# Patient Record
Sex: MALE | Race: WHITE | Employment: PART TIME | URBAN - METROPOLITAN AREA
[De-identification: names, ages, dates, MRNs, and addresses within clinical notes are randomized per-mention and may not be internally consistent; named-entity substitution may affect disease eponyms.]

---

## 2017-04-04 PROBLEM — G43.B1 INTRACTABLE OPHTHALMOPLEGIC MIGRAINE: Status: ACTIVE | Noted: 2017-04-04

## 2017-04-04 PROBLEM — Z72.0 TOBACCO ABUSE: Status: ACTIVE | Noted: 2017-04-04

## 2018-07-24 ENCOUNTER — HOSPITAL ENCOUNTER (OUTPATIENT)
Age: 68
Discharge: HOME | End: 2018-07-26
Payer: MEDICARE

## 2018-07-24 ENCOUNTER — OFFICE VISIT (OUTPATIENT)
Dept: PRIMARY CARE CLINIC | Age: 68
End: 2018-07-24
Payer: MEDICARE

## 2018-07-24 VITALS
SYSTOLIC BLOOD PRESSURE: 120 MMHG | OXYGEN SATURATION: 93 % | WEIGHT: 212 LBS | HEIGHT: 72 IN | HEART RATE: 78 BPM | TEMPERATURE: 97 F | BODY MASS INDEX: 28.71 KG/M2 | DIASTOLIC BLOOD PRESSURE: 70 MMHG

## 2018-07-24 DIAGNOSIS — E11.9 TYPE 2 DIABETES MELLITUS WITHOUT COMPLICATION, WITHOUT LONG-TERM CURRENT USE OF INSULIN (HCC): ICD-10-CM

## 2018-07-24 DIAGNOSIS — J20.9 ACUTE BRONCHITIS, UNSPECIFIED ORGANISM: Primary | ICD-10-CM

## 2018-07-24 DIAGNOSIS — E04.9 GOITER: ICD-10-CM

## 2018-07-24 DIAGNOSIS — E78.2 MIXED HYPERLIPIDEMIA: ICD-10-CM

## 2018-07-24 LAB
ALBUMIN SERPL-MCNC: 4.1 G/DL (ref 3.5–5.2)
ALP BLD-CCNC: 138 U/L (ref 40–129)
ALT SERPL-CCNC: 14 U/L (ref 0–40)
ANION GAP SERPL CALCULATED.3IONS-SCNC: 18 MMOL/L (ref 7–16)
AST SERPL-CCNC: 17 U/L (ref 0–39)
BILIRUB SERPL-MCNC: 0.9 MG/DL (ref 0–1.2)
BUN BLDV-MCNC: 12 MG/DL (ref 8–23)
CALCIUM SERPL-MCNC: 9.5 MG/DL (ref 8.6–10.2)
CHLORIDE BLD-SCNC: 99 MMOL/L (ref 98–107)
CHOLESTEROL, TOTAL: 134 MG/DL (ref 0–199)
CO2: 21 MMOL/L (ref 22–29)
CREAT SERPL-MCNC: 1.3 MG/DL (ref 0.7–1.2)
GFR AFRICAN AMERICAN: >60
GFR NON-AFRICAN AMERICAN: 55 ML/MIN/1.73
GLUCOSE BLD-MCNC: 135 MG/DL (ref 74–109)
HBA1C MFR BLD: 7.3 % (ref 4–5.6)
HDLC SERPL-MCNC: 28 MG/DL
LDL CHOLESTEROL CALCULATED: 34 MG/DL (ref 0–99)
POTASSIUM SERPL-SCNC: 4.6 MMOL/L (ref 3.5–5)
SODIUM BLD-SCNC: 138 MMOL/L (ref 132–146)
TOTAL PROTEIN: 6.7 G/DL (ref 6.4–8.3)
TRIGL SERPL-MCNC: 360 MG/DL (ref 0–149)
TSH SERPL DL<=0.05 MIU/L-ACNC: 1.93 UIU/ML (ref 0.27–4.2)
VLDLC SERPL CALC-MCNC: 72 MG/DL

## 2018-07-24 PROCEDURE — 80053 COMPREHEN METABOLIC PANEL: CPT

## 2018-07-24 PROCEDURE — 80061 LIPID PANEL: CPT

## 2018-07-24 PROCEDURE — 83036 HEMOGLOBIN GLYCOSYLATED A1C: CPT

## 2018-07-24 PROCEDURE — 84443 ASSAY THYROID STIM HORMONE: CPT

## 2018-07-24 PROCEDURE — 99214 OFFICE O/P EST MOD 30 MIN: CPT | Performed by: FAMILY MEDICINE

## 2018-07-24 RX ORDER — ATORVASTATIN CALCIUM 10 MG/1
TABLET, FILM COATED ORAL
Qty: 90 TABLET | Refills: 1 | Status: SHIPPED | OUTPATIENT
Start: 2018-07-24 | End: 2019-01-20 | Stop reason: SDUPTHER

## 2018-07-24 RX ORDER — RAMIPRIL 1.25 MG/1
CAPSULE ORAL
Qty: 90 CAPSULE | Refills: 1 | Status: SHIPPED | OUTPATIENT
Start: 2018-07-24 | End: 2019-01-20 | Stop reason: SDUPTHER

## 2018-07-24 RX ORDER — AZITHROMYCIN 250 MG/1
TABLET, FILM COATED ORAL
Qty: 1 PACKET | Refills: 0 | Status: SHIPPED | OUTPATIENT
Start: 2018-07-24 | End: 2019-01-21

## 2018-07-24 ASSESSMENT — ENCOUNTER SYMPTOMS
SHORTNESS OF BREATH: 0
BLURRED VISION: 0
CONSTIPATION: 1
SINUS PRESSURE: 1
RHINORRHEA: 1
SINUS PAIN: 1
DIARRHEA: 0
VISUAL CHANGE: 0
WHEEZING: 0
COUGH: 1

## 2018-07-24 ASSESSMENT — COPD QUESTIONNAIRES: COPD: 1

## 2018-07-24 NOTE — PROGRESS NOTES
rhythm. No murmur heard. Pulmonary/Chest: Effort normal. He has no wheezes. He has rhonchi in the right upper field and the left upper field. He has no rales. Abdominal: Soft. Bowel sounds are normal. He exhibits no mass. There is no tenderness. There is no rebound and no guarding. Musculoskeletal: Normal range of motion. He exhibits no edema. Lymphadenopathy:     He has no cervical adenopathy. Neurological: He is alert and oriented to person, place, and time. Skin: Skin is warm and dry. Psychiatric: He has a normal mood and affect. Vitals reviewed. ASSESSMENT AND PLAN:    Dimple Delatorre was seen today for diabetes, hyperlipidemia, copd, 6 month follow-up, blood work, medication refill, cough and congestion. Diagnoses and all orders for this visit:    Acute bronchitis, unspecified organism  -     azithromycin (ZITHROMAX Z-JANNETTE) 250 MG tablet; Take 2 pills on day 1, then 1 pill days 2-5. Mixed hyperlipidemia  -     atorvastatin (LIPITOR) 10 MG tablet; TAKE 1 TABLET DAILY  -     Lipid Panel; Future  -     Comprehensive Metabolic Panel; Future    Type 2 diabetes mellitus without complication, without long-term current use of insulin (HCC)  -     ramipril (ALTACE) 1.25 MG capsule; TAKE 1 CAPSULE DAILY  -     SITagliptin-metFORMIN HCl ER (JANUMET XR) 100-1000 MG TB24; TAKE 1 TABLET DAILY  -     Hemoglobin A1C; Future    Goiter  -     TSH without Reflex; Future    - call if no change in cough  - quit cig's    Return in about 6 months (around 1/24/2019), or if symptoms worsen or fail to improve.     Dwayne Ojeda, DO

## 2018-07-26 ENCOUNTER — TELEPHONE (OUTPATIENT)
Dept: PRIMARY CARE CLINIC | Age: 68
End: 2018-07-26

## 2018-11-08 LAB — DIABETIC RETINOPATHY: NEGATIVE

## 2019-01-20 DIAGNOSIS — E11.9 TYPE 2 DIABETES MELLITUS WITHOUT COMPLICATION, WITHOUT LONG-TERM CURRENT USE OF INSULIN (HCC): ICD-10-CM

## 2019-01-20 DIAGNOSIS — E78.2 MIXED HYPERLIPIDEMIA: ICD-10-CM

## 2019-01-21 ENCOUNTER — HOSPITAL ENCOUNTER (OUTPATIENT)
Age: 69
Discharge: HOME | End: 2019-01-23
Payer: MEDICARE

## 2019-01-21 ENCOUNTER — OFFICE VISIT (OUTPATIENT)
Dept: PRIMARY CARE CLINIC | Age: 69
End: 2019-01-21
Payer: MEDICARE

## 2019-01-21 VITALS
DIASTOLIC BLOOD PRESSURE: 62 MMHG | WEIGHT: 212 LBS | TEMPERATURE: 96 F | HEART RATE: 78 BPM | BODY MASS INDEX: 29.16 KG/M2 | SYSTOLIC BLOOD PRESSURE: 136 MMHG | OXYGEN SATURATION: 95 %

## 2019-01-21 DIAGNOSIS — Z12.11 COLON CANCER SCREENING: ICD-10-CM

## 2019-01-21 DIAGNOSIS — E11.65 UNCONTROLLED TYPE 2 DIABETES MELLITUS WITH HYPERGLYCEMIA (HCC): Primary | ICD-10-CM

## 2019-01-21 DIAGNOSIS — Z12.5 SCREENING PSA (PROSTATE SPECIFIC ANTIGEN): ICD-10-CM

## 2019-01-21 DIAGNOSIS — E78.2 MIXED HYPERLIPIDEMIA: ICD-10-CM

## 2019-01-21 DIAGNOSIS — E11.65 UNCONTROLLED TYPE 2 DIABETES MELLITUS WITH HYPERGLYCEMIA (HCC): ICD-10-CM

## 2019-01-21 DIAGNOSIS — J43.2 CENTRILOBULAR EMPHYSEMA (HCC): ICD-10-CM

## 2019-01-21 LAB
ALBUMIN SERPL-MCNC: 4.3 G/DL (ref 3.5–5.2)
ALP BLD-CCNC: 132 U/L (ref 40–129)
ALT SERPL-CCNC: 15 U/L (ref 0–40)
ANION GAP SERPL CALCULATED.3IONS-SCNC: 15 MMOL/L (ref 7–16)
AST SERPL-CCNC: 17 U/L (ref 0–39)
BILIRUB SERPL-MCNC: 1.6 MG/DL (ref 0–1.2)
BUN BLDV-MCNC: 10 MG/DL (ref 8–23)
CALCIUM SERPL-MCNC: 9.2 MG/DL (ref 8.6–10.2)
CHLORIDE BLD-SCNC: 96 MMOL/L (ref 98–107)
CHOLESTEROL, TOTAL: 118 MG/DL (ref 0–199)
CO2: 26 MMOL/L (ref 22–29)
CREAT SERPL-MCNC: 1.3 MG/DL (ref 0.7–1.2)
CREATININE URINE: 207 MG/DL (ref 40–278)
GFR AFRICAN AMERICAN: >60
GFR NON-AFRICAN AMERICAN: 54 ML/MIN/1.73
GLUCOSE BLD-MCNC: 169 MG/DL (ref 74–99)
HBA1C MFR BLD: 7.1 % (ref 4–5.6)
HDLC SERPL-MCNC: 33 MG/DL
LDL CHOLESTEROL CALCULATED: 45 MG/DL (ref 0–99)
MICROALBUMIN UR-MCNC: 49.1 MG/L
MICROALBUMIN/CREAT UR-RTO: 23.7 (ref 0–30)
POTASSIUM SERPL-SCNC: 4.8 MMOL/L (ref 3.5–5)
PROSTATE SPECIFIC ANTIGEN: 0.79 NG/ML (ref 0–4)
SODIUM BLD-SCNC: 137 MMOL/L (ref 132–146)
TOTAL PROTEIN: 7 G/DL (ref 6.4–8.3)
TRIGL SERPL-MCNC: 198 MG/DL (ref 0–149)
VLDLC SERPL CALC-MCNC: 40 MG/DL

## 2019-01-21 PROCEDURE — 80053 COMPREHEN METABOLIC PANEL: CPT

## 2019-01-21 PROCEDURE — 99214 OFFICE O/P EST MOD 30 MIN: CPT | Performed by: FAMILY MEDICINE

## 2019-01-21 PROCEDURE — 82570 ASSAY OF URINE CREATININE: CPT

## 2019-01-21 PROCEDURE — 82044 UR ALBUMIN SEMIQUANTITATIVE: CPT

## 2019-01-21 PROCEDURE — G0103 PSA SCREENING: HCPCS

## 2019-01-21 PROCEDURE — 83036 HEMOGLOBIN GLYCOSYLATED A1C: CPT

## 2019-01-21 PROCEDURE — 80061 LIPID PANEL: CPT

## 2019-01-21 RX ORDER — ATORVASTATIN CALCIUM 10 MG/1
TABLET, FILM COATED ORAL
Qty: 90 TABLET | Refills: 1 | Status: SHIPPED | OUTPATIENT
Start: 2019-01-21 | End: 2019-07-20 | Stop reason: SDUPTHER

## 2019-01-21 RX ORDER — RAMIPRIL 1.25 MG/1
CAPSULE ORAL
Qty: 90 CAPSULE | Refills: 1 | Status: SHIPPED | OUTPATIENT
Start: 2019-01-21 | End: 2019-07-20 | Stop reason: SDUPTHER

## 2019-01-21 RX ORDER — SITAGLIPTIN AND METFORMIN HYDROCHLORIDE 100; 1000 MG/1; MG/1
TABLET, FILM COATED, EXTENDED RELEASE ORAL
Qty: 90 TABLET | Refills: 1 | Status: SHIPPED | OUTPATIENT
Start: 2019-01-21 | End: 2019-07-20 | Stop reason: SDUPTHER

## 2019-01-21 ASSESSMENT — COPD QUESTIONNAIRES: COPD: 1

## 2019-01-21 ASSESSMENT — ENCOUNTER SYMPTOMS
BLURRED VISION: 0
VISUAL CHANGE: 0
DIARRHEA: 0
CONSTIPATION: 0
COUGH: 0
WHEEZING: 1
SHORTNESS OF BREATH: 1
BLOOD IN STOOL: 0

## 2019-01-24 ENCOUNTER — TELEPHONE (OUTPATIENT)
Dept: ADMINISTRATIVE | Age: 69
End: 2019-01-24

## 2019-01-24 ENCOUNTER — TELEPHONE (OUTPATIENT)
Dept: SURGERY | Age: 69
End: 2019-01-24

## 2019-01-30 ENCOUNTER — OFFICE VISIT (OUTPATIENT)
Dept: SURGERY | Age: 69
End: 2019-01-30

## 2019-01-30 VITALS
DIASTOLIC BLOOD PRESSURE: 66 MMHG | RESPIRATION RATE: 20 BRPM | HEIGHT: 72 IN | SYSTOLIC BLOOD PRESSURE: 136 MMHG | HEART RATE: 72 BPM | OXYGEN SATURATION: 96 % | TEMPERATURE: 97.7 F | BODY MASS INDEX: 28.71 KG/M2 | WEIGHT: 212 LBS

## 2019-01-30 DIAGNOSIS — Z12.11 ENCOUNTER FOR SCREENING COLONOSCOPY: Primary | ICD-10-CM

## 2019-01-30 PROCEDURE — 99999 PR OFFICE/OUTPT VISIT,PROCEDURE ONLY: CPT | Performed by: SURGERY

## 2019-01-31 ASSESSMENT — ENCOUNTER SYMPTOMS
WHEEZING: 0
VOMITING: 0
PHOTOPHOBIA: 0
BACK PAIN: 0
ABDOMINAL PAIN: 0
COUGH: 0
EYE REDNESS: 0
SORE THROAT: 0
NAUSEA: 0
DIARRHEA: 0
BLOOD IN STOOL: 0
CONSTIPATION: 0
SHORTNESS OF BREATH: 0

## 2019-02-22 ENCOUNTER — TELEPHONE (OUTPATIENT)
Dept: SURGERY | Age: 69
End: 2019-02-22

## 2019-02-28 RX ORDER — IBUPROFEN 200 MG
400 TABLET ORAL EVERY 6 HOURS PRN
Status: ON HOLD | COMMUNITY
End: 2021-08-26 | Stop reason: HOSPADM

## 2019-03-07 ENCOUNTER — HOSPITAL ENCOUNTER (OUTPATIENT)
Age: 69
Setting detail: OUTPATIENT SURGERY
Discharge: HOME | End: 2019-03-07
Attending: SURGERY | Admitting: SURGERY
Payer: MEDICARE

## 2019-03-07 ENCOUNTER — ANESTHESIA (OUTPATIENT)
Dept: ENDOSCOPY | Age: 69
End: 2019-03-07
Payer: MEDICARE

## 2019-03-07 ENCOUNTER — ANESTHESIA EVENT (OUTPATIENT)
Dept: ENDOSCOPY | Age: 69
End: 2019-03-07
Payer: MEDICARE

## 2019-03-07 VITALS
OXYGEN SATURATION: 94 % | BODY MASS INDEX: 27.77 KG/M2 | RESPIRATION RATE: 18 BRPM | SYSTOLIC BLOOD PRESSURE: 153 MMHG | HEIGHT: 72 IN | TEMPERATURE: 97.3 F | WEIGHT: 205 LBS | HEART RATE: 64 BPM | DIASTOLIC BLOOD PRESSURE: 80 MMHG

## 2019-03-07 VITALS — SYSTOLIC BLOOD PRESSURE: 99 MMHG | DIASTOLIC BLOOD PRESSURE: 53 MMHG | OXYGEN SATURATION: 95 %

## 2019-03-07 LAB — METER GLUCOSE: 173 MG/DL (ref 74–99)

## 2019-03-07 PROCEDURE — 2580000003 HC RX 258: Performed by: NURSE ANESTHETIST, CERTIFIED REGISTERED

## 2019-03-07 PROCEDURE — C1773 RET DEV, INSERTABLE: HCPCS | Performed by: SURGERY

## 2019-03-07 PROCEDURE — 2709999900 HC NON-CHARGEABLE SUPPLY: Performed by: SURGERY

## 2019-03-07 PROCEDURE — 7100000010 HC PHASE II RECOVERY - FIRST 15 MIN: Performed by: SURGERY

## 2019-03-07 PROCEDURE — 88305 TISSUE EXAM BY PATHOLOGIST: CPT

## 2019-03-07 PROCEDURE — 82962 GLUCOSE BLOOD TEST: CPT

## 2019-03-07 PROCEDURE — 3700000000 HC ANESTHESIA ATTENDED CARE: Performed by: SURGERY

## 2019-03-07 PROCEDURE — 45384 COLONOSCOPY W/LESION REMOVAL: CPT | Performed by: SURGERY

## 2019-03-07 PROCEDURE — 7100000011 HC PHASE II RECOVERY - ADDTL 15 MIN: Performed by: SURGERY

## 2019-03-07 PROCEDURE — 6360000002 HC RX W HCPCS: Performed by: NURSE ANESTHETIST, CERTIFIED REGISTERED

## 2019-03-07 PROCEDURE — 3609010600 HC COLONOSCOPY POLYPECTOMY SNARE/COLD BIOPSY: Performed by: SURGERY

## 2019-03-07 PROCEDURE — 45385 COLONOSCOPY W/LESION REMOVAL: CPT | Performed by: SURGERY

## 2019-03-07 PROCEDURE — 3700000001 HC ADD 15 MINUTES (ANESTHESIA): Performed by: SURGERY

## 2019-03-07 PROCEDURE — 88342 IMHCHEM/IMCYTCHM 1ST ANTB: CPT

## 2019-03-07 RX ORDER — 0.9 % SODIUM CHLORIDE 0.9 %
10 VIAL (ML) INJECTION EVERY 12 HOURS SCHEDULED
Status: DISCONTINUED | OUTPATIENT
Start: 2019-03-07 | End: 2019-03-07 | Stop reason: HOSPADM

## 2019-03-07 RX ORDER — PROPOFOL 10 MG/ML
INJECTION, EMULSION INTRAVENOUS PRN
Status: DISCONTINUED | OUTPATIENT
Start: 2019-03-07 | End: 2019-03-07 | Stop reason: SDUPTHER

## 2019-03-07 RX ORDER — FENTANYL CITRATE 50 UG/ML
INJECTION, SOLUTION INTRAMUSCULAR; INTRAVENOUS PRN
Status: DISCONTINUED | OUTPATIENT
Start: 2019-03-07 | End: 2019-03-07 | Stop reason: SDUPTHER

## 2019-03-07 RX ORDER — 0.9 % SODIUM CHLORIDE 0.9 %
10 VIAL (ML) INJECTION PRN
Status: DISCONTINUED | OUTPATIENT
Start: 2019-03-07 | End: 2019-03-07 | Stop reason: HOSPADM

## 2019-03-07 RX ORDER — SODIUM CHLORIDE 9 MG/ML
INJECTION, SOLUTION INTRAVENOUS CONTINUOUS PRN
Status: DISCONTINUED | OUTPATIENT
Start: 2019-03-07 | End: 2019-03-07 | Stop reason: SDUPTHER

## 2019-03-07 RX ORDER — SODIUM CHLORIDE 9 MG/ML
INJECTION, SOLUTION INTRAVENOUS CONTINUOUS
Status: DISCONTINUED | OUTPATIENT
Start: 2019-03-07 | End: 2019-03-07 | Stop reason: HOSPADM

## 2019-03-07 RX ADMIN — PROPOFOL 25 MG: 10 INJECTION, EMULSION INTRAVENOUS at 08:39

## 2019-03-07 RX ADMIN — PROPOFOL 25 MG: 10 INJECTION, EMULSION INTRAVENOUS at 08:46

## 2019-03-07 RX ADMIN — PROPOFOL 25 MG: 10 INJECTION, EMULSION INTRAVENOUS at 08:59

## 2019-03-07 RX ADMIN — PROPOFOL 15 MG: 10 INJECTION, EMULSION INTRAVENOUS at 08:52

## 2019-03-07 RX ADMIN — SODIUM CHLORIDE: 9 INJECTION, SOLUTION INTRAVENOUS at 08:23

## 2019-03-07 RX ADMIN — PROPOFOL 25 MG: 10 INJECTION, EMULSION INTRAVENOUS at 08:38

## 2019-03-07 RX ADMIN — PROPOFOL 25 MG: 10 INJECTION, EMULSION INTRAVENOUS at 08:41

## 2019-03-07 RX ADMIN — FENTANYL CITRATE 50 MCG: 50 INJECTION, SOLUTION INTRAMUSCULAR; INTRAVENOUS at 08:35

## 2019-03-07 RX ADMIN — FENTANYL CITRATE 50 MCG: 50 INJECTION, SOLUTION INTRAMUSCULAR; INTRAVENOUS at 08:38

## 2019-03-07 RX ADMIN — PROPOFOL 25 MG: 10 INJECTION, EMULSION INTRAVENOUS at 08:42

## 2019-03-07 RX ADMIN — PROPOFOL 50 MG: 10 INJECTION, EMULSION INTRAVENOUS at 08:37

## 2019-03-07 RX ADMIN — PROPOFOL 20 MG: 10 INJECTION, EMULSION INTRAVENOUS at 09:03

## 2019-03-07 ASSESSMENT — PAIN DESCRIPTION - LOCATION
LOCATION: ABDOMEN

## 2019-03-07 ASSESSMENT — PAIN SCALES - GENERAL
PAINLEVEL_OUTOF10: 0

## 2019-03-07 ASSESSMENT — PAIN DESCRIPTION - PROGRESSION
CLINICAL_PROGRESSION: NOT CHANGED

## 2019-03-07 ASSESSMENT — PAIN DESCRIPTION - PAIN TYPE
TYPE: ACUTE PAIN

## 2019-03-07 ASSESSMENT — LIFESTYLE VARIABLES: SMOKING_STATUS: 1

## 2019-03-12 ENCOUNTER — TELEPHONE (OUTPATIENT)
Dept: SURGERY | Age: 69
End: 2019-03-12

## 2019-03-20 ENCOUNTER — OFFICE VISIT (OUTPATIENT)
Dept: SURGERY | Age: 69
End: 2019-03-20
Payer: MEDICARE

## 2019-03-20 VITALS
HEART RATE: 70 BPM | RESPIRATION RATE: 18 BRPM | DIASTOLIC BLOOD PRESSURE: 73 MMHG | HEIGHT: 71 IN | TEMPERATURE: 98.4 F | OXYGEN SATURATION: 95 % | BODY MASS INDEX: 30.1 KG/M2 | WEIGHT: 215 LBS | SYSTOLIC BLOOD PRESSURE: 144 MMHG

## 2019-03-20 DIAGNOSIS — D12.6 ADENOMATOUS POLYP OF COLON, UNSPECIFIED PART OF COLON: Primary | ICD-10-CM

## 2019-03-20 PROCEDURE — 99212 OFFICE O/P EST SF 10 MIN: CPT | Performed by: SURGERY

## 2019-04-03 NOTE — PROGRESS NOTES
Patient presents for follow-up after recent colonoscopy with polypectomies. He did have several polyps comfortably a moderate size sessile one in the cecum. Fortunately this came back as tubular adenoma. Based on his polyp burden as well as this large polyp in the cecum, would recommend repeat colonoscopy in one year.

## 2019-04-24 ENCOUNTER — OFFICE VISIT (OUTPATIENT)
Dept: PRIMARY CARE CLINIC | Age: 69
End: 2019-04-24
Payer: MEDICARE

## 2019-04-24 VITALS
BODY MASS INDEX: 30.34 KG/M2 | TEMPERATURE: 98.2 F | OXYGEN SATURATION: 96 % | DIASTOLIC BLOOD PRESSURE: 60 MMHG | SYSTOLIC BLOOD PRESSURE: 132 MMHG | WEIGHT: 224 LBS | HEIGHT: 72 IN | HEART RATE: 88 BPM

## 2019-04-24 DIAGNOSIS — R35.1 NOCTURIA: ICD-10-CM

## 2019-04-24 DIAGNOSIS — E11.9 TYPE 2 DIABETES MELLITUS WITHOUT COMPLICATION, WITHOUT LONG-TERM CURRENT USE OF INSULIN (HCC): ICD-10-CM

## 2019-04-24 DIAGNOSIS — Z00.00 ROUTINE GENERAL MEDICAL EXAMINATION AT A HEALTH CARE FACILITY: Primary | ICD-10-CM

## 2019-04-24 LAB — HBA1C MFR BLD: 8.1 %

## 2019-04-24 PROCEDURE — 83036 HEMOGLOBIN GLYCOSYLATED A1C: CPT | Performed by: FAMILY MEDICINE

## 2019-04-24 PROCEDURE — G0438 PPPS, INITIAL VISIT: HCPCS | Performed by: FAMILY MEDICINE

## 2019-04-24 ASSESSMENT — PATIENT HEALTH QUESTIONNAIRE - PHQ9
SUM OF ALL RESPONSES TO PHQ QUESTIONS 1-9: 0
2. FEELING DOWN, DEPRESSED OR HOPELESS: 0
1. LITTLE INTEREST OR PLEASURE IN DOING THINGS: 0
SUM OF ALL RESPONSES TO PHQ9 QUESTIONS 1 & 2: 0
SUM OF ALL RESPONSES TO PHQ QUESTIONS 1-9: 0

## 2019-04-24 ASSESSMENT — ANXIETY QUESTIONNAIRES: GAD7 TOTAL SCORE: 0

## 2019-04-24 ASSESSMENT — ENCOUNTER SYMPTOMS
VISUAL CHANGE: 0
CONSTIPATION: 0
BLURRED VISION: 0
DIARRHEA: 0

## 2019-04-24 ASSESSMENT — LIFESTYLE VARIABLES: HOW OFTEN DO YOU HAVE A DRINK CONTAINING ALCOHOL: 0

## 2019-04-24 NOTE — PROGRESS NOTES
Karl Waggoner, a male of 77 y.o. came to the office 4/24/2019. Patient Active Problem List   Diagnosis    [facility] obstructive [facility] [address]    DM2 (diabetes mellitus, type 2) ([facility]    Hyperlipemia    Nephrolithiasis     Tobacco abuse    Intractable ophthalmoplegic migraine    Adenomatous polyp of colon          Diabetes   He presents for his follow-up diabetic visit. He has type 2 diabetes mellitus. His disease course has been stable. Associated symptoms include foot paresthesias (tingling but tolerable) and polyuria (at night doesn't want med). Pertinent negatives for diabetes include no blurred vision, no chest pain, no fatigue, no foot ulcerations, no polydipsia, no visual change and no weight loss. Symptoms are stable. Current diabetic treatment includes oral agent (dual therapy). He is compliant with treatment all of the time. He is following a generally unhealthy (eating more potatoe chips hs. ) diet. He participates in exercise intermittently. An ACE inhibitor/angiotensin II receptor blocker is being taken. copd: didn't notice Tudorza helping breathing. Out of for 2 months. Still smoking. Allergies   Allergen Reactions    Pcn [Penicillins]     Tape Donzell Meade Tape]        Current Outpatient Medications on File Prior to Visit   Medication Sig Dispense Refill    ibuprofen (ADVIL;MOTRIN) 200 MG tablet Take 400 mg by mouth every 6 hours as needed for Pain LD 2/28/19      ramipril (ALTACE) 1.25 MG capsule TAKE 1 CAPSULE DAILY 90 capsule 1    atorvastatin (LIPITOR) 10 MG tablet TAKE 1 TABLET DAILY 90 tablet 1    JANUMET -1000 MG TB24 TAKE 1 TABLET DAILY 90 tablet 1    aclidinium (TUDORZA PRESSAIR) 400 MCG/ACT AEPB inhaler Inhale 1 puff into the lungs 2 times daily 2 each 0    triamcinolone (KENALOG) 0.1 % cream Apply topically 2 times daily.  45 g 2    aspirin EC 81 MG EC tablet Take 81 mg by mouth daily LD 2/25/19 per surgeon       No current facility-administered medications on file prior to visit. Review of Systems   Constitutional: Negative for fatigue and weight loss. Eyes: Negative for blurred vision. Cardiovascular: Negative for chest pain, palpitations and leg swelling. Gastrointestinal: Negative for constipation and diarrhea. Endocrine: Positive for polyuria (at night doesn't want med). Negative for polydipsia. All other review of systems reviewed and are negative    OBJECTIVE:  /60   Pulse 88   Temp 98.2 °F (36.8 °C)   Ht 5' 11.5\" (1.816 m)   Wt 224 lb (101.6 kg)   SpO2 96%   BMI 30.81 kg/m²      Physical Exam   Constitutional: He is oriented to person, place, and time. He appears well-nourished. Eyes: Conjunctivae are normal. No scleral icterus. Neck: Neck supple. Carotid bruit is not present. No thyromegaly present. Cardiovascular: Normal rate and regular rhythm. No murmur heard. Pulmonary/Chest: Effort normal and breath sounds normal. He has no wheezes. He has no rales. Abdominal: Soft. Bowel sounds are normal. He exhibits no mass. There is no tenderness. There is no rebound and no guarding. Musculoskeletal: Normal range of motion. He exhibits no edema. Lymphadenopathy:     He has no cervical adenopathy. Neurological: He is alert and oriented to person, place, and time. Skin: Skin is warm and dry. Psychiatric: He has a normal mood and affect. Vitals reviewed. Results for Timbo Marion (MRN [id number]) as of 4/24/2019 09:28   Ref. Range 4/24/2019 09:13   Hemoglobin A1C Latest Units: % 8.1       ASSESSMENT AND PLAN:    Kourtney Butts was seen today for medicare awv.     Diagnoses and all orders for this visit:    Routine general medical examination at a health care facility    Type 2 diabetes mellitus without complication, without long-term current use of insulin (HCC)  -     POCT glycosylated hemoglobin (Hb A1C)    Nocturia    - quit cig use   - continue meds   - watch diet better ie less carb's    Return in about 3 months (around 7/24/2019) for Medicare Annual Wellness Visit in 1 year.     Sammie Ojeda, DO

## 2019-04-24 NOTE — PATIENT INSTRUCTIONS
Patient Education        Advance Care Planning: Care Instructions  Your Care Instructions    It can be hard to live with an illness that cannot be cured. But if your health is getting worse, you may want to make decisions about end-of-life care. Planning for the end of your life does not mean that you are giving up. It is a way to make sure that your wishes are met. Clearly stating your wishes can make it easier for your loved ones. Making plans while you are still able may also ease your mind and make your final days less stressful and more meaningful. Follow-up care is a key part of your treatment and safety. Be sure to make and go to all appointments, and call your doctor if you are having problems. It's also a good idea to know your test results and keep a list of the medicines you take. What can you do to plan for the end of life? · You can bring these issues up with your doctor. You do not need to wait until your doctor starts the conversation. You might start with \"I would not be willing to live with . Judithe Austin Judithe Austin Judithe Austin \" When you complete this sentence it helps your doctor understand your wishes. · Talk openly and honestly with your doctor. This is the best way to understand the decisions you will need to make as your health changes. Know that you can always change your mind. · Ask your doctor about commonly used life-support measures. These include tube feedings, breathing machines, and fluids given through a vein (IV). Understanding these treatments will help you decide whether you want them. · You may choose to have these life-supporting treatments for a limited time. This allows a trial period to see whether they will help you. You may also decide that you want your doctor to take only certain measures to keep you alive. It is important to spell out these conditions so that your doctor and family understand them. · Talk to your doctor about how long you are likely to live.  He or she may be able to give you an papers. Where can you learn more? Go to [email or web address] and sign in to your [employer] account. Enter P184 in the [employer] box to learn more about \"Advance Care Planning: Care Instructions. \"     If you do not have an account, please click on the \"Sign Up Now\" link. Current as of: April 18, 2018  Content Version: 11.9  © 7351-1382 [employer]. Care instructions adapted under license by [facility] ([facility]). If you have questions about a medical condition or this instruction, always ask your healthcare professional. [place] [address] any warranty or liability for your use of this information. Personalized Preventive Plan for Mahamed Beyer - 4/24/2019  Medicare offers a range of preventive health benefits. Some of the tests and screenings are paid in full while other may be subject to a deductible, co-insurance, and/or copay. Some of these benefits include a comprehensive review of your medical history including lifestyle, illnesses that may run in your family, and various assessments and screenings as appropriate. After reviewing your medical record and screening and assessments performed today your provider may have ordered immunizations, labs, imaging, and/or referrals for you. A list of these orders (if applicable) as well as your Preventive Care list are included within your After Visit Summary for your review. Other Preventive Recommendations:    · A preventive eye exam performed by an eye specialist is recommended every 1-2 years to screen for glaucoma; cataracts, macular degeneration, and other eye disorders. · A preventive dental visit is recommended every 6 months. · Try to get at least 150 minutes of exercise per week or 10,000 steps per day on a pedometer . · Order or download the FREE \"Exercise & Physical Activity: Your Everyday Guide\" from [employer].  Call [phone] or search [facility]

## 2019-04-24 NOTE — PROGRESS NOTES
Onset    Stroke Father        CareTeam (Including outside providers/suppliers regularly involved in providing care):   Patient Care Team:  Calin Campos DO as PCP - General (Family Medicine)  Calin Campos DO as PCP - [facility] Attributed Provider    Wt Readings from Last 3 Encounters:   04/24/19 224 lb (101.6 kg)   03/20/19 215 lb (97.5 kg)   03/07/19 205 lb (93 kg)     Vitals:    04/24/19 0832   BP: 132/60   Pulse: 88   Temp: 98.2 °F (36.8 °C)   SpO2: 96%   Weight: 224 lb (101.6 kg)   Height: 5' 11.5\" (1.816 m)     Body mass index is 30.81 kg/m². Based upon direct observation of the patient, evaluation of cognition reveals recent and remote memory intact.     General Appearance: alert and oriented to person, place and time, well developed and well- nourished, in no acute distress  Skin: warm and dry, no rash or erythema  Head: normocephalic and atraumatic  Eyes: pupils equal, round, and reactive to light, extraocular eye movements intact, conjunctivae normal  ENT: tympanic membrane, external ear and ear canal normal bilaterally, nose without deformity, nasal mucosa and turbinates normal without polyps  Neck: supple and non-tender without mass, no thyromegaly or thyroid nodules, no cervical lymphadenopathy  Pulmonary/Chest: clear to auscultation bilaterally- no wheezes, rales or rhonchi, normal air movement, no respiratory distress  Cardiovascular: normal rate, regular rhythm, normal S1 and S2, no murmurs, rubs, clicks, or gallops, distal pulses intact, no carotid bruits  Abdomen: soft, non-tender, non-distended, normal bowel sounds, no masses or organomegaly  Extremities: no cyanosis, clubbing or edema  Musculoskeletal: normal range of motion, no joint swelling, deformity or tenderness  Neurologic: reflexes normal and symmetric, no cranial nerve deficit, gait, coordination and speech normal    Patient's complete Health Risk Assessment and screening values have been reviewed and are found in Flowsheets. The following problems were reviewed today and where indicated follow up appointments were made and/or referrals ordered. Positive Risk Factor Screenings with Interventions:     Substance Abuse:  Social History     Tobacco History     Smoking Status  Current Every Day Smoker Smoking Frequency  1.5 packs/day for 35 years (52.5 pk yrs) Smoking Tobacco Type  Cigarettes    Smokeless Tobacco Use  Never Used          Alcohol History     Alcohol Use Status  Yes Comment  SOCIALLY          Drug Use     Drug Use Status  No          Sexual Activity     Sexually Active  Never               Audit Questionnaire: Screen for Alcohol Misuse  How often do you have a drink containing alcohol?: Never  Substance Abuse Interventions:  · Tobacco abuse:  patient is not ready to work toward tobacco cessation at this time    General Health:  General  In general, how would you say your health is?: Good  In the past 7 days, have you experienced any of the following? New or Increased Pain, New or Increased Fatigue, Loneliness, Social Isolation, Stress or Anger?: None of These  Do you get the social and emotional support that you need?: Yes  Do you have a Living Will?: (!) No  General Health Risk Interventions:  · No Living Will: recommend poa and living will    Health Habits/Nutrition:  Health Habits/Nutrition  Do you exercise for at least 20 minutes 2-3 times per week?: (!) No  Have you lost any weight without trying in the past 3 months?: No  Do you eat fewer than 2 meals per day?: No  Have you seen a dentist within the past year?: (!) No  Body mass index is 30.81 kg/m².   Health Habits/Nutrition Interventions:  · Inadequate physical activity:  patient is not ready to increase his/her physical activity level at this time  · Dental exam overdue:  patient encouraged to make appointment with his/her dentist    Hearing/Vision:  Hearing/Vision  Do you or your family notice any trouble with your hearing?: No  Do you have difficulty driving, watching TV, or doing any of your daily activities because of your eyesight?: No  Have you had an eye exam within the past year?: (!) No  Hearing/Vision Interventions:  · Vision concerns:  had eye done at home. Personalized Preventive Plan   Current Health Maintenance Status  Immunization History   Administered Date(s) Administered    Pneumococcal 13-valent Conjugate ([id number]) 2017    Pneumococcal Polysaccharide ([id number]) 2016        Health Maintenance   Topic Date Due    Hepatitis C screen  1950    Diabetic foot exam  1958    Diabetic retinal exam  1958    DTaP/Tdap/Td vaccine (1 - Tdap) 1967    Shingles Vaccine (1 of 2) 1998    Low dose CT lung screening  2003    Flu vaccine (Season Ended) 2019    A1C test (Diabetic or Prediabetic)  2020    Diabetic microalbuminuria test  2020    Lipid screen  2020    Potassium monitoring  2020    Creatinine monitoring  2020    Colon cancer screen colonoscopy  2029    Pneumococcal 65+ years Vaccine  Completed    AAA screen  Completed     Recommendations for Preventive Services Due: see orders and patient instructions/AVS.  . Recommended screening schedule for the next 5-10 years is provided to the patient in written form: see Patient Instructions/AVS.        Medicare Annual Wellness Visit  Name: Editha Schlatter Date: 2019   MRN: [id number] Sex: Male   Age: 77 y.o. Ethnicity: Non-/Non    : 1950 Race: Richard Mcgrath is here for Medicare AWV (yearly exam)    Screenings for behavioral, psychosocial and functional/safety risks, and cognitive dysfunction are all negative except as indicated below. These results, as well as other patient data from the [address] form, are documented in Flowsheets linked to this Encounter.     Allergies   Allergen Reactions    Pcn [Penicillins]     Tape Kaity Carpio Prior to Visit Medications    Medication Sig Taking? Authorizing Provider   ibuprofen (ADVIL;MOTRIN) 200 MG tablet Take 400 mg by mouth every 6 hours as needed for Pain LD 2/28/19 Yes Historical Provider, MD   ramipril (ALTACE) 1.25 MG capsule TAKE 1 CAPSULE DAILY Yes Vinod Ojeda DO   atorvastatin (LIPITOR) 10 MG tablet TAKE 1 TABLET DAILY Yes Vinod Ojeda DO   JANUMET -1000 MG TB24 TAKE 1 TABLET DAILY Yes Vinod Ojeda DO   aclidinium (TUDORZA PRESSAIR) 400 MCG/ACT AEPB inhaler Inhale 1 puff into the lungs 2 times daily Yes Vinod Ojeda DO   triamcinolone (KENALOG) 0.1 % cream Apply topically 2 times daily. Yes Balta Grandchild DO Rusty   aspirin EC 81 MG EC tablet Take 81 mg by mouth daily LD 2/25/19 per surgeon Yes Historical Provider, MD     Past Medical History:   Diagnosis Date    Colon polyps 03/07/2019    Diabetes ([address]     Hypertension     Rash     noted 2/28/19- says it is on back & shoulders     Past Surgical History:   Procedure Laterality Date    APPENDECTOMY      CHOLECYSTECTOMY      COLONOSCOPY  12 years ago    COLONOSCOPY N/A 3/7/2019    COLONOSCOPY POLYPECTOMY SNARE/COLD BIOPSY performed by Gemma Teixeira DO at [address]      LITHOTRIPSY       Family History   Problem Relation Age of Onset    Stroke Father        CareTeam (Including outside providers/suppliers regularly involved in providing care):   Patient Care Team:  Kade Gan DO as PCP - General (Family Medicine)  Kade Gan DO as PCP - [facility] Attributed Provider    Wt Readings from Last 3 Encounters:   04/24/19 224 lb (101.6 kg)   03/20/19 215 lb (97.5 kg)   03/07/19 205 lb (93 kg)     Vitals:    04/24/19 0832   BP: 132/60   Pulse: 88   Temp: 98.2 °F (36.8 °C)   SpO2: 96%   Weight: 224 lb (101.6 kg)   Height: 5' 11.5\" (1.816 m)     Body mass index is 30.81 kg/m².     Based upon direct observation of the patient, evaluation of cognition reveals recent and remote memory intact. General Appearance: alert and oriented to person, place and time, well developed and well- nourished, in no acute distress  Skin: warm and dry, no rash or erythema  Head: normocephalic and atraumatic  Eyes: pupils equal, round, and reactive to light, extraocular eye movements intact, conjunctivae normal  ENT: tympanic membrane, external ear and ear canal normal bilaterally, nose without deformity, nasal mucosa and turbinates normal without polyps  Neck: supple and non-tender without mass, no thyromegaly or thyroid nodules, no cervical lymphadenopathy  Pulmonary/Chest: clear to auscultation bilaterally- no wheezes, rales or rhonchi, normal air movement, no respiratory distress  Cardiovascular: normal rate, regular rhythm, normal S1 and S2, no murmurs, rubs, clicks, or gallops, distal pulses intact, no carotid bruits  Abdomen: soft, non-tender, non-distended, normal bowel sounds, no masses or organomegaly  Extremities: no cyanosis, clubbing or edema  Musculoskeletal: normal range of motion, no joint swelling, deformity or tenderness  Neurologic: reflexes normal and symmetric, no cranial nerve deficit, gait, coordination and speech normal    Patient's complete Health Risk Assessment and screening values have been reviewed and are found in Flowsheets. The following problems were reviewed today and where indicated follow up appointments were made and/or referrals ordered.     Positive Risk Factor Screenings with Interventions:     Substance Abuse:  Social History     Tobacco History     Smoking Status  Current Every Day Smoker Smoking Frequency  1.5 packs/day for 35 years (52.5 pk yrs) Smoking Tobacco Type  Cigarettes    Smokeless Tobacco Use  Never Used          Alcohol History     Alcohol Use Status  Yes Comment  SOCIALLY          Drug Use     Drug Use Status  No          Sexual Activity     Sexually Active  Never               Audit Questionnaire: Screen for Alcohol Misuse  How often do you have a drink containing alcohol?: Never  Substance Abuse Interventions:  · Tobacco abuse:  patient is not ready to work toward tobacco cessation at this time    General Health:  General  In general, how would you say your health is?: Good  In the past 7 days, have you experienced any of the following? New or Increased Pain, New or Increased Fatigue, Loneliness, Social Isolation, Stress or Anger?: None of These  Do you get the social and emotional support that you need?: Yes  Do you have a Living Will?: (!) No  General Health Risk Interventions:  · No Living Will: recommended    Health Habits/Nutrition:  Health Habits/Nutrition  Do you exercise for at least 20 minutes 2-3 times per week?: (!) No  Have you lost any weight without trying in the past 3 months?: No  Do you eat fewer than 2 meals per day?: No  Have you seen a dentist within the past year?: (!) No  Body mass index is 30.81 kg/m².   Health Habits/Nutrition Interventions:  · Inadequate physical activity:  patient is not ready to increase his/her physical activity level at this time  · Dental exam overdue:  patient encouraged to make appointment with his/her dentist    Hearing/Vision:  Hearing/Vision  Do you or your family notice any trouble with your hearing?: No  Do you have difficulty driving, watching TV, or doing any of your daily activities because of your eyesight?: No  Have you had an eye exam within the past year?: (!) No  Hearing/Vision Interventions:  · Vision concerns:  had home eye exam    Personalized Preventive Plan   Current Health Maintenance Status  Immunization History   Administered Date(s) Administered    Pneumococcal 13-valent Conjugate ([id number]) 01/03/2017    Pneumococcal Polysaccharide ([id number]) 01/19/2016        Health Maintenance   Topic Date Due    Hepatitis C screen  1950    Diabetic foot exam  05/01/1958    Diabetic retinal exam  05/01/1958    DTaP/Tdap/Td

## 2019-07-24 ENCOUNTER — HOSPITAL ENCOUNTER (OUTPATIENT)
Dept: GENERAL RADIOLOGY | Age: 69
Discharge: HOME | End: 2019-07-26
Payer: MEDICARE

## 2019-07-24 ENCOUNTER — HOSPITAL ENCOUNTER (OUTPATIENT)
Age: 69
Discharge: HOME | End: 2019-07-26
Payer: MEDICARE

## 2019-07-24 ENCOUNTER — OFFICE VISIT (OUTPATIENT)
Dept: PRIMARY CARE CLINIC | Age: 69
End: 2019-07-24
Payer: MEDICARE

## 2019-07-24 VITALS
TEMPERATURE: 98.2 F | HEIGHT: 71 IN | DIASTOLIC BLOOD PRESSURE: 84 MMHG | HEART RATE: 72 BPM | BODY MASS INDEX: 30.66 KG/M2 | WEIGHT: 219 LBS | OXYGEN SATURATION: 98 % | SYSTOLIC BLOOD PRESSURE: 128 MMHG

## 2019-07-24 DIAGNOSIS — E11.9 TYPE 2 DIABETES MELLITUS WITHOUT COMPLICATION, WITHOUT LONG-TERM CURRENT USE OF INSULIN (HCC): ICD-10-CM

## 2019-07-24 DIAGNOSIS — E11.9 TYPE 2 DIABETES MELLITUS WITHOUT COMPLICATION, WITHOUT LONG-TERM CURRENT USE OF INSULIN (HCC): Primary | ICD-10-CM

## 2019-07-24 DIAGNOSIS — G25.81 RLS (RESTLESS LEGS SYNDROME): ICD-10-CM

## 2019-07-24 DIAGNOSIS — R05.9 COUGH: ICD-10-CM

## 2019-07-24 LAB
ALBUMIN SERPL-MCNC: 4.3 G/DL (ref 3.5–5.2)
ALP BLD-CCNC: 137 U/L (ref 40–129)
ALT SERPL-CCNC: 15 U/L (ref 0–40)
ANION GAP SERPL CALCULATED.3IONS-SCNC: 14 MMOL/L (ref 7–16)
AST SERPL-CCNC: 16 U/L (ref 0–39)
BILIRUB SERPL-MCNC: 0.9 MG/DL (ref 0–1.2)
BUN BLDV-MCNC: 10 MG/DL (ref 8–23)
CALCIUM SERPL-MCNC: 9.5 MG/DL (ref 8.6–10.2)
CHLORIDE BLD-SCNC: 98 MMOL/L (ref 98–107)
CHOLESTEROL, TOTAL: 128 MG/DL (ref 0–199)
CO2: 26 MMOL/L (ref 22–29)
CREAT SERPL-MCNC: 1.4 MG/DL (ref 0.7–1.2)
GFR AFRICAN AMERICAN: >60
GFR NON-AFRICAN AMERICAN: 50 ML/MIN/1.73
GLUCOSE BLD-MCNC: 166 MG/DL (ref 74–99)
HBA1C MFR BLD: 7.5 %
HDLC SERPL-MCNC: 35 MG/DL
LDL CHOLESTEROL CALCULATED: 50 MG/DL (ref 0–99)
POTASSIUM SERPL-SCNC: 5.1 MMOL/L (ref 3.5–5)
SODIUM BLD-SCNC: 138 MMOL/L (ref 132–146)
TOTAL PROTEIN: 7 G/DL (ref 6.4–8.3)
TRIGL SERPL-MCNC: 214 MG/DL (ref 0–149)
VLDLC SERPL CALC-MCNC: 43 MG/DL

## 2019-07-24 PROCEDURE — 80053 COMPREHEN METABOLIC PANEL: CPT

## 2019-07-24 PROCEDURE — 71046 X-RAY EXAM CHEST 2 VIEWS: CPT

## 2019-07-24 PROCEDURE — 83036 HEMOGLOBIN GLYCOSYLATED A1C: CPT | Performed by: FAMILY MEDICINE

## 2019-07-24 PROCEDURE — 99215 OFFICE O/P EST HI 40 MIN: CPT | Performed by: FAMILY MEDICINE

## 2019-07-24 PROCEDURE — 80061 LIPID PANEL: CPT

## 2019-07-24 RX ORDER — ROPINIROLE 0.5 MG/1
0.5 TABLET, FILM COATED ORAL NIGHTLY
Qty: 30 TABLET | Refills: 3 | Status: SHIPPED
Start: 2019-07-24 | End: 2020-04-29 | Stop reason: SDUPTHER

## 2019-07-24 ASSESSMENT — COPD QUESTIONNAIRES: COPD: 1

## 2019-07-24 ASSESSMENT — ENCOUNTER SYMPTOMS
STRIDOR: 1
SORE THROAT: 0
NAUSEA: 0
SHORTNESS OF BREATH: 0
CONSTIPATION: 1
ABDOMINAL PAIN: 0
WHEEZING: 0
SPUTUM PRODUCTION: 0
COUGH: 1

## 2019-07-24 NOTE — PATIENT INSTRUCTIONS
Patient Education        Advance Directives: Care Instructions  Your Care Instructions  An advance directive is a legal way to state your wishes at the end of your life. It tells your family and your doctor what to do if you can no longer say what you want. There are two main types of advance directives. You can change them any time that your wishes change. · A living will tells your family and your doctor your wishes about life support and other treatment. · A durable power of  for health care lets you name a person to make treatment decisions for you when you can't speak for yourself. This person is called a health care agent. If you do not have an advance directive, decisions about your medical care may be made by a doctor or a  who doesn't know you. It may help to think of an advance directive as a gift to the people who care for you. If you have one, they won't have to make tough decisions by themselves. Follow-up care is a key part of your treatment and safety. Be sure to make and go to all appointments, and call your doctor if you are having problems. It's also a good idea to know your test results and keep a list of the medicines you take. How can you care for yourself at home? · Discuss your wishes with your loved ones and your doctor. This way, there are no surprises. · Many states have a unique form. Or you might use a universal form that has been approved by many states. This kind of form can sometimes be completed and stored online. Your electronic copy will then be available wherever you have a connection to the Internet. In most cases, doctors will respect your wishes even if you have a form from a different state. · You don't need a  to do an advance directive. But you may want to get legal advice. · Think about these questions when you prepare an advance directive:  ? Who do you want to make decisions about your medical care if you are not able to?  Many people choose a family member or close friend. ? Do you know enough about life support methods that might be used? If not, talk to your doctor so you understand. ? What are you most afraid of that might happen? You might be afraid of having pain, losing your independence, or being kept alive by machines. ? Where would you prefer to die? Choices include your home, a hospital, or a nursing home. ? Would you like to have information about hospice care to support you and your family? ? Do you want to donate organs when you die? ? Do you want certain Hindu practices performed before you die? If so, put your wishes in the advance directive. · Read your advance directive every year, and make changes as needed. When should you call for help? Be sure to contact your doctor if you have any questions. Where can you learn more? Go to [email or web address] org and sign in to your [employer] account. Enter R264 in the Oom[employer] box to learn more about \"Advance Directives: Care Instructions. \"     If you do not have an account, please click on the \"Sign Up Now\" link. Current as of: April 18, 2018  Content Version: 12.0  © 6695-5590 Healthwise, Incorporated. Care instructions adapted under license by [facility] ([facility]). If you have questions about a medical condition or this instruction, always ask your healthcare professional. [address] any warranty or liability for your use of this information.

## 2019-07-25 ENCOUNTER — TELEPHONE (OUTPATIENT)
Dept: PRIMARY CARE CLINIC | Age: 69
End: 2019-07-25

## 2019-07-25 DIAGNOSIS — R91.8 RIGHT LOWER LOBE LUNG MASS: Primary | ICD-10-CM

## 2019-07-25 RX ORDER — LEVOFLOXACIN 500 MG/1
500 TABLET, FILM COATED ORAL DAILY
Qty: 10 TABLET | Refills: 0 | Status: SHIPPED | OUTPATIENT
Start: 2019-07-25 | End: 2019-08-04

## 2019-07-25 NOTE — TELEPHONE ENCOUNTER
Called patient discussed labs which show cholesterol doing well. Discussed chest x-ray which shows a right lower lung mass versus infiltrate. Therefore we will get a CAT scan of his chest with contrast.  We will also start him on antibiotic.   Further treatment based on results

## 2019-08-01 ENCOUNTER — HOSPITAL ENCOUNTER (OUTPATIENT)
Dept: CT IMAGING | Age: 69
Discharge: HOME | End: 2019-08-03
Payer: MEDICARE

## 2019-08-01 ENCOUNTER — TELEPHONE (OUTPATIENT)
Dept: PRIMARY CARE CLINIC | Age: 69
End: 2019-08-01

## 2019-08-01 DIAGNOSIS — R91.8 RIGHT LOWER LOBE LUNG MASS: ICD-10-CM

## 2019-08-01 DIAGNOSIS — R91.8 HILAR MASS: Primary | ICD-10-CM

## 2019-08-01 DIAGNOSIS — E07.9 THYROID MASS: ICD-10-CM

## 2019-08-01 PROCEDURE — 6360000004 HC RX CONTRAST MEDICATION: Performed by: RADIOLOGY

## 2019-08-01 PROCEDURE — 2580000003 HC RX 258: Performed by: RADIOLOGY

## 2019-08-01 PROCEDURE — 71260 CT THORAX DX C+: CPT

## 2019-08-01 RX ORDER — SODIUM CHLORIDE 0.9 % (FLUSH) 0.9 %
10 SYRINGE (ML) INJECTION PRN
Status: DISCONTINUED | OUTPATIENT
Start: 2019-08-01 | End: 2019-08-04 | Stop reason: HOSPADM

## 2019-08-01 RX ADMIN — Medication 10 ML: at 08:38

## 2019-08-01 RX ADMIN — IOPAMIDOL 90 ML: 755 INJECTION, SOLUTION INTRAVENOUS at 08:37

## 2019-08-19 NOTE — PROGRESS NOTES
[facility] PRE-ADMISSION TESTING INSTRUCTIONS    The Preadmission Testing patient is instructed accordingly using the following criteria (check applicable):    ARRIVAL INSTRUCTIONS:  [x] Parking the day of Surgery is located in the Main Entrance lot. Upon entering the door, make an immediate right to the surgery reception desk    [] [id number] is available Monday through Friday 6 am to 6 pm    [x] Bring photo ID and insurance card    [] Bring in a copy of Living will or Durable Power of  papers. [x] Please be sure to arrange for responsible adult to provide transportation to and from the hospital    [x] Please arrange for responsible adult to be with you for the 24 hour period post procedure due to having anesthesia      GENERAL INSTRUCTIONS:    [x] Nothing by mouth after midnight, including gum, candy, mints or water    [x] You may brush your teeth, but do not swallow any water    [] Take medications as instructed with 1-2 oz of water    [x] Stop herbal supplements and vitamins 5 days prior to procedure    [x] Follow preop dosing of blood thinners per physician instructions    [] Take 1/2 dose of evening insulin, but no insulin after midnight    [x] No oral diabetic medications after midnight    [x] If diabetic and have low blood sugar or feel symptomatic, take 1-2oz apple juice only    [x] Bring inhalers day of surgery    [] Bring C-PAP/ Bi-Pap day of surgery    [] Bring urine specimen day of surgery    [x]  no lotion, powders or creams     [] Follow bowel prep as instructed per surgeon    [x] No tobacco products within 24 hours of surgery     [x] No alcohol or illegal drug use within 24 hours of surgery.     [x] Jewelry, body piercing's, eyeglasses, contact lenses and dentures are not permitted into surgery (bring cases)      [] Please do not wear any nail polish, make up or hair products on the day of surgery    [x] If not already done, you can expect a call from registration    [x] You can expect a call the business day prior to procedure to notify you if your arrival time changes    [x] If you receive a survey after surgery we would greatly appreciate your comments    [] Parent/guardian of a minor must accompany their child and remain on the premises  the entire time they are under our care     [] Pediatric patients may bring favorite toy, blanket or comfort item with them    [] A caregiver or family member must remain with the patient during their stay if they are mentally handicapped, have dementia, disoriented or unable to use a call light or would be a safety concern if left unattended    [x] Please notify surgeon if you develop any illness between now and time of surgery (cold, cough, sore throat, fever, nausea, vomiting) or any signs of infections  including skin, wounds, and dental.    [x]  The Outpatient Pharmacy is available to fill your prescription here on your day of surgery, ask your preop nurse for details    [] Other instructions  EDUCATIONAL MATERIALS PROVIDED:    [] PAT Preoperative Education Packet/Booklet     [] Medication List    [] Fluoroscopy Information Pamphlet    [] Transfusion bracelet applied with instructions    [] Joint replacement video reviewed    [] Shower with soap, lather and rinse well, and use CHG wipes provided the evening before surgery as instructed

## 2019-08-21 ENCOUNTER — OFFICE VISIT (OUTPATIENT)
Dept: PRIMARY CARE CLINIC | Age: 69
End: 2019-08-21
Payer: MEDICARE

## 2019-08-21 VITALS
HEIGHT: 72 IN | DIASTOLIC BLOOD PRESSURE: 82 MMHG | HEART RATE: 72 BPM | SYSTOLIC BLOOD PRESSURE: 128 MMHG | TEMPERATURE: 98 F | BODY MASS INDEX: 28.99 KG/M2 | WEIGHT: 214 LBS | OXYGEN SATURATION: 98 %

## 2019-08-21 DIAGNOSIS — R91.8 HILAR MASS: Primary | ICD-10-CM

## 2019-08-21 DIAGNOSIS — E04.9 GOITER: ICD-10-CM

## 2019-08-21 DIAGNOSIS — E11.9 TYPE 2 DIABETES MELLITUS WITHOUT COMPLICATION, WITHOUT LONG-TERM CURRENT USE OF INSULIN (HCC): ICD-10-CM

## 2019-08-21 DIAGNOSIS — R05.9 COUGH: ICD-10-CM

## 2019-08-21 LAB
CHP ED QC CHECK: NORMAL
GLUCOSE BLD-MCNC: 154 MG/DL

## 2019-08-21 PROCEDURE — 82962 GLUCOSE BLOOD TEST: CPT | Performed by: FAMILY MEDICINE

## 2019-08-21 PROCEDURE — 99213 OFFICE O/P EST LOW 20 MIN: CPT | Performed by: FAMILY MEDICINE

## 2019-08-21 ASSESSMENT — ENCOUNTER SYMPTOMS
COUGH: 1
SHORTNESS OF BREATH: 1
WHEEZING: 0
CHEST TIGHTNESS: 0

## 2019-08-21 NOTE — H&P
Outpatient h&p        CC:  · Abnormal CT  HPI:  · Sukhwinder Morfin is a 23-year-old male who presents today with an abnormal CT scan of the chest.  The CT was done after an abnormal chest radiograph. The patient was complaining of a cough.     The CT evidenced what appears to be an infiltrate/mass at the left lower lobe with a postobstructive process. There is been no fever chills night sweats weight loss or hemoptysis. The patient is noted to be a lifelong smoker having smoked about 70 fax 5 pack years. He continues to smoke now. Recently, he was placed on steel toe. He has presumed underlying chronic obstructive pulmonary disease.     PMH:    Past Medical History        Past Medical History:   Diagnosis Date    Colon polyps 03/07/2019    Diabetes ([address]      Hypertension      Rash       noted 2/28/19- says it is on back & shoulders         PSH:   Past Surgical History         Past Surgical History:   Procedure Laterality Date    APPENDECTOMY        CHOLECYSTECTOMY        COLONOSCOPY   12 years ago    COLONOSCOPY N/A 3/7/2019     COLONOSCOPY POLYPECTOMY SNARE/COLD BIOPSY performed by Wade Bartlett DO at [place] [id number]        LITHOTRIPSY                Review of Systems:     · Constitutional: As noted in the HPI. Denies fever or chills. · Eyes: No visual changes or diplopia. No scleral icterus. · ENT: No headaches, hearing loss or vertigo. No nasal congestion, or sore throat. · Cardiovascular: No chest pain, dyspnea on exertion, or palpitations. · Respiratory: No cough, SOB, pleuritic chest pain, or wheezing. No hemoptysis. · Gastrointestinal: No abdominal pain, nausea or emesis. No diarrhea or rectal bleeding or melena. No change in bowel habits. · Genitourinary: No dysuria, urinary frequency, or incontinence. No hematuria. · Musculoskeletal: No gait disturbance, weakness or joint complaints. · Integumentary: No rash or pruritis.  No abnormal pigmentation, hair or nail changes. · Neurological: No headache, diplopia, dizziness, tremor, change in muscle strength, numbness or tingling. No change in gait, balance, coordination, mood, affect, memory, mentation, behavior. · Psychiatric: No anxiety or depression. · Endocrine: No temperature intolerance, excessive thirst, fluid intake, urinary frequency, excessive appetite, or recent weight change. · Hematologic/Lymphatic: No abnormal bruising or bleeding, blood clots or swollen lymph nodes. No anemia, fever, chills, night sweats, or swollen glands. · Allergic/Immunologic: No seasonal or perenial allergies. No history of hives or atopic dermatitis.     Social History:  · Alcohol:  No  · Tobacco:   50 x 1.5ppd  · Employment:  no silica or asbestos exposure  · Family:  No family history of lung disease     Medications:  Current Facility-Administered Medications          Current Outpatient Medications   Medication Sig Dispense Refill    rOPINIRole (REQUIP) 0.5 MG tablet Take 1 tablet by mouth nightly 30 tablet 3    Tiotropium Bromide-Olodaterol (STIOLTO RESPIMAT) 2.5-2.5 MCG/ACT AERS 2 puffs daily 2 Inhaler 0    JANUMET -1000 MG TB24 TAKE 1 TABLET DAILY 90 tablet 0    atorvastatin (LIPITOR) 10 MG tablet TAKE 1 TABLET DAILY 90 tablet 0    ramipril (ALTACE) 1.25 MG capsule TAKE 1 CAPSULE DAILY 90 capsule 0    ibuprofen (ADVIL;MOTRIN) 200 MG tablet Take 400 mg by mouth every 6 hours as needed for Pain LD 2/28/19        triamcinolone (KENALOG) 0.1 % cream Apply topically 2 times daily.  45 g 2    aspirin EC 81 MG EC tablet Take 81 mg by mouth daily LD 2/25/19 per surgeon          No current facility-administered medications for this visit.                Vitals:  Tmax:  VITALS:  /62   Pulse 78   Resp 16   Ht 5' 11\" (1.803 m)   Wt 222 lb (100.7 kg)   SpO2 93% Comment: room air resting  BMI 30.96 kg/m²   CURRENT PULSE OXIMETRY:  SpO2: 93 %(room air resting)        EXAM:  General: No

## 2019-08-22 ENCOUNTER — ANESTHESIA (OUTPATIENT)
Dept: ENDOSCOPY | Age: 69
End: 2019-08-22
Payer: MEDICARE

## 2019-08-22 ENCOUNTER — HOSPITAL ENCOUNTER (OUTPATIENT)
Age: 69
Setting detail: OUTPATIENT SURGERY
Discharge: HOME | End: 2019-08-22
Attending: INTERNAL MEDICINE | Admitting: INTERNAL MEDICINE
Payer: MEDICARE

## 2019-08-22 ENCOUNTER — ANESTHESIA EVENT (OUTPATIENT)
Dept: ENDOSCOPY | Age: 69
End: 2019-08-22
Payer: MEDICARE

## 2019-08-22 VITALS
HEART RATE: 72 BPM | WEIGHT: 222 LBS | HEIGHT: 72 IN | SYSTOLIC BLOOD PRESSURE: 127 MMHG | BODY MASS INDEX: 30.07 KG/M2 | DIASTOLIC BLOOD PRESSURE: 61 MMHG | OXYGEN SATURATION: 94 % | RESPIRATION RATE: 16 BRPM | TEMPERATURE: 97 F

## 2019-08-22 VITALS — DIASTOLIC BLOOD PRESSURE: 62 MMHG | OXYGEN SATURATION: 96 % | SYSTOLIC BLOOD PRESSURE: 134 MMHG

## 2019-08-22 LAB — METER GLUCOSE: 150 MG/DL (ref 74–99)

## 2019-08-22 PROCEDURE — 6360000002 HC RX W HCPCS: Performed by: NURSE ANESTHETIST, CERTIFIED REGISTERED

## 2019-08-22 PROCEDURE — 2500000003 HC RX 250 WO HCPCS: Performed by: NURSE ANESTHETIST, CERTIFIED REGISTERED

## 2019-08-22 PROCEDURE — 2709999900 HC NON-CHARGEABLE SUPPLY: Performed by: INTERNAL MEDICINE

## 2019-08-22 PROCEDURE — 7100000010 HC PHASE II RECOVERY - FIRST 15 MIN: Performed by: INTERNAL MEDICINE

## 2019-08-22 PROCEDURE — 2500000003 HC RX 250 WO HCPCS: Performed by: INTERNAL MEDICINE

## 2019-08-22 PROCEDURE — 6370000000 HC RX 637 (ALT 250 FOR IP): Performed by: INTERNAL MEDICINE

## 2019-08-22 PROCEDURE — 82962 GLUCOSE BLOOD TEST: CPT

## 2019-08-22 PROCEDURE — 7100000011 HC PHASE II RECOVERY - ADDTL 15 MIN: Performed by: INTERNAL MEDICINE

## 2019-08-22 PROCEDURE — 3700000001 HC ADD 15 MINUTES (ANESTHESIA): Performed by: INTERNAL MEDICINE

## 2019-08-22 PROCEDURE — 2580000003 HC RX 258: Performed by: NURSE ANESTHETIST, CERTIFIED REGISTERED

## 2019-08-22 PROCEDURE — 3609011500 HC BRONCHOSCOPY DIAGNOSTIC OR CELL WASH ONLY: Performed by: INTERNAL MEDICINE

## 2019-08-22 PROCEDURE — 3700000000 HC ANESTHESIA ATTENDED CARE: Performed by: INTERNAL MEDICINE

## 2019-08-22 RX ORDER — LIDOCAINE HYDROCHLORIDE 20 MG/ML
SOLUTION OROPHARYNGEAL PRN
Status: DISCONTINUED | OUTPATIENT
Start: 2019-08-22 | End: 2019-08-22 | Stop reason: ALTCHOICE

## 2019-08-22 RX ORDER — LIDOCAINE HYDROCHLORIDE 20 MG/ML
INJECTION, SOLUTION EPIDURAL; INFILTRATION; INTRACAUDAL; PERINEURAL PRN
Status: DISCONTINUED | OUTPATIENT
Start: 2019-08-22 | End: 2019-08-22 | Stop reason: ALTCHOICE

## 2019-08-22 RX ORDER — FENTANYL CITRATE 50 UG/ML
INJECTION, SOLUTION INTRAMUSCULAR; INTRAVENOUS PRN
Status: DISCONTINUED | OUTPATIENT
Start: 2019-08-22 | End: 2019-08-22 | Stop reason: SDUPTHER

## 2019-08-22 RX ORDER — GLYCOPYRROLATE 1 MG/5 ML
SYRINGE (ML) INTRAVENOUS PRN
Status: DISCONTINUED | OUTPATIENT
Start: 2019-08-22 | End: 2019-08-22 | Stop reason: SDUPTHER

## 2019-08-22 RX ORDER — SODIUM CHLORIDE 9 MG/ML
INJECTION, SOLUTION INTRAVENOUS CONTINUOUS PRN
Status: DISCONTINUED | OUTPATIENT
Start: 2019-08-22 | End: 2019-08-22 | Stop reason: SDUPTHER

## 2019-08-22 RX ORDER — PROPOFOL 10 MG/ML
INJECTION, EMULSION INTRAVENOUS PRN
Status: DISCONTINUED | OUTPATIENT
Start: 2019-08-22 | End: 2019-08-22 | Stop reason: SDUPTHER

## 2019-08-22 RX ORDER — MIDAZOLAM HYDROCHLORIDE 1 MG/ML
INJECTION INTRAMUSCULAR; INTRAVENOUS PRN
Status: DISCONTINUED | OUTPATIENT
Start: 2019-08-22 | End: 2019-08-22 | Stop reason: SDUPTHER

## 2019-08-22 RX ADMIN — PROPOFOL 120 MG: 10 INJECTION, EMULSION INTRAVENOUS at 13:06

## 2019-08-22 RX ADMIN — SODIUM CHLORIDE: 9 INJECTION, SOLUTION INTRAVENOUS at 13:06

## 2019-08-22 RX ADMIN — Medication 0.2 MG: at 13:07

## 2019-08-22 RX ADMIN — FENTANYL CITRATE 50 MCG: 50 INJECTION, SOLUTION INTRAMUSCULAR; INTRAVENOUS at 13:08

## 2019-08-22 RX ADMIN — MIDAZOLAM HYDROCHLORIDE 2 MG: 1 INJECTION, SOLUTION INTRAMUSCULAR; INTRAVENOUS at 13:06

## 2019-08-22 ASSESSMENT — LIFESTYLE VARIABLES: SMOKING_STATUS: 1

## 2019-08-22 ASSESSMENT — PAIN - FUNCTIONAL ASSESSMENT: PAIN_FUNCTIONAL_ASSESSMENT: 0-10

## 2019-08-22 NOTE — OP NOTE
Bronchoscopy Procedure Note    Date of Operation: 8/22/2019    Pre-op Diagnosis: Possible left lower lobe mass    Post-op Diagnosis: Mucous plugging    Surgeon: Pasquale Coppola    Assistant: None    EBL: None    Anesthesia: [facility]     Operation: Flexible fiberoptic bronchoscopy, diagnostic / therapeutic    Complications: None    Indications and History:  The patient is a 68 y.o. male with an abnormal CT scan suggesting a left lower lobe mass. The risks, benefits, complications, treatment options and expected outcomes were discussed with the patient. The possibilities of reaction to medication, pulmonary aspiration, perforation of a viscus(Pneumothorax), bleeding, respiratory failure and failure to diagnose a condition and creating a complication requiring transfusion or operation were discussed with the patient who freely signed the consent. Description of Procedure: The patient was taken to the endoscopy suite, identified as Kim Bone and the procedure verified as Flexible Fiberoptic Bronchoscopy. A Time Out was held and the above information confirmed. After the induction of topical nasopharyngeal anesthesia, the patient was positioned supine and the bronchoscope was passed through the oropharynx . The vocal cords were visualized, and 1% lidocaine was topically placed onto the cords. The cords were normal. The scope was then passed into the trachea. Additional 1% lidocaine was used topically within the airway. Careful inspection of the tracheal lumen was accomplished. The scope was sequentially passed into all airways. Endobronchial findings: The trachea was intubated, thick issues mucus was seen throughout the tracheobronchial tree starting in the trachea. The left lower lobe was virtually occluded. All of this was aggressively suctioned. There is no overt purulence.   After that was completed, the right upper lobe, right middle lobe, superior segment right lower lobe

## 2019-08-22 NOTE — ANESTHESIA POSTPROCEDURE EVALUATION
Department of Anesthesiology  Postprocedure Note    Patient: Xiomara Farrar  MRN: [id number]  YOB: 1950  Date of evaluation: 8/22/2019  Time:  5:06 PM     Procedure Summary     Date:  08/22/19 Room / Location:  [facility] ENDO 03 / [facility] ENDOSCOPY    Anesthesia Start:   Anesthesia Stop:      Procedure:  BRONCHOSCOPY DIAGNOSTIC OR CELL [address] ONLY (N/A ) Diagnosis:  (MASS LEFT LOWER LOBE LUNG)    Surgeon:  Min Morris MD Responsible Provider:      Anesthesia Type:  MAC ASA Status:  3          Anesthesia Type: MAC    Carey Phase I: Carey Score: 10    Carey Phase II: Carey Score: 10    Last vitals: Reviewed and per EMR flowsheets.        Anesthesia Post Evaluation    Patient location during evaluation: PACU  Patient participation: complete - patient participated  Level of consciousness: awake  Pain score: 3  Airway patency: patent  Nausea & Vomiting: no nausea and no vomiting  Complications: no  Cardiovascular status: blood pressure returned to baseline  Respiratory status: acceptable  Hydration status: euvolemic

## 2019-08-26 ENCOUNTER — TELEPHONE (OUTPATIENT)
Dept: PRIMARY CARE CLINIC | Age: 69
End: 2019-08-26

## 2019-08-26 NOTE — TELEPHONE ENCOUNTER
Pt called stating that the insurance will not cover the inhaler and something different needs to be called in. He requested that  Call him to discuss this.

## 2019-08-27 NOTE — TELEPHONE ENCOUNTER
Stiolto Respimat not covered. Therefore we will change over to Combivent metered-dose inhaler 1 puffs 4 times a day.

## 2019-09-05 ENCOUNTER — OFFICE VISIT (OUTPATIENT)
Dept: ENDOCRINOLOGY | Age: 69
End: 2019-09-05
Payer: MEDICARE

## 2019-09-05 VITALS
HEART RATE: 77 BPM | BODY MASS INDEX: 31.15 KG/M2 | RESPIRATION RATE: 16 BRPM | OXYGEN SATURATION: 95 % | HEIGHT: 70 IN | WEIGHT: 217.6 LBS | SYSTOLIC BLOOD PRESSURE: 128 MMHG | DIASTOLIC BLOOD PRESSURE: 78 MMHG

## 2019-09-05 DIAGNOSIS — E04.2 MULTINODULAR GOITER: Primary | ICD-10-CM

## 2019-09-05 PROCEDURE — 99205 OFFICE O/P NEW HI 60 MIN: CPT | Performed by: INTERNAL MEDICINE

## 2019-09-05 NOTE — PROGRESS NOTES
Pcn [Penicillins] Hives    Tape Lashawn Bushy Tape]      blisters       CURRENT MEDICATIONS   Current Outpatient Medications   Medication Sig Dispense Refill    rOPINIRole (REQUIP) 0.5 MG tablet Take 1 tablet by mouth nightly 30 tablet 3    JANUMET -1000 MG TB24 TAKE 1 TABLET DAILY 90 tablet 0    atorvastatin (LIPITOR) 10 MG tablet TAKE 1 TABLET DAILY 90 tablet 0    ramipril (ALTACE) 1.25 MG capsule TAKE 1 CAPSULE DAILY 90 capsule 0    ibuprofen (ADVIL;MOTRIN) 200 MG tablet Take 400 mg by mouth every 6 hours as needed for Pain LD 8/18/2019      triamcinolone (KENALOG) 0.1 % cream Apply topically 2 times daily. 45 g 2    aspirin EC 81 MG EC tablet Take 81 mg by mouth daily Indications: for healthyi living Patient to verify with Dr. Casey Cintron for hold instuctions       No current facility-administered medications for this visit. Review of Systems  Constitutional: No fever, no chills, no diaphoresis, no generalized weakness. HEENT: No blurred vision, No sore throat, no ear pain, no hair loss  Neck: denied any neck swelling, difficulty swallowing,   Cadrdiopulomary: No CP, SOB or palpitation, No orthopnea or PND. No cough or wheezing. GI: No N/V/D, no constipation, No abdominal pain, no melena or hematochezia   : Denied any dysuria, hematuria, flank pain, discharge, or incontinence. Skin: denied any rash, ulcer, Hirsute, or hyperpigmentation. MSK: denied any joint deformity, joint pain/swelling, muscle pain, or back pain.   Neuro: no numbess, no tingling, no weakness,     OBJECTIVE    /78 (Site: Right Upper Arm, Position: Sitting, Cuff Size: Medium Adult)   Pulse 77   Resp 16   Ht 5' 10\" (1.778 m)   Wt 217 lb 9.6 oz (98.7 kg)   SpO2 95%   BMI 31.22 kg/m²   BP Readings from Last 4 Encounters:   09/05/19 128/78   08/29/19 118/70   08/22/19 127/61   08/22/19 134/62     Wt Readings from Last 6 Encounters:   09/05/19 217 lb 9.6 oz (98.7 kg)   08/29/19 221 lb (100.2 kg)   08/22/19 222 lb (100.7

## 2019-09-05 NOTE — LETTER
[address] [place] [address] Department of Endocrinology Diabetes and Metabolism   [address] [facility], [address] [facility],[address] [address]   Phone: [phone]  Fax: [phone]      Provider: Carey Jaeger MD  Primary Care Physician: Khoa Etienne DO   Referring Provider: Sandra Merchant*    Patient: Edita Malik  YOB: 1950  Date of Visit: 9/5/2019      Dear Dr. Khoa Etienne DO   I had the pleasure of seeing your patient Edita Malik today at endocrine clinic for consultation visit and I enclosed a copy of the office visit completed today. Thank you very much for asking us to participate in the care of this very pleasant patient. Please don't hesitate to call if there are any further questions or concerns. Sincerely   Carey Jaeger MD  Endocrinologist, [address] [address]   Phone: [phone]  Fax: [phone]      [address] [place] [address] Department of Endocrinology Diabetes and Metabolism   [address] [place] [address]   Phone: [phone]  Fax: [phone]    Date of Service: 9/5/2019    Primary Care Physician: Khoa Etienne DO  Referring physician: Sandra Merchant  Provider: Carey Jaeger MD            Reason for the visit:  Multinodular goiter     History of Present Illness: The history is provided by the patient. No  was used. Accuracy of the patient data is excellent. Edita Malik is a very pleasant 68 y.o. male seen in Endocrine clinic today for evaluation and management of multinodular goiter     The patient was found to have thyroid nodule on CT chest dose for evaluation for SOB and cough   CT chest 8/1/2019  There is a 3.9 x 2.5 cm mass in the right lobe of the thyroid    Edita Malik denies any new lumps, bumps in her neck, change in her voice, or shortness of breath. No family history of thyroid cancer.  No prior history of radiation to head or neck region. Patient denied any history of  swelling in the area of the thyroid gland, weight loss, change in appetite, nervousness, anxiety, irritability, tremor, sweating, heat intolerance, changes in bowel habits, muscle weakness or difficulty sleeping. Patient also denied any h/o unexplained weight gain, new fatigue, increased sensitivity to cold, dry skin, brittle fingernails, brittle hair, facial swelling/puffiness, or depressed mood. PAST MEDICAL HISTORY   Past Medical History:   Diagnosis Date    Chronic cough     uses inhaler /to have Bronch 8/22/2019    Colon polyps 03/07/2019    Diabetes ([facility] [address]     Hyperlipidemia     Hypertension     Rash     noted 2/28/19- says it is on back & shoulders    Restless leg      PAST SURGICAL HISTORY   Past Surgical History:   Procedure Laterality Date    APPENDECTOMY  2000?     ruptured    BRONCHOSCOPY N/A 8/22/2019    BRONCHOSCOPY DIAGNOSTIC OR CELL [address] ONLY performed by Marshall Godinez MD at [address] [facility]  2000?    open    COLONOSCOPY  12 years ago    COLONOSCOPY N/A 3/7/2019    COLONOSCOPY POLYPECTOMY SNARE/COLD BIOPSY performed by Arin Isaac DO at [facility]  years ago    LITHOTRIPSY  years ago     SOCIAL HISTORY   Social History     Socioeconomic History    Marital status:      Spouse name: Not on file    Number of children: Not on file    Years of education: Not on file    Highest education level: Not on file   Occupational History    Not on file   Social Needs    Financial resource strain: Not on file    Food insecurity:     Worry: Not on file     Inability: Not on file    Transportation needs:     Medical: Not on file     Non-medical: Not on file   Tobacco Use    Smoking status: Current Every Day Smoker     Packs/day: 1.50     Years: 35.00     Pack years: 52.50     Types: Cigarettes    Smokeless tobacco: Never Used Substance and Sexual Activity    Alcohol use: Yes     Comment: SOCIALLY    Drug use: No    Sexual activity: Never   Lifestyle    Physical activity:     Days per week: Not on file     Minutes per session: Not on file    Stress: Not on file   Relationships    Social connections:     Talks on phone: Not on file     Gets together: Not on file     Attends Worship service: Not on file     Active member of club or organization: Not on file     Attends meetings of clubs or organizations: Not on file     Relationship status: Not on file    Intimate partner violence:     Fear of current or ex partner: Not on file     Emotionally abused: Not on file     Physically abused: Not on file     Forced sexual activity: Not on file   Other Topics Concern    Not on file   Social History Narrative    Not on file     FAMILY HISTORY   Family History   Problem Relation Age of Onset    Stroke Father      ALLERGIES AND DRUG REACTIONS   Allergies   Allergen Reactions    Pcn [Penicillins] Hives    Tape [Adhesive Tape]      blisters       CURRENT MEDICATIONS   Current Outpatient Medications   Medication Sig Dispense Refill    rOPINIRole (REQUIP) 0.5 MG tablet Take 1 tablet by mouth nightly 30 tablet 3    JANUMET -1000 MG TB24 TAKE 1 TABLET DAILY 90 tablet 0    atorvastatin (LIPITOR) 10 MG tablet TAKE 1 TABLET DAILY 90 tablet 0    ramipril (ALTACE) 1.25 MG capsule TAKE 1 CAPSULE DAILY 90 capsule 0    ibuprofen (ADVIL;MOTRIN) 200 MG tablet Take 400 mg by mouth every 6 hours as needed for Pain LD 8/18/2019      triamcinolone (KENALOG) 0.1 % cream Apply topically 2 times daily. 45 g 2    aspirin EC 81 MG EC tablet Take 81 mg by mouth daily Indications: for healthyi living Patient to verify with Dr. Jesse Partida for hold instuctions       No current facility-administered medications for this visit. Review of Systems  Constitutional: No fever, no chills, no diaphoresis, no generalized weakness. HCT 52.7 01/19/2016 10:00 AM    MCV 92.0 01/19/2016 10:00 AM    MCH 30.2 01/19/2016 10:00 AM    MCHC 32.8 01/19/2016 10:00 AM    RDW 13.4 01/19/2016 10:00 AM     01/19/2016 10:00 AM    MPV 11.3 01/19/2016 10:00 AM      Lab Results   Component Value Date/Time     07/24/2019 12:00 PM    K 5.1 (H) 07/24/2019 12:00 PM    CO2 26 07/24/2019 12:00 PM    BUN 10 07/24/2019 12:00 PM    CREATININE 1.4 (H) 07/24/2019 12:00 PM    CALCIUM 9.5 07/24/2019 12:00 PM    LABGLOM 50 07/24/2019 12:00 PM    GFRAA >60 07/24/2019 12:00 PM      Lab Results   Component Value Date/Time    TSH 1.930 07/24/2018 11:45 AM     Lab Results   Component Value Date    LABA1C 7.5 07/24/2019    GLUCOSE 154 08/21/2019    MALBCR 23.7 01/21/2019    LABMICR 49.1 01/21/2019    LABCREA 207 01/21/2019     Lab Results   Component Value Date    CHOL 128 07/24/2019    CHOL 118 01/21/2019    TRIG 214 07/24/2019    TRIG 198 01/21/2019    HDL 35 07/24/2019    HDL 33 01/21/2019     No results found for: [address] [place] [address] [address] Records/Labs/Images Review:   I personally reviewed and summarized previous records   All labs and imaging were reviewed independently    Devencorrinamelvin 12, a 68 y.o.-old male seen in for the following issues     Multinodular goiter   · Prevalence of thyroid nodule on thyroid ultrasound is 50% and 95 % of these nodules are benign. · Will order thyroid US and proceed accordingly  · Ordered thyroid function test     VitD deficiency   · Encourage taking vitD 2000 U/day over the counter     Return in about 6 months (around 3/5/2020) for Multinodular goiter . The above issues were reviewed with the patient who understood and agreed with the plan. Thank you for allowing us to participate in the care of this patient. Please do not hesitate to contact us with any additional questions. Diagnosis Orders   1.  Multinodular goiter  TSH without Reflex    T4, Free    US Maine Thomas MD

## 2019-10-04 ENCOUNTER — HOSPITAL ENCOUNTER (OUTPATIENT)
Age: 69
Discharge: HOME | End: 2019-10-04
Payer: MEDICARE

## 2019-10-04 DIAGNOSIS — E04.2 MULTINODULAR GOITER: ICD-10-CM

## 2019-10-04 LAB
T4 FREE: 1.07 NG/DL (ref 0.93–1.7)
TSH SERPL DL<=0.05 MIU/L-ACNC: 1.3 UIU/ML (ref 0.27–4.2)

## 2019-10-04 PROCEDURE — 84443 ASSAY THYROID STIM HORMONE: CPT

## 2019-10-04 PROCEDURE — 36415 COLL VENOUS BLD VENIPUNCTURE: CPT

## 2019-10-04 PROCEDURE — 84439 ASSAY OF FREE THYROXINE: CPT

## 2019-10-06 ENCOUNTER — TELEPHONE (OUTPATIENT)
Dept: ENDOCRINOLOGY | Age: 69
End: 2019-10-06

## 2019-10-08 ENCOUNTER — HOSPITAL ENCOUNTER (OUTPATIENT)
Dept: ULTRASOUND IMAGING | Age: 69
Discharge: HOME | End: 2019-10-10
Payer: MEDICARE

## 2019-10-08 DIAGNOSIS — E04.2 MULTINODULAR GOITER: ICD-10-CM

## 2019-10-08 PROCEDURE — 76536 US EXAM OF HEAD AND NECK: CPT

## 2019-10-09 ENCOUNTER — TELEPHONE (OUTPATIENT)
Dept: ENDOCRINOLOGY | Age: 69
End: 2019-10-09

## 2019-10-09 DIAGNOSIS — E04.2 MULTINODULAR GOITER: Primary | ICD-10-CM

## 2020-03-30 RX ORDER — RAMIPRIL 1.25 MG/1
CAPSULE ORAL
Qty: 90 CAPSULE | Refills: 3 | OUTPATIENT
Start: 2020-03-30

## 2020-03-30 RX ORDER — RAMIPRIL 1.25 MG/1
1.25 CAPSULE ORAL DAILY
Qty: 90 CAPSULE | Refills: 0 | Status: SHIPPED
Start: 2020-03-30 | End: 2020-07-02 | Stop reason: SDUPTHER

## 2020-03-30 NOTE — TELEPHONE ENCOUNTER
Called patient he does not have a computer to do a virtual visit or my chart.  Spoke with the doctor he will do a refill

## 2020-04-20 RX ORDER — ATORVASTATIN CALCIUM 10 MG/1
TABLET, FILM COATED ORAL
Qty: 90 TABLET | Refills: 1 | OUTPATIENT
Start: 2020-04-20

## 2020-04-20 RX ORDER — SITAGLIPTIN AND METFORMIN HYDROCHLORIDE 100; 1000 MG/1; MG/1
TABLET, FILM COATED, EXTENDED RELEASE ORAL
Qty: 90 TABLET | Refills: 1 | OUTPATIENT
Start: 2020-04-20

## 2020-04-29 ENCOUNTER — OFFICE VISIT (OUTPATIENT)
Dept: FAMILY MEDICINE CLINIC | Age: 70
End: 2020-04-29
Payer: MEDICARE

## 2020-04-29 ENCOUNTER — HOSPITAL ENCOUNTER (OUTPATIENT)
Age: 70
Discharge: HOME | End: 2020-05-01
Payer: MEDICARE

## 2020-04-29 VITALS
SYSTOLIC BLOOD PRESSURE: 120 MMHG | WEIGHT: 215 LBS | DIASTOLIC BLOOD PRESSURE: 70 MMHG | TEMPERATURE: 96.9 F | OXYGEN SATURATION: 96 % | BODY MASS INDEX: 30.85 KG/M2 | HEART RATE: 76 BPM

## 2020-04-29 LAB
ALBUMIN SERPL-MCNC: 4.2 G/DL (ref 3.5–5.2)
ALP BLD-CCNC: 171 U/L (ref 40–129)
ALT SERPL-CCNC: 24 U/L (ref 0–40)
ANION GAP SERPL CALCULATED.3IONS-SCNC: 13 MMOL/L (ref 7–16)
AST SERPL-CCNC: 23 U/L (ref 0–39)
BILIRUB SERPL-MCNC: 1.3 MG/DL (ref 0–1.2)
BUN BLDV-MCNC: 9 MG/DL (ref 8–23)
CALCIUM SERPL-MCNC: 9.6 MG/DL (ref 8.6–10.2)
CHLORIDE BLD-SCNC: 97 MMOL/L (ref 98–107)
CHOLESTEROL, TOTAL: 129 MG/DL (ref 0–199)
CO2: 27 MMOL/L (ref 22–29)
CREAT SERPL-MCNC: 1.5 MG/DL (ref 0.7–1.2)
CREATININE URINE POCT: 300
GFR AFRICAN AMERICAN: 56
GFR NON-AFRICAN AMERICAN: 46 ML/MIN/1.73
GLUCOSE BLD-MCNC: 205 MG/DL (ref 74–99)
HBA1C MFR BLD: 7.4 % (ref 4–5.6)
HDLC SERPL-MCNC: 31 MG/DL
LDL CHOLESTEROL CALCULATED: 42 MG/DL (ref 0–99)
MICROALBUMIN/CREAT 24H UR: 150 MG/G{CREAT}
MICROALBUMIN/CREAT UR-RTO: NORMAL
POTASSIUM SERPL-SCNC: 5.2 MMOL/L (ref 3.5–5)
PROSTATE SPECIFIC ANTIGEN: 0.92 NG/ML (ref 0–4)
SODIUM BLD-SCNC: 137 MMOL/L (ref 132–146)
TOTAL PROTEIN: 7.1 G/DL (ref 6.4–8.3)
TRIGL SERPL-MCNC: 281 MG/DL (ref 0–149)
VLDLC SERPL CALC-MCNC: 56 MG/DL

## 2020-04-29 PROCEDURE — G0103 PSA SCREENING: HCPCS

## 2020-04-29 PROCEDURE — 80061 LIPID PANEL: CPT

## 2020-04-29 PROCEDURE — 82044 UR ALBUMIN SEMIQUANTITATIVE: CPT | Performed by: FAMILY MEDICINE

## 2020-04-29 PROCEDURE — 83036 HEMOGLOBIN GLYCOSYLATED A1C: CPT

## 2020-04-29 PROCEDURE — 80053 COMPREHEN METABOLIC PANEL: CPT

## 2020-04-29 PROCEDURE — 99215 OFFICE O/P EST HI 40 MIN: CPT | Performed by: FAMILY MEDICINE

## 2020-04-29 RX ORDER — ROPINIROLE 0.5 MG/1
0.5 TABLET, FILM COATED ORAL NIGHTLY
Qty: 90 TABLET | Refills: 1 | Status: SHIPPED
Start: 2020-04-29 | End: 2020-10-20

## 2020-04-29 RX ORDER — DICYCLOMINE HYDROCHLORIDE 10 MG/1
10 CAPSULE ORAL
Qty: 30 CAPSULE | Refills: 1 | Status: SHIPPED
Start: 2020-04-29 | End: 2020-09-25

## 2020-04-29 RX ORDER — ATORVASTATIN CALCIUM 10 MG/1
TABLET, FILM COATED ORAL
Qty: 90 TABLET | Refills: 1 | Status: SHIPPED
Start: 2020-04-29 | End: 2022-04-21 | Stop reason: SDUPTHER

## 2020-04-29 RX ORDER — SITAGLIPTIN AND METFORMIN HYDROCHLORIDE 100; 1000 MG/1; MG/1
TABLET, FILM COATED, EXTENDED RELEASE ORAL
Qty: 90 TABLET | Refills: 1 | Status: SHIPPED
Start: 2020-04-29 | End: 2022-04-21 | Stop reason: DRUGHIGH

## 2020-04-29 ASSESSMENT — ENCOUNTER SYMPTOMS
DIARRHEA: 0
ABDOMINAL DISTENTION: 1
SHORTNESS OF BREATH: 1
CONSTIPATION: 0
ABDOMINAL PAIN: 0
VISUAL CHANGE: 1
BLURRED VISION: 1

## 2020-04-29 ASSESSMENT — PATIENT HEALTH QUESTIONNAIRE - PHQ9
2. FEELING DOWN, DEPRESSED OR HOPELESS: 0
SUM OF ALL RESPONSES TO PHQ9 QUESTIONS 1 & 2: 0
1. LITTLE INTEREST OR PLEASURE IN DOING THINGS: 0
SUM OF ALL RESPONSES TO PHQ QUESTIONS 1-9: 0
SUM OF ALL RESPONSES TO PHQ QUESTIONS 1-9: 0

## 2020-04-29 NOTE — PROGRESS NOTES
(KENALOG) 0.1 % cream Apply topically 2 times daily. 45 g 2    aspirin EC 81 MG EC tablet Take 81 mg by mouth daily Indications: for healthyi living Patient to verify with Dr. Vel Dougherty for hold instuctions       No current facility-administered medications on file prior to visit. Review of Systems   Constitutional: Negative for unexpected weight change and weight loss. Eyes: Positive for blurred vision. Respiratory: Positive for shortness of breath. Still smoking 1.5 ppd. Cardiovascular: Negative for chest pain. Gastrointestinal: Positive for abdominal distention. Negative for abdominal pain, constipation and diarrhea. Endocrine: Negative for polydipsia and polyuria. Musculoskeletal: Negative for myalgias. other review of systems reviewed and are negative    OBJECTIVE:  /70   Pulse 76   Temp 96.9 °F (36.1 °C)   Wt 215 lb (97.5 kg)   SpO2 96%   BMI 30.85 kg/m²      Physical Exam  Vitals signs reviewed. Eyes:      General: No scleral icterus. Conjunctiva/sclera: Conjunctivae normal.   Neck:      Musculoskeletal: Neck supple. Thyroid: No thyromegaly. Vascular: No carotid bruit. Cardiovascular:      Rate and Rhythm: Normal rate and regular rhythm. Heart sounds: No murmur. Pulmonary:      Effort: Pulmonary effort is normal.      Breath sounds: Examination of the right-middle field reveals wheezing. Examination of the right-lower field reveals wheezing. Examination of the left-lower field reveals decreased breath sounds. Decreased breath sounds and wheezing present. No rales. Comments: + egophony RLL. Abdominal:      General: Bowel sounds are normal.      Palpations: Abdomen is soft. There is no mass. Tenderness: There is no abdominal tenderness. There is no guarding or rebound. Musculoskeletal: Normal range of motion. Lymphadenopathy:      Cervical: No cervical adenopathy. Skin:     General: Skin is warm and dry.       Comments: - dry

## 2020-04-30 ENCOUNTER — TELEPHONE (OUTPATIENT)
Dept: PRIMARY CARE CLINIC | Age: 70
End: 2020-04-30

## 2020-04-30 RX ORDER — ICOSAPENT ETHYL 1000 MG/1
2 CAPSULE ORAL 2 TIMES DAILY
Qty: 120 CAPSULE | Refills: 2 | Status: SHIPPED
Start: 2020-04-30 | End: 2021-08-12 | Stop reason: SDUPTHER

## 2020-04-30 NOTE — TELEPHONE ENCOUNTER
Called patient discussed labs triglycerides elevated will try to put him on the Sepra 1 g, 2 tablets twice a day. Hopefully this medicine will be covered. His hemoglobin A1c improving down to 7.4 we will try to increase his Janumet Er 100/1000 from 1 pill a day to 1-1/2 pills a day. Follow-up as scheduled.

## 2020-05-01 ENCOUNTER — TELEPHONE (OUTPATIENT)
Dept: PRIMARY CARE CLINIC | Age: 70
End: 2020-05-01

## 2020-06-29 RX ORDER — RAMIPRIL 1.25 MG/1
CAPSULE ORAL
Qty: 90 CAPSULE | Refills: 3 | OUTPATIENT
Start: 2020-06-29

## 2020-07-02 RX ORDER — RAMIPRIL 1.25 MG/1
1.25 CAPSULE ORAL DAILY
Qty: 90 CAPSULE | Refills: 1 | Status: SHIPPED
Start: 2020-07-02 | End: 2020-12-29

## 2020-08-31 ENCOUNTER — OFFICE VISIT (OUTPATIENT)
Dept: ENDOCRINOLOGY | Age: 70
End: 2020-08-31
Payer: MEDICARE

## 2020-08-31 VITALS
OXYGEN SATURATION: 94 % | BODY MASS INDEX: 31.01 KG/M2 | HEART RATE: 78 BPM | DIASTOLIC BLOOD PRESSURE: 72 MMHG | SYSTOLIC BLOOD PRESSURE: 116 MMHG | HEIGHT: 70 IN | TEMPERATURE: 97.8 F | WEIGHT: 216.6 LBS | RESPIRATION RATE: 16 BRPM

## 2020-08-31 PROCEDURE — 99214 OFFICE O/P EST MOD 30 MIN: CPT | Performed by: INTERNAL MEDICINE

## 2020-08-31 NOTE — PROGRESS NOTES
tremor, sweating, heat intolerance, changes in bowel habits, muscle weakness or difficulty sleeping. Patient also denied any h/o unexplained weight gain, new fatigue, increased sensitivity to cold, dry skin, brittle fingernails, brittle hair, facial swelling/puffiness, or depressed mood. PAST MEDICAL HISTORY   Past Medical History:   Diagnosis Date    Chronic cough     uses inhaler /to have Bronch 8/22/2019    Colon polyps 03/07/2019    Diabetes ([address]     Hyperlipidemia     Hypertension     Rash     noted 2/28/19- says it is on back & shoulders    Restless leg      PAST SURGICAL HISTORY   Past Surgical History:   Procedure Laterality Date    APPENDECTOMY  2000?     ruptured    BRONCHOSCOPY N/A 8/22/2019    BRONCHOSCOPY DIAGNOSTIC OR CELL [address] ONLY performed by Sigrid Saldana MD at [address]  2000?    open    COLONOSCOPY  12 years ago    COLONOSCOPY N/A 3/7/2019    COLONOSCOPY POLYPECTOMY SNARE/COLD BIOPSY performed by Jordy Calix DO at [facility]  years ago    LITHOTRIPSY  years ago     SOCIAL HISTORY   .[email or web address]      FAMILY HISTORY   Family History   Problem Relation Age of Onset    Stroke Father      ALLERGIES AND DRUG REACTIONS   Allergies   Allergen Reactions    Pcn [Penicillins] Hives    Tape [Adhesive Tape]      blisters       CURRENT MEDICATIONS   Current Outpatient Medications   Medication Sig Dispense Refill    ramipril (ALTACE) 1.25 MG capsule Take 1 capsule by mouth daily 90 capsule 1    Icosapent Ethyl (VASCEPA) 1 g CAPS capsule Take 2 capsules by mouth 2 times daily 120 capsule 2    atorvastatin (LIPITOR) 10 MG tablet TAKE 1 TABLET DAILY 90 tablet 1    SITagliptin-metFORMIN HCl ER (JANUMET XR) 100-1000 MG TB24 TAKE 1 TABLET DAILY 90 tablet 1    rOPINIRole (REQUIP) 0.5 MG tablet Take 1 tablet by mouth nightly 90 tablet 1    dicyclomine (BENTYL) 10 MG capsule Take 1 capsule by mouth 4 times daily (before meals and nightly) 30 capsule 1    ibuprofen (ADVIL;MOTRIN) 200 MG tablet Take 400 mg by mouth every 6 hours as needed for Pain LD 8/18/2019      triamcinolone (KENALOG) 0.1 % cream Apply topically 2 times daily. 45 g 2    aspirin EC 81 MG EC tablet Take 81 mg by mouth daily Indications: for healthyi living Patient to verify with Dr. Romana Hummingbird for hold instuctions       No current facility-administered medications for this visit. Review of Systems  Constitutional: No fever, no chills, no diaphoresis, no generalized weakness. HEENT: No blurred vision, No sore throat, no ear pain, no hair loss  Neck: denied any neck swelling, difficulty swallowing,   Cadrdiopulomary: No CP, SOB or palpitation, No orthopnea or PND. No cough or wheezing. GI: No N/V/D, no constipation, No abdominal pain, no melena or hematochezia   : Denied any dysuria, hematuria, flank pain, discharge, or incontinence. Skin: denied any rash, ulcer, Hirsute, or hyperpigmentation. MSK: denied any joint deformity, joint pain/swelling, muscle pain, or back pain. Neuro: no numbess, no tingling, no weakness,     OBJECTIVE    /72   Pulse 78   Temp 97.8 °F (36.6 °C)   Resp 16   Ht 5' 10\" (1.778 m)   Wt 216 lb 9.6 oz (98.2 kg)   SpO2 94%   BMI 31.08 kg/m²   BP Readings from Last 4 Encounters:   08/31/20 116/72   04/29/20 120/70   09/05/19 128/78   08/29/19 118/70     Wt Readings from Last 6 Encounters:   08/31/20 216 lb 9.6 oz (98.2 kg)   04/29/20 215 lb (97.5 kg)   09/05/19 217 lb 9.6 oz (98.7 kg)   08/29/19 221 lb (100.2 kg)   08/22/19 222 lb (100.7 kg)   08/21/19 214 lb (97.1 kg)       Physical examination:  General: awake alert, oriented x3, no abnormal position or movements. HEENT: normocephalic non traumatic  Neck: supple, no LN enlargement, Rt side thyroid nodule is palpable, , no thyroid tenderness, no JVD. Pulm: good air entry, +  wheezing i    CVS: S1 + S2, no murmur, no heave.  Dorsalis pedis pulse palpable   Abd: soft lax, no tenderness, no organomegaly, audible bowel sounds. Skin: warm, no lesions, no rash. No Palmar erythema  Musculoskeletal: No back tenderness, no kyphosis/scoliosis     Neuro: CN intact, sensation normal , muscle power normal. No tremors   Psych: normal mood, and affect    Review of Laboratory Data:  I personally reviewed the following labs:   Lab Results   Component Value Date/Time    WBC 9.0 01/19/2016 10:00 AM    RBC 5.73 01/19/2016 10:00 AM    HGB 17.3 (H) 01/19/2016 10:00 AM    HCT 52.7 01/19/2016 10:00 AM    MCV 92.0 01/19/2016 10:00 AM    MCH 30.2 01/19/2016 10:00 AM    MCHC 32.8 01/19/2016 10:00 AM    RDW 13.4 01/19/2016 10:00 AM     01/19/2016 10:00 AM    MPV 11.3 01/19/2016 10:00 AM      Lab Results   Component Value Date/Time     04/29/2020 10:15 AM    K 5.2 (H) 04/29/2020 10:15 AM    CO2 27 04/29/2020 10:15 AM    BUN 9 04/29/2020 10:15 AM    CREATININE 1.5 (H) 04/29/2020 10:15 AM    CALCIUM 9.6 04/29/2020 10:15 AM    LABGLOM 46 04/29/2020 10:15 AM    GFRAA 56 04/29/2020 10:15 AM      Lab Results   Component Value Date/Time    TSH 1.300 10/04/2019 09:33 AM    T4FREE 1.07 10/04/2019 09:33 AM     Lab Results   Component Value Date    LABA1C 7.4 04/29/2020    GLUCOSE 205 04/29/2020    MALBCR  04/29/2020    LABMICR 49.1 01/21/2019    LABCREA 207 01/21/2019     Lab Results   Component Value Date    CHOL 129 04/29/2020    CHOL 128 07/24/2019    TRIG 281 04/29/2020    TRIG 214 07/24/2019    HDL 31 04/29/2020    HDL 35 07/24/2019     No results found for: VITD25    Medical Records/Labs/Images Review:   I personally reviewed and summarized previous records   All labs and imaging were reviewed independently    Mise Aceves, a 65 y.o.-old male seen in for the following issues     Multinodular goiter   · Prevalence of thyroid nodule on thyroid ultrasound is 50% and 95 % of these nodules are benign.   · Will repeat thyroid US now and likely proceed with FNA to dominant Rt side thyroid nodules   · Also repeat TFT     VitD deficiency   · Continue vitD supplement     HLD  · continue Lipitor 10 mg daily     Return in about 6 months (around 2/28/2021) for MNG . The above issues were reviewed with the patient who understood and agreed with the plan. Thank you for allowing us to participate in the care of this patient. Please do not hesitate to contact us with any additional questions. Diagnosis Orders   1. Multinodular goiter  US THYROID    TSH without Reflex    T4, Free   2. Vitamin D deficiency     3. Hyperlipidemia, unspecified hyperlipidemia type       Eva Faust MD  Endocrinologist, [facility])   [address] [place] [address], [address] [place] [address],[facility] [id number]   Phone: [phone]  Fax: [phone]  -------------------------------------------------------------  An electronic signature was used to authenticate this note.  Stefany Glover MD on 8/31/2020 at 1:03 PM

## 2020-09-01 ENCOUNTER — HOSPITAL ENCOUNTER (OUTPATIENT)
Age: 70
Discharge: HOME | End: 2020-09-01
Payer: MEDICARE

## 2020-09-01 LAB
T4 FREE: 1.11 NG/DL (ref 0.93–1.7)
TSH SERPL DL<=0.05 MIU/L-ACNC: 1.09 UIU/ML (ref 0.27–4.2)

## 2020-09-01 PROCEDURE — 36415 COLL VENOUS BLD VENIPUNCTURE: CPT

## 2020-09-01 PROCEDURE — 84439 ASSAY OF FREE THYROXINE: CPT

## 2020-09-01 PROCEDURE — 84443 ASSAY THYROID STIM HORMONE: CPT

## 2020-09-02 ENCOUNTER — TELEPHONE (OUTPATIENT)
Dept: ENDOCRINOLOGY | Age: 70
End: 2020-09-02

## 2020-09-03 NOTE — TELEPHONE ENCOUNTER
Notify pt,  I have reviewed your recent results    Great!, thyroid hormones results are within an acceptable range of normal    Still waiting for thyroid US

## 2020-09-04 ENCOUNTER — HOSPITAL ENCOUNTER (OUTPATIENT)
Dept: ULTRASOUND IMAGING | Age: 70
Discharge: HOME | End: 2020-09-06
Payer: MEDICARE

## 2020-09-04 PROCEDURE — 76536 US EXAM OF HEAD AND NECK: CPT

## 2020-09-13 ENCOUNTER — TELEPHONE (OUTPATIENT)
Dept: ENDOCRINOLOGY | Age: 70
End: 2020-09-13

## 2020-09-13 NOTE — TELEPHONE ENCOUNTER
Notify pt,  I have reviewed your recent results    Thyroid US showed multiple nodules on both sides largest on Rt side     Will proceed with FNA to dominant 3 and 1.7 cm t side thyroid nodules

## 2020-09-25 ENCOUNTER — HOSPITAL ENCOUNTER (OUTPATIENT)
Dept: ULTRASOUND IMAGING | Age: 70
Discharge: HOME | End: 2020-09-27
Payer: MEDICARE

## 2020-09-25 VITALS
TEMPERATURE: 98.5 F | HEIGHT: 72 IN | WEIGHT: 210 LBS | RESPIRATION RATE: 16 BRPM | DIASTOLIC BLOOD PRESSURE: 66 MMHG | OXYGEN SATURATION: 96 % | HEART RATE: 64 BPM | SYSTOLIC BLOOD PRESSURE: 139 MMHG | BODY MASS INDEX: 28.44 KG/M2

## 2020-09-25 PROCEDURE — 88173 CYTOPATH EVAL FNA REPORT: CPT

## 2020-09-25 PROCEDURE — 10005 FNA BX W/US GDN 1ST LES: CPT

## 2020-09-25 PROCEDURE — 88305 TISSUE EXAM BY PATHOLOGIST: CPT

## 2020-09-25 RX ORDER — SODIUM CHLORIDE 0.9 % (FLUSH) 0.9 %
10 SYRINGE (ML) INJECTION PRN
Status: CANCELLED | OUTPATIENT
Start: 2020-09-25

## 2020-09-25 RX ORDER — LIDOCAINE HYDROCHLORIDE 10 MG/ML
10 INJECTION, SOLUTION INFILTRATION; PERINEURAL ONCE
Status: DISCONTINUED | OUTPATIENT
Start: 2020-09-25 | End: 2020-09-28 | Stop reason: HOSPADM

## 2020-09-25 ASSESSMENT — PAIN - FUNCTIONAL ASSESSMENT: PAIN_FUNCTIONAL_ASSESSMENT: 0-10

## 2020-09-25 NOTE — H&P
Interventional Radiology  Attending Pre-operative History and Physical    DIAGNOSIS:    Patient Active Problem List   Diagnosis    [facility]    DM2 (diabetes mellitus, type 2) ([facility]    Hyperlipemia    Nephrolithiasis     Tobacco abuse    Intractable ophthalmoplegic migraine    Adenomatous polyp of colon       CHIEF COMPLAINT: 64 yo M with a suspicious nodule in the left lobe of the thyroid. Here for ultrasound guided biopsy. Current Outpatient Medications:     ramipril (ALTACE) 1.25 MG capsule, Take 1 capsule by mouth daily, Disp: 90 capsule, Rfl: 1    Icosapent Ethyl (VASCEPA) 1 g CAPS capsule, Take 2 capsules by mouth 2 times daily, Disp: 120 capsule, Rfl: 2    atorvastatin (LIPITOR) 10 MG tablet, TAKE 1 TABLET DAILY, Disp: 90 tablet, Rfl: 1    SITagliptin-metFORMIN HCl ER (JANUMET XR) 100-1000 MG TB24, TAKE 1 TABLET DAILY, Disp: 90 tablet, Rfl: 1    rOPINIRole (REQUIP) 0.5 MG tablet, Take 1 tablet by mouth nightly, Disp: 90 tablet, Rfl: 1    ibuprofen (ADVIL;MOTRIN) 200 MG tablet, Take 400 mg by mouth every 6 hours as needed for Pain LD 8/18/2019, Disp: , Rfl:     triamcinolone (KENALOG) 0.1 % cream, Apply topically 2 times daily. , Disp: 45 g, Rfl: 2    Current Facility-Administered Medications:     lidocaine 1 % injection 10 mL, 10 mL, Intradermal, Once, Shruthi Renee MD    Allergies   Allergen Reactions    Pcn [Penicillins] Hives    Tape Hazle Stan Tape]      blisters       Past Medical History:   Diagnosis Date    Chronic cough     uses inhaler /to have Bronch 8/22/2019    Colon polyps 03/07/2019    Diabetes ([facility]     Hyperlipidemia     Hypertension     Rash     noted 2/28/19- says it is on back & shoulders    Restless leg        Past Surgical History:   Procedure Laterality Date    APPENDECTOMY  2000?     ruptured    BRONCHOSCOPY N/A 8/22/2019    BRONCHOSCOPY DIAGNOSTIC OR CELL [address] ONLY performed by Chris Rodrigez MD at [address]  CHOLECYSTECTOMY  2000?    open    COLONOSCOPY  12 years ago    COLONOSCOPY N/A 3/7/2019    COLONOSCOPY POLYPECTOMY SNARE/COLD BIOPSY performed by Dago Lovelace DO at [address] [address]  years ago    LITHOTRIPSY  years ago       Family History   Problem Relation Age of Onset    Stroke Father        Social History     Socioeconomic History    Marital status:      Spouse name: Not on file    Number of children: Not on file    Years of education: Not on file    Highest education level: Not on file   Occupational History    Not on file   Social Needs    Financial resource strain: Not on file    Food insecurity     Worry: Not on file     Inability: Not on file    Transportation needs     Medical: Not on file     Non-medical: Not on file   Tobacco Use    Smoking status: Current Every Day Smoker     Packs/day: 1.50     Years: 35.00     Pack years: 52.50     Types: Cigarettes    Smokeless tobacco: Never Used   Substance and Sexual Activity    Alcohol use: Yes     Comment: SOCIALLY    Drug use: No    Sexual activity: Never   Lifestyle    Physical activity     Days per week: Not on file     Minutes per session: Not on file    Stress: Not on file   Relationships    Social connections     Talks on phone: Not on file     Gets together: Not on file     Attends Latter day service: Not on file     Active member of club or organization: Not on file     Attends meetings of clubs or organizations: Not on file     Relationship status: Not on file    Intimate partner violence     Fear of current or ex partner: Not on file     Emotionally abused: Not on file     Physically abused: Not on file     Forced sexual activity: Not on file   Other Topics Concern    Not on file   Social History Narrative    Not on file       ROS: Non-contributory other than as noted above    PHYSICAL EXAM:      Heent: Alert and orientated.     Heart:  Rapid regular rhythm    Lungs:  demonstrate no contraindications to proceed      Abdomen:  normal      DATA:  CBC:   Lab Results   Component Value Date    WBC 9.0 01/19/2016    RBC 5.73 01/19/2016    HGB 17.3 01/19/2016    HCT 52.7 01/19/2016    MCV 92.0 01/19/2016    MCH 30.2 01/19/2016    MCHC 32.8 01/19/2016    RDW 13.4 01/19/2016     01/19/2016    MPV 11.3 01/19/2016     CBC with Differential:    Lab Results   Component Value Date    WBC 9.0 01/19/2016    RBC 5.73 01/19/2016    HGB 17.3 01/19/2016    HCT 52.7 01/19/2016     01/19/2016    MCV 92.0 01/19/2016    MCH 30.2 01/19/2016    MCHC 32.8 01/19/2016    RDW 13.4 01/19/2016    LYMPHOPCT 25 01/19/2016    MONOPCT 7 01/19/2016    BASOPCT 1 01/19/2016    MONOSABS 0.66 01/19/2016    LYMPHSABS 2.24 01/19/2016    EOSABS 0.23 01/19/2016    BASOSABS 0.08 01/19/2016     Platelets:    Lab Results   Component Value Date     01/19/2016     BUN/Creatinine:    Lab Results   Component Value Date    BUN 9 04/29/2020    CREATININE 1.5 04/29/2020       ASSESSMENT AND PLAN:  3.  64 yo M with a suspicious nodule in the left lobe of the thyroid. Here for ultrasound guided biopsy. 2.  Procedure options, risks and benefits reviewed with patient. Patient expresses understanding.     Electronically signed by Patricia Thomas MD on 9/25/2020 at 1:00 PM

## 2020-09-25 NOTE — BRIEF OP NOTE
Brief Postoperative Note    Mirna Krishna  YOB: 1950  [id number]    Pre-operative Diagnosis and Procedure: 66 yo M with a suspicious nodule in the left lobe of the thyroid. Here for ultrasound guided biopsy. Post-operative Diagnosis: Same    Anesthesia: Local    Estimated Blood Loss: < 10 cc    Surgeon: Dc Chinchilla MD    Complications: none    Specimen obtained: 3 passes    Findings: Successful ultrasound guided biopsy of a suspicious nodule in the left lobe of the thyroid.      Dc Chinchilla MD   9/25/2020 1:01 PM

## 2020-09-30 ENCOUNTER — TELEPHONE (OUTPATIENT)
Dept: ENDOCRINOLOGY | Age: 70
End: 2020-09-30

## 2020-10-01 NOTE — TELEPHONE ENCOUNTER
Notify pt,  I have reviewed your recent results    Excellent! Thyroid biopsy result was benign.  Will continue following with intermittent ultrasound and will discuss this in detail at your next OV

## 2020-12-29 RX ORDER — RAMIPRIL 1.25 MG/1
CAPSULE ORAL
Qty: 90 CAPSULE | Refills: 3 | Status: SHIPPED
Start: 2020-12-29 | End: 2021-12-27

## 2021-01-18 DIAGNOSIS — G25.81 RLS (RESTLESS LEGS SYNDROME): ICD-10-CM

## 2021-01-18 RX ORDER — ROPINIROLE 0.5 MG/1
TABLET, FILM COATED ORAL
Qty: 90 TABLET | Refills: 3 | OUTPATIENT
Start: 2021-01-18

## 2021-02-08 ENCOUNTER — OFFICE VISIT (OUTPATIENT)
Dept: PRIMARY CARE CLINIC | Age: 71
End: 2021-02-08
Payer: MEDICARE

## 2021-02-08 VITALS
HEIGHT: 71 IN | OXYGEN SATURATION: 99 % | WEIGHT: 214 LBS | TEMPERATURE: 97 F | HEART RATE: 72 BPM | SYSTOLIC BLOOD PRESSURE: 130 MMHG | DIASTOLIC BLOOD PRESSURE: 84 MMHG | BODY MASS INDEX: 29.96 KG/M2

## 2021-02-08 DIAGNOSIS — G25.81 RLS (RESTLESS LEGS SYNDROME): ICD-10-CM

## 2021-02-08 DIAGNOSIS — E78.2 MIXED HYPERLIPIDEMIA: ICD-10-CM

## 2021-02-08 DIAGNOSIS — E11.9 TYPE 2 DIABETES MELLITUS WITHOUT COMPLICATION, WITHOUT LONG-TERM CURRENT USE OF INSULIN (HCC): ICD-10-CM

## 2021-02-08 DIAGNOSIS — E11.9 TYPE 2 DIABETES MELLITUS WITHOUT COMPLICATION, WITHOUT LONG-TERM CURRENT USE OF INSULIN (HCC): Primary | ICD-10-CM

## 2021-02-08 DIAGNOSIS — J43.9 PULMONARY EMPHYSEMA, UNSPECIFIED EMPHYSEMA TYPE (HCC): ICD-10-CM

## 2021-02-08 LAB
ALBUMIN SERPL-MCNC: 4.1 G/DL (ref 3.5–5.2)
ALP BLD-CCNC: 176 U/L (ref 40–129)
ALT SERPL-CCNC: 8 U/L (ref 0–40)
ANION GAP SERPL CALCULATED.3IONS-SCNC: 10 MMOL/L (ref 7–16)
AST SERPL-CCNC: 6 U/L (ref 0–39)
BILIRUB SERPL-MCNC: 0.9 MG/DL (ref 0–1.2)
BUN BLDV-MCNC: 9 MG/DL (ref 8–23)
CALCIUM SERPL-MCNC: 9.4 MG/DL (ref 8.6–10.2)
CHLORIDE BLD-SCNC: 100 MMOL/L (ref 98–107)
CHOLESTEROL, TOTAL: 126 MG/DL (ref 0–199)
CO2: 27 MMOL/L (ref 22–29)
CREAT SERPL-MCNC: 1.3 MG/DL (ref 0.7–1.2)
GFR AFRICAN AMERICAN: >60
GFR NON-AFRICAN AMERICAN: 54 ML/MIN/1.73
GLUCOSE BLD-MCNC: 140 MG/DL (ref 74–99)
HBA1C MFR BLD: 7.2 % (ref 4–5.6)
HDLC SERPL-MCNC: 34 MG/DL
LDL CHOLESTEROL CALCULATED: 43 MG/DL (ref 0–99)
POTASSIUM SERPL-SCNC: 5.6 MMOL/L (ref 3.5–5)
SODIUM BLD-SCNC: 137 MMOL/L (ref 132–146)
TOTAL PROTEIN: 6.8 G/DL (ref 6.4–8.3)
TRIGL SERPL-MCNC: 247 MG/DL (ref 0–149)
VLDLC SERPL CALC-MCNC: 49 MG/DL

## 2021-02-08 PROCEDURE — 99214 OFFICE O/P EST MOD 30 MIN: CPT | Performed by: FAMILY MEDICINE

## 2021-02-08 RX ORDER — ROPINIROLE 1 MG/1
1 TABLET, FILM COATED ORAL NIGHTLY
Qty: 90 TABLET | Refills: 1 | Status: SHIPPED | OUTPATIENT
Start: 2021-02-08

## 2021-02-08 ASSESSMENT — ENCOUNTER SYMPTOMS
SHORTNESS OF BREATH: 1
WHEEZING: 1
CHEST TIGHTNESS: 0
SPUTUM PRODUCTION: 0
BLURRED VISION: 1
VISUAL CHANGE: 1
COUGH: 1

## 2021-02-08 ASSESSMENT — PATIENT HEALTH QUESTIONNAIRE - PHQ9
SUM OF ALL RESPONSES TO PHQ QUESTIONS 1-9: 0
SUM OF ALL RESPONSES TO PHQ QUESTIONS 1-9: 0

## 2021-02-08 ASSESSMENT — COPD QUESTIONNAIRES: COPD: 1

## 2021-02-08 NOTE — PROGRESS NOTES
Charles Amos, a male of 65 y.o. came to the office 2/8/2021. Patient Active Problem List   Diagnosis    [facility]) [facility]    DM2 (diabetes mellitus, type 2) ([facility]    Hyperlipemia    Nephrolithiasis     Tobacco abuse    Intractable ophthalmoplegic migraine    Adenomatous polyp of colon          Hyperlipidemia  This is a chronic problem. The current episode started more than 1 month ago. The problem is controlled. Associated symptoms include shortness of breath. Pertinent negatives include no chest pain, leg pain or myalgias. Diabetes  He presents for his follow-up diabetic visit. He has type 2 diabetes mellitus. Associated symptoms include blurred vision, foot paresthesias and visual change. Pertinent negatives for diabetes include no chest pain, no foot ulcerations, no polydipsia and no polyuria. Symptoms are stable. Current diabetic treatment includes oral agent (dual therapy). He is compliant with treatment all of the time. He rarely participates in exercise. Eye exam is current (has retinal appt in March. ). COPD  He complains of cough, shortness of breath and wheezing. There is no chest tightness or sputum production. This is a chronic problem. The current episode started more than 1 year ago. The problem has been unchanged. The cough is non-productive. Pertinent negatives include no chest pain or myalgias. His symptoms are aggravated by exposure to smoke (still smokes 1.5 ppd.). His symptoms are alleviated by beta-agonist and ipratropium. He reports moderate improvement on treatment. Risk factors for lung disease include smoking/tobacco exposure. RLS: better with Requip but could use higher dose.      Allergies   Allergen Reactions    Pcn [Penicillins] Hives    Tape [Adhesive Tape]      blisters       Current Outpatient Medications on File Prior to Visit   Medication Sig Dispense Refill    ramipril (ALTACE) 1.25 MG capsule TAKE 1 CAPSULE DAILY 90 capsule 3    Icosapent Ethyl (VASCEPA) 1 g CAPS capsule Take 2 capsules by mouth 2 times daily 120 capsule 2    atorvastatin (LIPITOR) 10 MG tablet TAKE 1 TABLET DAILY 90 tablet 1    SITagliptin-metFORMIN HCl ER (JANUMET XR) 100-1000 MG TB24 TAKE 1 TABLET DAILY 90 tablet 1    ibuprofen (ADVIL;MOTRIN) 200 MG tablet Take 400 mg by mouth every 6 hours as needed for Pain LD 8/18/2019       No current facility-administered medications on file prior to visit. Review of Systems   Eyes: Positive for blurred vision. Respiratory: Positive for cough, shortness of breath and wheezing. Negative for sputum production. Cardiovascular: Negative for chest pain. Endocrine: Negative for polydipsia and polyuria. Musculoskeletal: Negative for myalgias. other review of systems reviewed and are negative    OBJECTIVE:  /84   Pulse 72   Temp 97 °F (36.1 °C)   Ht 5' 11\" (1.803 m)   Wt 214 lb (97.1 kg)   SpO2 99%   BMI 29.85 kg/m²      Physical Exam  Vitals signs reviewed. Eyes:      General: No scleral icterus. Conjunctiva/sclera: Conjunctivae normal.   Neck:      Musculoskeletal: Neck supple. Thyroid: Thyromegaly (R side ) present. No thyroid mass or thyroid tenderness. Vascular: No carotid bruit. Cardiovascular:      Rate and Rhythm: Normal rate and regular rhythm. Heart sounds: No murmur. Pulmonary:      Effort: Pulmonary effort is normal.      Breath sounds: Normal breath sounds. No wheezing or rales. Abdominal:      General: Bowel sounds are normal.      Palpations: Abdomen is soft. There is no mass. Tenderness: There is no abdominal tenderness. There is no guarding or rebound. Musculoskeletal: Normal range of motion. Lymphadenopathy:      Cervical: No cervical adenopathy. Skin:     General: Skin is warm and dry. Neurological:      Mental Status: He is alert and oriented to person, place, and time.    Psychiatric:         Mood and Affect: Mood normal.         ASSESSMENT AND PLAN:    Ricci Guido was seen today for hyperlipidemia, diabetes and copd. Diagnoses and all orders for this visit:    Type 2 diabetes mellitus without complication, without long-term current use of insulin (HCC)  -     Comprehensive Metabolic Panel; Future  -     Hemoglobin A1C; Future    RLS (restless legs syndrome)  -     rOPINIRole (REQUIP) 1 MG tablet; Take 1 tablet by mouth nightly    Mixed hyperlipidemia  -     Lipid Panel; Future    Pulmonary emphysema, unspecified emphysema type (HCC)  -     albuterol-ipratropium (COMBIVENT RESPIMAT)  MCG/ACT AERS inhaler; Inhale 1 puff into the lungs every 6 hours    - quit smoking  - low carb diet. - increase Requip dose.  - get covid vax. Return in about 6 months (around 8/8/2021) for medicare pe, or for acute problem, based on lab results.     Larry Ojeda, DO

## 2021-02-10 ENCOUNTER — TELEPHONE (OUTPATIENT)
Dept: PRIMARY CARE CLINIC | Age: 71
End: 2021-02-10

## 2021-02-10 NOTE — TELEPHONE ENCOUNTER
Called patient discussed labs hemoglobin A1c improved to 7.2%. Therefore continue current regimen. His other labs were good regarding cholesterol kidneys liver and sugar. However his triglycerides are still above 200 so recommended he restart his Vascepa 1 g 2 pills twice a day. Call when he needs a refill. He agrees.

## 2021-03-02 ENCOUNTER — OFFICE VISIT (OUTPATIENT)
Dept: ENDOCRINOLOGY | Age: 71
End: 2021-03-02
Payer: MEDICARE

## 2021-03-02 VITALS
WEIGHT: 212 LBS | OXYGEN SATURATION: 97 % | SYSTOLIC BLOOD PRESSURE: 124 MMHG | BODY MASS INDEX: 29.57 KG/M2 | DIASTOLIC BLOOD PRESSURE: 76 MMHG | HEART RATE: 78 BPM | TEMPERATURE: 97.3 F

## 2021-03-02 DIAGNOSIS — E55.9 VITAMIN D DEFICIENCY: ICD-10-CM

## 2021-03-02 DIAGNOSIS — E78.5 HYPERLIPIDEMIA, UNSPECIFIED HYPERLIPIDEMIA TYPE: ICD-10-CM

## 2021-03-02 DIAGNOSIS — E04.2 MULTINODULAR GOITER: Primary | ICD-10-CM

## 2021-03-02 PROCEDURE — 99214 OFFICE O/P EST MOD 30 MIN: CPT | Performed by: INTERNAL MEDICINE

## 2021-03-02 NOTE — PROGRESS NOTES
[address] [place] [address] Department of Endocrinology Diabetes and Metabolism   [address] [facility] [address]   Phone: [phone]  Fax: [phone]    Date of Service: 3/2/2021    Primary Care Physician: Dea Tse DO  Provider: Conor Del Angel MD            Reason for the visit:  Multinodular goiter     History of Present Illness: The history is provided by the patient. No  was used. Accuracy of the patient data is excellent. Chino Gibbons is a very pleasant 65 y.o. male seen today for follow up visit     The patient was found to have thyroid nodule on CT chest dose for evaluation for SOB and cough   CT chest 8/1/2019  There is a 3.9 x 2.5 cm mass in the right lobe of the thyroid. The right lobe of thyroid measures 6.9 x 3.0 x 3.4 cm.     Thyroid US 10/2019: The right lobe of thyroid measures 6.9 x 3.0 x 3.4 cm.   There is a hypoechoic, spongiform nodule within the superior aspect of the right lobe of the thyroid with smooth borders and no calcifications measuring 3.1 x 2.7 x 2.5 cm   There is a hypoechoic, spongiform nodule within the mid right lobe of the thyroid with smooth borders and no calcifications measuring 1.9 x 1.2 x 1.9 cm   There is a hypoechoic, spongiform nodule within the inferior aspect of the right lobe of the thyroid with smooth borders and no calcifications measuring 2.8 x 1.6 x 2.9 cm   The left lobe of thyroid measures 5.3 x 1.6 x 2.0 cm --> No mass is seen in the left lobe of the thyroid    9/2020 - FNA to dominant Rt side thyroid nodules was benign     Chino Gibbons denies any compressive symptoms, change in her voice, or shortness of breath. No family history of thyroid cancer. No prior history of radiation to head or neck region.     Lab Results   Component Value Date/Time    TSH 1.090 09/01/2020 02:32 PM    T4FREE 1.11 09/01/2020 02:32 PM     Patient denied any history of  swelling in the area of the thyroid gland, weight loss, change in appetite, nervousness, anxiety, irritability, tremor, sweating, heat intolerance, changes in bowel habits, muscle weakness or difficulty sleeping. Patient also denied any h/o unexplained weight gain, new fatigue, increased sensitivity to cold, dry skin, brittle fingernails, brittle hair, facial swelling/puffiness, or depressed mood. PAST MEDICAL HISTORY   Past Medical History:   Diagnosis Date    Chronic cough     uses inhaler /to have Bronch 8/22/2019    Colon polyps 03/07/2019    Diabetes ([facility] [address]     Hyperlipidemia     Hypertension     Rash     noted 2/28/19- says it is on back & shoulders    Restless leg     Thyroid nodule 2020    R     PAST SURGICAL HISTORY   Past Surgical History:   Procedure Laterality Date    APPENDECTOMY  2000?     ruptured    BRONCHOSCOPY N/A 8/22/2019    BRONCHOSCOPY DIAGNOSTIC OR CELL [address] ONLY performed by Mitzy Corona MD at [address]  2000?    open    COLONOSCOPY  12 years ago    COLONOSCOPY N/A 3/7/2019    COLONOSCOPY POLYPECTOMY SNARE/COLD BIOPSY performed by Karma Avendaño DO at [address]  years ago    LITHOTRIPSY  years ago     SOCIAL HISTORY   .[email or web address]      FAMILY HISTORY   Family History   Problem Relation Age of Onset    Stroke Father      ALLERGIES AND DRUG REACTIONS   Allergies   Allergen Reactions    Pcn [Penicillins] Hives    Tape [Adhesive Tape]      blisters       CURRENT MEDICATIONS   Current Outpatient Medications   Medication Sig Dispense Refill    rOPINIRole (REQUIP) 1 MG tablet Take 1 tablet by mouth nightly 90 tablet 1    albuterol-ipratropium (COMBIVENT RESPIMAT)  MCG/ACT AERS inhaler Inhale 1 puff into the lungs every 6 hours 1 Inhaler 5    ramipril (ALTACE) 1.25 MG capsule TAKE 1 CAPSULE DAILY 90 capsule 3    Icosapent Ethyl (VASCEPA) 1 g CAPS capsule Take 2 capsules by mouth 2 times daily 120 capsule 2    atorvastatin (LIPITOR) 10 MG tablet TAKE 1 TABLET DAILY 90 tablet 1    SITagliptin-metFORMIN HCl ER (JANUMET XR) 100-1000 MG TB24 TAKE 1 TABLET DAILY 90 tablet 1    ibuprofen (ADVIL;MOTRIN) 200 MG tablet Take 400 mg by mouth every 6 hours as needed for Pain LD 8/18/2019       No current facility-administered medications for this visit. Review of Systems  Constitutional: No fever, no chills, no diaphoresis, no generalized weakness. HEENT: No blurred vision, No sore throat, no ear pain, no hair loss  Neck: denied any neck swelling, difficulty swallowing,   Cadrdiopulomary: No CP, SOB or palpitation, No orthopnea or PND. No cough or wheezing. GI: No N/V/D, no constipation, No abdominal pain, no melena or hematochezia   : Denied any dysuria, hematuria, flank pain, discharge, or incontinence. Skin: denied any rash, ulcer, Hirsute, or hyperpigmentation. MSK: denied any joint deformity, joint pain/swelling, muscle pain, or back pain. Neuro: no numbess, no tingling, no weakness,     OBJECTIVE    /76   Pulse 78   Temp 97.3 °F (36.3 °C)   Wt 212 lb (96.2 kg)   SpO2 97%   BMI 29.57 kg/m²   BP Readings from Last 4 Encounters:   03/02/21 124/76   02/08/21 130/84   09/25/20 139/66   08/31/20 116/72     Wt Readings from Last 6 Encounters:   03/02/21 212 lb (96.2 kg)   02/08/21 214 lb (97.1 kg)   09/25/20 210 lb (95.3 kg)   08/31/20 216 lb 9.6 oz (98.2 kg)   04/29/20 215 lb (97.5 kg)   09/05/19 217 lb 9.6 oz (98.7 kg)       Physical examination:  General: awake alert, oriented x3, no abnormal position or movements. HEENT: normocephalic non traumatic  Neck: supple, no LN enlargement, Rt side thyroid nodule is palpable, , no thyroid tenderness, no JVD. Pulm: good air entry, +  wheezing i    CVS: S1 + S2, no murmur, no heave. Dorsalis pedis pulse palpable   Abd: soft lax, no tenderness, no organomegaly, audible bowel sounds. Skin: warm, no lesions, no rash.  No Palmar erythema  Musculoskeletal: No back tenderness, no kyphosis/scoliosis     Neuro: CN intact, sensation normal , muscle power normal. No tremors   Psych: normal mood, and affect    Review of Laboratory Data:  I personally reviewed the following labs:   Lab Results   Component Value Date/Time    WBC 9.0 01/19/2016 10:00 AM    RBC 5.73 01/19/2016 10:00 AM    HGB 17.3 (H) 01/19/2016 10:00 AM    HCT 52.7 01/19/2016 10:00 AM    MCV 92.0 01/19/2016 10:00 AM    MCH 30.2 01/19/2016 10:00 AM    MCHC 32.8 01/19/2016 10:00 AM    RDW 13.4 01/19/2016 10:00 AM     01/19/2016 10:00 AM    MPV 11.3 01/19/2016 10:00 AM      Lab Results   Component Value Date/Time     02/08/2021 01:30 PM    K 5.6 (H) 02/08/2021 01:30 PM    CO2 27 02/08/2021 01:30 PM    BUN 9 02/08/2021 01:30 PM    CREATININE 1.3 (H) 02/08/2021 01:30 PM    CALCIUM 9.4 02/08/2021 01:30 PM    LABGLOM 54 02/08/2021 01:30 PM    GFRAA >60 02/08/2021 01:30 PM      Lab Results   Component Value Date/Time    TSH 1.090 09/01/2020 02:32 PM    T4FREE 1.11 09/01/2020 02:32 PM     Lab Results   Component Value Date    LABA1C 7.2 02/08/2021    GLUCOSE 140 02/08/2021    MALBCR  04/29/2020    LABMICR 49.1 01/21/2019    LABCREA 207 01/21/2019     Lab Results   Component Value Date    CHOL 126 02/08/2021    CHOL 129 04/29/2020    TRIG 247 02/08/2021    TRIG 281 04/29/2020    HDL 34 02/08/2021    HDL 31 04/29/2020     No results found for: VITD25     ASSESSMENT & RECOMMENDATIONS   Royce Kwan, a 65 y.o.-old male seen in for the following issues     Multinodular goiter   · FN to dominant 3 cm Rt side thyroid nodule was benign in 9/2020   · Repeat thyroid US in 9/2021   · TFT in 9/2020    VitD deficiency   · Continue vitD supplement     HLD  · continue Lipitor 10 mg daily     I personally reviewed external notes from PCP and other patient's care team providers, and personally interpreted labs associated with the above diagnosis.  I also ordered labs to further assess and manage the above addressed medical conditions    Return in about 6 months (around 9/2/2021) for Multinodular goiter, VitD deficiency. The above issues were reviewed with the patient who understood and agreed with the plan. Thank you for allowing us to participate in the care of this patient. Please do not hesitate to contact us with any additional questions. Diagnosis Orders   1. Multinodular goiter  TSH without Reflex    T4, Free    US THYROID   2. Vitamin D deficiency     3. Hyperlipidemia, unspecified hyperlipidemia type       Carmella Weaver MD  Endocrinologist, [facility])   [address] [place] [address], [address] [place] [address],[address] [address]   Phone: [phone]  Fax: [phone]  -------------------------------------------------------------  An electronic signature was used to authenticate this note.  Camelia Rowe MD on 3/2/2021 at 12:18 PM

## 2021-03-08 ENCOUNTER — IMMUNIZATION (OUTPATIENT)
Dept: PRIMARY CARE CLINIC | Age: 71
End: 2021-03-08
Payer: MEDICARE

## 2021-03-08 PROCEDURE — 91301 COVID-19, MODERNA VACCINE 100MCG/0.5ML DOSE: CPT | Performed by: NURSE PRACTITIONER

## 2021-03-08 PROCEDURE — 0011A COVID-19, MODERNA VACCINE 100MCG/0.5ML DOSE: CPT | Performed by: NURSE PRACTITIONER

## 2021-04-06 ENCOUNTER — IMMUNIZATION (OUTPATIENT)
Dept: PRIMARY CARE CLINIC | Age: 71
End: 2021-04-06
Payer: MEDICARE

## 2021-04-06 PROCEDURE — 0012A COVID-19, MODERNA VACCINE 100MCG/0.5ML DOSE: CPT | Performed by: NURSE PRACTITIONER

## 2021-04-06 PROCEDURE — 91301 COVID-19, MODERNA VACCINE 100MCG/0.5ML DOSE: CPT | Performed by: NURSE PRACTITIONER

## 2021-08-12 ENCOUNTER — OFFICE VISIT (OUTPATIENT)
Dept: PRIMARY CARE CLINIC | Age: 71
End: 2021-08-12
Payer: MEDICARE

## 2021-08-12 VITALS
TEMPERATURE: 96.9 F | SYSTOLIC BLOOD PRESSURE: 110 MMHG | BODY MASS INDEX: 28.28 KG/M2 | RESPIRATION RATE: 14 BRPM | DIASTOLIC BLOOD PRESSURE: 60 MMHG | HEIGHT: 71 IN | WEIGHT: 202 LBS | OXYGEN SATURATION: 96 % | HEART RATE: 67 BPM

## 2021-08-12 DIAGNOSIS — E11.9 TYPE 2 DIABETES MELLITUS WITHOUT COMPLICATION, WITHOUT LONG-TERM CURRENT USE OF INSULIN (HCC): ICD-10-CM

## 2021-08-12 DIAGNOSIS — R19.7 DIARRHEA DUE TO MALABSORPTION: ICD-10-CM

## 2021-08-12 DIAGNOSIS — K63.5 POLYP OF COLON, UNSPECIFIED PART OF COLON, UNSPECIFIED TYPE: ICD-10-CM

## 2021-08-12 DIAGNOSIS — Z00.00 ROUTINE GENERAL MEDICAL EXAMINATION AT A HEALTH CARE FACILITY: Primary | ICD-10-CM

## 2021-08-12 DIAGNOSIS — Z12.5 SCREENING PSA (PROSTATE SPECIFIC ANTIGEN): ICD-10-CM

## 2021-08-12 DIAGNOSIS — K90.9 DIARRHEA DUE TO MALABSORPTION: ICD-10-CM

## 2021-08-12 PROCEDURE — G0439 PPPS, SUBSEQ VISIT: HCPCS | Performed by: FAMILY MEDICINE

## 2021-08-12 PROCEDURE — 3051F HG A1C>EQUAL 7.0%<8.0%: CPT | Performed by: FAMILY MEDICINE

## 2021-08-12 PROCEDURE — 99213 OFFICE O/P EST LOW 20 MIN: CPT | Performed by: FAMILY MEDICINE

## 2021-08-12 RX ORDER — ICOSAPENT ETHYL 1000 MG/1
2 CAPSULE ORAL 2 TIMES DAILY
Qty: 120 CAPSULE | Refills: 3 | Status: SHIPPED | OUTPATIENT
Start: 2021-08-12

## 2021-08-12 SDOH — ECONOMIC STABILITY: FOOD INSECURITY: WITHIN THE PAST 12 MONTHS, YOU WORRIED THAT YOUR FOOD WOULD RUN OUT BEFORE YOU GOT MONEY TO BUY MORE.: NEVER TRUE

## 2021-08-12 SDOH — ECONOMIC STABILITY: FOOD INSECURITY: WITHIN THE PAST 12 MONTHS, THE FOOD YOU BOUGHT JUST DIDN'T LAST AND YOU DIDN'T HAVE MONEY TO GET MORE.: NEVER TRUE

## 2021-08-12 ASSESSMENT — PATIENT HEALTH QUESTIONNAIRE - PHQ9
SUM OF ALL RESPONSES TO PHQ QUESTIONS 1-9: 0
SUM OF ALL RESPONSES TO PHQ9 QUESTIONS 1 & 2: 0
SUM OF ALL RESPONSES TO PHQ QUESTIONS 1-9: 0
1. LITTLE INTEREST OR PLEASURE IN DOING THINGS: 0
SUM OF ALL RESPONSES TO PHQ QUESTIONS 1-9: 0
2. FEELING DOWN, DEPRESSED OR HOPELESS: 0

## 2021-08-12 ASSESSMENT — ENCOUNTER SYMPTOMS
FLATUS: 1
BLURRED VISION: 0
DIARRHEA: 1
COUGH: 0
CONSTIPATION: 1
VOMITING: 0
ABDOMINAL PAIN: 0
ABDOMINAL DISTENTION: 1
VISUAL CHANGE: 0
NAUSEA: 0
BLOATING: 1

## 2021-08-12 ASSESSMENT — LIFESTYLE VARIABLES: HOW OFTEN DO YOU HAVE A DRINK CONTAINING ALCOHOL: 0

## 2021-08-12 ASSESSMENT — SOCIAL DETERMINANTS OF HEALTH (SDOH): HOW HARD IS IT FOR YOU TO PAY FOR THE VERY BASICS LIKE FOOD, HOUSING, MEDICAL CARE, AND HEATING?: NOT HARD AT ALL

## 2021-08-12 NOTE — PATIENT INSTRUCTIONS
Personalized Preventive Plan for Iris Magallanes - 8/12/2021  Medicare offers a range of preventive health benefits. Some of the tests and screenings are paid in full while other may be subject to a deductible, co-insurance, and/or copay. Some of these benefits include a comprehensive review of your medical history including lifestyle, illnesses that may run in your family, and various assessments and screenings as appropriate. After reviewing your medical record and screening and assessments performed today your provider may have ordered immunizations, labs, imaging, and/or referrals for you. A list of these orders (if applicable) as well as your Preventive Care list are included within your After Visit Summary for your review. Other Preventive Recommendations:    · A preventive eye exam performed by an eye specialist is recommended every 1-2 years to screen for glaucoma; cataracts, macular degeneration, and other eye disorders. · A preventive dental visit is recommended every 6 months. · Try to get at least 150 minutes of exercise per week or 10,000 steps per day on a pedometer . · Order or download the FREE \"Exercise & Physical Activity: Your Everyday Guide\" from [employer]. Call 5-819.209.2070 or search [employer] online. · You need 9658-8778 mg of calcium and 6848-5965 IU of vitamin D per day. It is possible to meet your calcium requirement with diet alone, but a vitamin D supplement is usually necessary to meet this goal.  · When exposed to the sun, use a sunscreen that protects against both UVA and UVB radiation with an SPF of 30 or greater. Reapply every 2 to 3 hours or after sweating, drying off with a towel, or swimming. · Always wear a seat belt when traveling in a car. Always wear a helmet when riding a bicycle or motorcycle.

## 2021-08-12 NOTE — PROGRESS NOTES
Medicare Annual Wellness Visit  Name: Danii Aguayo Date: 2021   MRN: [id number] Sex: Male   Age: 66 y.o. Ethnicity: Non- / Non    : 1950 Race: White (non-)      Suzette Yun is here for Medicare AWV    Screenings for behavioral, psychosocial and functional/safety risks, and cognitive dysfunction are all negative except as indicated below. These results, as well as other patient data from the [address] form, are documented in Flowsheets linked to this Encounter. Allergies   Allergen Reactions    Pcn [Penicillins] Hives    Tape [Adhesive Tape]      blisters         Prior to Visit Medications    Medication Sig Taking? Authorizing Provider   Icosapent Ethyl (VASCEPA) 1 g CAPS capsule Take 2 capsules by mouth 2 times daily Yes Vinod Ojeda DO   rOPINIRole (REQUIP) 1 MG tablet Take 1 tablet by mouth nightly Yes Vinod Ojeda DO   albuterol-ipratropium (COMBIVENT RESPIMAT)  MCG/ACT AERS inhaler Inhale 1 puff into the lungs every 6 hours Yes Vinod Ojeda DO   ramipril (ALTACE) 1.25 MG capsule TAKE 1 CAPSULE DAILY Yes Lance Scott DO   atorvastatin (LIPITOR) 10 MG tablet TAKE 1 TABLET DAILY Yes Vinod Ojeda DO   SITagliptin-metFORMIN HCl ER (JANUMET XR) 100-1000 MG TB24 TAKE 1 TABLET DAILY Yes Vinod Ojeda DO   ibuprofen (ADVIL;MOTRIN) 200 MG tablet Take 400 mg by mouth every 6 hours as needed for Pain LD 2019 Yes Historical Provider, MD         Past Medical History:   Diagnosis Date    Chronic cough     uses inhaler /to have Bronch 2019    Colon polyps 2019    Diabetes ([address]     Hyperlipidemia     Hypertension     Rash     noted 19- says it is on back & shoulders    Restless leg     Thyroid nodule     R       Past Surgical History:   Procedure Laterality Date    APPENDECTOMY  ?     ruptured    BRONCHOSCOPY N/A 2019    BRONCHOSCOPY DIAGNOSTIC OR CELL [address] ONLY performed by Sabrina Blake MD at [address] [employer]  2000?    open    COLONOSCOPY  12 years ago    COLONOSCOPY N/A 3/7/2019    COLONOSCOPY POLYPECTOMY SNARE/COLD BIOPSY performed by Aleksandr Parish DO at [address]  years ago    LITHOTRIPSY  years ago         Family History   Problem Relation Age of Onset    Stroke Father        CareTeam (Including outside providers/suppliers regularly involved in providing care):   Patient Care Team:  Jannette Church DO as PCP - General (Family Medicine)  Jannette Church DO as PCP - [facility] Empaneled Provider  Sabrina Blake MD as Consulting Physician (Pulmonology)    Wt Readings from Last 3 Encounters:   08/12/21 202 lb (91.6 kg)   03/02/21 212 lb (96.2 kg)   02/08/21 214 lb (97.1 kg)     Vitals:    08/12/21 1025   BP: 110/60   Pulse: 67   Resp: 14   Temp: 96.9 °F (36.1 °C)   SpO2: 96%   Weight: 202 lb (91.6 kg)   Height: 5' 11\" (1.803 m)     Body mass index is 28.17 kg/m². Based upon direct observation of the patient, evaluation of cognition reveals recent and remote memory intact.     General Appearance: alert and oriented to person, place and time, well developed and well- nourished, in no acute distress  Skin: warm and dry, no rash or erythema  Head: normocephalic and atraumatic  Eyes: pupils equal, round, and reactive to light, extraocular eye movements intact, conjunctivae normal  ENT: tympanic membrane, external ear and ear canal normal bilaterally, nose without deformity, nasal mucosa and turbinates normal without polyps  Neck: supple and non-tender without mass, no thyromegaly or thyroid nodules, no cervical lymphadenopathy  Pulmonary/Chest: clear to auscultation bilaterally- no wheezes, rales or rhonchi, normal air movement, no respiratory distress  Cardiovascular: normal rate, regular rhythm, normal S1 and S2, no murmurs, rubs, clicks, or gallops, distal pulses intact, no carotid bruits  Abdomen: soft, non-tender, non-distended, normal bowel sounds, no masses or organomegaly  Extremities: no cyanosis, clubbing or edema  Musculoskeletal: normal range of motion, no joint swelling, deformity or tenderness  Neurologic: reflexes normal and symmetric, no cranial nerve deficit, gait, coordination and speech normal    Patient's complete Health Risk Assessment and screening values have been reviewed and are found in Flowsheets. The following problems were reviewed today and where indicated follow up appointments were made and/or referrals ordered. Positive Risk Factor Screenings with Interventions:         Substance History:  Social History     Tobacco History     Smoking Status  Current Every Day Smoker Smoking Frequency  1.5 packs/day for 35 years (52.5 pk yrs) Smoking Tobacco Type  Cigarettes    Smokeless Tobacco Use  Never Used          Alcohol History     Alcohol Use Status  Yes Comment  SOCIALLY          Drug Use     Drug Use Status  No          Sexual Activity     Sexually Active  Never               Alcohol Screening:       A score of 8 or more is associated with harmful or hazardous drinking. A score of 13 or more in women, and 15 or more in men, is likely to indicate alcohol dependence. Substance Abuse Interventions:  · Tobacco abuse:  recommend he quit smoking. General Health and ACP:  General  In general, how would you say your health is?: Good  In the past 7 days, have you experienced any of the following?  New or Increased Pain, New or Increased Fatigue, Loneliness, Social Isolation, Stress or Anger?: None of These  Do you get the social and emotional support that you need?: Yes  Do you have a Living Will?: (!) No  Advance Directives     Power of [facility] Will ACP-Advance Directive ACP-Power of     Not on File Not on File Not on File Not on File      General Health Risk Interventions:  · No Living Will: recommend [facility] Habits/Nutrition:  Health Habits/Nutrition  Do you exercise for at least 20 minutes 2-3 times per week?: (!) No  Have you lost any weight without trying in the past 3 months?: (!) Yes  Do you eat only one meal per day?: (!) Yes  Have you seen the dentist within the past year?: (!) No  Body mass index: (!) 28.17  Health Habits/Nutrition Interventions:  · Inadequate physical activity:  patient is not ready to increase his/her physical activity level at this time  · Nutritional issues:  afraid to eat due bloating and gas when he does. he gets constipated. · Dental exam overdue:  patient encouraged to make appointment with his/her dentist     Safety:  Safety  Do you have working smoke detectors?: (!) No  Have all throw rugs been removed or fastened?: Yes  Do you have non-slip mats or surfaces in all bathtubs/showers?: (!) No  Do all of your stairways have a railing or banister?: Yes  Are your doorways, halls and stairs free of clutter?: Yes  Do you always fasten your seatbelt when you are in a car?: (!) No  Safety Interventions:  · recommend smoke detectors for house and non slip mats for shower. he does wear a seatbelt.       Personalized Preventive Plan   Current Health Maintenance Status  Immunization History   Administered Date(s) Administered    COVID-19, Moderna, PF, 100mcg/0.5mL 03/08/2021, 04/06/2021    Pneumococcal Conjugate 13-valent ([id number]) 01/03/2017    Pneumococcal Polysaccharide ([id number]) 01/19/2016        Health Maintenance   Topic Date Due    Hepatitis C screen  Never done    Diabetic foot exam  Never done    DTaP/Tdap/Td vaccine (1 - Tdap) Never done    Shingles Vaccine (2 of 3) 03/09/2015    Annual Wellness Visit (AWV)  Never done    Diabetic retinal exam  11/08/2019    Low dose CT lung screening  08/01/2020    Diabetic microalbuminuria test  04/29/2021    Flu vaccine (1) 09/01/2021    A1C test (Diabetic or Prediabetic)  02/08/2022    Lipid screen  02/08/2022    Potassium monitoring  02/08/2022    Creatinine monitoring  02/08/2022    Colon cancer screen colonoscopy  03/07/2029    Pneumococcal 65+ years Vaccine  Completed    COVID-19 Vaccine  Completed    AAA screen  Completed    Hepatitis A vaccine  Aged Out    Hib vaccine  Aged Out    Meningococcal (ACWY) vaccine  Aged Out     Recommendations for BERD Due: see orders and patient instructions/AVS.  . Recommended screening schedule for the next 5-10 years is provided to the patient in written form: see Patient Kalani Gallegos was seen today for medicare awv. Diagnoses and all orders for this visit:    Routine general medical examination at a health care facility    Type 2 diabetes mellitus without complication, without long-term current use of insulin (HCC)  -     Icosapent Ethyl (VASCEPA) 1 g CAPS capsule; Take 2 capsules by mouth 2 times daily  -     Lipid Panel; Future  -     Microalbumin / Creatinine Urine Ratio; Future  -     Comprehensive Metabolic Panel; Future  -     Hemoglobin A1C; Future    Diarrhea due to malabsorption  -     Ambulatory referral to General Surgery    Screening PSA (prostate specific antigen)  -     PSA screening; Future    Polyp of colon, unspecified part of colon, unspecified type                   Michela Kitchen, a male of 66 y.o. came to the office 8/12/2021. Patient Active Problem List   Diagnosis    COPD ([facility]ronic obstructive pulmonary disease) [address] [address]    DM2 (diabetes mellitus, type 2) ([facility] [address]    Hyperlipemia    Nephrolithiasis     Tobacco abuse    Intractable ophthalmoplegic migraine    Adenomatous polyp of colon          Diabetes  He presents for his follow-up diabetic visit. He has type 2 diabetes mellitus. His disease course has been stable. Pertinent negatives for hypoglycemia include no headaches or sweats. Associated symptoms include weight loss.  Pertinent negatives for diabetes include no blurred vision, no chest pain, no foot paresthesias, no foot ulcerations, no polydipsia, no polyuria and no visual change. Symptoms are stable. Current diabetic treatment includes oral agent (dual therapy). He is compliant with treatment all of the time. His weight is decreasing steadily. His lunch blood glucose range is generally >200 mg/dl. An ACE inhibitor/angiotensin II receptor blocker is being taken. Eye exam is current. Diarrhea   This is a recurrent problem. The current episode started more than 1 month ago. The problem occurs 2 to 4 times per day (mostly in am when gets up.). The stool consistency is described as watery. Associated symptoms include bloating, increased flatus, myalgias and weight loss. Pertinent negatives include no abdominal pain, arthralgias, chills, coughing, fever, headaches, sweats or vomiting. He has tried nothing for the symptoms. Allergies   Allergen Reactions    Pcn [Penicillins] Hives    Tape [Adhesive Tape]      blisters       Current Outpatient Medications on File Prior to Visit   Medication Sig Dispense Refill    rOPINIRole (REQUIP) 1 MG tablet Take 1 tablet by mouth nightly 90 tablet 1    albuterol-ipratropium (COMBIVENT RESPIMAT)  MCG/ACT AERS inhaler Inhale 1 puff into the lungs every 6 hours 1 Inhaler 5    ramipril (ALTACE) 1.25 MG capsule TAKE 1 CAPSULE DAILY 90 capsule 3    atorvastatin (LIPITOR) 10 MG tablet TAKE 1 TABLET DAILY 90 tablet 1    SITagliptin-metFORMIN HCl ER (JANUMET XR) 100-1000 MG TB24 TAKE 1 TABLET DAILY 90 tablet 1    ibuprofen (ADVIL;MOTRIN) 200 MG tablet Take 400 mg by mouth every 6 hours as needed for Pain LD 8/18/2019       No current facility-administered medications on file prior to visit. Review of Systems   Constitutional: Positive for weight loss. Negative for chills and fever. Eyes: Negative for blurred vision. Respiratory: Negative for cough. Cardiovascular: Negative for chest pain. Gastrointestinal: Positive for abdominal distention, bloating, constipation, diarrhea and flatus. Negative for abdominal pain, nausea and vomiting. Endocrine: Negative for polydipsia and polyuria. Musculoskeletal: Positive for myalgias. Negative for arthralgias. Neurological: Negative for headaches. other review of systems reviewed and are negative    OBJECTIVE:  /60   Pulse 67   Temp 96.9 °F (36.1 °C)   Resp 14   Ht 5' 11\" (1.803 m)   Wt 202 lb (91.6 kg)   SpO2 96%   BMI 28.17 kg/m²      Physical Exam see above. ASSESSMENT AND PLAN:    Sharona Garcia was seen today for medicare awv. Diagnoses and all orders for this visit:    Routine general medical examination at a health care facility    Type 2 diabetes mellitus without complication, without long-term current use of insulin ([facility])  -     Icosapent Ethyl (VASCEPA) 1 g CAPS capsule; Take 2 capsules by mouth 2 times daily  -     Lipid Panel; Future  -     Microalbumin / Creatinine Urine Ratio; Future  -     Comprehensive Metabolic Panel; Future  -     Hemoglobin A1C; Future    Diarrhea due to malabsorption  -     Ambulatory referral to General Surgery    Screening PSA (prostate specific antigen)  -     PSA screening; Future    Polyp of colon, unspecified part of colon, unspecified type    -recommend follow up with Dr. Nadia Roberts due to his history of colon polyps. - samples Prilosec 20 mg daily for 2 wks, call if no better. - quit smoking. Return for Medicare Annual Wellness Visit in 1 year.     Kyra Ojeda, DO

## 2021-08-16 ENCOUNTER — TELEPHONE (OUTPATIENT)
Dept: SURGERY | Age: 71
End: 2021-08-16

## 2021-08-16 ENCOUNTER — OFFICE VISIT (OUTPATIENT)
Dept: SURGERY | Age: 71
End: 2021-08-16
Payer: MEDICARE

## 2021-08-16 VITALS
HEIGHT: 71 IN | WEIGHT: 206 LBS | BODY MASS INDEX: 28.84 KG/M2 | HEART RATE: 74 BPM | TEMPERATURE: 97.1 F | SYSTOLIC BLOOD PRESSURE: 141 MMHG | DIASTOLIC BLOOD PRESSURE: 73 MMHG

## 2021-08-16 DIAGNOSIS — R10.30 LOWER ABDOMINAL PAIN: ICD-10-CM

## 2021-08-16 DIAGNOSIS — R10.13 EPIGASTRIC PAIN: ICD-10-CM

## 2021-08-16 DIAGNOSIS — Z86.010 HX OF COLONIC POLYPS: Primary | ICD-10-CM

## 2021-08-16 DIAGNOSIS — R19.7 DIARRHEA, UNSPECIFIED TYPE: ICD-10-CM

## 2021-08-16 DIAGNOSIS — R14.0 BLOATING: ICD-10-CM

## 2021-08-16 PROCEDURE — 99214 OFFICE O/P EST MOD 30 MIN: CPT | Performed by: SURGERY

## 2021-08-16 NOTE — TELEPHONE ENCOUNTER
Prior Authorization Form:      DEMOGRAPHICS:                     Patient Name:  Kyra Kelley  Patient :  1950            Insurance:  Payor: Jose Peña / Plan: Pat Hall PPO / Product Type: Medicare /   Insurance ID Number:    Payor/Plan Subscr  Sex Relation Sub. Ins. ID Effective Group Num   1.  [address] 1950 Male Self [facility] 18 [id number]                                    [id number]         DIAGNOSIS & PROCEDURE:                       Procedure/Operation: egd/colon           CPT Code: 52090/60623    Diagnosis:  Bloating/hx polyps    ICD10 Code: r14.0/z86.010    Location:  [facility]    Surgeon:  Don Pozo INFORMATION:                          Date: 21    Time: 1130              Anesthesia:  General                                                       Status:  Outpatient        Special Comments:         Electronically signed by Rojelio Spence MA on 2021 at 1:33 PM

## 2021-08-16 NOTE — PATIENT INSTRUCTIONS
[facility] COLONOSCOPY PREPARATION    Instructions for Clear liquid diet  Definition  A clear liquid diet consists of clear liquids, such as water, broth and plain gelatin, that are easily digested and leave no undigested residue in your intestinal tract. Your doctor may prescribe a clear liquid diet before certain medical procedures or if you have certain digestive problems. Because a clear liquid diet can't provide you with adequate calories and nutrients, it shouldn't be continued for more than a few days. Purpose  A clear liquid diet is often used before tests, procedures or surgeries that require no food in your stomach or intestines, such as before colonoscopy. It may also be recommended as a short-term diet if you have certain digestive problems, such as nausea, vomiting or diarrhea, or after certain types of surgery. Diet details  A clear liquid diet helps maintain adequate hydration, provides some important electrolytes, such as sodium and potassium, and gives some energy at a time when a full diet isn't possible or recommended. The following foods are allowed in a clear liquid diet:    Plain water    Fruit juices without pulp, such as apple juice, white grape juice.  Strained lemonade    Clear, fat-free broth (bouillon or consomme)    Clear sodas     Plain gelatin (Not RED or PURPLE)   Honey    Ice pops without bits of fruit or fruit pulp    Tea or coffee without milk or cream   ** NOTHING RED OR PURPLE    Any foods not on the above list should be avoided. Also, for certain tests, such as colon exams, your doctor may ask you to avoid liquids or gelatin with red coloring.      A typical menu on the clear liquid diet may look like this:   Breakfast:  1 glass fruit juice  1 cup coffee or tea (without dairy products)  1 cup broth  1 bowl gelatin     Snack:  1 glass fruit juice  1 bowl gelatin     Lunch:  1 glass fruit juice  1 glass water  1 cup broth  1 bowl gelatin     Snack:  1 ice pop (without fruit pulp)  1 cup coffee or tea (without dairy products) or a soft drink     Dinner:  1 cup juice or water  1 cup broth  1 bowl gelatin  1 cup coffee or tea     Purchase this over the counter:  1. GATORADE/Clear liquid (64 ounces)   Pick these up at the pharmacy:   [address] [place] [address] 238 grams (over the counter only)    The DAY BEFORE your colonoscopy:   Drink only clear liquids. (Absolutely no solid food)    COLONOSCOPY PREP:  3 PM: Mix the entire bottle of MIRALAX into the 64 ounces of GATORADE. (Put half the bottle in each 32 ounce bottle). Shake the solution until fully dissolved. Drink an 8 ounce glass every 30 minutes until the solution is gone. **DO NOT EAT OR DRINK ANYTHING AFTER MIDNIGHT**          The DAY OF your colonoscopy: You may take any necessary medications with a sip of water. Bring along someone to take you home. REMEMBER: The preparation is very important. An adequate clean out allows for the best evaluation of your entire colon. During the prep, using baby wipes may ease some of your discomfort.     You should NOT plan on working or driving the rest of the day due to sedation given at the procedure

## 2021-08-17 RX ORDER — SODIUM CHLORIDE 9 MG/ML
INJECTION, SOLUTION INTRAVENOUS CONTINUOUS
Status: CANCELLED | OUTPATIENT
Start: 2021-08-17

## 2021-08-17 ASSESSMENT — ENCOUNTER SYMPTOMS
EYE REDNESS: 0
SORE THROAT: 0
SHORTNESS OF BREATH: 0
BLOOD IN STOOL: 0
WHEEZING: 0
CONSTIPATION: 0
NAUSEA: 0
PHOTOPHOBIA: 0
COUGH: 0
VOMITING: 0
DIARRHEA: 1
BACK PAIN: 0
ABDOMINAL PAIN: 1

## 2021-08-17 NOTE — H&P
HISTORY OF PRESENT ILLNESS:    The patient is a 66 y.o. male who presents with complaint of change in his bowel habits. He reports postprandial bloating. Reports a lot of diarrhea as well as epigastric and lower abdominal pain. He does have history of colon polyps. Last colonoscopy was in 2019. On that exam he had a moderate size sessile polyp in the cecum x3 as well as a sending colon. He was recommended to have a 1 year repeat endoscopy to reassess the area of the cecum and ascending colon. The polyp in the cecum was quite large. Past Medical History:   Diagnosis Date    Chronic cough     uses inhaler /to have Bronch 8/22/2019    Colon polyps 03/07/2019    Diabetes ([facility] [address]     Hyperlipidemia     Hypertension     Rash     noted 2/28/19- says it is on back & shoulders    Restless leg     Thyroid nodule 2020    R       Past Surgical History:   Procedure Laterality Date    APPENDECTOMY  2000? ruptured    BRONCHOSCOPY N/A 8/22/2019    BRONCHOSCOPY DIAGNOSTIC OR CELL [address] ONLY performed by Jose C Mak MD at [address]  2000?    open    COLONOSCOPY  12 years ago    COLONOSCOPY N/A 3/7/2019    COLONOSCOPY POLYPECTOMY SNARE/COLD BIOPSY performed by Enrique Chaney DO at [address]  years ago    LITHOTRIPSY  years ago       Prior to Admission medications    Medication Sig Start Date End Date Taking?  Authorizing Provider   Icosapent Ethyl (VASCEPA) 1 g CAPS capsule Take 2 capsules by mouth 2 times daily 8/12/21   [facility] Rusty, DO   rOPINIRole (REQUIP) 1 MG tablet Take 1 tablet by mouth nightly 2/8/21   [facility] Rusty, DO   albuterol-ipratropium (COMBIVENT RESPIMAT)  MCG/ACT AERS inhaler Inhale 1 puff into the lungs every 6 hours 2/8/21   [facility] Rusty, DO   ramipril (ALTACE) 1.25 MG capsule TAKE 1 CAPSULE DAILY 12/29/20   Janie Scott DO   atorvastatin (LIPITOR) 10 MG tablet TAKE 1 TABLET DAILY 4/29/20 Vinod Ojeda, DO   SITagliptin-metFORMIN HCl ER (JANUMET XR) 100-1000 MG TB24 TAKE 1 TABLET DAILY 4/29/20   Radhatami Melo DO Rusty   ibuprofen (ADVIL;MOTRIN) 200 MG tablet Take 400 mg by mouth every 6 hours as needed for Pain LD 8/18/2019    Historical Provider, MD       Scheduled Meds:  ContinuousInfusions:  PRN Meds:    Allergies   Allergen Reactions    Pcn [Penicillins] Hives    Tape [Adhesive Tape]      blisters       Social History     Socioeconomic History    Marital status:      Spouse name: Not on file    Number of children: Not on file    Years of education: Not on file    Highest education level: Not on file   Occupational History    Not on file   Tobacco Use    Smoking status: Current Every Day Smoker     Packs/day: 1.50     Years: 35.00     Pack years: 52.50     Types: Cigarettes    Smokeless tobacco: Never Used   Vaping Use    Vaping Use: Never used   Substance and Sexual Activity    Alcohol use: Yes     Comment: SOCIALLY    Drug use: No    Sexual activity: Never   Other Topics Concern    Not on file   Social History Narrative    Not on file     Social Determinants of Health     Financial Resource Strain: Low Risk     Difficulty of Paying Living Expenses: Not hard at all   Food Insecurity: No Food Insecurity    Worried About Running Out of Food in the Last Year: Never true    Carline of Food in the Last Year: Never true   Transportation Needs:     Lack of Transportation (Medical):      Lack of Transportation (Non-Medical):    Physical Activity:     Days of Exercise per Week:     Minutes of Exercise per Session:    Stress:     Feeling of Stress :    Social Connections:     Frequency of Communication with Friends and Family:     Frequency of Social Gatherings with Friends and Family:     Attends Spiritism Services:     Active Member of Clubs or Organizations:     Attends Club or Organization Meetings:     Marital Status:    Intimate Partner Violence:     intact dtr's   Psych: Euthymic mood, congruent affect      No results found. Assessment/Plan:  3 75-year-old male with change in bowel habits, diarrhea, epigastric abdominal pain, bloating, history of multiple colon polyps. Plan for EGD and colonoscopy. The risks and benefits of the procedure were explained to the patient, including risks of bleeding and perforation. The patient expressed understanding of these risks.

## 2021-08-17 NOTE — H&P (VIEW-ONLY)
HISTORY OF PRESENT ILLNESS:    The patient is a 66 y.o. male who presents with complaint of change in his bowel habits. He reports postprandial bloating. Reports a lot of diarrhea as well as epigastric and lower abdominal pain. He does have history of colon polyps. Last colonoscopy was in 2019. On that exam he had a moderate size sessile polyp in the cecum x3 as well as a sending colon. He was recommended to have a 1 year repeat endoscopy to reassess the area of the cecum and ascending colon. The polyp in the cecum was quite large. Past Medical History:   Diagnosis Date    Chronic cough     uses inhaler /to have Bronch 8/22/2019    Colon polyps 03/07/2019    Diabetes ([address]     Hyperlipidemia     Hypertension     Rash     noted 2/28/19- says it is on back & shoulders    Restless leg     Thyroid nodule 2020    R       Past Surgical History:   Procedure Laterality Date    APPENDECTOMY  2000? ruptured    BRONCHOSCOPY N/A 8/22/2019    BRONCHOSCOPY DIAGNOSTIC OR CELL [address] ONLY performed by Herminio Vail MD at [address]  2000?    open    COLONOSCOPY  12 years ago    COLONOSCOPY N/A 3/7/2019    COLONOSCOPY POLYPECTOMY SNARE/COLD BIOPSY performed by Mariajose Mendoza DO at [address] [facility]  years ago    LITHOTRIPSY  years ago       Prior to Admission medications    Medication Sig Start Date End Date Taking?  Authorizing Provider   Icosapent Ethyl (VASCEPA) 1 g CAPS capsule Take 2 capsules by mouth 2 times daily 8/12/21   Xiomy Ojeda,    rOPINIRole (REQUIP) 1 MG tablet Take 1 tablet by mouth nightly 2/8/21   Xiomy Ojeda DO   albuterol-ipratropium (COMBIVENT RESPIMAT)  MCG/ACT AERS inhaler Inhale 1 puff into the lungs every 6 hours 2/8/21   Xiomy Ojeda DO   ramipril (ALTACE) 1.25 MG capsule TAKE 1 CAPSULE DAILY 12/29/20   Christen Scott DO   atorvastatin (LIPITOR) 10 MG tablet TAKE 1 TABLET DAILY 4/29/20 Vinod Ojeda, DO   SITagliptin-metFORMIN HCl ER (JANUMET XR) 100-1000 MG TB24 TAKE 1 TABLET DAILY 4/29/20   Erick Barahona Rusty DO   ibuprofen (ADVIL;MOTRIN) 200 MG tablet Take 400 mg by mouth every 6 hours as needed for Pain LD 8/18/2019    Historical Provider, MD       Scheduled Meds:  ContinuousInfusions:  PRN Meds:    Allergies   Allergen Reactions    Pcn [Penicillins] Hives    Tape [Adhesive Tape]      blisters       Social History     Socioeconomic History    Marital status:      Spouse name: Not on file    Number of children: Not on file    Years of education: Not on file    Highest education level: Not on file   Occupational History    Not on file   Tobacco Use    Smoking status: Current Every Day Smoker     Packs/day: 1.50     Years: 35.00     Pack years: 52.50     Types: Cigarettes    Smokeless tobacco: Never Used   Vaping Use    Vaping Use: Never used   Substance and Sexual Activity    Alcohol use: Yes     Comment: SOCIALLY    Drug use: No    Sexual activity: Never   Other Topics Concern    Not on file   Social History Narrative    Not on file     Social Determinants of Health     Financial Resource Strain: Low Risk     Difficulty of Paying Living Expenses: Not hard at all   Food Insecurity: No Food Insecurity    Worried About Running Out of Food in the Last Year: Never true    Carline of Food in the Last Year: Never true   Transportation Needs:     Lack of Transportation (Medical):      Lack of Transportation (Non-Medical):    Physical Activity:     Days of Exercise per Week:     Minutes of Exercise per Session:    Stress:     Feeling of Stress :    Social Connections:     Frequency of Communication with Friends and Family:     Frequency of Social Gatherings with Friends and Family:     Attends Rastafarian Services:     Active Member of Clubs or Organizations:     Attends Club or Organization Meetings:     Marital Status:    Intimate Partner Violence:     Fear of Current or Ex-Partner:     Emotionally Abused:     Physically Abused:     Sexually Abused:        Family History   Problem Relation Age of Onset    Stroke Father        Review Of Systems:   Review of Systems   Constitutional: Negative for chills and fever. HENT: Negative for ear pain, nosebleeds, sore throat and tinnitus. Eyes: Negative for photophobia and redness. Respiratory: Negative for cough, shortness of breath and wheezing. Cardiovascular: Negative for chest pain and palpitations. Gastrointestinal: Positive for abdominal pain and diarrhea. Negative for blood in stool, constipation, nausea and vomiting. Endocrine: Negative for polydipsia. Genitourinary: Negative for dysuria, hematuria and urgency. Musculoskeletal: Negative for back pain and neck pain. Skin: Negative for rash. Neurological: Negative for dizziness, tremors and seizures. Hematological: Does not bruise/bleed easily. All other systems reviewed and are negative.        Physical Exam:  Vitals:    08/16/21 1109   BP: (!) 141/73   Site: Left Upper Arm   Pulse: 74   Temp: 97.1 °F (36.2 °C)   TempSrc: Temporal   Weight: 206 lb (93.4 kg)   Height: 5' 11\" (1.803 m)       General: Well nourished, well developed, no acute distress  Eyes:  PERRL   Conjunctiva unremarkable   ENT:  TM's intact bilaterally, no effusion   Nasal:  No mucosal edema     No nasal drainage   Oral:  mucosa moist and pink   Neck:  Supple   No palpable cervical lymphoadenopathy   Thyroid without mass or enlargement  Resp: Lungs CTAB   Equal and adequate air exchange without accessory muscle use   No rales, rhonchi or wheeze  CV: S1S2 RRR   No murmur   Intact distal pulses   No edema  GI: Abdomen Soft, non tender, non distended   Normoactive bowel sounds   No palpable hepatosplenomegaly  MS: Physiologic ROM of all extremities    Intact distal pulses   No clubbing or cyanosis   Skin Warm and dry; no rash or lesion  Neuro: Alert and oriented; normal and intact dtr's   Psych: Euthymic mood, congruent affect      No results found. Assessment/Plan:  3 77-year-old male with change in bowel habits, diarrhea, epigastric abdominal pain, bloating, history of multiple colon polyps. Plan for EGD and colonoscopy. The risks and benefits of the procedure were explained to the patient, including risks of bleeding and perforation. The patient expressed understanding of these risks.

## 2021-08-17 NOTE — PROGRESS NOTES
Consult Note    Dear Magdalena Hernandez, thank you for referring Mely Simpson for evaluation. Reason for Consult: Change in bowel habits, diarrhea    HISTORY OF PRESENT ILLNESS:    The patient is a 66 y.o. male who presents with complaint of change in his bowel habits. He reports postprandial bloating. Reports a lot of diarrhea as well as epigastric and lower abdominal pain. He does have history of colon polyps. Last colonoscopy was in 2019. On that exam he had a moderate size sessile polyp in the cecum x3 as well as a sending colon. He was recommended to have a 1 year repeat endoscopy to reassess the area of the cecum and ascending colon. The polyp in the cecum was quite large. Past Medical History:   Diagnosis Date    Chronic cough     uses inhaler /to have Bronch 8/22/2019    Colon polyps 03/07/2019    Diabetes ([address]     Hyperlipidemia     Hypertension     Rash     noted 2/28/19- says it is on back & shoulders    Restless leg     Thyroid nodule 2020    R       Past Surgical History:   Procedure Laterality Date    APPENDECTOMY  2000? ruptured    BRONCHOSCOPY N/A 8/22/2019    BRONCHOSCOPY DIAGNOSTIC OR CELL [address] ONLY performed by Dulce Aquino MD at [address]  2000?    open    COLONOSCOPY  12 years ago    COLONOSCOPY N/A 3/7/2019    COLONOSCOPY POLYPECTOMY SNARE/COLD BIOPSY performed by Suyapa Cabrera DO at [address] [facility]  years ago    LITHOTRIPSY  years ago       Prior to Admission medications    Medication Sig Start Date End Date Taking?  Authorizing Provider   Icosapent Ethyl (VASCEPA) 1 g CAPS capsule Take 2 capsules by mouth 2 times daily 8/12/21   Kelsea Ojeda DO   rOPINIRole (REQUIP) 1 MG tablet Take 1 tablet by mouth nightly 2/8/21   Kelsea Ojeda DO   albuterol-ipratropium (COMBIVENT RESPIMAT)  MCG/ACT AERS inhaler Inhale 1 puff into the lungs every 6 hours 2/8/21   Kelsea Dick Attends Jainism Services:     Active Member of Clubs or Organizations:     Attends Club or Organization Meetings:     Marital Status:    Intimate Partner Violence:     Fear of Current or Ex-Partner:     Emotionally Abused:     Physically Abused:     Sexually Abused:        Family History   Problem Relation Age of Onset    Stroke Father        Review Of Systems:   Review of Systems   Constitutional: Negative for chills and fever. HENT: Negative for ear pain, nosebleeds, sore throat and tinnitus. Eyes: Negative for photophobia and redness. Respiratory: Negative for cough, shortness of breath and wheezing. Cardiovascular: Negative for chest pain and palpitations. Gastrointestinal: Positive for abdominal pain and diarrhea. Negative for blood in stool, constipation, nausea and vomiting. Endocrine: Negative for polydipsia. Genitourinary: Negative for dysuria, hematuria and urgency. Musculoskeletal: Negative for back pain and neck pain. Skin: Negative for rash. Neurological: Negative for dizziness, tremors and seizures. Hematological: Does not bruise/bleed easily. All other systems reviewed and are negative.        Physical Exam:  Vitals:    08/16/21 1109   BP: (!) 141/73   Site: Left Upper Arm   Pulse: 74   Temp: 97.1 °F (36.2 °C)   TempSrc: Temporal   Weight: 206 lb (93.4 kg)   Height: 5' 11\" (1.803 m)       General: Well nourished, well developed, no acute distress  Eyes:  PERRL   Conjunctiva unremarkable   ENT:  TM's intact bilaterally, no effusion   Nasal:  No mucosal edema     No nasal drainage   Oral:  mucosa moist and pink   Neck:  Supple   No palpable cervical lymphoadenopathy   Thyroid without mass or enlargement  Resp: Lungs CTAB   Equal and adequate air exchange without accessory muscle use   No rales, rhonchi or wheeze  CV: S1S2 RRR   No murmur   Intact distal pulses   No edema  GI: Abdomen Soft, non tender, non distended   Normoactive bowel sounds   No palpable hepatosplenomegaly  MS: Physiologic ROM of all extremities    Intact distal pulses   No clubbing or cyanosis   Skin Warm and dry; no rash or lesion  Neuro: Alert and oriented; normal and intact dtr's   Psych: Euthymic mood, congruent affect      No results found. Assessment/Plan:  3 68-year-old male with change in bowel habits, diarrhea, epigastric abdominal pain, bloating, history of multiple colon polyps. Plan for EGD and colonoscopy. The risks and benefits of the procedure were explained to the patient, including risks of bleeding and perforation. The patient expressed understanding of these risks.         Electronically signed by Felix Rivas DO on 8/17/21 at 9:09 AM EDT

## 2021-08-24 NOTE — PROGRESS NOTES
[facility] PRE-ADMISSION TESTING INSTRUCTIONS    The Preadmission Testing patient is instructed accordingly using the following criteria (check applicable):    ARRIVAL INSTRUCTIONS:  [x] Parking the day of Surgery is located in the Main Entrance lot. Upon entering the door, make an immediate right to the surgery reception desk    [x] Bring photo ID and insurance card    [] Bring in a copy of Living will or Durable Power of  papers. [x] Please be sure to arrange for responsible adult to provide transportation to and from the hospital    [x] Please arrange for responsible adult to be with you for the 24 hour period post procedure due to having anesthesia      GENERAL INSTRUCTIONS:    [x] Nothing by mouth after midnight, including gum, candy, mints or water    [x] You may brush your teeth, but do not swallow any water    [] Take medications as instructed with 1-2 oz of water    [] Stop herbal supplements and vitamins 5 days prior to procedure    [] Follow preop dosing of blood thinners per physician instructions    [] Take 1/2 dose of evening insulin, but no insulin after midnight    [] No oral diabetic medications after midnight    [] If diabetic and have low blood sugar or feel symptomatic, take 1-2oz apple juice only    [x] Bring inhalers day of surgery    [] Bring C-PAP/ Bi-Pap day of surgery    [] Bring urine specimen day of surgery    [x] Shower or bath with soap, lather and rinse well, AM of Surgery, no lotion, powders or creams to surgical site    [x] Follow bowel prep as instructed per surgeon    [x] No tobacco products within 24 hours of surgery     [x] No alcohol or illegal drug use within 24 hours of surgery.     [x] Jewelry, body piercing's, eyeglasses, contact lenses and dentures are not permitted into surgery (bring cases)      [] Please do not wear any nail polish, make up or hair products on the day of surgery    [x] You can expect a call the business day prior to procedure to notify you if your arrival time changes    [x] If you receive a survey after surgery we would greatly appreciate your comments    [] Parent/guardian of a minor must accompany their child and remain on the premises  the entire time they are under our care     [] Pediatric patients may bring favorite toy, blanket or comfort item with them    [] A caregiver or family member must remain with the patient during their stay if they are mentally handicapped, have dementia, disoriented or unable to use a call light or would be a safety concern if left unattended    [x] Please notify surgeon if you develop any illness between now and time of surgery (cold, cough, sore throat, fever, nausea, vomiting) or any signs of infections  including skin, wounds, and dental.    [x]  The Outpatient Pharmacy is available to fill your prescription here on your day of surgery, ask your preop nurse for details    [] Other instructions    EDUCATIONAL MATERIALS PROVIDED:    [] PAT Preoperative Education Packet/Booklet     [] Medication List    [] Transfusion bracelet applied with instructions    [] Shower with soap, lather and rinse well, and use CHG wipes provided the evening before surgery as instructed    [] Incentive spirometer with instructions

## 2021-08-26 ENCOUNTER — ANESTHESIA EVENT (OUTPATIENT)
Dept: ENDOSCOPY | Age: 71
End: 2021-08-26
Payer: MEDICARE

## 2021-08-26 ENCOUNTER — ANESTHESIA (OUTPATIENT)
Dept: ENDOSCOPY | Age: 71
End: 2021-08-26
Payer: MEDICARE

## 2021-08-26 ENCOUNTER — HOSPITAL ENCOUNTER (OUTPATIENT)
Age: 71
Setting detail: OUTPATIENT SURGERY
Discharge: HOME | End: 2021-08-26
Attending: SURGERY | Admitting: SURGERY
Payer: MEDICARE

## 2021-08-26 VITALS
OXYGEN SATURATION: 96 % | RESPIRATION RATE: 30 BRPM | SYSTOLIC BLOOD PRESSURE: 150 MMHG | DIASTOLIC BLOOD PRESSURE: 65 MMHG

## 2021-08-26 VITALS
OXYGEN SATURATION: 95 % | DIASTOLIC BLOOD PRESSURE: 75 MMHG | HEIGHT: 71 IN | HEART RATE: 63 BPM | WEIGHT: 205 LBS | SYSTOLIC BLOOD PRESSURE: 153 MMHG | RESPIRATION RATE: 18 BRPM | TEMPERATURE: 97.3 F | BODY MASS INDEX: 28.7 KG/M2

## 2021-08-26 LAB — METER GLUCOSE: 144 MG/DL (ref 74–99)

## 2021-08-26 PROCEDURE — 6360000002 HC RX W HCPCS: Performed by: NURSE ANESTHETIST, CERTIFIED REGISTERED

## 2021-08-26 PROCEDURE — 88342 IMHCHEM/IMCYTCHM 1ST ANTB: CPT

## 2021-08-26 PROCEDURE — 2580000003 HC RX 258: Performed by: SURGERY

## 2021-08-26 PROCEDURE — 82962 GLUCOSE BLOOD TEST: CPT

## 2021-08-26 PROCEDURE — 7100000010 HC PHASE II RECOVERY - FIRST 15 MIN: Performed by: SURGERY

## 2021-08-26 PROCEDURE — 3700000000 HC ANESTHESIA ATTENDED CARE: Performed by: SURGERY

## 2021-08-26 PROCEDURE — 43239 EGD BIOPSY SINGLE/MULTIPLE: CPT | Performed by: SURGERY

## 2021-08-26 PROCEDURE — 88305 TISSUE EXAM BY PATHOLOGIST: CPT

## 2021-08-26 PROCEDURE — 3609012400 HC EGD TRANSORAL BIOPSY SINGLE/MULTIPLE: Performed by: SURGERY

## 2021-08-26 PROCEDURE — 7100000011 HC PHASE II RECOVERY - ADDTL 15 MIN: Performed by: SURGERY

## 2021-08-26 PROCEDURE — 2709999900 HC NON-CHARGEABLE SUPPLY: Performed by: SURGERY

## 2021-08-26 PROCEDURE — 3609010400 HC COLONOSCOPY POLYPECTOMY HOT BIOPSY: Performed by: SURGERY

## 2021-08-26 PROCEDURE — 45385 COLONOSCOPY W/LESION REMOVAL: CPT | Performed by: SURGERY

## 2021-08-26 PROCEDURE — 3700000001 HC ADD 15 MINUTES (ANESTHESIA): Performed by: SURGERY

## 2021-08-26 RX ORDER — SODIUM CHLORIDE 9 MG/ML
INJECTION, SOLUTION INTRAVENOUS CONTINUOUS
Status: DISCONTINUED | OUTPATIENT
Start: 2021-08-26 | End: 2021-08-26 | Stop reason: HOSPADM

## 2021-08-26 RX ORDER — PROPOFOL 10 MG/ML
INJECTION, EMULSION INTRAVENOUS PRN
Status: DISCONTINUED | OUTPATIENT
Start: 2021-08-26 | End: 2021-08-26 | Stop reason: SDUPTHER

## 2021-08-26 RX ORDER — OMEPRAZOLE 40 MG/1
40 CAPSULE, DELAYED RELEASE ORAL 2 TIMES DAILY
Qty: 30 CAPSULE | Refills: 2 | Status: SHIPPED | OUTPATIENT
Start: 2021-08-26

## 2021-08-26 RX ADMIN — PROPOFOL 30 MG: 10 INJECTION, EMULSION INTRAVENOUS at 10:48

## 2021-08-26 RX ADMIN — PROPOFOL 40 MG: 10 INJECTION, EMULSION INTRAVENOUS at 11:05

## 2021-08-26 RX ADMIN — PROPOFOL 30 MG: 10 INJECTION, EMULSION INTRAVENOUS at 10:52

## 2021-08-26 RX ADMIN — PROPOFOL 50 MG: 10 INJECTION, EMULSION INTRAVENOUS at 10:44

## 2021-08-26 RX ADMIN — SODIUM CHLORIDE: 9 INJECTION, SOLUTION INTRAVENOUS at 10:01

## 2021-08-26 RX ADMIN — PROPOFOL 150 MG: 10 INJECTION, EMULSION INTRAVENOUS at 10:36

## 2021-08-26 RX ADMIN — PROPOFOL 60 MG: 10 INJECTION, EMULSION INTRAVENOUS at 11:13

## 2021-08-26 RX ADMIN — PROPOFOL 40 MG: 10 INJECTION, EMULSION INTRAVENOUS at 10:57

## 2021-08-26 ASSESSMENT — PAIN DESCRIPTION - PROGRESSION
CLINICAL_PROGRESSION: NOT CHANGED

## 2021-08-26 ASSESSMENT — PAIN DESCRIPTION - LOCATION
LOCATION: ABDOMEN;THROAT
LOCATION: ABDOMEN
LOCATION: ABDOMEN;THROAT

## 2021-08-26 ASSESSMENT — PAIN - FUNCTIONAL ASSESSMENT: PAIN_FUNCTIONAL_ASSESSMENT: 0-10

## 2021-08-26 ASSESSMENT — PAIN SCALES - GENERAL
PAINLEVEL_OUTOF10: 0

## 2021-08-26 ASSESSMENT — PAIN DESCRIPTION - PAIN TYPE
TYPE: ACUTE PAIN

## 2021-08-26 ASSESSMENT — COPD QUESTIONNAIRES: CAT_SEVERITY: MODERATE

## 2021-08-26 ASSESSMENT — LIFESTYLE VARIABLES: SMOKING_STATUS: 1

## 2021-08-26 NOTE — INTERVAL H&P NOTE
Update History & Physical    The patient's History and Physical was reviewed with the patient and I examined the patient. There was no change. The surgical site was confirmed by the patient and me. Plan: The risks, benefits, expected outcome, and alternative to the recommended procedure have been discussed with the patient. Patient understands and wants to proceed with the procedure.      Electronically signed by Antonina Belle DO on 8/26/2021 at 10:28 AM

## 2021-08-26 NOTE — PROGRESS NOTES
Pt received in pacu 2 from endo report received assessment and vs obtained pt awake wife at bedside and updated passing flatus will monitor  1150 taking clear liquids vss   1210 discharge instructions given to pt and his wife at this time and verbalized understanding of instructions pamphlets given waiting to speak with dr Fidel Hebert here and spoke with pt and his wife and updated on procedure and follow up at this time

## 2021-08-26 NOTE — ANESTHESIA POSTPROCEDURE EVALUATION
Department of Anesthesiology  Postprocedure Note    Patient: Sana Segovia  MRN: [id number]  YOB: 1950  Date of evaluation: 8/26/2021  Time:  7:36 PM     Procedure Summary     Date: 08/26/21 Room / Location: [place] [address] / [facility]    Anesthesia Start: 1036 Anesthesia Stop: 1130    Procedures:       EGD BIOPSY (N/A )      COLONOSCOPY POLYPECTOMY HOT BIOPSY (N/A ) Diagnosis: (ABDOMINAL BLOATING HISTORY OF POLYPS)    Surgeons: Marie Vu DO Responsible Provider: Cuca Gross MD    Anesthesia Type: MAC ASA Status: 3          Anesthesia Type: MAC    Carey Phase I: Carey Score: 10    Carey Phase II: Carey Score: 10    Last vitals: Reviewed and per EMR flowsheets.        Anesthesia Post Evaluation    Patient location during evaluation: PACU  Patient participation: complete - patient participated  Level of consciousness: awake and alert  Airway patency: patent  Nausea & Vomiting: no vomiting and no nausea  Complications: no  Cardiovascular status: hemodynamically stable  Respiratory status: acceptable  Hydration status: stable

## 2021-08-26 NOTE — OP NOTE
Operative Note      Patient: Afshin Walker  YOB: 1950  MRN: [id number]    Date of Procedure: 8/26/2021    Pre-Op Diagnosis: ABDOMINAL BLOATING HISTORY OF POLYPS    Post-Op Diagnosis: Same, erosive gastritis, submucosal gastric lesion, multiple colon polyps       Procedure(s):  EGD BIOPSY  COLONOSCOPY with snare polypectomy times 9    Surgeon(s):  Damaso Phan DO    Assistant:   * No surgical staff found *    Anesthesia: Monitor Anesthesia Care    Estimated Blood Loss (mL): Minimal    Complications: None    Specimens:   ID Type Source Tests Collected by Time Destination   A : antral bx Tissue Stomach SURGICAL PATHOLOGY [facility],  8/26/2021 1039    B : bx lesser curve Tissue Stomach SURGICAL PATHOLOGY [facility],  8/26/2021 1044    C : bx submucosal lesion Tissue Stomach SURGICAL PATHOLOGY [facility],  8/26/2021 1046    D : cold snare polyp 20 cm Tissue Colon SURGICAL PATHOLOGY [facility],  8/26/2021 1100    E : cold snare proximal transverse polyp x3 Tissue Colon SURGICAL PATHOLOGY [facility],  8/26/2021 1102    F : hot snare cecal polyp Tissue Colon SURGICAL PATHOLOGY [facility],  8/26/2021 1112    G : hot snare hepatic flexure polyps Tissue Colon SURGICAL PATHOLOGY [facility],  8/26/2021 1119    H : hot snare polyps x2 @ 70 cm Tissue Colon SURGICAL PATHOLOGY [facility],  8/26/2021 1124    I : hot snare polyp 50 cm Tissue Colon SURGICAL PATHOLOGY [facility],  8/26/2021 1126        Implants:  * No implants in log *      Drains: * No LDAs found *    Findings: Severe diffuse erosive gastritis. Abnormal appearance of lesions on the lesser curve. Biopsies taken to rule out ulcer versus neoplasm. Submucosal gastric lesion along the mid body of the stomach, likely lipoma. Biopsies obtained. Multiple colon polyps. 9 of which are removed using snare polypectomy technique.   Large sessile polyp in the cecum as well as multiple sessile polyps in the ascending colon are not removed today. Recommend likely right colectomy for treatment of these. Detailed Description of Procedure:   Procedures form endoscopy sooner conversation prep with anesthesia staff. Esophogastric was placed in the oropharynx advanced in the esophageal difficulty. Esophageal mucosa was grossly UltraSling. Within the stomach there is severe erosive gastritis diffusely. On the lesser curve there are 3-4 abnormal appearing lesions that resemble somewhat ulcers versus neoplastic lesions. Biopsies were taken with the forceps in this area. Also in the mid body of the stomach there is a submucosal lesion. Multiple biopsies were obtained. Ultimately this appears to be a lipoma. There is severe duodenitis first part. Second part is grossly normal.  Additional antral biopsies were obtained. Scope was removed. Digital rectal exam is performed. There are no palpable masses. Colonoscope passed transanally into the rectum Vancil into the cecum was reached intubated. There are multiple polyps identified. None of these are removed using snare polypectomy technique. I was able to clear the colon up to the hepatic flexure area. Of note, the polyp labeled at 20 cm was actually at 40 cm. The ascending colon contained numerous polyps as did the cecum with a broad-based sessile polyp which was unable to be completely removed endoscopically. Recommend right colectomy for this.     Electronically signed by Enrique Chaney DO on 8/26/2021 at 11:30 AM

## 2021-08-26 NOTE — ANESTHESIA PRE PROCEDURE
Department of Anesthesiology  Preprocedure Note       Name:  Chapis Guido   Age:  66 y.o.  :  1950                                          MRN:  [id number]         Date:  2021      Surgeon: Jimi Quan):  Ortiz Cornejo DO    Procedure: EGD ESOPHAGOGASTRODUODENOSCOPY (N/A )  COLONOSCOPY DIAGNOSTIC (N/A )    Medications prior to admission:   Prior to Admission medications    Medication Sig Start Date End Date Taking? Authorizing Provider   Icosapent Ethyl (VASCEPA) 1 g CAPS capsule Take 2 capsules by mouth 2 times daily 21   Betina Nexesskavya Economus, DO   rOPINIRole (REQUIP) 1 MG tablet Take 1 tablet by mouth nightly 21   Betina Nexesskavya Singhus,    albuterol-ipratropium (COMBIVENT RESPIMAT)  MCG/ACT AERS inhaler Inhale 1 puff into the lungs every 6 hours 21   Betina Nexesskavya Singhus, DO   ramipril (ALTACE) 1.25 MG capsule TAKE 1 CAPSULE DAILY 20   Herminio Scott DO   atorvastatin (LIPITOR) 10 MG tablet TAKE 1 TABLET DAILY 20   Vinod Ojeda,    SITagliptin-metFORMIN HCl ER (JANUMET XR) 100-1000 MG TB24 TAKE 1 TABLET DAILY 20   Vinod Ojeda DO   ibuprofen (ADVIL;MOTRIN) 200 MG tablet Take 400 mg by mouth every 6 hours as needed for Pain     Historical Provider, MD       Current medications:    No current facility-administered medications for this visit. No current outpatient medications on file. Facility-Administered Medications Ordered in Other Visits   Medication Dose Route Frequency Provider Last Rate Last Admin    0.9 % sodium chloride infusion   IntraVENous Continuous Ortiz Cornejo DO           Allergies:     Allergies   Allergen Reactions    Pcn [Penicillins] Hives    Tape Lindia Cluck Tape]      blisters       Problem List:    Patient Active Problem List   Diagnosis Code    [facility]) [facility] J44.9    DM2 (diabetes mellitus, type 2) ([facility] E11.9    Hyperlipemia E78.5    Nephrolithiasis  N20.0  Tobacco abuse Z72.0    Intractable ophthalmoplegic migraine G43. B1    Adenomatous polyp of colon D12.6       Past Medical History:        Diagnosis Date    Chronic cough     uses inhaler /to have Bronch 8/22/2019    Colon polyps 03/07/2019    Diabetes ([address]     Hyperlipidemia     Hypertension     Rash     noted 2/28/19- says it is on back & shoulders    Restless leg     Thyroid nodule 2020    R       Past Surgical History:        Procedure Laterality Date    APPENDECTOMY  2000? ruptured    BRONCHOSCOPY N/A 8/22/2019    BRONCHOSCOPY DIAGNOSTIC OR CELL [address] ONLY performed by Galilea Polanco MD at [address]  2000?    open    COLONOSCOPY  12 years ago    COLONOSCOPY N/A 3/7/2019    COLONOSCOPY POLYPECTOMY SNARE/COLD BIOPSY performed by Elidia Buenrostro DO [facility]  years ago    LITHOTRIPSY  years ago       Social History:    Social History     Tobacco Use    Smoking status: Current Every Day Smoker     Packs/day: 1.50     Years: 35.00     Pack years: 52.50     Types: Cigarettes    Smokeless tobacco: Never Used   Substance Use Topics    Alcohol use: Yes     Comment: SOCIALLY                                Ready to quit: Not Answered  Counseling given: Not Answered      Vital Signs (Current): There were no vitals filed for this visit.                                            BP Readings from Last 3 Encounters:   08/26/21 (!) 148/69   08/16/21 (!) 141/73   08/12/21 110/60       NPO Status:                                                                                 BMI:   Wt Readings from Last 3 Encounters:   08/26/21 205 lb (93 kg)   08/16/21 206 lb (93.4 kg)   08/12/21 202 lb (91.6 kg)     There is no height or weight on file to calculate BMI.    CBC:   Lab Results   Component Value Date    WBC 9.0 01/19/2016    RBC 5.73 01/19/2016    HGB 17.3 01/19/2016    HCT 52.7 01/19/2016    MCV 92.0 01/19/2016    RDW 13.4 01/19/2016    PLT 216 01/19/2016       CMP:   Lab Results   Component Value Date     08/12/2021    K 4.8 08/12/2021    CL 97 08/12/2021    CO2 27 08/12/2021    BUN 9 08/12/2021    CREATININE 1.2 08/12/2021    GFRAA >60 08/12/2021    LABGLOM 59 08/12/2021    GLUCOSE 156 08/12/2021    PROT 6.6 08/12/2021    CALCIUM 9.2 08/12/2021    BILITOT 1.1 08/12/2021    ALKPHOS 181 08/12/2021    AST 17 08/12/2021    ALT 11 08/12/2021       POC Tests: No results for input(s): POCGLU, POCNA, POCK, POCCL, POCBUN, POCHEMO, POCHCT in the last 72 hours. Coags: No results found for: PROTIME, INR, APTT    HCG (If Applicable): No results found for: PREGTESTUR, PREGSERUM, HCG, HCGQUANT     ABGs: No results found for: PHART, PO2ART, DWA1KQI, URM0POR, BEART, [id number]     Type & Screen (If Applicable):  No results found for: LABABO, [address]    Anesthesia Evaluation  Patient summary reviewed  Airway: Mallampati: III  TM distance: >3 FB   Neck ROM: full  Mouth opening: > = 3 FB Dental: normal exam         Pulmonary: breath sounds clear to auscultation  (+) COPD: moderate and severe,  decreased breath sounds,  current smoker          Patient smoked on day of surgery. Cardiovascular:    (+) hypertension: moderate, hyperlipidemia      ECG reviewed  Rhythm: regular  Rate: normal                    Neuro/Psych:   (+) neuromuscular disease:, headaches:,             GI/Hepatic/Renal:   (+) bowel prep,           Endo/Other:    (+) DiabetesType II DM, , .                 Abdominal:             Vascular: negative vascular ROS. Other Findings:               Anesthesia Plan      MAC     ASA 3       Induction: intravenous. MIPS: Prophylactic antiemetics administered. Anesthetic plan and risks discussed with patient and spouse. Plan discussed with CRNA.                   Nitesh Chaudhari MD   8/26/2021

## 2021-09-02 ENCOUNTER — OFFICE VISIT (OUTPATIENT)
Dept: ENDOCRINOLOGY | Age: 71
End: 2021-09-02
Payer: MEDICARE

## 2021-09-02 VITALS
HEIGHT: 71 IN | HEART RATE: 84 BPM | OXYGEN SATURATION: 98 % | DIASTOLIC BLOOD PRESSURE: 72 MMHG | BODY MASS INDEX: 28.42 KG/M2 | SYSTOLIC BLOOD PRESSURE: 122 MMHG | RESPIRATION RATE: 18 BRPM | WEIGHT: 203 LBS

## 2021-09-02 DIAGNOSIS — Z79.4 TYPE 2 DIABETES MELLITUS WITH DIABETIC POLYNEUROPATHY, WITH LONG-TERM CURRENT USE OF INSULIN (HCC): ICD-10-CM

## 2021-09-02 DIAGNOSIS — E04.2 MULTINODULAR GOITER: Primary | ICD-10-CM

## 2021-09-02 DIAGNOSIS — E11.42 TYPE 2 DIABETES MELLITUS WITH DIABETIC POLYNEUROPATHY, WITH LONG-TERM CURRENT USE OF INSULIN (HCC): ICD-10-CM

## 2021-09-02 DIAGNOSIS — E55.9 VITAMIN D DEFICIENCY: ICD-10-CM

## 2021-09-02 PROCEDURE — 3051F HG A1C>EQUAL 7.0%<8.0%: CPT | Performed by: INTERNAL MEDICINE

## 2021-09-02 PROCEDURE — 99214 OFFICE O/P EST MOD 30 MIN: CPT | Performed by: INTERNAL MEDICINE

## 2021-09-02 NOTE — PROGRESS NOTES
[address] [place] [address] Department of Endocrinology Diabetes and Metabolism   [address] [facility] [address]   Phone: [phone]  Fax: [phone]    Date of Service: 9/2/2021  Primary Care Physician: Corey Richmond DO  Provider: Manda Schneider MD            Reason for the visit:  Multinodular goiter     History of Present Illness: The history is provided by the patient. No  was used. Accuracy of the patient data is excellent. Paz Petit is a very pleasant 66 y.o. male seen today for follow up visit     The patient was found to have thyroid nodule on CT chest dose for evaluation for SOB and cough   CT chest 8/1/2019  There is a 3.9 x 2.5 cm mass in the right lobe of the thyroid. The right lobe of thyroid measures 6.9 x 3.0 x 3.4 cm.     Thyroid US 10/2019: The right lobe of thyroid measures 6 x 3.1 x 3.8 cm --> 2 masses are seen in the right lobe of thyroid one in the upper pole measuring 3 x 2.5 x 2 0.1 cm/s the mid pole measuring 1.6 x 1.7 x 1.7 cm  The left lobe of thyroid measures 3.9 x 1.7 x 1.4 cm. 2 masses are seen in the left lobe of thyroid one measuring 0.3 x 0.3 x 0.4 cm a second measuring 0.4 x 0.3 x 0.3 cm  The isthmus measures 0.8 cm There is a 0.9 x 0.4 x 0.6 cm nodule seen in the isthmus    US 9/2020: The right lobe of thyroid measures 6.9 x 3.0 x 3.4 cm --> There is a hypoechoic, spongiform nodule within the superior aspect of the right lobe of the thyroid with smooth borders and no calcifications measuring 3.1 x 2.7 x 2.5 cm. There is a hypoechoic, spongiform nodule within the mid right lobe of the thyroid with smooth borders and no calcifications measuring 1.9 x 1.2 x 1.9 cm.  There is a hypoechoic, spongiform nodule within the inferior aspect of the right lobe of the thyroid with smooth borders and no calcifications measuring 2.8 x 1.6 x 2.9 cm   The left lobe of thyroid measures 5.3 x 1.6 x 2.0 cm --> No mass is seen in the left lobe of the thyroid    9/2020 - FNA to dominant Rt side thyroid nodules was benign     Afshin Walker denies any compressive symptoms, change in her voice, or shortness of breath. No family history of thyroid cancer. No prior history of radiation to head or neck region. Lab Results   Component Value Date/Time    TSH 1.090 09/01/2020 02:32 PM    T4FREE 1.11 09/01/2020 02:32 PM     Patient denied any history of  swelling in the area of the thyroid gland, weight loss, change in appetite, nervousness, anxiety, irritability, tremor, sweating, heat intolerance, changes in bowel habits, muscle weakness or difficulty sleeping. Patient also denied any h/o unexplained weight gain, new fatigue, increased sensitivity to cold, dry skin, brittle fingernails, brittle hair, facial swelling/puffiness, or depressed mood. PAST MEDICAL HISTORY   Past Medical History:   Diagnosis Date    Chronic cough     uses inhaler /to have Bronch 8/22/2019    Colon polyps 03/07/2019    Diabetes [facility]     Hyperlipidemia     Hypertension     Rash     noted 2/28/19- says it is on back & shoulders    Restless leg     Thyroid nodule 2020    R     PAST SURGICAL HISTORY   Past Surgical History:   Procedure Laterality Date    APPENDECTOMY  2000?     ruptured    BRONCHOSCOPY N/A 8/22/2019    BRONCHOSCOPY DIAGNOSTIC OR CELL [address] [facility]?    [facility]    COLONOSCOPY  12 years ago    COLONOSCOPY N/A 3/7/2019    COLONOSCOPY POLYPECTOMY SNARE/COLD BIOPSY performed by Damaso Phan DO at [address] COLONOSCOPY N/A 8/26/2021    COLONOSCOPY POLYPECTOMY HOT BIOPSY performed by Damaso Phan DO at [address]  years ago    LITHOTRIPSY  years ago    UPPER GASTROINTESTINAL ENDOSCOPY N/A 8/26/2021    EGD BIOPSY performed by Damaso Phan DO at [facility]   .[place]      FAMILY HISTORY   Family History   Problem Relation Age of Onset    Stroke Father      ALLERGIES AND DRUG REACTIONS   Allergies   Allergen Reactions    Pcn [Penicillins] Hives    Tape [Adhesive Tape]      blisters       CURRENT MEDICATIONS   Current Outpatient Medications   Medication Sig Dispense Refill    omeprazole (PRILOSEC) 40 MG delayed release capsule Take 1 capsule by mouth 2 times daily 30 capsule 2    Icosapent Ethyl (VASCEPA) 1 g CAPS capsule Take 2 capsules by mouth 2 times daily 120 capsule 3    rOPINIRole (REQUIP) 1 MG tablet Take 1 tablet by mouth nightly 90 tablet 1    albuterol-ipratropium (COMBIVENT RESPIMAT)  MCG/ACT AERS inhaler Inhale 1 puff into the lungs every 6 hours 1 Inhaler 5    ramipril (ALTACE) 1.25 MG capsule TAKE 1 CAPSULE DAILY 90 capsule 3    atorvastatin (LIPITOR) 10 MG tablet TAKE 1 TABLET DAILY 90 tablet 1    SITagliptin-metFORMIN HCl ER (JANUMET XR) 100-1000 MG TB24 TAKE 1 TABLET DAILY 90 tablet 1     No current facility-administered medications for this visit. Review of Systems  Constitutional: No fever, no chills, no diaphoresis, no generalized weakness. HEENT: No blurred vision, No sore throat, no ear pain, no hair loss  Neck: denied any neck swelling, difficulty swallowing,   Cadrdiopulomary: No CP, SOB or palpitation, No orthopnea or PND. No cough or wheezing. GI: No N/V/D, no constipation, No abdominal pain, no melena or hematochezia   : Denied any dysuria, hematuria, flank pain, discharge, or incontinence. Skin: denied any rash, ulcer, Hirsute, or hyperpigmentation. MSK: denied any joint deformity, joint pain/swelling, muscle pain, or back pain.   Neuro: no numbess, no tingling, no weakness,     OBJECTIVE    /72   Pulse 84   Resp 18   Ht 5' 11\" (1.803 m)   Wt 203 lb (92.1 kg)   SpO2 98%   BMI 28.31 kg/m²   BP Readings from Last 4 Encounters:   09/02/21 122/72   08/26/21 (!) 153/75   08/26/21 (!) 150/65   08/16/21 (!) 141/73     Wt Readings from Last 6 Encounters:   09/02/21 203 lb (92.1 kg)   08/26/21 205 lb (93 kg)   08/16/21 206 lb (93.4 kg)   08/12/21 202 lb (91.6 kg)   03/02/21 212 lb (96.2 kg)   02/08/21 214 lb (97.1 kg)       Physical examination:  General: awake alert, oriented x3, no abnormal position or movements. HEENT: normocephalic non traumatic  Neck: supple, no LN enlargement, Rt side thyroid nodule is palpable, , no thyroid tenderness, no JVD. Pulm: good air entry, +  wheezing i    CVS: S1 + S2, no murmur, no heave. Dorsalis pedis pulse palpable   Abd: soft lax, no tenderness, no organomegaly, audible bowel sounds. Skin: warm, no lesions, no rash.  No Palmar erythema  Musculoskeletal: No back tenderness, no kyphosis/scoliosis     Neuro: CN intact, sensation normal , muscle power normal. No tremors   Psych: normal mood, and affect    Review of Laboratory Data:  I personally reviewed the following labs:   Lab Results   Component Value Date/Time    WBC 9.0 01/19/2016 10:00 AM    RBC 5.73 01/19/2016 10:00 AM    HGB 17.3 (H) 01/19/2016 10:00 AM    HCT 52.7 01/19/2016 10:00 AM    MCV 92.0 01/19/2016 10:00 AM    MCH 30.2 01/19/2016 10:00 AM    MCHC 32.8 01/19/2016 10:00 AM    RDW 13.4 01/19/2016 10:00 AM     01/19/2016 10:00 AM    MPV 11.3 01/19/2016 10:00 AM      Lab Results   Component Value Date/Time     08/12/2021 12:00 PM    K 4.8 08/12/2021 12:00 PM    CO2 27 08/12/2021 12:00 PM    BUN 9 08/12/2021 12:00 PM    CREATININE 1.2 08/12/2021 12:00 PM    CALCIUM 9.2 08/12/2021 12:00 PM    LABGLOM 59 08/12/2021 12:00 PM    GFRAA >60 08/12/2021 12:00 PM      Lab Results   Component Value Date/Time    TSH 1.090 09/01/2020 02:32 PM    T4FREE 1.11 09/01/2020 02:32 PM     Lab Results   Component Value Date    LABA1C 7.4 08/12/2021    GLUCOSE 156 08/12/2021    MALBCR 26.7 08/12/2021    LABMICR 75.8 08/12/2021    LABCREA 284 08/12/2021     Lab Results   Component Value Date    CHOL 145 08/12/2021    CHOL 126 02/08/2021    TRIG 247 08/12/2021    TRIG 247 02/08/2021    HDL 32 08/12/2021    HDL 34 02/08/2021     No results found for: Alyce Purvis, a 66 y.o.-old male seen in for the following issues     Multinodular goiter   · Pt has multiple nodules in the Rt lobe. FNA to dominant 3 cm Rt side thyroid nodule was benign in 9/2020   · Repeat thyroid US in few weeks     VitD deficiency   · Continue vitD supplement     HLD  · continue Lipitor 10 mg daily     I personally reviewed external notes from PCP and other patient's care team providers, and personally interpreted labs associated with the above diagnosis. I also ordered labs to further assess and manage the above addressed medical conditions    Return in about 1 year (around 9/2/2022) for Multinodular goiter, VitD deficiency. The above issues were reviewed with the patient who understood and agreed with the plan. Thank you for allowing us to participate in the care of this patient. Please do not hesitate to contact us with any additional questions. Diagnosis Orders   1. Multinodular goiter  TSH without Reflex    T4, Free    US THYROID   2. Type 2 diabetes mellitus with diabetic polyneuropathy, with long-term current use of insulin ([facility]     3. Vitamin D deficiency  Vitamin D 25 Hydroxy    Basic Metabolic Panel     Juleen Dubin MD  Endocrinologist, [facility])   [address] [facility], [address] [facility],[address] [address]   Phone: [phone]  Fax: [phone]  -------------------------------------------------------------  An electronic signature was used to authenticate this note.  Gianluca Castro MD on 9/2/2021 at 11:12 AM

## 2021-09-06 ENCOUNTER — TELEPHONE (OUTPATIENT)
Dept: ENDOCRINOLOGY | Age: 71
End: 2021-09-06

## 2021-09-06 NOTE — TELEPHONE ENCOUNTER
Notify pt,  I have reviewed your recent results    All result are WNL except vitD which was low. Take Vitamin D 50.000 units one tab once a week for 8 weeks. At the end of 8 weeks, start taking Vitamin D3 2000 units daily over the counter.  Prescription for weekly vitD sent to the pharmacy

## 2021-09-13 ENCOUNTER — OFFICE VISIT (OUTPATIENT)
Dept: SURGERY | Age: 71
End: 2021-09-13
Payer: MEDICARE

## 2021-09-13 VITALS
DIASTOLIC BLOOD PRESSURE: 66 MMHG | WEIGHT: 202.4 LBS | BODY MASS INDEX: 28.34 KG/M2 | HEART RATE: 70 BPM | TEMPERATURE: 97.2 F | HEIGHT: 71 IN | SYSTOLIC BLOOD PRESSURE: 131 MMHG

## 2021-09-13 DIAGNOSIS — D12.6 ADENOMATOUS POLYP OF COLON, UNSPECIFIED PART OF COLON: Primary | ICD-10-CM

## 2021-09-13 PROCEDURE — 99214 OFFICE O/P EST MOD 30 MIN: CPT | Performed by: SURGERY

## 2021-09-13 NOTE — LETTER
[facility] General Surgery  [address] [place] [address], [address] [facility]  [place] [address]  Phone: [phone]  Fax: [phone] [facility],           September 13, 2021     Dr Savannah Sawyer      Patient: Karla Tellez   MR Number: [id number]   YOB: 1950   Date of Visit: 9/13/2021       Dear Aleah Ohara:    I am requesting a consultation of my patient Se Chaves, to you for evaluation of Medical Clearance. Patient is in need of Right Colectomy. ____Cleared    ____not cleared    I appreciate your assistance in his care and look forward to your findings and recommendations.     Sincerely,                           Sherif Chapin, DO

## 2021-09-15 ENCOUNTER — TELEPHONE (OUTPATIENT)
Dept: PRIMARY CARE CLINIC | Age: 71
End: 2021-09-15

## 2021-09-15 NOTE — TELEPHONE ENCOUNTER
Nurse called and said patient is going to be scheduling for a colectomy with Dr Amy Chinchilla he will just need a letter of clearance from you. Told her to call and have him make appt. Just an FYI.

## 2021-09-17 RX ORDER — SODIUM CHLORIDE 0.9 % (FLUSH) 0.9 %
5-40 SYRINGE (ML) INJECTION PRN
Status: CANCELLED | OUTPATIENT
Start: 2021-09-17

## 2021-09-17 RX ORDER — SODIUM CHLORIDE 0.9 % (FLUSH) 0.9 %
5-40 SYRINGE (ML) INJECTION EVERY 12 HOURS SCHEDULED
Status: CANCELLED | OUTPATIENT
Start: 2021-09-17

## 2021-09-17 RX ORDER — SODIUM CHLORIDE 9 MG/ML
25 INJECTION, SOLUTION INTRAVENOUS PRN
Status: CANCELLED | OUTPATIENT
Start: 2021-09-17

## 2021-09-17 ASSESSMENT — ENCOUNTER SYMPTOMS
BLOOD IN STOOL: 0
BACK PAIN: 0
WHEEZING: 0
EYE REDNESS: 0
CONSTIPATION: 0
SORE THROAT: 0
SHORTNESS OF BREATH: 0
DIARRHEA: 0
ABDOMINAL PAIN: 0
PHOTOPHOBIA: 0
COUGH: 0
NAUSEA: 0
VOMITING: 0

## 2021-09-17 NOTE — H&P
History and Physical    Patient's Name/Date of Birth: China Doll / 1950 (38 y.o.)    Date: September 17, 2021     Chief Complaint:   Chief Complaint   Patient presents with    Results     egd biopsy results        HPI: Patient is 57-year-old male who presents for follow-up to recent colonoscopy. He has no complaints. We did review his findings today which included erosive gastritis, multiple colon polyps including a large sessile polyp of the cecum as well as multiple sessile polyps in the ascending colon which are unable to be endoscopically removed. Past Medical History:   Diagnosis Date    Chronic cough     uses inhaler /to have Bronch 8/22/2019    Colon polyps 03/07/2019    Diabetes ([address]     Hyperlipidemia     Hypertension     Rash     noted 2/28/19- says it is on back & shoulders    Restless leg     Thyroid nodule 2020    R       Past Surgical History:   Procedure Laterality Date    APPENDECTOMY  2000? ruptured    BRONCHOSCOPY N/A 8/22/2019    BRONCHOSCOPY DIAGNOSTIC OR CELL [address] ONLY performed by Holly Finnegan MD at [address] [employer]  2000?    open    COLONOSCOPY  12 years ago    COLONOSCOPY N/A 3/7/2019    COLONOSCOPY POLYPECTOMY SNARE/COLD BIOPSY performed by Chato Johnson DO at [address] [facility] COLONOSCOPY N/A 8/26/2021    COLONOSCOPY POLYPECTOMY HOT BIOPSY performed by Chato Johnson DO at [facility]  years ago    LITHOTRIPSY  years ago    UPPER GASTROINTESTINAL ENDOSCOPY N/A 8/26/2021    EGD BIOPSY performed by Chato Johnson DO at [address]       Prior to Admission medications    Medication Sig Start Date End Date Taking? Authorizing Provider   vitamin D (CHOLECALCIFEROL) 39279 UNIT CAPS Take 1 capsule weekly, for 8 weeks only.  No refills 9/6/21   Elgin Paulino MD   omeprazole (PRILOSEC) 40 MG delayed release capsule Take 1 capsule by mouth 2 times daily 8/26/21   Chato Johnson DO   Icosapent Ethyl (VASCEPA) 1 g CAPS capsule Take 2 capsules by mouth 2 times daily 8/12/21   Sydnee Ojeda, DO   rOPINIRole (REQUIP) 1 MG tablet Take 1 tablet by mouth nightly 2/8/21   Sydnee Ojeda, DO   albuterol-ipratropium (COMBIVENT RESPIMAT)  MCG/ACT AERS inhaler Inhale 1 puff into the lungs every 6 hours 2/8/21   Sydnee Ojeda, DO   ramipril (ALTACE) 1.25 MG capsule TAKE 1 CAPSULE DAILY 12/29/20   Yan Scott, DO   atorvastatin (LIPITOR) 10 MG tablet TAKE 1 TABLET DAILY 4/29/20   Sydnee Ojeda, DO   SITagliptin-metFORMIN HCl ER (JANUMET XR) 100-1000 MG TB24 TAKE 1 TABLET DAILY 4/29/20   Sydnee Ojeda, DO       Allergies   Allergen Reactions    Pcn [Penicillins] Hives    Tape [Adhesive Tape]      blisters       Family History   Problem Relation Age of Onset    Stroke Father           Social History     Socioeconomic History    Marital status:      Spouse name: Not on file    Number of children: Not on file    Years of education: Not on file    Highest education level: Not on file   Occupational History    Not on file   Tobacco Use    Smoking status: Current Every Day Smoker     Packs/day: 1.50     Years: 35.00     Pack years: 52.50     Types: Cigarettes    Smokeless tobacco: Never Used   Vaping Use    Vaping Use: Never used   Substance and Sexual Activity    Alcohol use: Yes     Comment: SOCIALLY    Drug use: No    Sexual activity: Never   Other Topics Concern    Not on file   Social History Narrative    Not on file     Social Determinants of Health     Financial Resource Strain: Low Risk     Difficulty of Paying Living Expenses: Not hard at all   Food Insecurity: No Food Insecurity    Worried About Running Out of Food in the Last Year: Never true    Carline of Food in the Last Year: Never true   Transportation Needs:     Lack of Transportation (Medical):      Lack of Transportation (Non-Medical):    Physical Activity:     Days of Exercise per Week:     Minutes of Exercise per Session:    Stress:     Feeling of Stress :    Social Connections:     Frequency of Communication with Friends and Family:     Frequency of Social Gatherings with Friends and Family:     Attends Islam Services:     Active Member of Clubs or Organizations:     Attends Club or Organization Meetings:     Marital Status:    Intimate Partner Violence:     Fear of Current or Ex-Partner:     Emotionally Abused:     Physically Abused:     Sexually Abused:        Review of Systems:   Review of Systems   Constitutional: Negative for chills and fever. HENT: Negative for ear pain, nosebleeds, sore throat and tinnitus. Eyes: Negative for photophobia and redness. Respiratory: Negative for cough, shortness of breath and wheezing. Cardiovascular: Negative for chest pain and palpitations. Gastrointestinal: Negative for abdominal pain, blood in stool, constipation, diarrhea, nausea and vomiting. Endocrine: Negative for polydipsia. Genitourinary: Negative for dysuria, hematuria and urgency. Musculoskeletal: Negative for back pain and neck pain. Skin: Negative for rash. Neurological: Negative for dizziness, tremors and seizures. Hematological: Does not bruise/bleed easily. All other systems reviewed and are negative.        Physical Exam:  Vitals:    09/13/21 1015   BP: 131/66   Site: Right Upper Arm   Pulse: 70   Temp: 97.2 °F (36.2 °C)   TempSrc: Temporal   Weight: 202 lb 6.4 oz (91.8 kg)   Height: 5' 11\" (1.803 m)       General: Well nourished, well developed, no acute distress  Eyes:  PERRL   Conjunctiva unremarkable   ENT:  TM's intact bilaterally, no effusion   Nasal:  Nomucosal edema     No nasal drainage   Oral:  mucosa moist and pink   Neck:  Supple   No palpable cervical lymphoadenopathy   Thyroid without mass or enlargement  Resp: Lungs CTAB   Equal and adequate air exchange without accessory muscle use   No rales, rhonchi or wheeze  CV: S1S2 RRR   No murmur   Intact distal pulses   No edema  GI: Abdomen Soft, nontender, non distended   Normoactive bowel sounds   No palpable hepatosplenomegaly  MS: Physiologic ROM of all extremities    Intact distal pulses   No clubbing or cyanosis   Skin Warmand dry; no rash or lesion  Neuro: Alert and oriented; normal and intact dtr's   Psych: Euthymic mood, congruent affect      No results found. Assessment/Plan:  3 75-year-old male with large sessile adenomas of the ascending colon and cecum. I have recommended right colectomy for this. We will request medical clearance and once cleared, will schedule the procedure    The risks of the procedure were explained to the patient including, but not limited to, infection, bleeding, anastomotic leak, injury to the ureter.

## 2021-09-17 NOTE — PROGRESS NOTES
History and Physical    Patient's Name/Date of Birth: Chapis Guido / 1950 (28 y.o.)    Date: September 17, 2021     Chief Complaint:   Chief Complaint   Patient presents with    Results     egd biopsy results        HPI: Patient is 75-year-old male who presents for follow-up to recent colonoscopy. He has no complaints. We did review his findings today which included erosive gastritis, multiple colon polyps including a large sessile polyp of the cecum as well as multiple sessile polyps in the ascending colon which are unable to be endoscopically removed. Past Medical History:   Diagnosis Date    Chronic cough     uses inhaler /to have Bronch 8/22/2019    Colon polyps 03/07/2019    Diabetes ([address]     Hyperlipidemia     Hypertension     Rash     noted 2/28/19- says it is on back & shoulders    Restless leg     Thyroid nodule 2020    R       Past Surgical History:   Procedure Laterality Date    APPENDECTOMY  2000? ruptured    BRONCHOSCOPY N/A 8/22/2019    BRONCHOSCOPY DIAGNOSTIC OR CELL [address] ONLY performed by Felipe Ferreira MD at [address]  2000?    open    COLONOSCOPY  12 years ago    COLONOSCOPY N/A 3/7/2019    COLONOSCOPY POLYPECTOMY SNARE/COLD BIOPSY performed by Ortiz Cornejo DO at [address] COLONOSCOPY N/A 8/26/2021    COLONOSCOPY POLYPECTOMY HOT BIOPSY performed by Ortiz Cornejo DO at [address]  years ago    LITHOTRIPSY  years ago    UPPER GASTROINTESTINAL ENDOSCOPY N/A 8/26/2021    EGD BIOPSY performed by Ortiz Cornejo DO at [address]       Prior to Admission medications    Medication Sig Start Date End Date Taking? Authorizing Provider   vitamin D (CHOLECALCIFEROL) 91754 UNIT CAPS Take 1 capsule weekly, for 8 weeks only.  No refills 9/6/21   Kari Sanchez MD   omeprazole (PRILOSEC) 40 MG delayed release capsule Take 1 capsule by mouth 2 times daily 8/26/21   Ortiz Cornejo DO   Icosapent Ethyl (VASCEPA) 1 g CAPS capsule Take 2 capsules by mouth 2 times daily 8/12/21   Nicolás Singhus, DO   rOPINIRole (REQUIP) 1 MG tablet Take 1 tablet by mouth nightly 2/8/21   Nicolás Ojeda, DO   albuterol-ipratropium (COMBIVENT RESPIMAT)  MCG/ACT AERS inhaler Inhale 1 puff into the lungs every 6 hours 2/8/21   Nicolás Ojeda, DO   ramipril (ALTACE) 1.25 MG capsule TAKE 1 CAPSULE DAILY 12/29/20   Ari Scott,    atorvastatin (LIPITOR) 10 MG tablet TAKE 1 TABLET DAILY 4/29/20   Nicolás Ojeda, DO   SITagliptin-metFORMIN HCl ER (JANUMET XR) 100-1000 MG TB24 TAKE 1 TABLET DAILY 4/29/20   Nicolás Singhus, DO       Allergies   Allergen Reactions    Pcn [Penicillins] Hives    Tape [Adhesive Tape]      blisters       Family History   Problem Relation Age of Onset    Stroke Father           Social History     Socioeconomic History    Marital status:      Spouse name: Not on file    Number of children: Not on file    Years of education: Not on file    Highest education level: Not on file   Occupational History    Not on file   Tobacco Use    Smoking status: Current Every Day Smoker     Packs/day: 1.50     Years: 35.00     Pack years: 52.50     Types: Cigarettes    Smokeless tobacco: Never Used   Vaping Use    Vaping Use: Never used   Substance and Sexual Activity    Alcohol use: Yes     Comment: SOCIALLY    Drug use: No    Sexual activity: Never   Other Topics Concern    Not on file   Social History Narrative    Not on file     Social Determinants of Health     Financial Resource Strain: Low Risk     Difficulty of Paying Living Expenses: Not hard at all   Food Insecurity: No Food Insecurity    Worried About Running Out of Food in the Last Year: Never true    Carline of Food in the Last Year: Never true   Transportation Needs:     Lack of Transportation (Medical):      Lack of Transportation (Non-Medical):    Physical Activity:     Days of Exercise per Week:     Minutes of Exercise per Session:    Stress:     Feeling of Stress :    Social Connections:     Frequency of Communication with Friends and Family:     Frequency of Social Gatherings with Friends and Family:     Attends Yazidi Services:     Active Member of Clubs or Organizations:     Attends Club or Organization Meetings:     Marital Status:    Intimate Partner Violence:     Fear of Current or Ex-Partner:     Emotionally Abused:     Physically Abused:     Sexually Abused:        Review of Systems:   Review of Systems   Constitutional: Negative for chills and fever. HENT: Negative for ear pain, nosebleeds, sore throat and tinnitus. Eyes: Negative for photophobia and redness. Respiratory: Negative for cough, shortness of breath and wheezing. Cardiovascular: Negative for chest pain and palpitations. Gastrointestinal: Negative for abdominal pain, blood in stool, constipation, diarrhea, nausea and vomiting. Endocrine: Negative for polydipsia. Genitourinary: Negative for dysuria, hematuria and urgency. Musculoskeletal: Negative for back pain and neck pain. Skin: Negative for rash. Neurological: Negative for dizziness, tremors and seizures. Hematological: Does not bruise/bleed easily. All other systems reviewed and are negative.        Physical Exam:  Vitals:    09/13/21 1015   BP: 131/66   Site: Right Upper Arm   Pulse: 70   Temp: 97.2 °F (36.2 °C)   TempSrc: Temporal   Weight: 202 lb 6.4 oz (91.8 kg)   Height: 5' 11\" (1.803 m)       General: Well nourished, well developed, no acute distress  Eyes:  PERRL   Conjunctiva unremarkable   ENT:  TM's intact bilaterally, no effusion   Nasal:  Nomucosal edema     No nasal drainage   Oral:  mucosa moist and pink   Neck:  Supple   No palpable cervical lymphoadenopathy   Thyroid without mass or enlargement  Resp: Lungs CTAB   Equal and adequate air exchange without accessory muscle use   No rales, rhonchi or wheeze  CV: S1S2 RRR   No murmur   Intact distal pulses   No edema  GI: Abdomen Soft, nontender, non distended   Normoactive bowel sounds   No palpable hepatosplenomegaly  MS: Physiologic ROM of all extremities    Intact distal pulses   No clubbing or cyanosis   Skin Warmand dry; no rash or lesion  Neuro: Alert and oriented; normal and intact dtr's   Psych: Euthymic mood, congruent affect      No results found. Assessment/Plan:  3 77-year-old male with large sessile adenomas of the ascending colon and cecum. I have recommended right colectomy for this. We will request medical clearance and once cleared, will schedule the procedure    The risks of the procedure were explained to the patient including, but not limited to, infection, bleeding, anastomotic leak, injury to the ureter.         Electronically signed by Stephanie Humphries DO on 9/17/21 at 3:03 PM EDT

## 2021-09-22 ENCOUNTER — HOSPITAL ENCOUNTER (OUTPATIENT)
Dept: ULTRASOUND IMAGING | Age: 71
Discharge: HOME | End: 2021-09-24
Payer: MEDICARE

## 2021-09-22 DIAGNOSIS — E04.2 MULTINODULAR GOITER: ICD-10-CM

## 2021-09-22 PROCEDURE — 76536 US EXAM OF HEAD AND NECK: CPT

## 2021-09-23 ENCOUNTER — TELEPHONE (OUTPATIENT)
Dept: SURGERY | Age: 71
End: 2021-09-23

## 2021-09-23 ENCOUNTER — OFFICE VISIT (OUTPATIENT)
Dept: PRIMARY CARE CLINIC | Age: 71
End: 2021-09-23
Payer: MEDICARE

## 2021-09-23 VITALS
OXYGEN SATURATION: 93 % | SYSTOLIC BLOOD PRESSURE: 110 MMHG | WEIGHT: 202 LBS | HEART RATE: 73 BPM | DIASTOLIC BLOOD PRESSURE: 70 MMHG | TEMPERATURE: 97.1 F | BODY MASS INDEX: 28.17 KG/M2

## 2021-09-23 DIAGNOSIS — J43.2 CENTRILOBULAR EMPHYSEMA (HCC): ICD-10-CM

## 2021-09-23 DIAGNOSIS — Z01.818 PRE-OP TESTING: Primary | ICD-10-CM

## 2021-09-23 PROCEDURE — 93000 ELECTROCARDIOGRAM COMPLETE: CPT | Performed by: FAMILY MEDICINE

## 2021-09-23 PROCEDURE — 99213 OFFICE O/P EST LOW 20 MIN: CPT | Performed by: FAMILY MEDICINE

## 2021-09-23 RX ORDER — ALBUTEROL SULFATE 90 UG/1
2 AEROSOL, METERED RESPIRATORY (INHALATION) EVERY 6 HOURS PRN
Qty: 18 G | Refills: 1 | Status: SHIPPED | OUTPATIENT
Start: 2021-09-23

## 2021-09-23 RX ORDER — FLUTICASONE FUROATE, UMECLIDINIUM BROMIDE AND VILANTEROL TRIFENATATE 100; 62.5; 25 UG/1; UG/1; UG/1
1 POWDER RESPIRATORY (INHALATION) DAILY
Qty: 2 EACH | Refills: 0 | COMMUNITY
Start: 2021-09-23 | End: 2021-10-15 | Stop reason: SDUPTHER

## 2021-09-23 ASSESSMENT — ENCOUNTER SYMPTOMS
SHORTNESS OF BREATH: 0
WHEEZING: 1
DIARRHEA: 0
CONSTIPATION: 0

## 2021-09-23 NOTE — TELEPHONE ENCOUNTER
Prior Authorization Form:      DEMOGRAPHICS:                     Patient Name:  Liane Mora  Patient :  1950            Insurance:  Payor: [employer] / Plan: Janie German PPO / Product Type: Medicare /   Insurance ID Number:    Payor/Plan Subscr  Sex Relation Sub. Ins. ID Effective Group Num   1.  [address] 1950 Male Self [id number]                                    [id number]         DIAGNOSIS & PROCEDURE:                       Procedure/Operation: robot R colectomy           CPT Code: 08987    Diagnosis:  Adenomatous polyp of colon    ICD10 Code: d12.6    Location:  [facility]    Surgeon:  Yojana Srivastava INFORMATION:                          Date: 10/19/21    Time: 830              Anesthesia:  General                                                       Status:  Outpatient        Special Comments:         Electronically signed by Nani Espino MA on 2021 at 2:32 PM

## 2021-09-23 NOTE — PROGRESS NOTES
Karla Tellez, a male of 66 y.o. came to the office 9/23/2021. Patient Active Problem List   Diagnosis    COPD [facility]chronic obstructive [facility] disease) [facility]    Type 2 diabetes mellitus with diabetic polyneuropathy, with long-term current use of insulin ([facility]    Hyperlipemia    Nephrolithiasis     Tobacco abuse    Intractable ophthalmoplegic migraine    Adenomatous polyp of colon          HPI  Colon polyps: has mult sessile polyps in asc colon and large one that was unable to be removed from cecum. No abd pain. Gen: [employer] did home visit and recommend rescue inhaler. Copd: having some wheezing. Allergies   Allergen Reactions    Pcn [Penicillins] Hives    Tape [Adhesive Tape]      blisters       Current Outpatient Medications on File Prior to Visit   Medication Sig Dispense Refill    vitamin D (CHOLECALCIFEROL) 23742 UNIT CAPS Take 1 capsule weekly, for 8 weeks only. No refills 8 capsule 0    omeprazole (PRILOSEC) 40 MG delayed release capsule Take 1 capsule by mouth 2 times daily 30 capsule 2    Icosapent Ethyl (VASCEPA) 1 g CAPS capsule Take 2 capsules by mouth 2 times daily 120 capsule 3    rOPINIRole (REQUIP) 1 MG tablet Take 1 tablet by mouth nightly 90 tablet 1    albuterol-ipratropium (COMBIVENT RESPIMAT)  MCG/ACT AERS inhaler Inhale 1 puff into the lungs every 6 hours 1 Inhaler 5    ramipril (ALTACE) 1.25 MG capsule TAKE 1 CAPSULE DAILY 90 capsule 3    atorvastatin (LIPITOR) 10 MG tablet TAKE 1 TABLET DAILY 90 tablet 1    SITagliptin-metFORMIN HCl ER (JANUMET XR) 100-1000 MG TB24 TAKE 1 TABLET DAILY 90 tablet 1     No current facility-administered medications on file prior to visit. Review of Systems   Constitutional: Negative for fatigue and unexpected weight change. Respiratory: Positive for wheezing. Negative for shortness of breath. Gastrointestinal: Negative for constipation and diarrhea.    other review of systems reviewed and are negative    OBJECTIVE:  /70   Pulse 73   Temp 97.1 °F (36.2 °C)   Wt 202 lb (91.6 kg)   SpO2 93%   BMI 28.17 kg/m²      Physical Exam  Constitutional:       General: He is not in acute distress. Eyes:      General: No scleral icterus. Conjunctiva/sclera: Conjunctivae normal.   Neck:      Thyroid: No thyromegaly. Cardiovascular:      Rate and Rhythm: Normal rate and regular rhythm. Heart sounds: No murmur heard. Pulmonary:      Effort: Pulmonary effort is normal.      Breath sounds: Wheezing present. Musculoskeletal:      Cervical back: Neck supple. Lymphadenopathy:      Cervical: No cervical adenopathy. Skin:     General: Skin is warm and dry. Neurological:      Mental Status: He is alert and oriented to person, place, and time.     ekg: nsr, nl     ASSESSMENT AND PLAN:    Artemio Baldwin was seen today for pre-op exam.    Diagnoses and all orders for this visit:    Pre-op testing  -     EKG 12 Lead    Centrilobular emphysema ([address]  -     fluticasone-umeclidin-vilant (Prentis Ing) 100-62.5-25 MCG/INH AEPB; Inhale 1 puff into the lungs daily  -     albuterol sulfate  (90 Base) MCG/ACT inhaler; Inhale 2 puffs into the lungs every 6 hours as needed for Wheezing or Shortness of Breath    - hold Combivent and see if breathing improves with Trelegy. - ok for surgery     Return if symptoms worsen or fail to improve, for as scheduled.     Doroteo Ojeda,

## 2021-09-26 ENCOUNTER — TELEPHONE (OUTPATIENT)
Dept: ENDOCRINOLOGY | Age: 71
End: 2021-09-26

## 2021-09-26 DIAGNOSIS — E04.2 MULTINODULAR GOITER: Primary | ICD-10-CM

## 2021-10-12 ENCOUNTER — HOSPITAL ENCOUNTER (OUTPATIENT)
Dept: PREADMISSION TESTING | Age: 71
Discharge: HOME | End: 2021-10-12
Payer: MEDICARE

## 2021-10-12 VITALS
DIASTOLIC BLOOD PRESSURE: 56 MMHG | HEART RATE: 68 BPM | OXYGEN SATURATION: 94 % | HEIGHT: 71 IN | RESPIRATION RATE: 20 BRPM | WEIGHT: 202 LBS | TEMPERATURE: 97.4 F | BODY MASS INDEX: 28.28 KG/M2 | SYSTOLIC BLOOD PRESSURE: 114 MMHG

## 2021-10-12 LAB
ABO/RH: NORMAL
ALBUMIN SERPL-MCNC: 3.9 G/DL (ref 3.5–5.2)
ALP BLD-CCNC: 152 U/L (ref 40–129)
ALT SERPL-CCNC: 11 U/L (ref 0–40)
ANION GAP SERPL CALCULATED.3IONS-SCNC: 9 MMOL/L (ref 7–16)
ANTIBODY SCREEN: NORMAL
AST SERPL-CCNC: 18 U/L (ref 0–39)
BASOPHILS ABSOLUTE: 0.07 E9/L (ref 0–0.2)
BASOPHILS RELATIVE PERCENT: 0.7 % (ref 0–2)
BILIRUB SERPL-MCNC: 1.3 MG/DL (ref 0–1.2)
BUN BLDV-MCNC: 12 MG/DL (ref 6–23)
CALCIUM SERPL-MCNC: 8.8 MG/DL (ref 8.6–10.2)
CEA: 3.3 NG/ML (ref 0–5.2)
CHLORIDE BLD-SCNC: 99 MMOL/L (ref 98–107)
CO2: 29 MMOL/L (ref 22–29)
CREAT SERPL-MCNC: 1.5 MG/DL (ref 0.7–1.2)
EOSINOPHILS ABSOLUTE: 0.15 E9/L (ref 0.05–0.5)
EOSINOPHILS RELATIVE PERCENT: 1.5 % (ref 0–6)
GFR AFRICAN AMERICAN: 55
GFR NON-AFRICAN AMERICAN: 46 ML/MIN/1.73
GLUCOSE BLD-MCNC: 123 MG/DL (ref 74–99)
HCT VFR BLD CALC: 50.4 % (ref 37–54)
HEMOGLOBIN: 16.2 G/DL (ref 12.5–16.5)
IMMATURE GRANULOCYTES #: 0.05 E9/L
IMMATURE GRANULOCYTES %: 0.5 % (ref 0–5)
LYMPHOCYTES ABSOLUTE: 1.59 E9/L (ref 1.5–4)
LYMPHOCYTES RELATIVE PERCENT: 16.4 % (ref 20–42)
MCH RBC QN AUTO: 30.2 PG (ref 26–35)
MCHC RBC AUTO-ENTMCNC: 32.1 % (ref 32–34.5)
MCV RBC AUTO: 93.9 FL (ref 80–99.9)
MONOCYTES ABSOLUTE: 0.56 E9/L (ref 0.1–0.95)
MONOCYTES RELATIVE PERCENT: 5.8 % (ref 2–12)
NEUTROPHILS ABSOLUTE: 7.28 E9/L (ref 1.8–7.3)
NEUTROPHILS RELATIVE PERCENT: 75.1 % (ref 43–80)
PDW BLD-RTO: 12.8 FL (ref 11.5–15)
PLATELET # BLD: 198 E9/L (ref 130–450)
PMV BLD AUTO: 11.6 FL (ref 7–12)
POTASSIUM REFLEX MAGNESIUM: 5.3 MMOL/L (ref 3.5–5)
RBC # BLD: 5.37 E12/L (ref 3.8–5.8)
SODIUM BLD-SCNC: 137 MMOL/L (ref 132–146)
TOTAL PROTEIN: 6.4 G/DL (ref 6.4–8.3)
WBC # BLD: 9.7 E9/L (ref 4.5–11.5)

## 2021-10-12 PROCEDURE — 36415 COLL VENOUS BLD VENIPUNCTURE: CPT

## 2021-10-12 PROCEDURE — 86900 BLOOD TYPING SEROLOGIC ABO: CPT

## 2021-10-12 PROCEDURE — 80053 COMPREHEN METABOLIC PANEL: CPT

## 2021-10-12 PROCEDURE — 86901 BLOOD TYPING SEROLOGIC RH(D): CPT

## 2021-10-12 PROCEDURE — 86850 RBC ANTIBODY SCREEN: CPT

## 2021-10-12 PROCEDURE — 82378 CARCINOEMBRYONIC ANTIGEN: CPT

## 2021-10-12 PROCEDURE — 85025 COMPLETE CBC W/AUTO DIFF WBC: CPT

## 2021-10-12 PROCEDURE — 87081 CULTURE SCREEN ONLY: CPT

## 2021-10-12 NOTE — PROGRESS NOTES
[facility] PRE-ADMISSION TESTING INSTRUCTIONS    The Preadmission Testing patient is instructed accordingly using the following criteria (check applicable):    ARRIVAL INSTRUCTIONS:  [x] Parking the day of Surgery is located in the Main Entrance lot. Upon entering the door, make an immediate right to the surgery reception desk    [x] Bring photo ID and insurance card    [] Bring in a copy of Living will or Durable Power of  papers. [x] Please be sure to arrange for responsible adult to provide transportation to and from the hospital    [] Please arrange for responsible adult to be with you for the 24 hour period post procedure due to having anesthesia      GENERAL INSTRUCTIONS:    [x] Nothing by mouth after midnight, including gum, candy, mints or water    [x] You may brush your teeth, but do not swallow any water    [x] Take medications as instructed with 1-2 oz of water    [x] Stop herbal supplements and vitamins 5 days prior to procedure    [] Follow preop dosing of blood thinners per physician instructions    [] Take 1/2 dose of evening insulin, but no insulin after midnight    [] No oral diabetic medications after midnight    [] If diabetic and have low blood sugar or feel symptomatic, take 1-2oz apple juice only    [] Bring inhalers day of surgery    [] Bring C-PAP/ Bi-Pap day of surgery    [] Bring urine specimen day of surgery    [] Shower or bath with soap, lather and rinse well, AM of Surgery, no lotion, powders or creams to surgical site    [] Follow bowel prep as instructed per surgeon    [x] No tobacco products within 24 hours of surgery     [x] No alcohol or illegal drug use within 24 hours of surgery.     [x] Jewelry, body piercing's, eyeglasses, contact lenses and dentures are not permitted into surgery (bring cases)      [] Please do not wear any nail polish, make up or hair products on the day of surgery    [x] You can expect a call the business day prior to procedure to notify you if your arrival time changes    [x] If you receive a survey after surgery we would greatly appreciate your comments    [] Parent/guardian of a minor must accompany their child and remain on the premises  the entire time they are under our care     [] Pediatric patients may bring favorite toy, blanket or comfort item with them    [] A caregiver or family member must remain with the patient during their stay if they are mentally handicapped, have dementia, disoriented or unable to use a call light or would be a safety concern if left unattended    [x] Please notify surgeon if you develop any illness between now and time of surgery (cold, cough, sore throat, fever, nausea, vomiting) or any signs of infections  including skin, wounds, and dental.    [x]  The Outpatient Pharmacy is available to fill your prescription here on your day of surgery, ask your preop nurse for details    [] Other instructions    EDUCATIONAL MATERIALS PROVIDED:    [x] PAT Preoperative Education Packet/Booklet     [x] Medication List    [x] Transfusion bracelet applied with instructions    [x] Shower with soap, lather and rinse well, and use CHG wipes provided the evening before surgery as instructed    [x] Incentive spirometer with instructions        Have you been tested for COVID  No           Have you been told you were positive for COVID No  Have you had any known exposure to someone that is positive for COVID Yes  Do you have a cough                   Yes   smoker           Do you have shortness of breath Yes       smoker          Do you have a sore throat            No                Are you having chills                    No                Are you having muscle aches. Yes legs                    Please come to the hospital wearing a mask and have your significant other wear a mask as well.   Both of you should check your temperature before leaving to come here,  if it is 100 or higher please call [phone]

## 2021-10-12 NOTE — PROGRESS NOTES
Patient wheezing in [facility]. I updated Dr Yoli Ware regarding the wheezing, and his recent visit with Dr Nelson John, and the inhaler changes. She wanted patient to re-check and follow up with Dr Nelson John prior to surgery. Patient aware and instructed. I faxed labs from [facility] to Dr Roberto Traylor, and updated him regarding the wheezing.

## 2021-10-14 LAB — MRSA CULTURE ONLY: NORMAL

## 2021-10-15 ENCOUNTER — OFFICE VISIT (OUTPATIENT)
Dept: PRIMARY CARE CLINIC | Age: 71
End: 2021-10-15
Payer: MEDICARE

## 2021-10-15 ENCOUNTER — HOSPITAL ENCOUNTER (OUTPATIENT)
Dept: GENERAL RADIOLOGY | Age: 71
Discharge: HOME | End: 2021-10-17
Payer: MEDICARE

## 2021-10-15 ENCOUNTER — HOSPITAL ENCOUNTER (OUTPATIENT)
Age: 71
Discharge: HOME | End: 2021-10-15
Payer: MEDICARE

## 2021-10-15 VITALS
HEART RATE: 70 BPM | DIASTOLIC BLOOD PRESSURE: 62 MMHG | BODY MASS INDEX: 27.89 KG/M2 | SYSTOLIC BLOOD PRESSURE: 120 MMHG | WEIGHT: 200 LBS | TEMPERATURE: 96.8 F | OXYGEN SATURATION: 92 %

## 2021-10-15 DIAGNOSIS — J43.2 CENTRILOBULAR EMPHYSEMA (HCC): ICD-10-CM

## 2021-10-15 DIAGNOSIS — J43.2 CENTRILOBULAR EMPHYSEMA (HCC): Primary | ICD-10-CM

## 2021-10-15 PROCEDURE — 71046 X-RAY EXAM CHEST 2 VIEWS: CPT

## 2021-10-15 PROCEDURE — 99213 OFFICE O/P EST LOW 20 MIN: CPT | Performed by: FAMILY MEDICINE

## 2021-10-15 RX ORDER — FLUTICASONE FUROATE, UMECLIDINIUM BROMIDE AND VILANTEROL TRIFENATATE 100; 62.5; 25 UG/1; UG/1; UG/1
1 POWDER RESPIRATORY (INHALATION) DAILY
Qty: 1 EACH | Refills: 3 | Status: SHIPPED
Start: 2021-10-15 | End: 2022-08-22 | Stop reason: SDUPTHER

## 2021-10-15 ASSESSMENT — ENCOUNTER SYMPTOMS
WHEEZING: 1
SHORTNESS OF BREATH: 1
COUGH: 1

## 2021-10-15 NOTE — PROGRESS NOTES
Brandie Patterson, a male of 66 y.o. came to the office 10/15/2021. Patient Active Problem List   Diagnosis    [facility] obstructive [facility] disease) [facility]    Type 2 diabetes mellitus with diabetic polyneuropathy, with long-term current use of insulin ([facility]    Hyperlipemia    Nephrolithiasis     Tobacco abuse    Intractable ophthalmoplegic migraine    Adenomatous polyp of colon          Wheezing   This is a chronic problem. The current episode started more than 1 month ago. The problem has been gradually improving. Associated symptoms include coughing (dry ) and shortness of breath (no change with new med). Pertinent negatives include no chills or fever. The symptoms are aggravated by smoke. He has tried steroid inhaler (Trelegy) for the symptoms. The treatment provided moderate relief. His past medical history is significant for COPD. Allergies   Allergen Reactions    Pcn [Penicillins] Hives    Tape [Adhesive Tape]      blisters       Current Outpatient Medications on File Prior to Visit   Medication Sig Dispense Refill    albuterol sulfate  (90 Base) MCG/ACT inhaler Inhale 2 puffs into the lungs every 6 hours as needed for Wheezing or Shortness of Breath 18 g 1    vitamin D (CHOLECALCIFEROL) 49512 UNIT CAPS Take 1 capsule weekly, for 8 weeks only. No refills 8 capsule 0    omeprazole (PRILOSEC) 40 MG delayed release capsule Take 1 capsule by mouth 2 times daily 30 capsule 2    Icosapent Ethyl (VASCEPA) 1 g CAPS capsule Take 2 capsules by mouth 2 times daily 120 capsule 3    rOPINIRole (REQUIP) 1 MG tablet Take 1 tablet by mouth nightly 90 tablet 1    ramipril (ALTACE) 1.25 MG capsule TAKE 1 CAPSULE DAILY 90 capsule 3    atorvastatin (LIPITOR) 10 MG tablet TAKE 1 TABLET DAILY 90 tablet 1    SITagliptin-metFORMIN HCl ER (JANUMET XR) 100-1000 MG TB24 TAKE 1 TABLET DAILY 90 tablet 1     No current facility-administered medications on file prior to visit.        Review of Systems Constitutional: Negative for chills and fever. Respiratory: Positive for cough (dry ), shortness of breath (no change with new med) and wheezing. other review of systems reviewed and are negative    OBJECTIVE:  /62   Pulse 70   Temp 96.8 °F (36 °C)   Wt 200 lb (90.7 kg)   SpO2 92%   BMI 27.89 kg/m²      Physical Exam  Constitutional:       General: He is not in acute distress. Eyes:      General: No scleral icterus. Conjunctiva/sclera: Conjunctivae normal.   Neck:      Thyroid: No thyromegaly. Cardiovascular:      Rate and Rhythm: Normal rate and regular rhythm. Heart sounds: No murmur heard. Pulmonary:      Effort: Pulmonary effort is normal.      Breath sounds: Examination of the right-lower field reveals rhonchi. Examination of the left-lower field reveals rhonchi. Rhonchi present. Comments: Mild expiratory rhonchi in bases. Musculoskeletal:      Cervical back: Neck supple. Lymphadenopathy:      Cervical: No cervical adenopathy. Skin:     General: Skin is warm and dry. Neurological:      Mental Status: He is alert and oriented to person, place, and time. ASSESSMENT AND PLAN:    Jennifer Shah was seen today for wheezing. Diagnoses and all orders for this visit:    Centrilobular emphysema ([facility]  -     fluticasone-umeclidin-vilant ([facility]) 100-62.5-25 MCG/INH AEPB; Inhale 1 puff into the lungs daily  -     XR CHEST (2 VW); Future    - continue Trelegy daily  - ok for gi surgery 10/19 if cxr ok. - quit cig's    Return if symptoms worsen or fail to improve, for based on results.     Alfredo Ojeda, DO

## 2021-10-19 ENCOUNTER — ANESTHESIA EVENT (OUTPATIENT)
Dept: OPERATING ROOM | Age: 71
DRG: 331 | End: 2021-10-19
Payer: MEDICARE

## 2021-10-19 ENCOUNTER — ANESTHESIA (OUTPATIENT)
Dept: OPERATING ROOM | Age: 71
DRG: 331 | End: 2021-10-19
Payer: MEDICARE

## 2021-10-19 ENCOUNTER — HOSPITAL ENCOUNTER (INPATIENT)
Age: 71
LOS: 4 days | Discharge: HOME | DRG: 331 | End: 2021-10-23
Attending: SURGERY | Admitting: SURGERY
Payer: MEDICARE

## 2021-10-19 VITALS — SYSTOLIC BLOOD PRESSURE: 152 MMHG | OXYGEN SATURATION: 99 % | DIASTOLIC BLOOD PRESSURE: 72 MMHG

## 2021-10-19 DIAGNOSIS — D12.6 ADENOMA OF COLON: Primary | ICD-10-CM

## 2021-10-19 LAB
METER GLUCOSE: 116 MG/DL (ref 74–99)
METER GLUCOSE: 201 MG/DL (ref 74–99)

## 2021-10-19 PROCEDURE — 2060000000 HC ICU INTERMEDIATE R&B

## 2021-10-19 PROCEDURE — 44207 L COLECTOMY/COLOPROCTOSTOMY: CPT | Performed by: SURGERY

## 2021-10-19 PROCEDURE — 2580000003 HC RX 258: Performed by: SURGERY

## 2021-10-19 PROCEDURE — 6360000002 HC RX W HCPCS: Performed by: SURGERY

## 2021-10-19 PROCEDURE — S2900 ROBOTIC SURGICAL SYSTEM: HCPCS | Performed by: SURGERY

## 2021-10-19 PROCEDURE — 3700000000 HC ANESTHESIA ATTENDED CARE: Performed by: SURGERY

## 2021-10-19 PROCEDURE — 94640 AIRWAY INHALATION TREATMENT: CPT

## 2021-10-19 PROCEDURE — 7100000001 HC PACU RECOVERY - ADDTL 15 MIN: Performed by: SURGERY

## 2021-10-19 PROCEDURE — 2580000003 HC RX 258

## 2021-10-19 PROCEDURE — 99223 1ST HOSP IP/OBS HIGH 75: CPT | Performed by: SURGERY

## 2021-10-19 PROCEDURE — 2500000003 HC RX 250 WO HCPCS

## 2021-10-19 PROCEDURE — 2720000010 HC SURG SUPPLY STERILE: Performed by: SURGERY

## 2021-10-19 PROCEDURE — 6360000002 HC RX W HCPCS

## 2021-10-19 PROCEDURE — 88307 TISSUE EXAM BY PATHOLOGIST: CPT

## 2021-10-19 PROCEDURE — 6370000000 HC RX 637 (ALT 250 FOR IP): Performed by: SURGERY

## 2021-10-19 PROCEDURE — 3600000009 HC SURGERY ROBOT BASE: Performed by: SURGERY

## 2021-10-19 PROCEDURE — 2500000003 HC RX 250 WO HCPCS: Performed by: SURGERY

## 2021-10-19 PROCEDURE — 6360000002 HC RX W HCPCS: Performed by: ANESTHESIOLOGY

## 2021-10-19 PROCEDURE — 3600000019 HC SURGERY ROBOT ADDTL 15MIN: Performed by: SURGERY

## 2021-10-19 PROCEDURE — 0DTF0ZZ RESECTION OF RIGHT LARGE INTESTINE, OPEN APPROACH: ICD-10-PCS | Performed by: SURGERY

## 2021-10-19 PROCEDURE — 82962 GLUCOSE BLOOD TEST: CPT

## 2021-10-19 PROCEDURE — 3700000001 HC ADD 15 MINUTES (ANESTHESIA): Performed by: SURGERY

## 2021-10-19 PROCEDURE — 2709999900 HC NON-CHARGEABLE SUPPLY: Performed by: SURGERY

## 2021-10-19 PROCEDURE — 8E0W4CZ ROBOTIC ASSISTED PROCEDURE OF TRUNK REGION, PERCUTANEOUS ENDOSCOPIC APPROACH: ICD-10-PCS | Performed by: SURGERY

## 2021-10-19 PROCEDURE — 7100000000 HC PACU RECOVERY - FIRST 15 MIN: Performed by: SURGERY

## 2021-10-19 RX ORDER — ONDANSETRON 2 MG/ML
4 INJECTION INTRAMUSCULAR; INTRAVENOUS EVERY 6 HOURS PRN
Status: DISCONTINUED | OUTPATIENT
Start: 2021-10-19 | End: 2021-10-23 | Stop reason: HOSPADM

## 2021-10-19 RX ORDER — SODIUM CHLORIDE 9 MG/ML
INJECTION, SOLUTION INTRAVENOUS CONTINUOUS
Status: DISCONTINUED | OUTPATIENT
Start: 2021-10-19 | End: 2021-10-21

## 2021-10-19 RX ORDER — RAMIPRIL 1.25 MG/1
1.25 CAPSULE ORAL DAILY
Status: DISCONTINUED | OUTPATIENT
Start: 2021-10-19 | End: 2021-10-23 | Stop reason: HOSPADM

## 2021-10-19 RX ORDER — ONDANSETRON 4 MG/1
4 TABLET, ORALLY DISINTEGRATING ORAL EVERY 8 HOURS PRN
Status: DISCONTINUED | OUTPATIENT
Start: 2021-10-19 | End: 2021-10-23 | Stop reason: HOSPADM

## 2021-10-19 RX ORDER — SODIUM CHLORIDE 0.9 % (FLUSH) 0.9 %
5-40 SYRINGE (ML) INJECTION EVERY 12 HOURS SCHEDULED
Status: DISCONTINUED | OUTPATIENT
Start: 2021-10-19 | End: 2021-10-19 | Stop reason: HOSPADM

## 2021-10-19 RX ORDER — DEXAMETHASONE SODIUM PHOSPHATE 4 MG/ML
INJECTION, SOLUTION INTRA-ARTICULAR; INTRALESIONAL; INTRAMUSCULAR; INTRAVENOUS; SOFT TISSUE PRN
Status: DISCONTINUED | OUTPATIENT
Start: 2021-10-19 | End: 2021-10-19 | Stop reason: SDUPTHER

## 2021-10-19 RX ORDER — METFORMIN HYDROCHLORIDE 500 MG/1
1000 TABLET, EXTENDED RELEASE ORAL
Status: DISCONTINUED | OUTPATIENT
Start: 2021-10-19 | End: 2021-10-23 | Stop reason: HOSPADM

## 2021-10-19 RX ORDER — OXYCODONE HYDROCHLORIDE 5 MG/1
5 TABLET ORAL EVERY 4 HOURS PRN
Status: DISCONTINUED | OUTPATIENT
Start: 2021-10-19 | End: 2021-10-23 | Stop reason: HOSPADM

## 2021-10-19 RX ORDER — ARFORMOTEROL TARTRATE 15 UG/2ML
15 SOLUTION RESPIRATORY (INHALATION) 2 TIMES DAILY
Status: DISCONTINUED | OUTPATIENT
Start: 2021-10-19 | End: 2021-10-23 | Stop reason: HOSPADM

## 2021-10-19 RX ORDER — SODIUM CHLORIDE 0.9 % (FLUSH) 0.9 %
5-40 SYRINGE (ML) INJECTION EVERY 12 HOURS SCHEDULED
Status: DISCONTINUED | OUTPATIENT
Start: 2021-10-19 | End: 2021-10-23 | Stop reason: HOSPADM

## 2021-10-19 RX ORDER — BUDESONIDE 0.25 MG/2ML
250 INHALANT ORAL 2 TIMES DAILY
Status: DISCONTINUED | OUTPATIENT
Start: 2021-10-19 | End: 2021-10-23 | Stop reason: HOSPADM

## 2021-10-19 RX ORDER — SODIUM CHLORIDE 9 MG/ML
25 INJECTION, SOLUTION INTRAVENOUS PRN
Status: DISCONTINUED | OUTPATIENT
Start: 2021-10-19 | End: 2021-10-19 | Stop reason: HOSPADM

## 2021-10-19 RX ORDER — MORPHINE SULFATE 2 MG/ML
2 INJECTION, SOLUTION INTRAMUSCULAR; INTRAVENOUS
Status: DISCONTINUED | OUTPATIENT
Start: 2021-10-19 | End: 2021-10-22

## 2021-10-19 RX ORDER — ALBUTEROL SULFATE 90 UG/1
2 AEROSOL, METERED RESPIRATORY (INHALATION) EVERY 6 HOURS PRN
Status: DISCONTINUED | OUTPATIENT
Start: 2021-10-19 | End: 2021-10-19 | Stop reason: CLARIF

## 2021-10-19 RX ORDER — ROCURONIUM BROMIDE 10 MG/ML
INJECTION, SOLUTION INTRAVENOUS PRN
Status: DISCONTINUED | OUTPATIENT
Start: 2021-10-19 | End: 2021-10-19 | Stop reason: SDUPTHER

## 2021-10-19 RX ORDER — SODIUM CHLORIDE 9 MG/ML
INJECTION, SOLUTION INTRAVENOUS CONTINUOUS PRN
Status: DISCONTINUED | OUTPATIENT
Start: 2021-10-19 | End: 2021-10-19 | Stop reason: SDUPTHER

## 2021-10-19 RX ORDER — BUPIVACAINE HYDROCHLORIDE 5 MG/ML
INJECTION, SOLUTION EPIDURAL; INTRACAUDAL PRN
Status: DISCONTINUED | OUTPATIENT
Start: 2021-10-19 | End: 2021-10-19 | Stop reason: ALTCHOICE

## 2021-10-19 RX ORDER — ONDANSETRON 2 MG/ML
4 INJECTION INTRAMUSCULAR; INTRAVENOUS
Status: DISCONTINUED | OUTPATIENT
Start: 2021-10-19 | End: 2021-10-19 | Stop reason: HOSPADM

## 2021-10-19 RX ORDER — SODIUM CHLORIDE 0.9 % (FLUSH) 0.9 %
5-40 SYRINGE (ML) INJECTION PRN
Status: DISCONTINUED | OUTPATIENT
Start: 2021-10-19 | End: 2021-10-23 | Stop reason: HOSPADM

## 2021-10-19 RX ORDER — ALBUTEROL SULFATE 2.5 MG/3ML
2.5 SOLUTION RESPIRATORY (INHALATION) EVERY 6 HOURS PRN
Status: DISCONTINUED | OUTPATIENT
Start: 2021-10-19 | End: 2021-10-23 | Stop reason: HOSPADM

## 2021-10-19 RX ORDER — PROPOFOL 10 MG/ML
INJECTION, EMULSION INTRAVENOUS PRN
Status: DISCONTINUED | OUTPATIENT
Start: 2021-10-19 | End: 2021-10-19 | Stop reason: SDUPTHER

## 2021-10-19 RX ORDER — MORPHINE SULFATE 2 MG/ML
4 INJECTION, SOLUTION INTRAMUSCULAR; INTRAVENOUS
Status: DISCONTINUED | OUTPATIENT
Start: 2021-10-19 | End: 2021-10-22

## 2021-10-19 RX ORDER — SODIUM CHLORIDE 9 MG/ML
25 INJECTION, SOLUTION INTRAVENOUS PRN
Status: DISCONTINUED | OUTPATIENT
Start: 2021-10-19 | End: 2021-10-23 | Stop reason: HOSPADM

## 2021-10-19 RX ORDER — SODIUM CHLORIDE 0.9 % (FLUSH) 0.9 %
5-40 SYRINGE (ML) INJECTION PRN
Status: DISCONTINUED | OUTPATIENT
Start: 2021-10-19 | End: 2021-10-19 | Stop reason: HOSPADM

## 2021-10-19 RX ORDER — ALOGLIPTIN 12.5 MG/1
12.5 TABLET, FILM COATED ORAL
Status: DISCONTINUED | OUTPATIENT
Start: 2021-10-19 | End: 2021-10-23 | Stop reason: HOSPADM

## 2021-10-19 RX ORDER — LIDOCAINE HYDROCHLORIDE 20 MG/ML
INJECTION, SOLUTION EPIDURAL; INFILTRATION; INTRACAUDAL; PERINEURAL PRN
Status: DISCONTINUED | OUTPATIENT
Start: 2021-10-19 | End: 2021-10-19 | Stop reason: SDUPTHER

## 2021-10-19 RX ORDER — ROPINIROLE 1 MG/1
1 TABLET, FILM COATED ORAL NIGHTLY
Status: DISCONTINUED | OUTPATIENT
Start: 2021-10-19 | End: 2021-10-23 | Stop reason: HOSPADM

## 2021-10-19 RX ORDER — ONDANSETRON 2 MG/ML
INJECTION INTRAMUSCULAR; INTRAVENOUS PRN
Status: DISCONTINUED | OUTPATIENT
Start: 2021-10-19 | End: 2021-10-19 | Stop reason: SDUPTHER

## 2021-10-19 RX ORDER — KETOROLAC TROMETHAMINE 30 MG/ML
INJECTION, SOLUTION INTRAMUSCULAR; INTRAVENOUS PRN
Status: DISCONTINUED | OUTPATIENT
Start: 2021-10-19 | End: 2021-10-19 | Stop reason: SDUPTHER

## 2021-10-19 RX ORDER — MIDAZOLAM HYDROCHLORIDE 1 MG/ML
INJECTION INTRAMUSCULAR; INTRAVENOUS PRN
Status: DISCONTINUED | OUTPATIENT
Start: 2021-10-19 | End: 2021-10-19 | Stop reason: SDUPTHER

## 2021-10-19 RX ORDER — FENTANYL CITRATE 50 UG/ML
INJECTION, SOLUTION INTRAMUSCULAR; INTRAVENOUS PRN
Status: DISCONTINUED | OUTPATIENT
Start: 2021-10-19 | End: 2021-10-19 | Stop reason: SDUPTHER

## 2021-10-19 RX ORDER — MEPERIDINE HYDROCHLORIDE 25 MG/ML
12.5 INJECTION INTRAMUSCULAR; INTRAVENOUS; SUBCUTANEOUS EVERY 5 MIN PRN
Status: DISCONTINUED | OUTPATIENT
Start: 2021-10-19 | End: 2021-10-19 | Stop reason: HOSPADM

## 2021-10-19 RX ORDER — PANTOPRAZOLE SODIUM 40 MG/1
40 TABLET, DELAYED RELEASE ORAL
Status: DISCONTINUED | OUTPATIENT
Start: 2021-10-19 | End: 2021-10-23 | Stop reason: HOSPADM

## 2021-10-19 RX ORDER — OXYCODONE HYDROCHLORIDE 5 MG/1
10 TABLET ORAL EVERY 4 HOURS PRN
Status: DISCONTINUED | OUTPATIENT
Start: 2021-10-19 | End: 2021-10-23 | Stop reason: HOSPADM

## 2021-10-19 RX ADMIN — ARFORMOTEROL TARTRATE 15 MCG: 15 SOLUTION RESPIRATORY (INHALATION) at 21:29

## 2021-10-19 RX ADMIN — ROCURONIUM BROMIDE 50 MG: 10 INJECTION, SOLUTION INTRAVENOUS at 09:15

## 2021-10-19 RX ADMIN — ENOXAPARIN SODIUM 40 MG: 40 INJECTION SUBCUTANEOUS at 15:05

## 2021-10-19 RX ADMIN — LIDOCAINE HYDROCHLORIDE 100 MG: 20 INJECTION, SOLUTION EPIDURAL; INFILTRATION; INTRACAUDAL; PERINEURAL at 09:14

## 2021-10-19 RX ADMIN — SUGAMMADEX 200 MG: 100 INJECTION, SOLUTION INTRAVENOUS at 11:37

## 2021-10-19 RX ADMIN — SODIUM CHLORIDE: 9 INJECTION, SOLUTION INTRAVENOUS at 09:50

## 2021-10-19 RX ADMIN — HYDROMORPHONE HYDROCHLORIDE 0.5 MG: 1 INJECTION, SOLUTION INTRAMUSCULAR; INTRAVENOUS; SUBCUTANEOUS at 12:06

## 2021-10-19 RX ADMIN — KETOROLAC TROMETHAMINE 30 MG: 30 INJECTION, SOLUTION INTRAMUSCULAR; INTRAVENOUS at 11:16

## 2021-10-19 RX ADMIN — FENTANYL CITRATE 50 MCG: 50 INJECTION, SOLUTION INTRAMUSCULAR; INTRAVENOUS at 10:43

## 2021-10-19 RX ADMIN — CEFOXITIN SODIUM 2000 MG: 2 POWDER, FOR SOLUTION INTRAVENOUS at 16:33

## 2021-10-19 RX ADMIN — PROPOFOL 200 MG: 10 INJECTION, EMULSION INTRAVENOUS at 09:14

## 2021-10-19 RX ADMIN — FENTANYL CITRATE 50 MCG: 50 INJECTION, SOLUTION INTRAMUSCULAR; INTRAVENOUS at 11:43

## 2021-10-19 RX ADMIN — PANTOPRAZOLE SODIUM 40 MG: 40 TABLET, DELAYED RELEASE ORAL at 15:05

## 2021-10-19 RX ADMIN — MIDAZOLAM 2 MG: 1 INJECTION INTRAMUSCULAR; INTRAVENOUS at 09:09

## 2021-10-19 RX ADMIN — FENTANYL CITRATE 50 MCG: 50 INJECTION, SOLUTION INTRAMUSCULAR; INTRAVENOUS at 09:39

## 2021-10-19 RX ADMIN — ROCURONIUM BROMIDE 20 MG: 10 INJECTION, SOLUTION INTRAVENOUS at 10:55

## 2021-10-19 RX ADMIN — DEXAMETHASONE SODIUM PHOSPHATE 8 MG: 4 INJECTION, SOLUTION INTRAMUSCULAR; INTRAVENOUS at 09:14

## 2021-10-19 RX ADMIN — BUDESONIDE 250 MCG: 0.25 SUSPENSION RESPIRATORY (INHALATION) at 21:29

## 2021-10-19 RX ADMIN — IPRATROPIUM BROMIDE 0.5 MG: 0.5 SOLUTION RESPIRATORY (INHALATION) at 21:30

## 2021-10-19 RX ADMIN — FENTANYL CITRATE 150 MCG: 50 INJECTION, SOLUTION INTRAMUSCULAR; INTRAVENOUS at 09:14

## 2021-10-19 RX ADMIN — RAMIPRIL 1.25 MG: 1.25 CAPSULE ORAL at 16:32

## 2021-10-19 RX ADMIN — OXYCODONE 10 MG: 5 TABLET ORAL at 15:10

## 2021-10-19 RX ADMIN — ONDANSETRON 4 MG: 2 INJECTION INTRAMUSCULAR; INTRAVENOUS at 09:14

## 2021-10-19 RX ADMIN — HYDROMORPHONE HYDROCHLORIDE 0.5 MG: 1 INJECTION, SOLUTION INTRAMUSCULAR; INTRAVENOUS; SUBCUTANEOUS at 12:11

## 2021-10-19 RX ADMIN — HYDROMORPHONE HYDROCHLORIDE 0.5 MG: 1 INJECTION, SOLUTION INTRAMUSCULAR; INTRAVENOUS; SUBCUTANEOUS at 12:01

## 2021-10-19 RX ADMIN — HYDROMORPHONE HYDROCHLORIDE 0.5 MG: 1 INJECTION, SOLUTION INTRAMUSCULAR; INTRAVENOUS; SUBCUTANEOUS at 12:18

## 2021-10-19 RX ADMIN — ROCURONIUM BROMIDE 10 MG: 10 INJECTION, SOLUTION INTRAVENOUS at 09:41

## 2021-10-19 RX ADMIN — FENTANYL CITRATE 50 MCG: 50 INJECTION, SOLUTION INTRAMUSCULAR; INTRAVENOUS at 11:52

## 2021-10-19 RX ADMIN — SODIUM CHLORIDE: 9 INJECTION, SOLUTION INTRAVENOUS at 09:14

## 2021-10-19 RX ADMIN — CEFOXITIN SODIUM 2000 MG: 2 POWDER, FOR SOLUTION INTRAVENOUS at 09:14

## 2021-10-19 ASSESSMENT — PULMONARY FUNCTION TESTS
PIF_VALUE: 20
PIF_VALUE: 25
PIF_VALUE: 20
PIF_VALUE: 25
PIF_VALUE: 17
PIF_VALUE: 20
PIF_VALUE: 25
PIF_VALUE: 25
PIF_VALUE: 16
PIF_VALUE: 20
PIF_VALUE: 22
PIF_VALUE: 18
PIF_VALUE: 25
PIF_VALUE: 20
PIF_VALUE: 17
PIF_VALUE: 25
PIF_VALUE: 20
PIF_VALUE: 19
PIF_VALUE: 1
PIF_VALUE: 25
PIF_VALUE: 23
PIF_VALUE: 25
PIF_VALUE: 20
PIF_VALUE: 25
PIF_VALUE: 25
PIF_VALUE: 20
PIF_VALUE: 22
PIF_VALUE: 25
PIF_VALUE: 22
PIF_VALUE: 22
PIF_VALUE: 24
PIF_VALUE: 23
PIF_VALUE: 25
PIF_VALUE: 22
PIF_VALUE: 20
PIF_VALUE: 23
PIF_VALUE: 15
PIF_VALUE: 25
PIF_VALUE: 23
PIF_VALUE: 26
PIF_VALUE: 25
PIF_VALUE: 22
PIF_VALUE: 26
PIF_VALUE: 20
PIF_VALUE: 20
PIF_VALUE: 2
PIF_VALUE: 20
PIF_VALUE: 20
PIF_VALUE: 22
PIF_VALUE: 25
PIF_VALUE: 25
PIF_VALUE: 22
PIF_VALUE: 25
PIF_VALUE: 22
PIF_VALUE: 22
PIF_VALUE: 20
PIF_VALUE: 20
PIF_VALUE: 25
PIF_VALUE: 22
PIF_VALUE: 25
PIF_VALUE: 15
PIF_VALUE: 15
PIF_VALUE: 1
PIF_VALUE: 22
PIF_VALUE: 23
PIF_VALUE: 25
PIF_VALUE: 25
PIF_VALUE: 23
PIF_VALUE: 22
PIF_VALUE: 20
PIF_VALUE: 25
PIF_VALUE: 25
PIF_VALUE: 3
PIF_VALUE: 20
PIF_VALUE: 19
PIF_VALUE: 25
PIF_VALUE: 23
PIF_VALUE: 25
PIF_VALUE: 20
PIF_VALUE: 22
PIF_VALUE: 25
PIF_VALUE: 25
PIF_VALUE: 20
PIF_VALUE: 20
PIF_VALUE: 21
PIF_VALUE: 25
PIF_VALUE: 3
PIF_VALUE: 25
PIF_VALUE: 20
PIF_VALUE: 25
PIF_VALUE: 20
PIF_VALUE: 25
PIF_VALUE: 23
PIF_VALUE: 25
PIF_VALUE: 25
PIF_VALUE: 3
PIF_VALUE: 24
PIF_VALUE: 20
PIF_VALUE: 25
PIF_VALUE: 10
PIF_VALUE: 20
PIF_VALUE: 25
PIF_VALUE: 1
PIF_VALUE: 20
PIF_VALUE: 25
PIF_VALUE: 25
PIF_VALUE: 27
PIF_VALUE: 24
PIF_VALUE: 22
PIF_VALUE: 1
PIF_VALUE: 25
PIF_VALUE: 3
PIF_VALUE: 25
PIF_VALUE: 24
PIF_VALUE: 20
PIF_VALUE: 25
PIF_VALUE: 25
PIF_VALUE: 23
PIF_VALUE: 25
PIF_VALUE: 19
PIF_VALUE: 25
PIF_VALUE: 25
PIF_VALUE: 24
PIF_VALUE: 25
PIF_VALUE: 20
PIF_VALUE: 20
PIF_VALUE: 24
PIF_VALUE: 25
PIF_VALUE: 20
PIF_VALUE: 19
PIF_VALUE: 23

## 2021-10-19 ASSESSMENT — PAIN DESCRIPTION - PROGRESSION: CLINICAL_PROGRESSION: GRADUALLY IMPROVING

## 2021-10-19 ASSESSMENT — PAIN SCALES - GENERAL
PAINLEVEL_OUTOF10: 7
PAINLEVEL_OUTOF10: 8
PAINLEVEL_OUTOF10: 7
PAINLEVEL_OUTOF10: 8

## 2021-10-19 ASSESSMENT — PAIN DESCRIPTION - LOCATION
LOCATION: ABDOMEN
LOCATION: ABDOMEN

## 2021-10-19 ASSESSMENT — PAIN DESCRIPTION - PAIN TYPE
TYPE: SURGICAL PAIN

## 2021-10-19 ASSESSMENT — COPD QUESTIONNAIRES: CAT_SEVERITY: MODERATE

## 2021-10-19 ASSESSMENT — LIFESTYLE VARIABLES: SMOKING_STATUS: 1

## 2021-10-19 NOTE — H&P
HPI: Patient is 20-year-old male who presents for follow-up to recent colonoscopy. He has no complaints. We did review his findings today which included erosive gastritis, multiple colon polyps including a large sessile polyp of the cecum as well as multiple sessile polyps in the ascending colon which are unable to be endoscopically removed.     Past Medical History        Past Medical History:   Diagnosis Date    Chronic cough       uses inhaler /to have Bronch 8/22/2019    Colon polyps 03/07/2019    Diabetes ([address]      Hyperlipidemia      Hypertension      Rash       noted 2/28/19- says it is on back & shoulders    Restless leg      Thyroid nodule 2020     R            Past Surgical History         Past Surgical History:   Procedure Laterality Date    APPENDECTOMY   2000?     ruptured    BRONCHOSCOPY N/A 8/22/2019     BRONCHOSCOPY DIAGNOSTIC OR CELL [address] ONLY performed by Lexy Springer MD at [address]   2000?     open    COLONOSCOPY   12 years ago    COLONOSCOPY N/A 3/7/2019     COLONOSCOPY POLYPECTOMY SNARE/COLD BIOPSY performed by Sivakumar Strauss DO at [address] COLONOSCOPY N/A 8/26/2021     COLONOSCOPY POLYPECTOMY HOT BIOPSY performed by Sivakumar Strauss DO at [address]   years ago    LITHOTRIPSY   years ago    UPPER GASTROINTESTINAL ENDOSCOPY N/A 8/26/2021     EGD BIOPSY performed by Sivakumar Strauss DO at [address] Medications           Prior to Admission medications    Medication Sig Start Date End Date Taking? Authorizing Provider   vitamin D (CHOLECALCIFEROL) 67186 UNIT CAPS Take 1 capsule weekly, for 8 weeks only.  No refills 9/6/21     Jorge Curtis MD   omeprazole (PRILOSEC) 40 MG delayed release capsule Take 1 capsule by mouth 2 times daily 8/26/21     Sivakumar Strauss DO   Icosapent Ethyl (VASCEPA) 1 g CAPS capsule Take 2 capsules by mouth 2 times daily 8/12/21     Linh Ojeda DO rOPINIRole (REQUIP) 1 MG tablet Take 1 tablet by mouth nightly 2/8/21     Vinod Ojeda DO   albuterol-ipratropium (COMBIVENT RESPIMAT)  MCG/ACT AERS inhaler Inhale 1 puff into the lungs every 6 hours 2/8/21     Vinod Ojeda DO   ramipril (ALTACE) 1.25 MG capsule TAKE 1 CAPSULE DAILY 12/29/20     Shahrzad Scott DO   atorvastatin (LIPITOR) 10 MG tablet TAKE 1 TABLET DAILY 4/29/20     Vinod Ojeda DO   SITagliptin-metFORMIN HCl ER (JANUMET XR) 100-1000 MG TB24 TAKE 1 TABLET DAILY 4/29/20     Breanne Cricket Ojeda DO                  Allergies   Allergen Reactions    Pcn [Penicillins] Hives    Tape Clemetine Ada Tape]         blisters         Family History         Family History   Problem Relation Age of Onset    Stroke Father                 Social History               Socioeconomic History    Marital status:        Spouse name: Not on file    Number of children: Not on file    Years of education: Not on file    Highest education level: Not on file   Occupational History    Not on file   Tobacco Use    Smoking status: Current Every Day Smoker       Packs/day: 1.50       Years: 35.00       Pack years: 52.50       Types: Cigarettes    Smokeless tobacco: Never Used   Vaping Use    Vaping Use: Never used   Substance and Sexual Activity    Alcohol use: Yes       Comment: SOCIALLY    Drug use: No    Sexual activity: Never   Other Topics Concern    Not on file   Social History Narrative    Not on file      Social Determinants of Health          Financial Resource Strain: Low Risk     Difficulty of Paying Living Expenses: Not hard at all   Food Insecurity: No Food Insecurity    Worried About Running Out of Food in the Last Year: Never true    [address] in the Last Year: Never true   Transportation Needs:     Lack of Transportation (Medical):      Lack of Transportation (Non-Medical):    Physical Activity:     Days of Exercise per Week:     Minutes of Exercise per Session:    Stress:     Feeling of Stress :    Social Connections:     Frequency of Communication with Friends and Family:     Frequency of Social Gatherings with Friends and Family:     Attends Taoist Services:     Active Member of Clubs or Organizations:     Attends Club or Organization Meetings:     Marital Status:    Intimate Partner Violence:     Fear of Current or Ex-Partner:     Emotionally Abused:     Physically Abused:     Sexually Abused:             Review of Systems:   Review of Systems   Constitutional: Negative for chills and fever. HENT: Negative for ear pain, nosebleeds, sore throat and tinnitus. Eyes: Negative for photophobia and redness. Respiratory: Negative for cough, shortness of breath and wheezing. Cardiovascular: Negative for chest pain and palpitations. Gastrointestinal: Negative for abdominal pain, blood in stool, constipation, diarrhea, nausea and vomiting. Endocrine: Negative for polydipsia. Genitourinary: Negative for dysuria, hematuria and urgency. Musculoskeletal: Negative for back pain and neck pain. Skin: Negative for rash. Neurological: Negative for dizziness, tremors and seizures. Hematological: Does not bruise/bleed easily.    All other systems reviewed and are negative.        Physical Exam:  Vitals       Vitals:     09/13/21 1015   BP: 131/66   Site: Right Upper Arm   Pulse: 70   Temp: 97.2 °F (36.2 °C)   TempSrc: Temporal   Weight: 202 lb 6.4 oz (91.8 kg)   Height: 5' 11\" (1.803 m)            General: Well nourished, well developed, no acute distress  Eyes:   PERRL              Conjunctiva unremarkable   ENT:    TM's intact bilaterally, no effusion              Nasal:  Nomucosal edema                           No nasal drainage              Oral:    mucosa moist and pink   Neck:   Supple              No palpable cervical lymphoadenopathy              Thyroid without mass or enlargement  Resp:   Lungs CTAB              Equal and adequate air exchange without accessory muscle use              No rales, rhonchi or wheeze  CV:      S1S2 RRR              No murmur              Intact distal pulses              No edema  GI:       Abdomen Soft, nontender, non distended              Normoactive bowel sounds              No palpable hepatosplenomegaly  MS:      Physiologic ROM of all extremities               Intact distal pulses              No clubbing or cyanosis   Skin     Warmand dry; no rash or lesion  Neuro: Alert and oriented; normal and intact dtr's   Psych: Euthymic mood, congruent affect       No results found.            Assessment/Plan:  3 69-year-old male with large sessile adenomas of the ascending colon and cecum. I have recommended right colectomy for this.   We will request medical clearance and once cleared, will schedule the procedure     The risks of the procedure were explained to the patient including, but not limited to, infection, bleeding, anastomotic leak, injury to the ureter.

## 2021-10-19 NOTE — ANESTHESIA PRE PROCEDURE
(MEFOXIN) 2000 mg in dextrose 5% 50 mL (mini-bag)  2,000 mg IntraVENous On Call to [facility],         sodium chloride flush 0.9 % injection 5-40 mL  5-40 mL IntraVENous 2 times per day Elle Koo DO        sodium chloride flush 0.9 % injection 5-40 mL  5-40 mL IntraVENous PRN Elle Koo DO           Allergies: Allergies   Allergen Reactions    Pcn [Penicillins] Hives    Tape Dietra Vickey Tape]      blisters       Problem List:    Patient Active Problem List   Diagnosis Code    COPD (chronic obstructive pulmonary disease) ([facility] J44.9    Type 2 diabetes mellitus with diabetic polyneuropathy, with long-term current use of insulin ([facility] E11.42, Z79.4    Hyperlipemia E78.5    Nephrolithiasis  N20.0    Tobacco abuse Z72.0    Intractable ophthalmoplegic migraine G43. B1    Adenomatous polyp of colon D12.6       Past Medical History:        Diagnosis Date    Agent orange exposure     Chronic cough     uses inhaler /to have Bronch 8/22/2019    Colon polyps 03/07/2019, 9-2021    fOR or 10-19-21    COPD (chronic obstructive pulmonary disease) ([facility]     Diabetes ([facility]     Hyperlipidemia     Hypertension     Rash     noted 2/28/19- says it is on back & shoulders    Restless leg     Thyroid nodule 2020    R       Past Surgical History:        Procedure Laterality Date    APPENDECTOMY  2000?     ruptured    BRONCHOSCOPY N/A 8/22/2019    BRONCHOSCOPY DIAGNOSTIC OR CELL [address] ONLY [facility]  [facility]?    open    COLONOSCOPY  12 years ago    COLONOSCOPY N/A 3/7/2019    COLONOSCOPY POLYPECTOMY SNARE/COLD BIOPSY performed by Elle Koo DO at [address] [facility] COLONOSCOPY N/A 8/26/2021    COLONOSCOPY POLYPECTOMY HOT BIOPSY performed by Elle Koo DO at [address]  years ago    LITHOTRIPSY  years ago    UPPER GASTROINTESTINAL ENDOSCOPY N/A 8/26/2021    EGD BIOPSY performed by Elle Koo DO at [address]  TM distance: >3 FB   Neck ROM: full  Mouth opening: > = 3 FB Dental: normal exam         Pulmonary: breath sounds clear to auscultation  (+) COPD: moderate and severe,  current smoker    (-) no decreased breath sounds          Patient smoked on day of surgery. Cardiovascular:    (+) hypertension: moderate, hyperlipidemia      ECG reviewed  Rhythm: regular  Rate: normal                    Neuro/Psych:   (+) neuromuscular disease:, headaches:,             GI/Hepatic/Renal:        (-) bowel prep       Endo/Other:    (+) DiabetesType II DM, , .                 Abdominal:             Vascular: negative vascular ROS. Other Findings:             Anesthesia Plan      general     ASA 3       Induction: intravenous. MIPS: Prophylactic antiemetics administered. Anesthetic plan and risks discussed with patient and spouse. Plan discussed with CRNA.                 Parminder Webb MD   10/19/2021

## 2021-10-19 NOTE — OP NOTE
Operative Note      Patient: Cecil Wylie  YOB: 1950  MRN: [id number]    Date of Procedure: 10/19/2021    Pre-Op Diagnosis: Colonic adenoma    Post-Op Diagnosis: Same       Procedure(s):  LAPAROSCOPIC ROBOTIC XI ASSISTED RIGHT COLECTOMY    Surgeon(s):  Beth Norman DO    Assistant:   Arun Nuñez MD    Anesthesia: General    Estimated Blood Loss (mL): Minimal    Complications: None    Specimens:   ID Type Source Tests Collected by Time Destination   A : RIGHT COLON Tissue Tissue SURGICAL PATHOLOGY Beth Norman DO 10/19/2021 1055        Implants:  * No implants in log *      Drains:   Urethral Catheter Non-latex 16 fr (Active)       Findings: Intra-abdominal adhesions from previous open cholecystectomy    Detailed Description of Procedure:   Dr. Manuela Marroquin was present throughout the procedure and participated in all parts of the procedure including dissection, retraction, exposure, creation of anastomosis. Procedure was performed in the operating room under general anesthesia. Patient is prepped draped standard sterile fashion. Began with a stab incision the patient's left upper quadrant Chaidez's point. Varies is then passed into the abdominal cavity which is then insufflated to a pressure of 50 mmHg with CO2 gas. Robotic port site incisions were then marked on the patient's left lateral abdomen. Skin incision is made in the patient's left upper quadrant and an 8 mm robotic port with laparoscope in situ was then passed into the abdominal cavity. 3 additional 8 mm ports were placed under direct laparoscopic visualization. Robot is then docked instruments are introduced. There is moderate amount of right upper quadrant adhesions between the omentum, colon, anterior abdominal wall, and liver. These were taken down using the vessel sealer. There also adhesions between the ascending colon lateral abdominal wall which were also divided with the vessel sealer.   Next the cecum and terminal ileum were then elevated. The ileocolic colic artery and vein are individually dissected near the base of the mesentery. These were then ligated each individually using the vessel sealer device. Additional mesenteric division was performed with the vessel sealer as well as blunt dissection. Was able to create a window posterior to the cecum to the lateral abdominal wall. Additional a sending colon mesentery is divided bluntly as well as with the aid of the vessel sealer. Next the gastrocolic ligament is incised with the vessel sealer. Dissection is carried down laterally dividing remaining adhesions between the colon and liver, eventually taking down the hepatic flexure attachments. Continued dissection was performed bluntly in order to visualize the duodenum which was bluntly dissected away from the colon. Additional mesenteric attachments of the proximal transverse colon were also divided using the vessel sealer. Once there was excellent mobility of the entire specimen, a supraumbilical approximately 5 cm incision was created sharply with a 15 blade. Just prior to this, the instruments were removed and the robot was undocked and withdrawn. Dissection was completed with cautery through the linea into the peritoneal cavity. Wound protector was then placed. The specimen was then brought out through the wound protector. The terminal ileum was divided with a CATRACHITO 75 stapler. Additional mesenteric attachments of the terminal ileum were divided between Lulu clamps and 3-0 Vicryl ties. We chose our site of transection of the mid transverse colon. This was also divided with CATRACHITO 75 stapler. Once again any remaining mesenteric attachments were divided between Ashanti Wenceslao clamps and tied with 3-0 Vicryl ties. Specimen was then handed off. Hemostasis was noted to be good. We then created a stapled side-to-side, functional end-to-end anastomosis with 2 firings of the CATRACHITO 75 stapler.   The staple line was oversewn with a layer of interrupted 3-0 silk Lembert sutures. Anastomosis was then placed back to peritoneal cavity. Wound protector was removed. All ports were removed. The fascial defect at the supraumbilical incision was closed with a running #1 PDS suture. Wound was irrigated. Skin was closed with 4 Monocryl. Port site incisions were also closed with 4-0 Monocryl. Marcaine 0.5% was injected for postop pain control. All incisions were then covered with skin glue. At the end the procedure all sponge, needle, instrument counts were correct.     Electronically signed by Mathew Castaneda DO on 10/19/2021 at 11:52 AM

## 2021-10-20 LAB
ANION GAP SERPL CALCULATED.3IONS-SCNC: 12 MMOL/L (ref 7–16)
BASOPHILS ABSOLUTE: 0.01 E9/L (ref 0–0.2)
BASOPHILS RELATIVE PERCENT: 0.1 % (ref 0–2)
BUN BLDV-MCNC: 18 MG/DL (ref 6–23)
CALCIUM SERPL-MCNC: 8.1 MG/DL (ref 8.6–10.2)
CHLORIDE BLD-SCNC: 105 MMOL/L (ref 98–107)
CO2: 22 MMOL/L (ref 22–29)
CREAT SERPL-MCNC: 1.4 MG/DL (ref 0.7–1.2)
EOSINOPHILS ABSOLUTE: 0 E9/L (ref 0.05–0.5)
EOSINOPHILS RELATIVE PERCENT: 0 % (ref 0–6)
GFR AFRICAN AMERICAN: >60
GFR NON-AFRICAN AMERICAN: 50 ML/MIN/1.73
GLUCOSE BLD-MCNC: 134 MG/DL (ref 74–99)
HCT VFR BLD CALC: 45.2 % (ref 37–54)
HEMOGLOBIN: 14.6 G/DL (ref 12.5–16.5)
IMMATURE GRANULOCYTES #: 0.07 E9/L
IMMATURE GRANULOCYTES %: 0.4 % (ref 0–5)
LYMPHOCYTES ABSOLUTE: 1.05 E9/L (ref 1.5–4)
LYMPHOCYTES RELATIVE PERCENT: 6 % (ref 20–42)
MCH RBC QN AUTO: 30.4 PG (ref 26–35)
MCHC RBC AUTO-ENTMCNC: 32.3 % (ref 32–34.5)
MCV RBC AUTO: 94 FL (ref 80–99.9)
METER GLUCOSE: 127 MG/DL (ref 74–99)
METER GLUCOSE: 134 MG/DL (ref 74–99)
METER GLUCOSE: 152 MG/DL (ref 74–99)
METER GLUCOSE: 152 MG/DL (ref 74–99)
MONOCYTES ABSOLUTE: 1.4 E9/L (ref 0.1–0.95)
MONOCYTES RELATIVE PERCENT: 8.1 % (ref 2–12)
NEUTROPHILS ABSOLUTE: 14.85 E9/L (ref 1.8–7.3)
NEUTROPHILS RELATIVE PERCENT: 85.4 % (ref 43–80)
PDW BLD-RTO: 12.7 FL (ref 11.5–15)
PLATELET # BLD: 180 E9/L (ref 130–450)
PMV BLD AUTO: 12 FL (ref 7–12)
POTASSIUM SERPL-SCNC: 5.1 MMOL/L (ref 3.5–5)
RBC # BLD: 4.81 E12/L (ref 3.8–5.8)
SODIUM BLD-SCNC: 139 MMOL/L (ref 132–146)
WBC # BLD: 17.4 E9/L (ref 4.5–11.5)

## 2021-10-20 PROCEDURE — 82962 GLUCOSE BLOOD TEST: CPT

## 2021-10-20 PROCEDURE — 85025 COMPLETE CBC W/AUTO DIFF WBC: CPT

## 2021-10-20 PROCEDURE — 6370000000 HC RX 637 (ALT 250 FOR IP): Performed by: SURGERY

## 2021-10-20 PROCEDURE — 80048 BASIC METABOLIC PNL TOTAL CA: CPT

## 2021-10-20 PROCEDURE — 2580000003 HC RX 258: Performed by: SURGERY

## 2021-10-20 PROCEDURE — 6360000002 HC RX W HCPCS: Performed by: SURGERY

## 2021-10-20 PROCEDURE — 51701 INSERT BLADDER CATHETER: CPT

## 2021-10-20 PROCEDURE — 36415 COLL VENOUS BLD VENIPUNCTURE: CPT

## 2021-10-20 PROCEDURE — 2700000000 HC OXYGEN THERAPY PER DAY

## 2021-10-20 PROCEDURE — 51798 US URINE CAPACITY MEASURE: CPT

## 2021-10-20 PROCEDURE — 2060000000 HC ICU INTERMEDIATE R&B

## 2021-10-20 RX ORDER — NICOTINE POLACRILEX 4 MG
15 LOZENGE BUCCAL PRN
Status: DISCONTINUED | OUTPATIENT
Start: 2021-10-20 | End: 2021-10-23 | Stop reason: HOSPADM

## 2021-10-20 RX ORDER — IPRATROPIUM BROMIDE AND ALBUTEROL SULFATE 2.5; .5 MG/3ML; MG/3ML
1 SOLUTION RESPIRATORY (INHALATION) EVERY 4 HOURS PRN
Status: DISCONTINUED | OUTPATIENT
Start: 2021-10-20 | End: 2021-10-23 | Stop reason: HOSPADM

## 2021-10-20 RX ORDER — ACETAMINOPHEN 325 MG/1
650 TABLET ORAL EVERY 6 HOURS PRN
Status: DISCONTINUED | OUTPATIENT
Start: 2021-10-20 | End: 2021-10-23 | Stop reason: HOSPADM

## 2021-10-20 RX ORDER — DEXTROSE MONOHYDRATE 50 MG/ML
100 INJECTION, SOLUTION INTRAVENOUS PRN
Status: DISCONTINUED | OUTPATIENT
Start: 2021-10-20 | End: 2021-10-23 | Stop reason: HOSPADM

## 2021-10-20 RX ORDER — ACETAMINOPHEN 325 MG/1
650 TABLET ORAL EVERY 4 HOURS PRN
Status: DISCONTINUED | OUTPATIENT
Start: 2021-10-20 | End: 2021-10-20

## 2021-10-20 RX ORDER — DEXTROSE MONOHYDRATE 25 G/50ML
12.5 INJECTION, SOLUTION INTRAVENOUS PRN
Status: DISCONTINUED | OUTPATIENT
Start: 2021-10-20 | End: 2021-10-23 | Stop reason: HOSPADM

## 2021-10-20 RX ADMIN — ENOXAPARIN SODIUM 40 MG: 40 INJECTION SUBCUTANEOUS at 15:00

## 2021-10-20 RX ADMIN — RAMIPRIL 1.25 MG: 1.25 CAPSULE ORAL at 08:58

## 2021-10-20 RX ADMIN — OXYCODONE 10 MG: 5 TABLET ORAL at 10:45

## 2021-10-20 RX ADMIN — PANTOPRAZOLE SODIUM 40 MG: 40 TABLET, DELAYED RELEASE ORAL at 16:00

## 2021-10-20 RX ADMIN — SODIUM CHLORIDE, PRESERVATIVE FREE 10 ML: 5 INJECTION INTRAVENOUS at 08:56

## 2021-10-20 RX ADMIN — ACETAMINOPHEN 650 MG: 325 TABLET ORAL at 04:30

## 2021-10-20 RX ADMIN — PANTOPRAZOLE SODIUM 40 MG: 40 TABLET, DELAYED RELEASE ORAL at 08:56

## 2021-10-20 RX ADMIN — ROPINIROLE HYDROCHLORIDE 1 MG: 1 TABLET, FILM COATED ORAL at 22:09

## 2021-10-20 RX ADMIN — CEFOXITIN SODIUM 2000 MG: 2 POWDER, FOR SOLUTION INTRAVENOUS at 03:23

## 2021-10-20 RX ADMIN — CEFOXITIN SODIUM 2000 MG: 2 POWDER, FOR SOLUTION INTRAVENOUS at 10:45

## 2021-10-20 RX ADMIN — OXYCODONE 5 MG: 5 TABLET ORAL at 06:30

## 2021-10-20 ASSESSMENT — PAIN SCALES - GENERAL
PAINLEVEL_OUTOF10: 8
PAINLEVEL_OUTOF10: 0
PAINLEVEL_OUTOF10: 8
PAINLEVEL_OUTOF10: 8
PAINLEVEL_OUTOF10: 2

## 2021-10-20 NOTE — PROGRESS NOTES
GENERAL SURGERY  DAILY PROGRESS NOTE  10/20/2021    Subjective:  Pain unchanged this morning, no N/V. Denies flatus and BM. Increased secretions this morning and some coughing. Patient had a small 100.2 temperature overnight. Camargo clear. Objective:  BP (!) 118/58   Pulse 88   Temp 100.2 °F (37.9 °C) (Oral)   Resp 22   Ht 5' 11\" (1.803 m)   Wt 202 lb (91.6 kg)   SpO2 92%   BMI 28.17 kg/m²     GENERAL:  No acute distress. Alert and interactive. Oriented x3. LUNGS:  No cough. Nonlabored breathing on RA  CARDIOVASC:  Normal rate, no cyanosis. ABDOMEN:  Soft, non distended, appropriately tender. No guarding / rigidity / rebound. EXTREMITIES: Moves all extremities. No swelling or edema. Assessment/Plan:  66 y.o. male s/p robo assist right colectomy for  large sessile adenomas of the ascending colon and cecum.      - Encourage ICS and ambulation  - NPO  - pain and nausea control as needed  -abdominal binder    Plan to be discussed with Dr. Basilio Horvath     Electronically signed by Jemma Lopes MD on 10/20/2021 at 5:02 AM  Increase activity. Advance to full liquids.

## 2021-10-20 NOTE — PROGRESS NOTES
General surgery residents at bedside and nurse informed them regarding K+ 5.1 , temperature of 100.0 (Tylenol given), and order to pull catheter. Nurse was told that if creatinine Improved order will be entered to pull catheter. Breathing treatments ordered due to moist cough and mild SOB. Pt remains A&Ox4 and denies severe pain when not coughing, outside of temperature vitals are stable.  Awaiting further recommendations from gen surg team.

## 2021-10-20 NOTE — PLAN OF CARE
Problem: Falls - Risk of:  Goal: Will remain free from falls  Description: Will remain free from falls  Outcome: Met This Shift  Goal: Absence of physical injury  Description: Absence of physical injury  Outcome: Met This Shift     Problem: Nausea/Vomiting:  Goal: Absence of nausea/vomiting  Description: Absence of nausea/vomiting  Outcome: Met This Shift     Problem: Nausea/Vomiting:  Goal: Able to drink  Description: Able to drink  Outcome: Ongoing  Goal: Able to eat  Description: Able to eat  Outcome: Ongoing

## 2021-10-20 NOTE — PROGRESS NOTES
P Quality Flow/Interdisciplinary Rounds Progress Note        Quality Flow Rounds held on October 20, 2021    Disciplines Attending:  Bedside Nurse, ,  and Nursing Unit Leadership    Meron Pugh was admitted on 10/19/2021  6:24 AM    Anticipated Discharge Date:       Disposition:    Garcia Score:  Garcia Scale Score: 20    Readmission Risk              Risk of Unplanned Readmission:  12           Discussed patient goal for the day, patient clinical progression, and barriers to discharge.   The following Goal(s) of the Day/Commitment(s) have been identified:  Discharge [address] CHARLES Sauer  October 20, 2021

## 2021-10-20 NOTE — CARE COORDINATION
Met with patient about diagnosis and discharge plan of care. POD#1 lap right colectomy. Currently on clear liquid diet. Lives with spouse in 2nd floor apt. Independent prior to admit. PCP is Dr Carolyn Marquis.  Plan is home with no needs-o

## 2021-10-20 NOTE — ANESTHESIA POSTPROCEDURE EVALUATION
Department of Anesthesiology  Postprocedure Note    Patient: Aida Ramirez  MRN: [id number]  YOB: 1950  Date of evaluation: 10/19/2021  Time:  9:48 PM     Procedure Summary     Date: 10/19/21 Room / Location: [facility] OR 10 / [facility]    Anesthesia Start: 9087 Anesthesia Stop: 1152    Procedure: LAPAROSCOPIC ROBOTIC XI ASSISTED RIGHT COLECTOMY (N/A Abdomen) Diagnosis: (LARGE FECAL ADENOMA)    Surgeons: Darin Dupont DO Responsible Provider: Christy Dumont MD    Anesthesia Type: general ASA Status: 3          Anesthesia Type: general    Carey Phase I: Carey Score: 9    Carey Phase II:      Last vitals: Reviewed and per EMR flowsheets.        Anesthesia Post Evaluation    Patient location during evaluation: PACU  Patient participation: complete - patient participated  Level of consciousness: awake and alert  Airway patency: patent  Nausea & Vomiting: no vomiting and no nausea  Complications: no  Cardiovascular status: hemodynamically stable  Respiratory status: acceptable  Hydration status: stable

## 2021-10-21 LAB
ANION GAP SERPL CALCULATED.3IONS-SCNC: 10 MMOL/L (ref 7–16)
BUN BLDV-MCNC: 15 MG/DL (ref 6–23)
CALCIUM SERPL-MCNC: 8.6 MG/DL (ref 8.6–10.2)
CHLORIDE BLD-SCNC: 104 MMOL/L (ref 98–107)
CO2: 22 MMOL/L (ref 22–29)
CREAT SERPL-MCNC: 1.2 MG/DL (ref 0.7–1.2)
GFR AFRICAN AMERICAN: >60
GFR NON-AFRICAN AMERICAN: 59 ML/MIN/1.73
GLUCOSE BLD-MCNC: 135 MG/DL (ref 74–99)
HCT VFR BLD CALC: 44.3 % (ref 37–54)
HEMOGLOBIN: 14.4 G/DL (ref 12.5–16.5)
MCH RBC QN AUTO: 30.6 PG (ref 26–35)
MCHC RBC AUTO-ENTMCNC: 32.5 % (ref 32–34.5)
MCV RBC AUTO: 94.1 FL (ref 80–99.9)
METER GLUCOSE: 124 MG/DL (ref 74–99)
METER GLUCOSE: 167 MG/DL (ref 74–99)
METER GLUCOSE: 180 MG/DL (ref 74–99)
METER GLUCOSE: 192 MG/DL (ref 74–99)
PDW BLD-RTO: 12.8 FL (ref 11.5–15)
PLATELET # BLD: 170 E9/L (ref 130–450)
PMV BLD AUTO: 12.1 FL (ref 7–12)
POTASSIUM REFLEX MAGNESIUM: 4.6 MMOL/L (ref 3.5–5)
RBC # BLD: 4.71 E12/L (ref 3.8–5.8)
SODIUM BLD-SCNC: 136 MMOL/L (ref 132–146)
WBC # BLD: 17.1 E9/L (ref 4.5–11.5)

## 2021-10-21 PROCEDURE — 6360000002 HC RX W HCPCS: Performed by: SURGERY

## 2021-10-21 PROCEDURE — 2700000000 HC OXYGEN THERAPY PER DAY

## 2021-10-21 PROCEDURE — 2580000003 HC RX 258: Performed by: STUDENT IN AN ORGANIZED HEALTH CARE EDUCATION/TRAINING PROGRAM

## 2021-10-21 PROCEDURE — 51702 INSERT TEMP BLADDER CATH: CPT

## 2021-10-21 PROCEDURE — 94640 AIRWAY INHALATION TREATMENT: CPT

## 2021-10-21 PROCEDURE — 82962 GLUCOSE BLOOD TEST: CPT

## 2021-10-21 PROCEDURE — 2060000000 HC ICU INTERMEDIATE R&B

## 2021-10-21 PROCEDURE — 6370000000 HC RX 637 (ALT 250 FOR IP): Performed by: STUDENT IN AN ORGANIZED HEALTH CARE EDUCATION/TRAINING PROGRAM

## 2021-10-21 PROCEDURE — 80048 BASIC METABOLIC PNL TOTAL CA: CPT

## 2021-10-21 PROCEDURE — 85027 COMPLETE CBC AUTOMATED: CPT

## 2021-10-21 PROCEDURE — 6370000000 HC RX 637 (ALT 250 FOR IP): Performed by: SURGERY

## 2021-10-21 PROCEDURE — 36415 COLL VENOUS BLD VENIPUNCTURE: CPT

## 2021-10-21 RX ORDER — DEXTROSE AND SODIUM CHLORIDE 5; .45 G/100ML; G/100ML
INJECTION, SOLUTION INTRAVENOUS CONTINUOUS
Status: DISCONTINUED | OUTPATIENT
Start: 2021-10-21 | End: 2021-10-22

## 2021-10-21 RX ORDER — TAMSULOSIN HYDROCHLORIDE 0.4 MG/1
0.4 CAPSULE ORAL DAILY
Status: DISCONTINUED | OUTPATIENT
Start: 2021-10-21 | End: 2021-10-23 | Stop reason: HOSPADM

## 2021-10-21 RX ADMIN — INSULIN LISPRO 1 UNITS: 100 INJECTION, SOLUTION INTRAVENOUS; SUBCUTANEOUS at 22:47

## 2021-10-21 RX ADMIN — BUDESONIDE 250 MCG: 0.25 SUSPENSION RESPIRATORY (INHALATION) at 20:39

## 2021-10-21 RX ADMIN — ROPINIROLE HYDROCHLORIDE 1 MG: 1 TABLET, FILM COATED ORAL at 23:20

## 2021-10-21 RX ADMIN — IPRATROPIUM BROMIDE 0.5 MG: 0.5 SOLUTION RESPIRATORY (INHALATION) at 09:29

## 2021-10-21 RX ADMIN — ARFORMOTEROL TARTRATE 15 MCG: 15 SOLUTION RESPIRATORY (INHALATION) at 09:29

## 2021-10-21 RX ADMIN — ACETAMINOPHEN 650 MG: 325 TABLET ORAL at 22:47

## 2021-10-21 RX ADMIN — PANTOPRAZOLE SODIUM 40 MG: 40 TABLET, DELAYED RELEASE ORAL at 15:52

## 2021-10-21 RX ADMIN — ARFORMOTEROL TARTRATE 15 MCG: 15 SOLUTION RESPIRATORY (INHALATION) at 20:39

## 2021-10-21 RX ADMIN — BUDESONIDE 250 MCG: 0.25 SUSPENSION RESPIRATORY (INHALATION) at 09:29

## 2021-10-21 RX ADMIN — IPRATROPIUM BROMIDE 0.5 MG: 0.5 SOLUTION RESPIRATORY (INHALATION) at 20:39

## 2021-10-21 RX ADMIN — DEXTROSE AND SODIUM CHLORIDE: 5; 450 INJECTION, SOLUTION INTRAVENOUS at 09:19

## 2021-10-21 RX ADMIN — OXYCODONE 10 MG: 5 TABLET ORAL at 10:47

## 2021-10-21 RX ADMIN — TAMSULOSIN HYDROCHLORIDE 0.4 MG: 0.4 CAPSULE ORAL at 09:15

## 2021-10-21 RX ADMIN — RAMIPRIL 1.25 MG: 1.25 CAPSULE ORAL at 09:16

## 2021-10-21 RX ADMIN — PANTOPRAZOLE SODIUM 40 MG: 40 TABLET, DELAYED RELEASE ORAL at 09:15

## 2021-10-21 RX ADMIN — ENOXAPARIN SODIUM 40 MG: 40 INJECTION SUBCUTANEOUS at 15:52

## 2021-10-21 RX ADMIN — INSULIN LISPRO 2 UNITS: 100 INJECTION, SOLUTION INTRAVENOUS; SUBCUTANEOUS at 12:59

## 2021-10-21 RX ADMIN — INSULIN LISPRO 2 UNITS: 100 INJECTION, SOLUTION INTRAVENOUS; SUBCUTANEOUS at 16:36

## 2021-10-21 ASSESSMENT — PAIN - FUNCTIONAL ASSESSMENT: PAIN_FUNCTIONAL_ASSESSMENT: ACTIVITIES ARE NOT PREVENTED

## 2021-10-21 ASSESSMENT — PAIN SCALES - GENERAL
PAINLEVEL_OUTOF10: 2
PAINLEVEL_OUTOF10: 7

## 2021-10-21 ASSESSMENT — PAIN DESCRIPTION - LOCATION: LOCATION: ABDOMEN

## 2021-10-21 ASSESSMENT — PAIN DESCRIPTION - DESCRIPTORS: DESCRIPTORS: THROBBING

## 2021-10-21 ASSESSMENT — PAIN DESCRIPTION - PAIN TYPE: TYPE: SURGICAL PAIN

## 2021-10-21 ASSESSMENT — PAIN DESCRIPTION - ONSET: ONSET: ON-GOING

## 2021-10-21 ASSESSMENT — PAIN DESCRIPTION - PROGRESSION: CLINICAL_PROGRESSION: NOT CHANGED

## 2021-10-21 ASSESSMENT — PAIN DESCRIPTION - FREQUENCY: FREQUENCY: CONTINUOUS

## 2021-10-21 ASSESSMENT — PAIN DESCRIPTION - ORIENTATION: ORIENTATION: MID

## 2021-10-21 NOTE — CARE COORDINATION
Updated plan of care. POD#2. Camargo reinserted. Full liquid diet, encourage ambulation.  Plan remains home with no needs-mjo

## 2021-10-21 NOTE — PROGRESS NOTES
Updated gen surg resident that pt denies BM but states some flatus. No new diet order at this time. States to leave alvares in place at this time.

## 2021-10-21 NOTE — PLAN OF CARE
Problem: Falls - Risk of:  Goal: Will remain free from falls  Description: Will remain free from falls  Outcome: Met This Shift  Goal: Absence of physical injury  Description: Absence of physical injury  Outcome: Met This Shift     Problem: Nausea/Vomiting:  Goal: Able to drink  Description: Able to drink  Outcome: Met This Shift  Goal: Able to eat  Description: Able to eat  Outcome: Met This Shift     Problem: Pain:  Goal: Control of acute pain  Description: Control of acute pain  Outcome: Met This Shift

## 2021-10-21 NOTE — PROGRESS NOTES
Patient seen and examined. He has not been drinking much since this morning. He is not using his incentive spirometer. He states that he feels that he needs to cough, but he cannot due to abdominal pain from his incision. Patient instructed on importance of using incentive spirometer. Will order PEP/Flutter. Continue IV fluids. CXR not indicated at this time.

## 2021-10-21 NOTE — PROGRESS NOTES
Bladder scan revealed approximately 364 cc of urine in bladder. Nurse notified Dr. Alberto Govea via perfect serve for straight cath or alvares insertion order. Awaiting response.

## 2021-10-21 NOTE — PROGRESS NOTES
GENERAL SURGERY  DAILY PROGRESS NOTE  10/21/2021    Subjective:  Patient experienced urinary retention overnight. Alvares catheter was reinserted. Denies abdominal pain, flatus and BM overnight. Tolerating liquids without N/V. Objective:  /67   Pulse 86   Temp 97.4 °F (36.3 °C) (Oral)   Resp 20   Ht 5' 11\" (1.803 m)   Wt 202 lb (91.6 kg)   SpO2 95%   BMI 28.17 kg/m²     GENERAL:  No acute distress. Alert and interactive. Oriented x3. LUNGS:  No cough. Nonlabored breathing on 1 L O2 nasal cannula. CARDIOVASC:  Normal rate, no cyanosis. ABDOMEN:  Soft, mildly distended, tympanic, appropriately tender. No guarding / rigidity / rebound. Dressings CDIT. EXTREMITIES: Moves all extremities. No swelling or edema. Assessment/Plan:  66 y.o. male s/p robo assist right colectomy for  large sessile adenomas of the ascending colon and cecum.      - Encourage ICS and ambulation  - pain and nausea control as needed  - abdominal binder  - await return of bowel function   - full liquid diet     Patient findings and plan discussed with Dr. Roberto Traylor    Electronically signed by Navjot Morfin MD on 10/21/2021 at 4:29 AM   Electronically signed by Nico Wright MD on 10/21/2021 at 5:36 AM     BM today. Still somewhat distended.   Incr activity  Keep alvares until am tomorrow, then DC  Cont full liq

## 2021-10-21 NOTE — PROGRESS NOTES
Patient instructed and educated on the importance of using his incentive spirometer, patient achieved 200, goal set for 500 tolerated poorly.

## 2021-10-22 LAB
ANION GAP SERPL CALCULATED.3IONS-SCNC: 12 MMOL/L (ref 7–16)
BUN BLDV-MCNC: 22 MG/DL (ref 6–23)
CALCIUM SERPL-MCNC: 9 MG/DL (ref 8.6–10.2)
CHLORIDE BLD-SCNC: 101 MMOL/L (ref 98–107)
CO2: 23 MMOL/L (ref 22–29)
CREAT SERPL-MCNC: 1.1 MG/DL (ref 0.7–1.2)
GFR AFRICAN AMERICAN: >60
GFR NON-AFRICAN AMERICAN: >60 ML/MIN/1.73
GLUCOSE BLD-MCNC: 163 MG/DL (ref 74–99)
HCT VFR BLD CALC: 46.1 % (ref 37–54)
HEMOGLOBIN: 14.9 G/DL (ref 12.5–16.5)
MCH RBC QN AUTO: 30 PG (ref 26–35)
MCHC RBC AUTO-ENTMCNC: 32.3 % (ref 32–34.5)
MCV RBC AUTO: 92.9 FL (ref 80–99.9)
METER GLUCOSE: 128 MG/DL (ref 74–99)
METER GLUCOSE: 131 MG/DL (ref 74–99)
METER GLUCOSE: 148 MG/DL (ref 74–99)
METER GLUCOSE: 158 MG/DL (ref 74–99)
PDW BLD-RTO: 12.8 FL (ref 11.5–15)
PLATELET # BLD: 196 E9/L (ref 130–450)
PMV BLD AUTO: 12.4 FL (ref 7–12)
POTASSIUM REFLEX MAGNESIUM: 4.5 MMOL/L (ref 3.5–5)
RBC # BLD: 4.96 E12/L (ref 3.8–5.8)
SODIUM BLD-SCNC: 136 MMOL/L (ref 132–146)
WBC # BLD: 14.2 E9/L (ref 4.5–11.5)

## 2021-10-22 PROCEDURE — 6370000000 HC RX 637 (ALT 250 FOR IP): Performed by: STUDENT IN AN ORGANIZED HEALTH CARE EDUCATION/TRAINING PROGRAM

## 2021-10-22 PROCEDURE — 2700000000 HC OXYGEN THERAPY PER DAY

## 2021-10-22 PROCEDURE — 2580000003 HC RX 258: Performed by: SURGERY

## 2021-10-22 PROCEDURE — 6360000002 HC RX W HCPCS: Performed by: SURGERY

## 2021-10-22 PROCEDURE — 80048 BASIC METABOLIC PNL TOTAL CA: CPT

## 2021-10-22 PROCEDURE — 2060000000 HC ICU INTERMEDIATE R&B

## 2021-10-22 PROCEDURE — 85027 COMPLETE CBC AUTOMATED: CPT

## 2021-10-22 PROCEDURE — 36415 COLL VENOUS BLD VENIPUNCTURE: CPT

## 2021-10-22 PROCEDURE — 82962 GLUCOSE BLOOD TEST: CPT

## 2021-10-22 PROCEDURE — 94640 AIRWAY INHALATION TREATMENT: CPT

## 2021-10-22 PROCEDURE — 6370000000 HC RX 637 (ALT 250 FOR IP): Performed by: SURGERY

## 2021-10-22 RX ADMIN — IPRATROPIUM BROMIDE 0.5 MG: 0.5 SOLUTION RESPIRATORY (INHALATION) at 21:04

## 2021-10-22 RX ADMIN — ACETAMINOPHEN 650 MG: 325 TABLET ORAL at 13:29

## 2021-10-22 RX ADMIN — ENOXAPARIN SODIUM 40 MG: 40 INJECTION SUBCUTANEOUS at 15:50

## 2021-10-22 RX ADMIN — ROPINIROLE HYDROCHLORIDE 1 MG: 1 TABLET, FILM COATED ORAL at 20:28

## 2021-10-22 RX ADMIN — BUDESONIDE 250 MCG: 0.25 SUSPENSION RESPIRATORY (INHALATION) at 21:04

## 2021-10-22 RX ADMIN — INSULIN LISPRO 2 UNITS: 100 INJECTION, SOLUTION INTRAVENOUS; SUBCUTANEOUS at 12:08

## 2021-10-22 RX ADMIN — SODIUM CHLORIDE, PRESERVATIVE FREE 10 ML: 5 INJECTION INTRAVENOUS at 20:28

## 2021-10-22 RX ADMIN — ARFORMOTEROL TARTRATE 15 MCG: 15 SOLUTION RESPIRATORY (INHALATION) at 21:04

## 2021-10-22 RX ADMIN — ARFORMOTEROL TARTRATE 15 MCG: 15 SOLUTION RESPIRATORY (INHALATION) at 10:48

## 2021-10-22 RX ADMIN — RAMIPRIL 1.25 MG: 1.25 CAPSULE ORAL at 09:46

## 2021-10-22 RX ADMIN — IPRATROPIUM BROMIDE 0.5 MG: 0.5 SOLUTION RESPIRATORY (INHALATION) at 10:48

## 2021-10-22 RX ADMIN — TAMSULOSIN HYDROCHLORIDE 0.4 MG: 0.4 CAPSULE ORAL at 09:46

## 2021-10-22 RX ADMIN — PANTOPRAZOLE SODIUM 40 MG: 40 TABLET, DELAYED RELEASE ORAL at 15:50

## 2021-10-22 RX ADMIN — INSULIN LISPRO 2 UNITS: 100 INJECTION, SOLUTION INTRAVENOUS; SUBCUTANEOUS at 07:39

## 2021-10-22 RX ADMIN — PANTOPRAZOLE SODIUM 40 MG: 40 TABLET, DELAYED RELEASE ORAL at 07:37

## 2021-10-22 RX ADMIN — BUDESONIDE 250 MCG: 0.25 SUSPENSION RESPIRATORY (INHALATION) at 10:48

## 2021-10-22 ASSESSMENT — PAIN DESCRIPTION - PAIN TYPE: TYPE: ACUTE PAIN;SURGICAL PAIN

## 2021-10-22 ASSESSMENT — PAIN SCALES - GENERAL
PAINLEVEL_OUTOF10: 0
PAINLEVEL_OUTOF10: 4
PAINLEVEL_OUTOF10: 4
PAINLEVEL_OUTOF10: 0

## 2021-10-22 ASSESSMENT — PAIN DESCRIPTION - DESCRIPTORS: DESCRIPTORS: ACHING

## 2021-10-22 ASSESSMENT — PAIN DESCRIPTION - LOCATION: LOCATION: ABDOMEN

## 2021-10-22 NOTE — PROGRESS NOTES
Spoke to Dr. Lee Ann Garcias regarding patient's emesis after lunch, will continue with current diet at this time.

## 2021-10-22 NOTE — PLAN OF CARE
Problem: Falls - Risk of:  Goal: Will remain free from falls  Description: Will remain free from falls  Outcome: Met This Shift  Goal: Absence of physical injury  Description: Absence of physical injury  Outcome: Met This Shift     Problem: Nausea/Vomiting:  Goal: Able to drink  Description: Able to drink  Outcome: Met This Shift  Goal: Able to eat  Description: Able to eat  Outcome: Met This Shift

## 2021-10-22 NOTE — CARE COORDINATION
Social Work:    Per RN Cassandra, pulse ox indicates a need for portable & home 02. Social work met with Mr. Asher Barrera and discussed 02 need as well as offered HHC. Mr. Asher Barrera is agreeable to both and has no agency preference. Social work called a tentative referral in to [facility] at [facility] (accepted) for today vs possible weekend discharge.     Electronically signed by MARSIOL Jaffe on 10/22/2021 at 1:11 PM

## 2021-10-22 NOTE — PROGRESS NOTES
GENERAL SURGERY  DAILY PROGRESS NOTE  10/22/2021    Subjective:  Patient denies pain, nausea and vomiting. Had a BM overnight. SMI 500ml  Objective:  /73   Pulse 88   Temp 97.7 °F (36.5 °C) (Oral)   Resp 16   Ht 5' 11\" (1.803 m)   Wt 202 lb (91.6 kg)   SpO2 97%   BMI 28.17 kg/m²     GENERAL:  No acute distress. Alert and interactive. Oriented x3. LUNGS:  No cough. Nonlabored breathing on 2 L O2 nasal cannula. CARDIOVASC:  Normal rate, no cyanosis. ABDOMEN:  Soft, mildly distended, tympanic, appropriately tender. No guarding / rigidity / rebound. Dressings CDIT. Urine dark but clear. EXTREMITIES: Moves all extremities. No swelling or edema.      Assessment/Plan:  66 y.o. male s/p robo assist right colectomy for  large sessile adenomas of the ascending colon and cecum 10/19/21.    - Encourage ICS and ambulation  - pain and nausea control as needed  - abdominal binder  - full liquid diet as tolerated  - remove alvares    Patient findings and plan to be discussed with Dr. Kvng Mcclellan    Electronically signed by Daria Alvarez MD on 10/22/2021 at 4:57 AM   Electronically signed by Susannah Nugetn MD on 10/22/2021 at 6:01 AM

## 2021-10-22 NOTE — PROGRESS NOTES
Patient sitting at rest with Oxygen 2L n/c 93%. During ambulation on room air pulse ox dropped to 91%, recovered at 92% on RA.

## 2021-10-22 NOTE — HOME CARE
[facility] received referral. Will follow after discharge. Spoke with patient wife and verified demographics. Fany Grider LPN, [facility].

## 2021-10-23 VITALS
SYSTOLIC BLOOD PRESSURE: 134 MMHG | HEIGHT: 71 IN | BODY MASS INDEX: 28.28 KG/M2 | TEMPERATURE: 97.8 F | HEART RATE: 90 BPM | DIASTOLIC BLOOD PRESSURE: 60 MMHG | OXYGEN SATURATION: 94 % | RESPIRATION RATE: 16 BRPM | WEIGHT: 202 LBS

## 2021-10-23 LAB
ANION GAP SERPL CALCULATED.3IONS-SCNC: 11 MMOL/L (ref 7–16)
BUN BLDV-MCNC: 26 MG/DL (ref 6–23)
CALCIUM SERPL-MCNC: 8.7 MG/DL (ref 8.6–10.2)
CHLORIDE BLD-SCNC: 100 MMOL/L (ref 98–107)
CO2: 24 MMOL/L (ref 22–29)
CREAT SERPL-MCNC: 1.1 MG/DL (ref 0.7–1.2)
GFR AFRICAN AMERICAN: >60
GFR NON-AFRICAN AMERICAN: >60 ML/MIN/1.73
GLUCOSE BLD-MCNC: 143 MG/DL (ref 74–99)
HCT VFR BLD CALC: 41.2 % (ref 37–54)
HEMOGLOBIN: 13.6 G/DL (ref 12.5–16.5)
MCH RBC QN AUTO: 30.4 PG (ref 26–35)
MCHC RBC AUTO-ENTMCNC: 33 % (ref 32–34.5)
MCV RBC AUTO: 92 FL (ref 80–99.9)
METER GLUCOSE: 126 MG/DL (ref 74–99)
METER GLUCOSE: 142 MG/DL (ref 74–99)
PDW BLD-RTO: 12.6 FL (ref 11.5–15)
PLATELET # BLD: 204 E9/L (ref 130–450)
PMV BLD AUTO: 12 FL (ref 7–12)
POTASSIUM REFLEX MAGNESIUM: 4.5 MMOL/L (ref 3.5–5)
RBC # BLD: 4.48 E12/L (ref 3.8–5.8)
SODIUM BLD-SCNC: 135 MMOL/L (ref 132–146)
WBC # BLD: 7 E9/L (ref 4.5–11.5)

## 2021-10-23 PROCEDURE — 6370000000 HC RX 637 (ALT 250 FOR IP): Performed by: SURGERY

## 2021-10-23 PROCEDURE — 36415 COLL VENOUS BLD VENIPUNCTURE: CPT

## 2021-10-23 PROCEDURE — 94667 MNPJ CHEST WALL 1ST: CPT

## 2021-10-23 PROCEDURE — 6360000002 HC RX W HCPCS: Performed by: SURGERY

## 2021-10-23 PROCEDURE — 80048 BASIC METABOLIC PNL TOTAL CA: CPT

## 2021-10-23 PROCEDURE — 2700000000 HC OXYGEN THERAPY PER DAY

## 2021-10-23 PROCEDURE — 85027 COMPLETE CBC AUTOMATED: CPT

## 2021-10-23 PROCEDURE — 82962 GLUCOSE BLOOD TEST: CPT

## 2021-10-23 PROCEDURE — 94640 AIRWAY INHALATION TREATMENT: CPT

## 2021-10-23 PROCEDURE — 2580000003 HC RX 258: Performed by: SURGERY

## 2021-10-23 PROCEDURE — 6370000000 HC RX 637 (ALT 250 FOR IP): Performed by: STUDENT IN AN ORGANIZED HEALTH CARE EDUCATION/TRAINING PROGRAM

## 2021-10-23 RX ORDER — OXYCODONE HYDROCHLORIDE 5 MG/1
5 TABLET ORAL EVERY 4 HOURS PRN
Qty: 20 TABLET | Refills: 0 | Status: SHIPPED | OUTPATIENT
Start: 2021-10-23 | End: 2021-10-26

## 2021-10-23 RX ADMIN — ARFORMOTEROL TARTRATE 15 MCG: 15 SOLUTION RESPIRATORY (INHALATION) at 08:14

## 2021-10-23 RX ADMIN — IPRATROPIUM BROMIDE 0.5 MG: 0.5 SOLUTION RESPIRATORY (INHALATION) at 08:14

## 2021-10-23 RX ADMIN — RAMIPRIL 1.25 MG: 1.25 CAPSULE ORAL at 08:42

## 2021-10-23 RX ADMIN — TAMSULOSIN HYDROCHLORIDE 0.4 MG: 0.4 CAPSULE ORAL at 08:42

## 2021-10-23 RX ADMIN — PANTOPRAZOLE SODIUM 40 MG: 40 TABLET, DELAYED RELEASE ORAL at 06:41

## 2021-10-23 RX ADMIN — SODIUM CHLORIDE, PRESERVATIVE FREE 10 ML: 5 INJECTION INTRAVENOUS at 08:43

## 2021-10-23 RX ADMIN — BUDESONIDE 250 MCG: 0.25 SUSPENSION RESPIRATORY (INHALATION) at 08:14

## 2021-10-23 ASSESSMENT — PAIN SCALES - GENERAL: PAINLEVEL_OUTOF10: 0

## 2021-10-23 NOTE — PLAN OF CARE
Problem: Falls - Risk of:  Goal: Will remain free from falls  Description: Will remain free from falls  10/22/2021 2333 by Palak Scales RN  Outcome: Ongoing  10/22/2021 1943 by Perla Palacios RN  Outcome: Met This Shift  Goal: Absence of physical injury  Description: Absence of physical injury  10/22/2021 2333 by Palak Scales RN  Outcome: Ongoing  10/22/2021 1943 by Perla Palacios RN  Outcome: Met This Shift     Problem: Nausea/Vomiting:  Goal: Able to drink  Description: Able to drink  10/22/2021 2333 by Palak Scales RN  Outcome: Ongoing  10/22/2021 1943 by Perla Palacios RN  Outcome: Met This Shift  Goal: Able to eat  Description: Able to eat  10/22/2021 2333 by Palak Scales RN  Outcome: Ongoing  10/22/2021 1943 by Perla Palacios RN  Outcome: Met This Shift  Goal: Ability to achieve adequate nutritional intake will improve  Description: Ability to achieve adequate nutritional intake will improve  Outcome: Ongoing     Problem: Pain:  Goal: Pain level will decrease  Description: Pain level will decrease  Outcome: Ongoing  Goal: Control of acute pain  Description: Control of acute pain  Outcome: Ongoing  Goal: Control of chronic pain  Description: Control of chronic pain  Outcome: Ongoing

## 2021-10-23 NOTE — PROGRESS NOTES
Tolerating diet. Continues to move bowels. Was to go home. Abdomen soft nontender. Incision intact. Okay for discharge today.

## 2021-11-03 ENCOUNTER — OFFICE VISIT (OUTPATIENT)
Dept: SURGERY | Age: 71
End: 2021-11-03

## 2021-11-03 VITALS
BODY MASS INDEX: 26.92 KG/M2 | TEMPERATURE: 97.3 F | HEART RATE: 101 BPM | SYSTOLIC BLOOD PRESSURE: 166 MMHG | WEIGHT: 193 LBS | DIASTOLIC BLOOD PRESSURE: 66 MMHG

## 2021-11-03 DIAGNOSIS — Z09 POSTOPERATIVE EXAMINATION: Primary | ICD-10-CM

## 2021-11-03 PROCEDURE — 99024 POSTOP FOLLOW-UP VISIT: CPT | Performed by: SURGERY

## 2021-11-05 NOTE — PROGRESS NOTES
Patient presents for postoperative visit. He has no complaints. Tolerating diet. He is having several bowel movements a day. On exam incisions are intact healing appropriately his abdomen soft nontender. I provided reassurance that his bowels will continue to firm up over time. Continue to increase activity as tolerated. Recheck him in 3 weeks. Pathology is reviewed and fortunately all benign adenomas.

## 2021-11-24 ENCOUNTER — OFFICE VISIT (OUTPATIENT)
Dept: SURGERY | Age: 71
End: 2021-11-24

## 2021-11-24 VITALS
WEIGHT: 185.6 LBS | TEMPERATURE: 97.4 F | BODY MASS INDEX: 25.89 KG/M2 | DIASTOLIC BLOOD PRESSURE: 70 MMHG | SYSTOLIC BLOOD PRESSURE: 130 MMHG | HEART RATE: 97 BPM

## 2021-11-24 DIAGNOSIS — Z09 POSTOPERATIVE EXAMINATION: Primary | ICD-10-CM

## 2021-11-24 PROCEDURE — 99024 POSTOP FOLLOW-UP VISIT: CPT | Performed by: SURGERY

## 2021-11-24 NOTE — DISCHARGE SUMMARY
Physician Discharge Summary     Patient ID:  Yonatan Nicholson  [id number]  64 y.o.  1950    Admit date: 10/19/2021    Discharge date and time: 10/23/2021 12:58 PM     Admitting Physician: Demetrio Patel DO     Admission Diagnoses: Adenoma of colon [D12.6]    Discharge Diagnoses: Active Problems:    Adenoma of colon  Resolved Problems:    * No resolved hospital problems. *      Admission Condition: fair    Discharged Condition: stable    Indication for Admission: R hemicolectomy for sessile adenoma    Hospital Course/Procedures/Operation/treatments:   Patient presented 10/19 following colonoscopy showing large sessile adenomas for which R colectomy was recommended. He tolerated operation without issues, postoperatively he experienced some urinary retention for which alvares was reinserted. He was encouraged to use ICS and had appropriate pain control and return of bowel function. He was stablefor dc home 10/23            Consults:   None    Significant Diagnostic Studies:   XR CHEST (2 VW)    Result Date: 10/15/2021  EXAMINATION: TWO XRAY VIEWS OF THE CHEST 10/15/2021 11:03 am COMPARISON: 24 July 2019 HISTORY: ORDERING SYSTEM PROVIDED HISTORY: Centrilobular emphysema ([address] TECHNOLOGIST PROVIDED HISTORY: Reason for exam:->other FINDINGS: Opacity in the lateral view is related to mild residual right middle lobe atelectasis. Heart and pulmonary vascularity are normal.  Neither costophrenic angle is blunted. Diminished right middle lobe atelectasis. No new abnormal findings. Discharge Exam:  GENERAL:  No acute distress. Alert and interactive. Oriented x3. LUNGS:  No cough. Nonlabored breathing on 2 L O2 nasal cannula. CARDIOVASC:  Normal rate, no cyanosis. ABDOMEN:  Soft, mildly distended, tympanic, appropriately tender. No guarding / rigidity / rebound. Dressings CDIT. Urine dark but clear. EXTREMITIES: Moves all extremities.  No swelling or edema.         Disposition: home    In process/preliminary results:  Outstanding Order Results     No orders found from 9/20/2021 to 10/20/2021. Patient Instructions:   Discharge Medication List as of 10/23/2021 12:32 PM           Details   oxyCODONE (ROXICODONE) 5 MG immediate release tablet Take 1 tablet by mouth every 4 hours as needed for Pain for up to 3 days. , Disp-20 tablet, R-0Print              Details   fluticasone-umeclidin-vilant (TRELEGY ELLIPTA) 100-62.5-25 MCG/INH AEPB Inhale 1 puff into the lungs daily, Disp-1 each, R-3Normal      albuterol sulfate  (90 Base) MCG/ACT inhaler Inhale 2 puffs into the lungs every 6 hours as needed for Wheezing or Shortness of Breath, Disp-18 g, R-1Normal      vitamin D (CHOLECALCIFEROL) 07877 UNIT CAPS Take 1 capsule weekly, for 8 weeks only.  No refills, Disp-8 capsule, R-0Normal      omeprazole (PRILOSEC) 40 MG delayed release capsule Take 1 capsule by mouth 2 times daily, Disp-30 capsule, R-2Normal      Icosapent Ethyl (VASCEPA) 1 g CAPS capsule Take 2 capsules by mouth 2 times daily, Disp-120 capsule, R-3Normal      rOPINIRole (REQUIP) 1 MG tablet Take 1 tablet by mouth nightly, Disp-90 tablet, R-1Normal      ramipril (ALTACE) 1.25 MG capsule TAKE 1 CAPSULE DAILY, Disp-90 capsule, R-3Normal      atorvastatin (LIPITOR) 10 MG tablet TAKE 1 TABLET DAILY, Disp-90 tablet, R-1Normal      SITagliptin-metFORMIN HCl ER (JANUMET XR) 100-1000 MG TB24 TAKE 1 TABLET DAILY, Disp-90 tablet, R-1Normal             Follow up:   Scott Gonzalez DO  [address]  [phone]    In 2 weeks         Signed:  Patrisha Meigs, MD  11/23/2021  8:57 PM

## 2021-11-24 NOTE — PROGRESS NOTES
Patient presents for postop visit. He feels he is getting better. He is still having loose bowels. Explained again this is because we remove the right colon. He expresses understanding. On exam incisions well-healed. Still has residual hematoma in the right lateral abdomen which is mildly tender. Measures approximately 7 cm in diameter. He may return to work on Sunday. Recheck in 3 weeks.

## 2021-11-24 NOTE — LETTER
[facility]  [address] [place] [address], [address] [facility]  [address] [address]  Phone: [phone]  Fax: [phone] [facility],         November 24, 2021     Patient: Tessy Patterson   YOB: 1950   Date of Visit: 11/24/2021       To Whom It May Concern: It is my medical opinion that Parish Root may return to work on 11/28/21. If you have any questions or concerns, please don't hesitate to call.     Sincerely,        Chaparro Faustin DO

## 2021-12-15 ENCOUNTER — OFFICE VISIT (OUTPATIENT)
Dept: SURGERY | Age: 71
End: 2021-12-15

## 2021-12-15 VITALS
SYSTOLIC BLOOD PRESSURE: 124 MMHG | HEIGHT: 71 IN | DIASTOLIC BLOOD PRESSURE: 64 MMHG | WEIGHT: 182 LBS | TEMPERATURE: 97.4 F | HEART RATE: 84 BPM | RESPIRATION RATE: 20 BRPM | BODY MASS INDEX: 25.48 KG/M2 | OXYGEN SATURATION: 97 %

## 2021-12-15 DIAGNOSIS — Z09 POSTOPERATIVE EXAMINATION: Primary | ICD-10-CM

## 2021-12-15 PROCEDURE — 99024 POSTOP FOLLOW-UP VISIT: CPT | Performed by: SURGERY

## 2021-12-15 NOTE — PROGRESS NOTES
Patient presents for postoperative visit. He is doing well. Bowels are becoming more solid. He is tolerating diet. On exam abdomen is soft nondistended. The hematoma has decreased approximate 50% in size. Is now approximately 6 cm in maximal diameter. It is mildly tender. Overall he is doing very well. He should return for a colonoscopy in October 2022 due to the large amount of polyps.

## 2021-12-23 ENCOUNTER — OFFICE VISIT (OUTPATIENT)
Dept: PRIMARY CARE CLINIC | Age: 71
End: 2021-12-23
Payer: MEDICARE

## 2021-12-23 VITALS
HEIGHT: 71 IN | HEART RATE: 77 BPM | OXYGEN SATURATION: 95 % | DIASTOLIC BLOOD PRESSURE: 64 MMHG | WEIGHT: 181 LBS | TEMPERATURE: 97.6 F | BODY MASS INDEX: 25.34 KG/M2 | SYSTOLIC BLOOD PRESSURE: 122 MMHG

## 2021-12-23 DIAGNOSIS — G25.0 BENIGN ESSENTIAL TREMOR: ICD-10-CM

## 2021-12-23 DIAGNOSIS — E78.2 MIXED HYPERLIPIDEMIA: ICD-10-CM

## 2021-12-23 DIAGNOSIS — E11.9 TYPE 2 DIABETES MELLITUS WITHOUT COMPLICATION, WITHOUT LONG-TERM CURRENT USE OF INSULIN (HCC): Primary | ICD-10-CM

## 2021-12-23 DIAGNOSIS — J43.2 CENTRILOBULAR EMPHYSEMA (HCC): ICD-10-CM

## 2021-12-23 LAB — HBA1C MFR BLD: 6.6 %

## 2021-12-23 PROCEDURE — 83036 HEMOGLOBIN GLYCOSYLATED A1C: CPT | Performed by: FAMILY MEDICINE

## 2021-12-23 PROCEDURE — 99214 OFFICE O/P EST MOD 30 MIN: CPT | Performed by: FAMILY MEDICINE

## 2021-12-23 ASSESSMENT — ENCOUNTER SYMPTOMS
BLURRED VISION: 0
DIFFICULTY BREATHING: 0
VISUAL CHANGE: 0
SHORTNESS OF BREATH: 1
CHEST TIGHTNESS: 0
COUGH: 0

## 2021-12-23 ASSESSMENT — COPD QUESTIONNAIRES: COPD: 1

## 2021-12-23 NOTE — PROGRESS NOTES
Kerri Saab, a male of 66 y.o. came to the office 12/23/2021. Patient Active Problem List   Diagnosis    [facility]    Type 2 diabetes mellitus with diabetic polyneuropathy, with long-term current use of insulin ([facility]    Hyperlipemia    Nephrolithiasis     Tobacco abuse    Intractable ophthalmoplegic migraine    Adenomatous polyp of colon    Adenoma of colon          Diabetes  He presents for his follow-up diabetic visit. He has type 2 diabetes mellitus. His disease course has been stable. Associated symptoms include fatigue (since abd surgery) and foot paresthesias (when walking). Pertinent negatives for diabetes include no blurred vision, no chest pain, no foot ulcerations, no polydipsia, no polyuria and no visual change. Symptoms are stable. Current diabetic treatment includes oral agent (dual therapy). He is compliant with treatment all of the time. An ACE inhibitor/angiotensin II receptor blocker is being taken. COPD  He complains of shortness of breath (no change). There is no chest tightness, cough or difficulty breathing. This is a chronic problem. The current episode started more than 1 year ago. Pertinent negatives include no chest pain or myalgias. His symptoms are aggravated by exposure to smoke. His symptoms are alleviated by steroid inhaler, leukotriene antagonist and beta-agonist. He reports moderate improvement on treatment. Hyperlipidemia  This is a chronic problem. The current episode started more than 1 year ago. The problem is controlled. Associated symptoms include shortness of breath (no change). Pertinent negatives include no chest pain, leg pain or myalgias. Current antihyperlipidemic treatment includes statins. tremor R hand: for past month or so since abd surgery.       Allergies   Allergen Reactions    Pcn [Penicillins] Hives    Tape [Adhesive Tape]      blisters       Current Outpatient Medications on File Prior to Visit Medication Sig Dispense Refill    fluticasone-umeclidin-vilant (TRELEGY ELLIPTA) 100-62.5-25 MCG/INH AEPB Inhale 1 puff into the lungs daily 1 each 3    albuterol sulfate  (90 Base) MCG/ACT inhaler Inhale 2 puffs into the lungs every 6 hours as needed for Wheezing or Shortness of Breath 18 g 1    vitamin D (CHOLECALCIFEROL) 28412 UNIT CAPS Take 1 capsule weekly, for 8 weeks only. No refills 8 capsule 0    omeprazole (PRILOSEC) 40 MG delayed release capsule Take 1 capsule by mouth 2 times daily 30 capsule 2    Icosapent Ethyl (VASCEPA) 1 g CAPS capsule Take 2 capsules by mouth 2 times daily 120 capsule 3    rOPINIRole (REQUIP) 1 MG tablet Take 1 tablet by mouth nightly 90 tablet 1    ramipril (ALTACE) 1.25 MG capsule TAKE 1 CAPSULE DAILY 90 capsule 3    atorvastatin (LIPITOR) 10 MG tablet TAKE 1 TABLET DAILY 90 tablet 1    SITagliptin-metFORMIN HCl ER (JANUMET XR) 100-1000 MG TB24 TAKE 1 TABLET DAILY 90 tablet 1     No current facility-administered medications on file prior to visit. Review of Systems   Constitutional: Positive for fatigue (since abd surgery). Eyes: Negative for blurred vision. Respiratory: Positive for shortness of breath (no change). Negative for cough. Cardiovascular: Negative for chest pain. Endocrine: Negative for polydipsia and polyuria. Musculoskeletal: Negative for myalgias. other review of systems reviewed and are negative    OBJECTIVE:  /64   Pulse 77   Temp 97.6 °F (36.4 °C)   Ht 5' 11\" (1.803 m)   Wt 181 lb (82.1 kg)   SpO2 95%   BMI 25.24 kg/m²      Physical Exam  Vitals reviewed. Eyes:      General: No scleral icterus. Conjunctiva/sclera: Conjunctivae normal.   Neck:      Thyroid: No thyromegaly. Vascular: No carotid bruit. Cardiovascular:      Rate and Rhythm: Normal rate and regular rhythm. Heart sounds: No murmur heard. Pulmonary:      Effort: Pulmonary effort is normal.      Breath sounds: Normal breath sounds. No wheezing or rales. Abdominal:      General: Bowel sounds are normal.      Palpations: Abdomen is soft. There is mass (R uq hematoma. ). Tenderness: There is no abdominal tenderness. There is no guarding or rebound. Musculoskeletal:         General: Normal range of motion. Cervical back: Neck supple. Lymphadenopathy:      Cervical: No cervical adenopathy. Skin:     General: Skin is warm and dry. Neurological:      Mental Status: He is alert and oriented to person, place, and time. Motor: No tremor. Psychiatric:         Mood and Affect: Mood normal.       Results for Ivonne King (MRN [id number]) as of 12/23/2021 11:09   Ref. Range 12/23/2021 10:58   Hemoglobin A1C Latest Units: % 6.6     ASSESSMENT AND PLAN:    [address] was seen today for diabetes, copd and hyperlipidemia. Diagnoses and all orders for this visit:    Type 2 diabetes mellitus without complication, without long-term current use of insulin (HCC)  -     POCT glycosylated hemoglobin (Hb A1C)    Mixed hyperlipidemia    Centrilobular emphysema (HCC)    Benign essential tremor    - continue current meds and diet  - quit cig's   -monitor tremor if worsens call    Return in about 4 months (around 4/23/2022) for or for acute problem.     Charli Ojeda, DO

## 2021-12-24 DIAGNOSIS — E11.9 TYPE 2 DIABETES MELLITUS WITHOUT COMPLICATION, WITHOUT LONG-TERM CURRENT USE OF INSULIN (HCC): ICD-10-CM

## 2021-12-27 RX ORDER — RAMIPRIL 1.25 MG/1
CAPSULE ORAL
Qty: 90 CAPSULE | Refills: 0 | Status: SHIPPED
Start: 2021-12-27 | End: 2022-03-24

## 2022-03-24 DIAGNOSIS — E11.9 TYPE 2 DIABETES MELLITUS WITHOUT COMPLICATION, WITHOUT LONG-TERM CURRENT USE OF INSULIN (HCC): ICD-10-CM

## 2022-03-24 RX ORDER — RAMIPRIL 1.25 MG/1
CAPSULE ORAL
Qty: 90 CAPSULE | Refills: 1 | Status: SHIPPED
Start: 2022-03-24 | End: 2022-09-20

## 2022-03-29 LAB — DIABETIC RETINOPATHY: NEGATIVE

## 2022-04-21 ENCOUNTER — OFFICE VISIT (OUTPATIENT)
Dept: PRIMARY CARE CLINIC | Age: 72
End: 2022-04-21
Payer: MEDICARE

## 2022-04-21 VITALS
HEIGHT: 71 IN | BODY MASS INDEX: 26.32 KG/M2 | SYSTOLIC BLOOD PRESSURE: 92 MMHG | OXYGEN SATURATION: 97 % | DIASTOLIC BLOOD PRESSURE: 48 MMHG | HEART RATE: 78 BPM | TEMPERATURE: 96.9 F | WEIGHT: 188 LBS

## 2022-04-21 DIAGNOSIS — E11.9 TYPE 2 DIABETES MELLITUS WITHOUT COMPLICATION, WITHOUT LONG-TERM CURRENT USE OF INSULIN (HCC): Primary | ICD-10-CM

## 2022-04-21 DIAGNOSIS — E78.2 MIXED HYPERLIPIDEMIA: ICD-10-CM

## 2022-04-21 DIAGNOSIS — K90.9 DIARRHEA DUE TO MALABSORPTION: ICD-10-CM

## 2022-04-21 DIAGNOSIS — R19.7 DIARRHEA DUE TO MALABSORPTION: ICD-10-CM

## 2022-04-21 DIAGNOSIS — J43.2 CENTRILOBULAR EMPHYSEMA (HCC): ICD-10-CM

## 2022-04-21 DIAGNOSIS — Z79.4 TYPE 2 DIABETES MELLITUS WITH DIABETIC POLYNEUROPATHY, WITH LONG-TERM CURRENT USE OF INSULIN (HCC): ICD-10-CM

## 2022-04-21 DIAGNOSIS — E11.42 TYPE 2 DIABETES MELLITUS WITH DIABETIC POLYNEUROPATHY, WITH LONG-TERM CURRENT USE OF INSULIN (HCC): ICD-10-CM

## 2022-04-21 LAB — HBA1C MFR BLD: 5.9 %

## 2022-04-21 PROCEDURE — 83036 HEMOGLOBIN GLYCOSYLATED A1C: CPT | Performed by: FAMILY MEDICINE

## 2022-04-21 PROCEDURE — 3044F HG A1C LEVEL LT 7.0%: CPT | Performed by: FAMILY MEDICINE

## 2022-04-21 PROCEDURE — 99214 OFFICE O/P EST MOD 30 MIN: CPT | Performed by: FAMILY MEDICINE

## 2022-04-21 RX ORDER — ATORVASTATIN CALCIUM 10 MG/1
TABLET, FILM COATED ORAL
Qty: 90 TABLET | Refills: 1 | Status: SHIPPED | OUTPATIENT
Start: 2022-04-21

## 2022-04-21 RX ORDER — SITAGLIPTIN AND METFORMIN HYDROCHLORIDE 100; 1000 MG/1; MG/1
TABLET, FILM COATED, EXTENDED RELEASE ORAL
Qty: 90 TABLET | Refills: 1 | Status: CANCELLED | OUTPATIENT
Start: 2022-04-21

## 2022-04-21 RX ORDER — SITAGLIPTIN AND METFORMIN HYDROCHLORIDE 50; 500 MG/1; MG/1
1 TABLET, FILM COATED, EXTENDED RELEASE ORAL DAILY
Qty: 90 TABLET | Refills: 1 | Status: SHIPPED | OUTPATIENT
Start: 2022-04-21

## 2022-04-21 ASSESSMENT — ENCOUNTER SYMPTOMS
COUGH: 1
CONSTIPATION: 0
BLURRED VISION: 0
VISUAL CHANGE: 0
BLOOD IN STOOL: 0
DIARRHEA: 1

## 2022-04-21 NOTE — PROGRESS NOTES
Diann Salmeron, a male of 66 y.o. came to the office 4/21/2022. Patient Active Problem List   Diagnosis    [facility]    Type 2 diabetes mellitus with diabetic polyneuropathy, with long-term current use of insulin ([facility]    Hyperlipemia    Nephrolithiasis     Tobacco abuse    Intractable ophthalmoplegic migraine    Adenomatous polyp of colon    Adenoma of colon          Diabetes  He presents for his follow-up diabetic visit. He has type 2 diabetes mellitus. His disease course has been improving. Hypoglycemia symptoms include dizziness. Pertinent negatives for diabetes include no blurred vision, no chest pain, no fatigue, no foot paresthesias, no foot ulcerations, no polydipsia, no polyuria, no visual change and no weight loss. He rarely participates in exercise. An ACE inhibitor/angiotensin II receptor blocker is being taken. Eye exam is current. Hyperlipidemia  This is a chronic problem. The current episode started more than 1 year ago. The problem is controlled. Pertinent negatives include no chest pain or myalgias. Current antihyperlipidemic treatment includes statins. Diarrhea   This is a new problem. Episode onset: since abd surgery  The problem occurs 2 to 4 times per day. The stool consistency is described as watery. Associated symptoms include coughing. Pertinent negatives include no myalgias or weight loss. Nothing aggravates the symptoms. He has tried nothing for the symptoms. His past medical history is significant for bowel resection. Emphysema: still smoking a ppd. Braulio Setters well on Trelegy.      Allergies   Allergen Reactions    Pcn [Penicillins] Hives    Tape [Adhesive Tape]      blisters       Current Outpatient Medications on File Prior to Visit   Medication Sig Dispense Refill    ramipril (ALTACE) 1.25 MG capsule TAKE 1 CAPSULE DAILY 90 capsule 1    fluticasone-umeclidin-vilant (TRELEGY ELLIPTA) 100-62.5-25 MCG/INH AEPB Inhale 1 puff into the lungs daily 1 each 3    albuterol sulfate  (90 Base) MCG/ACT inhaler Inhale 2 puffs into the lungs every 6 hours as needed for Wheezing or Shortness of Breath 18 g 1    omeprazole (PRILOSEC) 40 MG delayed release capsule Take 1 capsule by mouth 2 times daily 30 capsule 2    rOPINIRole (REQUIP) 1 MG tablet Take 1 tablet by mouth nightly 90 tablet 1    Icosapent Ethyl (VASCEPA) 1 g CAPS capsule Take 2 capsules by mouth 2 times daily (Patient not taking: Reported on 4/21/2022) 120 capsule 3     No current facility-administered medications on file prior to visit. Review of Systems   Constitutional: Negative for fatigue and weight loss. Eyes: Negative for blurred vision. Respiratory: Positive for cough. Cardiovascular: Negative for chest pain, palpitations and leg swelling. Gastrointestinal: Positive for diarrhea. Negative for blood in stool and constipation. Passing a lot of gas since abd surgery. Endocrine: Negative for polydipsia and polyuria. Musculoskeletal: Negative for myalgias. Neurological: Positive for dizziness. Negative for light-headedness. other review of systems reviewed and are negative    OBJECTIVE:  BP (!) 92/48   Pulse 78   Temp 96.9 °F (36.1 °C) (Temporal)   Ht 5' 11\" (1.803 m)   Wt 188 lb (85.3 kg)   SpO2 97%   BMI 26.22 kg/m²      Physical Exam  Vitals reviewed. Eyes:      General: No scleral icterus. Conjunctiva/sclera: Conjunctivae normal.   Neck:      Thyroid: No thyromegaly. Vascular: No carotid bruit. Cardiovascular:      Rate and Rhythm: Normal rate and regular rhythm. Heart sounds: No murmur heard. Pulmonary:      Effort: Pulmonary effort is normal.      Breath sounds: Examination of the left-upper field reveals decreased breath sounds. Decreased breath sounds and rhonchi present. No wheezing or rales. Abdominal:      General: Bowel sounds are normal.      Palpations: Abdomen is soft. There is no mass. Tenderness: There is no abdominal tenderness. There is no guarding or rebound. Musculoskeletal:         General: Normal range of motion. Cervical back: Neck supple. Lymphadenopathy:      Cervical: No cervical adenopathy. Skin:     General: Skin is warm and dry. Neurological:      Mental Status: He is alert and oriented to person, place, and time. Psychiatric:         Mood and Affect: Mood normal.       Results for Rishabh Leija (MRN [id number]) as of 4/21/2022 11:45   Ref. Range 4/21/2022 11:31   Hemoglobin A1C Latest Units: % 5.9     ASSESSMENT AND PLAN:    Paulino Betancur was seen today for diabetes. Diagnoses and all orders for this visit:    Type 2 diabetes mellitus without complication, without long-term current use of insulin ([facility])  -     POCT glycosylated hemoglobin (Hb A1C)    Mixed hyperlipidemia  -     atorvastatin (LIPITOR) 10 MG tablet; TAKE 1 TABLET DAILY    Diarrhea due to malabsorption    Centrilobular emphysema (HCC)    Type 2 diabetes mellitus with diabetic polyneuropathy, with long-term current use of insulin ([facility])  -     SITagliptin-metFORMIN (JANUMET XR)  MG TB24 per extended release tablet; Take 1 tablet by mouth daily    Other orders  -     Discontinue: vitamin D (CHOLECALCIFEROL) 62413 UNIT CAPS; One weekly  -     vitamin D (CHOLECALCIFEROL) 07118 UNIT CAPS; One weekly    - decrease Janumet XR due to much improved DM control and his diarrhea which is due to recent partial colectomy surgery. - otc Metamucil powder daily. - quit cigs  - continue Trelegy  - push fluids    Return in about 4 months (around 8/21/2022) for medicare pe and ov.     Josh Ojeda DO

## 2022-07-21 ENCOUNTER — OFFICE VISIT (OUTPATIENT)
Dept: PRIMARY CARE CLINIC | Age: 72
End: 2022-07-21
Payer: MEDICARE

## 2022-07-21 VITALS
HEART RATE: 68 BPM | WEIGHT: 186 LBS | OXYGEN SATURATION: 98 % | BODY MASS INDEX: 25.94 KG/M2 | TEMPERATURE: 97.2 F | SYSTOLIC BLOOD PRESSURE: 122 MMHG | DIASTOLIC BLOOD PRESSURE: 80 MMHG

## 2022-07-21 DIAGNOSIS — M79.10 MYALGIA: ICD-10-CM

## 2022-07-21 DIAGNOSIS — M79.605 PAIN IN BOTH LOWER EXTREMITIES: Primary | ICD-10-CM

## 2022-07-21 DIAGNOSIS — M79.604 PAIN IN BOTH LOWER EXTREMITIES: Primary | ICD-10-CM

## 2022-07-21 PROCEDURE — 1123F ACP DISCUSS/DSCN MKR DOCD: CPT | Performed by: FAMILY MEDICINE

## 2022-07-21 PROCEDURE — 96372 THER/PROPH/DIAG INJ SC/IM: CPT | Performed by: FAMILY MEDICINE

## 2022-07-21 PROCEDURE — 99213 OFFICE O/P EST LOW 20 MIN: CPT | Performed by: FAMILY MEDICINE

## 2022-07-21 RX ORDER — KETOROLAC TROMETHAMINE 30 MG/ML
30 INJECTION, SOLUTION INTRAMUSCULAR; INTRAVENOUS ONCE
Status: COMPLETED | OUTPATIENT
Start: 2022-07-21 | End: 2022-07-21

## 2022-07-21 RX ORDER — KETOROLAC TROMETHAMINE 30 MG/ML
30 INJECTION, SOLUTION INTRAMUSCULAR; INTRAVENOUS ONCE
Qty: 1 ML | Refills: 0
Start: 2022-07-21 | End: 2022-07-21

## 2022-07-21 RX ADMIN — KETOROLAC TROMETHAMINE 30 MG: 30 INJECTION, SOLUTION INTRAMUSCULAR; INTRAVENOUS at 14:11

## 2022-07-21 ASSESSMENT — PATIENT HEALTH QUESTIONNAIRE - PHQ9
SUM OF ALL RESPONSES TO PHQ QUESTIONS 1-9: 0
SUM OF ALL RESPONSES TO PHQ QUESTIONS 1-9: 0
1. LITTLE INTEREST OR PLEASURE IN DOING THINGS: 0
SUM OF ALL RESPONSES TO PHQ9 QUESTIONS 1 & 2: 0
SUM OF ALL RESPONSES TO PHQ QUESTIONS 1-9: 0
SUM OF ALL RESPONSES TO PHQ QUESTIONS 1-9: 0
2. FEELING DOWN, DEPRESSED OR HOPELESS: 0

## 2022-07-21 ASSESSMENT — ENCOUNTER SYMPTOMS: SHORTNESS OF BREATH: 1

## 2022-07-21 NOTE — PROGRESS NOTES
Kendrick Madrid, a male of 74 y.o. came to the office 7/21/2022. Patient Active Problem List   Diagnosis    COPD (chronic obstructive pulmonary disease) ([facility] [address]    Type 2 diabetes mellitus with diabetic polyneuropathy, with long-term current use of insulin ([facility] [address]    Hyperlipemia    Nephrolithiasis     Tobacco abuse    Intractable ophthalmoplegic migraine    Adenomatous polyp of colon          Leg Pain   There was no injury mechanism (but symptoms since Nov '21). The pain is present in the left leg and right leg. The quality of the pain is described as aching and burning. The pain has been Constant since onset. Associated symptoms include muscle weakness. Exacerbated by: walking. He has tried NSAIDs for the symptoms. The treatment provided mild relief. Allergies   Allergen Reactions    Pcn [Penicillins] Hives    Tape Mary Carmen Gurwinder Tape]      blisters       Current Outpatient Medications on File Prior to Visit   Medication Sig Dispense Refill    atorvastatin (LIPITOR) 10 MG tablet TAKE 1 TABLET DAILY 90 tablet 1    SITagliptin-metFORMIN (JANUMET XR)  MG TB24 per extended release tablet Take 1 tablet by mouth daily 90 tablet 1    vitamin D (CHOLECALCIFEROL) 71942 UNIT CAPS One weekly 12 capsule 1    ramipril (ALTACE) 1.25 MG capsule TAKE 1 CAPSULE DAILY 90 capsule 1    fluticasone-umeclidin-vilant (TRELEGY ELLIPTA) 100-62.5-25 MCG/INH AEPB Inhale 1 puff into the lungs daily 1 each 3    albuterol sulfate  (90 Base) MCG/ACT inhaler Inhale 2 puffs into the lungs every 6 hours as needed for Wheezing or Shortness of Breath 18 g 1    omeprazole (PRILOSEC) 40 MG delayed release capsule Take 1 capsule by mouth 2 times daily 30 capsule 2    Icosapent Ethyl (VASCEPA) 1 g CAPS capsule Take 2 capsules by mouth 2 times daily 120 capsule 3    rOPINIRole (REQUIP) 1 MG tablet Take 1 tablet by mouth nightly 90 tablet 1     No current facility-administered medications on file prior to visit.        Review of Systems Respiratory:  Positive for shortness of breath. Cardiovascular:  Negative for chest pain, palpitations and leg swelling. Musculoskeletal:  Positive for myalgias. Negative for arthralgias. Neurological:  Positive for weakness (legs). other review of systems reviewed and are negative    OBJECTIVE:  /80   Pulse 68   Temp 97.2 °F (36.2 °C)   Wt 186 lb (84.4 kg)   SpO2 98%   BMI 25.94 kg/m²      Physical Exam  Constitutional:       General: He is not in acute distress. Eyes:      General: No scleral icterus. Conjunctiva/sclera: Conjunctivae normal.   Neck:      Thyroid: No thyromegaly. Cardiovascular:      Rate and Rhythm: Normal rate and regular rhythm. Pulses:           Femoral pulses are 3+ on the right side and 3+ on the left side. Dorsalis pedis pulses are 2+ on the right side and 2+ on the left side. Heart sounds: No murmur heard. Pulmonary:      Effort: Pulmonary effort is normal.      Breath sounds: Rhonchi present. Musculoskeletal:      Cervical back: Neck supple. Right lower leg: No edema. Left lower leg: No edema. Lymphadenopathy:      Cervical: No cervical adenopathy. Skin:     General: Skin is warm and dry. Neurological:      Mental Status: He is alert and oriented to person, place, and time. Psychiatric:         Mood and Affect: Mood normal.       ASSESSMENT AND PLAN:    Arlyn Sims was seen today for leg pain. Diagnoses and all orders for this visit:    Pain in both lower extremities  -     ketorolac (TORADOL) 30 MG/ML injection; Inject 1 mL into the muscle once for 1 dose    Myalgia  -     ketorolac (TORADOL) 30 MG/ML injection; Inject 1 mL into the muscle once for 1 dose  - stop Lipitor as possible cause of his leg pain  - quit smoking   - continue motrin prn. Return if symptoms worsen or fail to improve, for as scheduled for medicare pe.     Ronny Ojeda, DO

## 2022-08-04 ENCOUNTER — TELEPHONE (OUTPATIENT)
Dept: PRIMARY CARE CLINIC | Age: 72
End: 2022-08-04

## 2022-08-04 NOTE — TELEPHONE ENCOUNTER
Pt wanted to update you on his condition still in a lot of pain very slight improvement.  Please advise

## 2022-08-05 ENCOUNTER — HOSPITAL ENCOUNTER (OUTPATIENT)
Age: 72
Discharge: HOME | End: 2022-08-07
Payer: MEDICARE

## 2022-08-05 ENCOUNTER — HOSPITAL ENCOUNTER (OUTPATIENT)
Dept: GENERAL RADIOLOGY | Age: 72
Discharge: HOME | End: 2022-08-07
Payer: MEDICARE

## 2022-08-05 DIAGNOSIS — M54.40 LOW BACK PAIN WITH SCIATICA, SCIATICA LATERALITY UNSPECIFIED, UNSPECIFIED BACK PAIN LATERALITY, UNSPECIFIED CHRONICITY: Primary | ICD-10-CM

## 2022-08-05 DIAGNOSIS — M54.40 LOW BACK PAIN WITH SCIATICA, SCIATICA LATERALITY UNSPECIFIED, UNSPECIFIED BACK PAIN LATERALITY, UNSPECIFIED CHRONICITY: ICD-10-CM

## 2022-08-05 PROCEDURE — 72100 X-RAY EXAM L-S SPINE 2/3 VWS: CPT

## 2022-08-05 NOTE — TELEPHONE ENCOUNTER
Spoke with patient's wife last evening. Apparently stopping the Lipitor is not helped the leg pain that much. I wonder if this is coming out of his back. Therefore I want him to go get a lumbar sacral x-ray. Follow-up based on what x-ray shows.

## 2022-08-08 DIAGNOSIS — M51.36 DEGENERATIVE DISC DISEASE, LUMBAR: Primary | ICD-10-CM

## 2022-08-08 RX ORDER — MELOXICAM 7.5 MG/1
7.5 TABLET ORAL DAILY PRN
Qty: 30 TABLET | Refills: 2 | Status: SHIPPED
Start: 2022-08-08 | End: 2022-08-22 | Stop reason: SDUPTHER

## 2022-08-08 NOTE — PROGRESS NOTES
Called patient discussed x-ray lumbar spine which shows significant disease there. He is having significant problems walking. We will start him on Mobic as well as get him into physical therapy. If no improvement with this would consider MRI and epidural possible injections and/or surgical consult. Patient took old Percocet which helped but only for a couple hours. I directed him to stay away from this.

## 2022-08-22 ENCOUNTER — OFFICE VISIT (OUTPATIENT)
Dept: PRIMARY CARE CLINIC | Age: 72
End: 2022-08-22
Payer: MEDICARE

## 2022-08-22 VITALS
HEART RATE: 72 BPM | OXYGEN SATURATION: 98 % | WEIGHT: 186 LBS | HEIGHT: 71 IN | TEMPERATURE: 97.2 F | BODY MASS INDEX: 26.04 KG/M2

## 2022-08-22 DIAGNOSIS — Z79.4 TYPE 2 DIABETES MELLITUS WITH DIABETIC POLYNEUROPATHY, WITH LONG-TERM CURRENT USE OF INSULIN (HCC): ICD-10-CM

## 2022-08-22 DIAGNOSIS — Z00.00 MEDICARE ANNUAL WELLNESS VISIT, SUBSEQUENT: Primary | ICD-10-CM

## 2022-08-22 DIAGNOSIS — Z12.5 SCREENING PSA (PROSTATE SPECIFIC ANTIGEN): ICD-10-CM

## 2022-08-22 DIAGNOSIS — E11.42 TYPE 2 DIABETES MELLITUS WITH DIABETIC POLYNEUROPATHY, WITH LONG-TERM CURRENT USE OF INSULIN (HCC): ICD-10-CM

## 2022-08-22 DIAGNOSIS — M51.36 DEGENERATIVE DISC DISEASE, LUMBAR: ICD-10-CM

## 2022-08-22 DIAGNOSIS — J43.2 CENTRILOBULAR EMPHYSEMA (HCC): ICD-10-CM

## 2022-08-22 DIAGNOSIS — R27.0 ATAXIA: ICD-10-CM

## 2022-08-22 LAB
ALBUMIN SERPL-MCNC: 4 G/DL (ref 3.5–5.2)
ALP BLD-CCNC: 187 U/L (ref 40–129)
ALT SERPL-CCNC: 15 U/L (ref 0–40)
ANION GAP SERPL CALCULATED.3IONS-SCNC: 14 MMOL/L (ref 7–16)
AST SERPL-CCNC: 19 U/L (ref 0–39)
BILIRUB SERPL-MCNC: 1 MG/DL (ref 0–1.2)
BUN BLDV-MCNC: 12 MG/DL (ref 6–23)
CALCIUM SERPL-MCNC: 9.2 MG/DL (ref 8.6–10.2)
CHLORIDE BLD-SCNC: 100 MMOL/L (ref 98–107)
CHOLESTEROL, TOTAL: 159 MG/DL (ref 0–199)
CO2: 24 MMOL/L (ref 22–29)
CREAT SERPL-MCNC: 1.3 MG/DL (ref 0.7–1.2)
CREATININE URINE: 163 MG/DL (ref 40–278)
GFR AFRICAN AMERICAN: >60
GFR NON-AFRICAN AMERICAN: 54 ML/MIN/1.73
GLUCOSE BLD-MCNC: 158 MG/DL (ref 74–99)
HBA1C MFR BLD: 6.1 % (ref 4–5.6)
HCT VFR BLD CALC: 49.3 % (ref 37–54)
HDLC SERPL-MCNC: 36 MG/DL
HEMOGLOBIN: 16 G/DL (ref 12.5–16.5)
LDL CHOLESTEROL CALCULATED: 89 MG/DL (ref 0–99)
MCH RBC QN AUTO: 30.4 PG (ref 26–35)
MCHC RBC AUTO-ENTMCNC: 32.5 % (ref 32–34.5)
MCV RBC AUTO: 93.5 FL (ref 80–99.9)
MICROALBUMIN UR-MCNC: 15.6 MG/L
MICROALBUMIN/CREAT UR-RTO: 9.6 (ref 0–30)
PDW BLD-RTO: 13.1 FL (ref 11.5–15)
PLATELET # BLD: 199 E9/L (ref 130–450)
PMV BLD AUTO: 11.8 FL (ref 7–12)
POTASSIUM SERPL-SCNC: 4.8 MMOL/L (ref 3.5–5)
PROSTATE SPECIFIC ANTIGEN: 1.4 NG/ML (ref 0–4)
RBC # BLD: 5.27 E12/L (ref 3.8–5.8)
SODIUM BLD-SCNC: 138 MMOL/L (ref 132–146)
TOTAL PROTEIN: 6.7 G/DL (ref 6.4–8.3)
TRIGL SERPL-MCNC: 171 MG/DL (ref 0–149)
VLDLC SERPL CALC-MCNC: 34 MG/DL
WBC # BLD: 8.5 E9/L (ref 4.5–11.5)

## 2022-08-22 PROCEDURE — G0439 PPPS, SUBSEQ VISIT: HCPCS | Performed by: FAMILY MEDICINE

## 2022-08-22 PROCEDURE — 99212 OFFICE O/P EST SF 10 MIN: CPT | Performed by: FAMILY MEDICINE

## 2022-08-22 PROCEDURE — 1123F ACP DISCUSS/DSCN MKR DOCD: CPT | Performed by: FAMILY MEDICINE

## 2022-08-22 PROCEDURE — 3044F HG A1C LEVEL LT 7.0%: CPT | Performed by: FAMILY MEDICINE

## 2022-08-22 RX ORDER — MELOXICAM 15 MG/1
15 TABLET ORAL DAILY PRN
Qty: 30 TABLET | Refills: 1 | Status: SHIPPED | OUTPATIENT
Start: 2022-08-22

## 2022-08-22 RX ORDER — FLUTICASONE FUROATE, UMECLIDINIUM BROMIDE AND VILANTEROL TRIFENATATE 100; 62.5; 25 UG/1; UG/1; UG/1
1 POWDER RESPIRATORY (INHALATION) DAILY
Qty: 1 EACH | Refills: 3 | Status: SHIPPED | OUTPATIENT
Start: 2022-08-22

## 2022-08-22 SDOH — ECONOMIC STABILITY: FOOD INSECURITY: WITHIN THE PAST 12 MONTHS, THE FOOD YOU BOUGHT JUST DIDN'T LAST AND YOU DIDN'T HAVE MONEY TO GET MORE.: NEVER TRUE

## 2022-08-22 SDOH — ECONOMIC STABILITY: FOOD INSECURITY: WITHIN THE PAST 12 MONTHS, YOU WORRIED THAT YOUR FOOD WOULD RUN OUT BEFORE YOU GOT MONEY TO BUY MORE.: NEVER TRUE

## 2022-08-22 ASSESSMENT — SOCIAL DETERMINANTS OF HEALTH (SDOH): HOW HARD IS IT FOR YOU TO PAY FOR THE VERY BASICS LIKE FOOD, HOUSING, MEDICAL CARE, AND HEATING?: NOT HARD AT ALL

## 2022-08-22 ASSESSMENT — LIFESTYLE VARIABLES
HOW MANY STANDARD DRINKS CONTAINING ALCOHOL DO YOU HAVE ON A TYPICAL DAY: PATIENT DOES NOT DRINK
HOW OFTEN DO YOU HAVE A DRINK CONTAINING ALCOHOL: NEVER

## 2022-08-22 ASSESSMENT — PATIENT HEALTH QUESTIONNAIRE - PHQ9
1. LITTLE INTEREST OR PLEASURE IN DOING THINGS: 0
2. FEELING DOWN, DEPRESSED OR HOPELESS: 0
SUM OF ALL RESPONSES TO PHQ QUESTIONS 1-9: 0
SUM OF ALL RESPONSES TO PHQ9 QUESTIONS 1 & 2: 0
SUM OF ALL RESPONSES TO PHQ QUESTIONS 1-9: 0

## 2022-08-22 ASSESSMENT — ENCOUNTER SYMPTOMS: VISUAL CHANGE: 0

## 2022-08-22 NOTE — PROGRESS NOTES
Medicare Annual Wellness Visit    Darrell Bourgeois is here for Medicare AWV and Blood Work ([facility] orders)    Assessment & Plan   Medicare annual wellness visit, subsequent  Centrilobular emphysema ([facility] [address]  -     fluticasone-umeclidin-vilant (Renny Nip) 100-62.5-25 MCG/INH AEPB; Inhale 1 puff into the lungs daily, Disp-1 each, R-3Normal  -     CBC; Future  Degenerative disc disease, lumbar  -     MRI LUMBAR SPINE WO CONTRAST; Future  -     meloxicam (MOBIC) 15 MG tablet; Take 1 tablet by mouth daily as needed for Pain, Disp-30 tablet, R-1Normal  Type 2 diabetes mellitus with diabetic polyneuropathy, with long-term current use of insulin ([facility])  -     Lipid Panel; Future  -     Comprehensive Metabolic Panel; Future  -     Hemoglobin A1C; Future  -     CBC; Future  -     Microalbumin / Creatinine Urine Ratio; Future  Screening PSA (prostate specific antigen)  -     PSA Screening; Future  Ataxia  -     MRI LUMBAR SPINE WO CONTRAST; Future    Recommendations for Preventive Services Due: see orders and patient instructions/AVS.  Recommended screening schedule for the next 5-10 years is provided to the patient in written form: see Patient Instructions/AVS.     Return for Medicare Annual Wellness Visit in 1 year, based on results. Subjective   The following acute and/or chronic problems were also addressed today:    Darrell Bourgeois, a male of 74 y.o. came to the office 8/22/2022. Patient Active Problem List   Diagnosis    COPD (chronic obstructive pulmonary disease) ([facility] [address]    Type 2 diabetes mellitus with diabetic polyneuropathy, with long-term current use of insulin ([facility] [address]    Hyperlipemia    Nephrolithiasis     Tobacco abuse    Intractable ophthalmoplegic migraine    Adenomatous polyp of colon          Diabetes  He presents for his follow-up diabetic visit. He has type 2 diabetes mellitus. His disease course has been stable. Associated symptoms include foot paresthesias.  Pertinent negatives for diabetes include no chest pain, no foot ulcerations, no polydipsia, no polyuria, no visual change and no weight loss. Symptoms are stable. Current diabetic treatment includes oral agent (dual therapy). His weight is stable. He is following a generally healthy diet. He rarely participates in exercise. Low back pain: still having with assoc leg pain when walks down back of his legs and front to his feet. Feels unsteady on feet. Pain occurs as soon as he stands. Had radiofrequency done in past.       Allergies   Allergen Reactions    Pcn [Penicillins] Hives    Tape Mary Carmen Gurwinder Tape]      blisters       Current Outpatient Medications on File Prior to Visit   Medication Sig Dispense Refill    ketorolac (TORADOL) 30 MG/ML injection Inject 1 mL into the muscle once for 1 dose 1 mL 0    atorvastatin (LIPITOR) 10 MG tablet TAKE 1 TABLET DAILY 90 tablet 1    SITagliptin-metFORMIN (JANUMET XR)  MG TB24 per extended release tablet Take 1 tablet by mouth daily 90 tablet 1    vitamin D (CHOLECALCIFEROL) 95925 UNIT CAPS One weekly 12 capsule 1    ramipril (ALTACE) 1.25 MG capsule TAKE 1 CAPSULE DAILY 90 capsule 1    albuterol sulfate  (90 Base) MCG/ACT inhaler Inhale 2 puffs into the lungs every 6 hours as needed for Wheezing or Shortness of Breath 18 g 1    omeprazole (PRILOSEC) 40 MG delayed release capsule Take 1 capsule by mouth 2 times daily 30 capsule 2    Icosapent Ethyl (VASCEPA) 1 g CAPS capsule Take 2 capsules by mouth 2 times daily 120 capsule 3    rOPINIRole (REQUIP) 1 MG tablet Take 1 tablet by mouth nightly 90 tablet 1     No current facility-administered medications on file prior to visit. Review of Systems   Constitutional:  Negative for weight loss. Cardiovascular:  Negative for chest pain. Endocrine: Negative for polydipsia and polyuria.  other review of systems reviewed and are negative    OBJECTIVE:  Pulse 72   Temp 97.2 °F (36.2 °C)   Ht 5' 11\" (1.803 m)   Wt 186 lb (84.4 kg)   SpO2 98% BMI 25.94 kg/m²      Physical Exam see below. ASSESSMENT AND PLAN:    Dimitrios Atkinson was seen today for medicare awv and blood work. Diagnoses and all orders for this visit:    Medicare annual wellness visit, subsequent    Centrilobular emphysema ([address]  -     fluticasone-umeclidin-vilant (Giacomo Minus) 100-62.5-25 MCG/INH AEPB; Inhale 1 puff into the lungs daily  -     CBC; Future    Degenerative disc disease, lumbar  -     MRI LUMBAR SPINE WO CONTRAST; Future  -     meloxicam (MOBIC) 15 MG tablet; Take 1 tablet by mouth daily as needed for Pain    Type 2 diabetes mellitus with diabetic polyneuropathy, with long-term current use of insulin ([facility])  -     Lipid Panel; Future  -     Comprehensive Metabolic Panel; Future  -     Hemoglobin A1C; Future  -     CBC; Future  -     Microalbumin / Creatinine Urine Ratio; Future    Screening PSA (prostate specific antigen)  -     PSA Screening; Future    Ataxia  -     MRI LUMBAR SPINE WO CONTRAST; Future      Return for Medicare Annual Wellness Visit in 1 year, based on results. Yusuf Ojeda,         Patient's complete Health Risk Assessment and screening values have been reviewed and are found in Flowsheets. The following problems were reviewed today and where indicated follow up appointments were made and/or referrals ordered.     Positive Risk Factor Screenings with Interventions:    Fall Risk:  Do you feel unsteady or are you worried about falling? : (!) yes  2 or more falls in past year?: (!) yes (no injury)  Fall with injury in past year?: (!) yes (no injury)   Fall Risk Interventions:    Physical therapy referral ordered for strength and balance training      Tobacco Use:  Tobacco Use: High Risk    Smoking Tobacco Use: Every Day    Smokeless Tobacco Use: Never     E-cigarette/Vaping       Questions Responses    E-cigarette/Vaping Use Never User    Start Date     Passive Exposure     Quit Date     Counseling Given     Comments           Substance Use - Tobacco Interventions:  patient is not ready to work toward tobacco cessation at this time         [address] and ACP:  General  In general, how would you say your health is?: Fair  In the past 7 days, have you experienced any of the following: New or Increased Pain, New or Increased Fatigue, Loneliness, Social Isolation, Stress or Anger?: No  Do you get the social and emotional support that you need?: Yes  Do you have a Living Will?: (!) No    Advance Directives       Power of [facility] Will ACP-Advance Directive ACP-Power of     Not on File Not on File Not on File Not on [facility] Interventions:  Pain issues: physical therapy referral ordered    Health Habits/Nutrition:  Physical Activity: Sufficiently Active    Days of Exercise per Week: 5 days    Minutes of Exercise per Session: 60 min     Have you lost any weight without trying in the past 3 months?: No  Body mass index: (!) 25.94  Have you seen the dentist within the past year?: (!) No  Health Habits/Nutrition Interventions:  Dental exam overdue:  patient encouraged to make appointment with his/her dentist     Safety:  Do you have working smoke detectors?: (!) No  Do you have any tripping hazards - loose or unsecured carpets or rugs?: No  Do you have any tripping hazards - clutter in doorways, halls, or stairs?: No  Do you have either shower bars, grab bars, non-slip mats or non-slip surfaces in your shower or bathtub?: (!) No  Do all of your stairways have a railing or banister?: Yes  Do you always fasten your seatbelt when you are in a car?: Yes  Safety Interventions:  Non slip mats for bathtub. Get smoke detector. Objective   Vitals:    08/22/22 1021   Pulse: 72   Temp: 97.2 °F (36.2 °C)   SpO2: 98%   Weight: 186 lb (84.4 kg)   Height: 5' 11\" (1.803 m)      Body mass index is 25.94 kg/m².       General Appearance: alert and oriented to person, place and time, well developed and well- nourished, in no acute distress  Skin: warm and dry, no rash or erythema  Head: normocephalic and atraumatic  Eyes: pupils equal, round, and reactive to light, extraocular eye movements intact, conjunctivae normal  ENT: tympanic membrane, external ear and ear canal normal bilaterally, nose without deformity, nasal mucosa and turbinates normal without polyps  Neck: supple and non-tender without mass, no thyromegaly or thyroid nodules, no cervical lymphadenopathy  Pulmonary/Chest: clear to auscultation bilaterally- no wheezes, rales or rhonchi, normal air movement, no respiratory distress  Cardiovascular: normal rate, regular rhythm, normal S1 and S2, no murmurs, rubs, clicks, or gallops, distal pulses intact, no carotid bruits  Abdomen: soft, non-tender, non-distended, normal bowel sounds, no masses or organomegaly  Extremities: no cyanosis, clubbing or edema  Musculoskeletal: normal range of motion, no joint swelling, deformity or tenderness  Neurologic: reflexes normal and symmetric, no cranial nerve deficit, gait, coordination and speech normal       Allergies   Allergen Reactions    Pcn [Penicillins] Hives    Tape [Adhesive Tape]      blisters     Prior to Visit Medications    Medication Sig Taking?  Authorizing Provider   fluticasone-umeclidin-vilant (TRELEGY ELLIPTA) 100-62.5-25 MCG/INH AEPB Inhale 1 puff into the lungs daily Yes Vinod Ojeda DO   meloxicam (MOBIC) 15 MG tablet Take 1 tablet by mouth daily as needed for Pain Yes Vinod Ojeda DO   ketorolac (TORADOL) 30 MG/ML injection Inject 1 mL into the muscle once for 1 dose  Vinod Ojeda DO   atorvastatin (LIPITOR) 10 MG tablet TAKE 1 TABLET DAILY  Vinod Ojeda DO   SITagliptin-metFORMIN (JANUMET XR)  MG TB24 per extended release tablet Take 1 tablet by mouth daily  Vinod Ojeda DO   vitamin D (CHOLECALCIFEROL) 76202 UNIT CAPS One weekly  Vinod Ojeda DO   ramipril (ALTACE) 1.25 MG capsule TAKE 1 CAPSULE DAILY Vinod Ojeda DO   albuterol sulfate  (90 Base) MCG/ACT inhaler Inhale 2 puffs into the lungs every 6 hours as needed for Wheezing or Shortness of Breath  Vinod Ojeda DO   omeprazole (PRILOSEC) 40 MG delayed release capsule Take 1 capsule by mouth 2 times daily  Mathew Castaneda DO   Icosapent Ethyl (VASCEPA) 1 g CAPS capsule Take 2 capsules by mouth 2 times daily  Vinod Ojeda DO   rOPINIRole (REQUIP) 1 MG tablet Take 1 tablet by mouth nightly  DO Caitlyn Vargas (Including outside providers/suppliers regularly involved in providing care):   Patient Care Team:  Cookie Sawyer DO as PCP - General (Family Medicine)  Cookie Sawyer DO as PCP - [facility] Empaneled Provider  Mirela Rivas MD as Consulting Physician (Pulmonology)     Reviewed and updated this visit:  Tobacco  Allergies  Meds  Problems  Med Hx  Surg Hx  Soc Hx  Fam Hx        - refuses ct lung screen presently

## 2022-08-22 NOTE — PATIENT INSTRUCTIONS
Personalized Preventive Plan for Cecil Wylie - 8/22/2022  Medicare offers a range of preventive health benefits. Some of the tests and screenings are paid in full while other may be subject to a deductible, co-insurance, and/or copay. Some of these benefits include a comprehensive review of your medical history including lifestyle, illnesses that may run in your family, and various assessments and screenings as appropriate. After reviewing your medical record and screening and assessments performed today your provider may have ordered immunizations, labs, imaging, and/or referrals for you. A list of these orders (if applicable) as well as your Preventive Care list are included within your After Visit Summary for your review. Other Preventive Recommendations:    A preventive eye exam performed by an eye specialist is recommended every 1-2 years to screen for glaucoma; cataracts, macular degeneration, and other eye disorders. A preventive dental visit is recommended every 6 months. Try to get at least 150 minutes of exercise per week or 10,000 steps per day on a pedometer . Order or download the FREE \"Exercise & Physical Activity: Your Everyday Guide\" from [employer]. Call 5-880.859.7171 or search [employer] online. You need 5197-0604 mg of calcium and 1056-6989 IU of vitamin D per day. It is possible to meet your calcium requirement with diet alone, but a vitamin D supplement is usually necessary to meet this goal.  When exposed to the sun, use a sunscreen that protects against both UVA and UVB radiation with an SPF of 30 or greater. Reapply every 2 to 3 hours or after sweating, drying off with a towel, or swimming. Always wear a seat belt when traveling in a car. Always wear a helmet when riding a bicycle or motorcycle.

## 2022-09-13 ENCOUNTER — OFFICE VISIT (OUTPATIENT)
Dept: ENDOCRINOLOGY | Age: 72
End: 2022-09-13
Payer: MEDICARE

## 2022-09-13 VITALS
HEIGHT: 71 IN | HEART RATE: 73 BPM | OXYGEN SATURATION: 95 % | SYSTOLIC BLOOD PRESSURE: 133 MMHG | BODY MASS INDEX: 26.18 KG/M2 | DIASTOLIC BLOOD PRESSURE: 73 MMHG | RESPIRATION RATE: 18 BRPM | WEIGHT: 187 LBS

## 2022-09-13 DIAGNOSIS — E04.2 MULTINODULAR GOITER: Primary | ICD-10-CM

## 2022-09-13 DIAGNOSIS — E11.42 TYPE 2 DIABETES MELLITUS WITH DIABETIC POLYNEUROPATHY, WITH LONG-TERM CURRENT USE OF INSULIN (HCC): ICD-10-CM

## 2022-09-13 DIAGNOSIS — E55.9 VITAMIN D DEFICIENCY: ICD-10-CM

## 2022-09-13 DIAGNOSIS — E78.5 HYPERLIPIDEMIA, UNSPECIFIED HYPERLIPIDEMIA TYPE: ICD-10-CM

## 2022-09-13 DIAGNOSIS — Z79.4 TYPE 2 DIABETES MELLITUS WITH DIABETIC POLYNEUROPATHY, WITH LONG-TERM CURRENT USE OF INSULIN (HCC): ICD-10-CM

## 2022-09-13 PROCEDURE — 1123F ACP DISCUSS/DSCN MKR DOCD: CPT | Performed by: INTERNAL MEDICINE

## 2022-09-13 PROCEDURE — 3044F HG A1C LEVEL LT 7.0%: CPT | Performed by: INTERNAL MEDICINE

## 2022-09-13 PROCEDURE — 99214 OFFICE O/P EST MOD 30 MIN: CPT | Performed by: INTERNAL MEDICINE

## 2022-09-13 NOTE — PROGRESS NOTES
[address] [place] [address] Department of Endocrinology Diabetes and Metabolism   [address] [facility] [address]   Phone: [phone]  Fax: [phone]    Date of Service: 9/13/2022  Primary Care Physician: Erica Alvarenga DO  Provider: Anthony Dupont MD            Reason for the visit:  Multinodular goiter     History of Present Illness: The history is provided by the patient. No  was used. Accuracy of the patient data is excellent. Bety Ventura is a very pleasant 74 y.o. male seen today for follow up visit     The patient was found to have thyroid nodule on CT chest dose for evaluation for SOB and cough   CT chest 8/1/2019  There is a 3.9 x 2.5 cm mass in the right lobe of the thyroid. The right lobe of thyroid measures 6.9 x 3.0 x 3.4 cm. Thyroid US 10/2019: The right lobe of thyroid measures 6 x 3.1 x 3.8 cm --> 2 masses are seen in the right lobe of thyroid one in the upper pole measuring 3 x 2.5 x 2 0.1 cm/s the mid pole measuring 1.6 x 1.7 x 1.7 cm  The left lobe of thyroid measures 3.9 x 1.7 x 1.4 cm. 2 masses are seen in the left lobe of thyroid one measuring 0.3 x 0.3 x 0.4 cm a second measuring 0.4 x 0.3 x 0.3 cm  The isthmus measures 0.8 cm There is a 0.9 x 0.4 x 0.6 cm nodule seen in the isthmus    US 9/2020: The right lobe of thyroid measures 6.9 x 3.0 x 3.4 cm --> There is a hypoechoic, spongiform nodule within the superior aspect of the right lobe of the thyroid with smooth borders and no calcifications measuring 3.1 x 2.7 x 2.5 cm. There is a hypoechoic, spongiform nodule within the mid right lobe of the thyroid with smooth borders and no calcifications measuring 1.9 x 1.2 x 1.9 cm.  There is a hypoechoic, spongiform nodule within the inferior aspect of the right lobe of the thyroid with smooth borders and no calcifications measuring 2.8 x 1.6 x 2.9 cm   The left lobe of thyroid measures 5.3 x 1.6 x 2.0 cm --> No mass is seen in the left lobe of the thyroid    9/2020 - FNA to dominant suspicious Rt side thyroid nodules was benign     9/2021 - thyroid US , Rt side nodule now measuring 2.5 cm and 2.4 cm  2.4 cm nodule has calcification but was biopsies in 2019 and was benign     Due for US now       Kadi Ibarra denies any compressive symptoms, change in her voice, or shortness of breath. No family history of thyroid cancer. No prior history of radiation to head or neck region. Lab Results   Component Value Date/Time    TSH 1.340 09/02/2021 11:25 AM    T4FREE 1.04 09/02/2021 11:25 AM     Patient denied any history of  swelling in the area of the thyroid gland, weight loss, change in appetite, nervousness, anxiety, irritability, tremor, sweating, heat intolerance, changes in bowel habits, muscle weakness or difficulty sleeping. Patient also denied any h/o unexplained weight gain, new fatigue, increased sensitivity to cold, dry skin, brittle fingernails, brittle hair, facial swelling/puffiness, or depressed mood. PAST MEDICAL HISTORY   Past Medical History:   Diagnosis Date    Agent orange exposure     Chronic cough     uses inhaler /to have Bronch 8/22/2019    Colon polyps 03/07/2019, 9-2021    fOR or 10-19-21    COPD (chronic obstructive pulmonary disease) ([facility]     Diabetes ([facility]     Hyperlipidemia     Hypertension     Rash     noted 2/28/19- says it is on back & shoulders    Restless leg     Spinal stenosis at L4-L5 level 2009    [facility]    Thyroid nodule 2020    R     PAST SURGICAL HISTORY   Past Surgical History:   Procedure Laterality Date    APPENDECTOMY  2000?     ruptured    BRONCHOSCOPY N/A 8/22/2019    BRONCHOSCOPY DIAGNOSTIC OR CELL [address] ONLY performed by Love Dobson MD at [facility]  2000?    open    COLECTOMY N/A 10/19/2021    LAPAROSCOPIC ROBOTIC XI ASSISTED RIGHT COLECTOMY performed by Jeremy Quinonez DO at [address] [place] [address]  12 years ago    COLONOSCOPY N/A 3/7/2019    COLONOSCOPY POLYPECTOMY SNARE/COLD BIOPSY [facility]el [facility] N/A 8/26/2021    COLONOSCOPY POLYPECTOMY HOT BIOPSY performed by Demetrio Patel DO at [address] [facility]  years ago    LITHOTRIPSY  years ago    UPPER GASTROINTESTINAL ENDOSCOPY N/A 8/26/2021    EGD BIOPSY performed by Demetrio Patel DO at [facility] ENDOSCOPY     SOCIAL HISTORY   .[email or web address]      FAMILY HISTORY   Family History   Problem Relation Age of Onset    Stroke Father      ALLERGIES AND DRUG REACTIONS   Allergies   Allergen Reactions    Pcn [Penicillins] Hives    Tape Jeaneen Eastern Tape]      blisters       CURRENT MEDICATIONS   Current Outpatient Medications   Medication Sig Dispense Refill    fluticasone-umeclidin-vilant (TRELEGY ELLIPTA) 100-62.5-25 MCG/INH AEPB Inhale 1 puff into the lungs daily 1 each 3    meloxicam (MOBIC) 15 MG tablet Take 1 tablet by mouth daily as needed for Pain 30 tablet 1    atorvastatin (LIPITOR) 10 MG tablet TAKE 1 TABLET DAILY 90 tablet 1    SITagliptin-metFORMIN (JANUMET XR)  MG TB24 per extended release tablet Take 1 tablet by mouth daily 90 tablet 1    vitamin D (CHOLECALCIFEROL) 33386 UNIT CAPS One weekly 12 capsule 1    ramipril (ALTACE) 1.25 MG capsule TAKE 1 CAPSULE DAILY 90 capsule 1    albuterol sulfate  (90 Base) MCG/ACT inhaler Inhale 2 puffs into the lungs every 6 hours as needed for Wheezing or Shortness of Breath 18 g 1    omeprazole (PRILOSEC) 40 MG delayed release capsule Take 1 capsule by mouth 2 times daily 30 capsule 2    Icosapent Ethyl (VASCEPA) 1 g CAPS capsule Take 2 capsules by mouth 2 times daily 120 capsule 3    rOPINIRole (REQUIP) 1 MG tablet Take 1 tablet by mouth nightly 90 tablet 1    ketorolac (TORADOL) 30 MG/ML injection Inject 1 mL into the muscle once for 1 dose 1 mL 0     No current facility-administered medications for this visit.        Review of Systems  Constitutional: No fever, no chills, no diaphoresis, no generalized weakness. HEENT: No blurred vision, No sore throat, no ear pain, no hair loss  Neck: denied any neck swelling, difficulty swallowing,   Cadrdiopulomary: No CP, SOB or palpitation, No orthopnea or PND. No cough or wheezing. GI: No N/V/D, no constipation, No abdominal pain, no melena or hematochezia   : Denied any dysuria, hematuria, flank pain, discharge, or incontinence. Skin: denied any rash, ulcer, Hirsute, or hyperpigmentation. MSK: denied any joint deformity, joint pain/swelling, muscle pain, or back pain. Neuro: no numbess, no tingling, no weakness,     OBJECTIVE    /73   Pulse 73   Resp 18   Ht 5' 11\" (1.803 m)   Wt 187 lb (84.8 kg)   SpO2 95%   BMI 26.08 kg/m²   BP Readings from Last 4 Encounters:   09/13/22 133/73   07/21/22 122/80   04/21/22 (!) 92/48   12/23/21 122/64     Wt Readings from Last 6 Encounters:   09/13/22 187 lb (84.8 kg)   08/22/22 186 lb (84.4 kg)   07/21/22 186 lb (84.4 kg)   04/21/22 188 lb (85.3 kg)   12/23/21 181 lb (82.1 kg)   12/15/21 182 lb (82.6 kg)       Physical examination:  General: awake alert, oriented x3, no abnormal position or movements. HEENT: normocephalic non traumatic  Neck: supple, no LN enlargement, Rt side thyroid nodule is palpable, , no thyroid tenderness, no JVD. Pulm: good air entry, +  wheezing i    CVS: S1 + S2, no murmur, no heave. Dorsalis pedis pulse palpable   Abd: soft lax, no tenderness, no organomegaly, audible bowel sounds. Skin: warm, no lesions, no rash.  No Palmar erythema  Musculoskeletal: No back tenderness, no kyphosis/scoliosis     Neuro: CN intact, sensation normal , muscle power normal. No tremors   Psych: normal mood, and affect    Review of Laboratory Data:  I personally reviewed the following labs:   Lab Results   Component Value Date/Time    WBC 8.5 08/22/2022 10:52 AM    RBC 5.27 08/22/2022 10:52 AM    HGB 16.0 08/22/2022 10:52 AM    HCT 49.3 08/22/2022 10:52 AM    MCV 93.5 08/22/2022 10:52 AM    MCH 30.4 08/22/2022 10:52 AM    MCHC 32.5 08/22/2022 10:52 AM    RDW 13.1 08/22/2022 10:52 AM     08/22/2022 10:52 AM    MPV 11.8 08/22/2022 10:52 AM      Lab Results   Component Value Date/Time     08/22/2022 10:52 AM    K 4.8 08/22/2022 10:52 AM    K 4.5 10/23/2021 04:15 AM    CO2 24 08/22/2022 10:52 AM    BUN 12 08/22/2022 10:52 AM    CREATININE 1.3 (H) 08/22/2022 10:52 AM    CALCIUM 9.2 08/22/2022 10:52 AM    LABGLOM 54 08/22/2022 10:52 AM    GFRAA >60 08/22/2022 10:52 AM      Lab Results   Component Value Date/Time    TSH 1.340 09/02/2021 11:25 AM    T4FREE 1.04 09/02/2021 11:25 AM     Lab Results   Component Value Date/Time    LABA1C 6.1 08/22/2022 10:52 AM    GLUCOSE 158 08/22/2022 10:52 AM    MALBCR 9.6 08/22/2022 11:01 AM    LABMICR 15.6 08/22/2022 11:01 AM    LABCREA 163 08/22/2022 11:01 AM     Lab Results   Component Value Date/Time    CHOL 159 08/22/2022 10:52 AM    CHOL 145 08/12/2021 12:00 PM    TRIG 171 08/22/2022 10:52 AM    TRIG 247 08/12/2021 12:00 PM    HDL 36 08/22/2022 10:52 AM    HDL 32 08/12/2021 12:00 PM     Lab Results   Component Value Date/Time    VITD25 20 09/02/2021 11:25 AM        ASSESSMENT & RECOMMENDATIONS   Neyda Jones, a 74 y.o.-old male seen in for the following issues     Multinodular goiter   Pt has multiple nodules in the Rt lobe. FNA to dominant 3 cm Rt side thyroid nodule was benign in 9/2020   Repeat thyroid US in few weeks and consider FNA to Rt side nodule continue to grow and showing suspicious features on US     VitD deficiency   Continue vitD supplement     HLD  continue Lipitor 10 mg daily     I personally reviewed external notes from PCP and other patient's care team providers, and personally interpreted labs associated with the above diagnosis. I also ordered labs to further assess and manage the above addressed medical conditions    Return in about 1 year (around 9/13/2023) for Multinodular goiter, VitD deficiency.     The above issues were reviewed with the patient who understood and agreed with the plan. Thank you for allowing us to participate in the care of this patient. Please do not hesitate to contact us with any additional questions. Diagnosis Orders   1. Multinodular goiter  US THYROID      2. Type 2 diabetes mellitus with diabetic polyneuropathy, with long-term current use of insulin ([facility]        3. Vitamin D deficiency        4. Hyperlipidemia, unspecified hyperlipidemia type          Montez Mccullough MD  Endocrinologist, [facility])   [address] [place] [address], [address] [place] [address],[address] [id number]   Phone: [phone]  Fax: [phone]  -------------------------------------------------------------  An electronic signature was used to authenticate this note.  Lavonne Nageotte, MD on 9/13/2022 at 8:37 AM

## 2022-09-20 DIAGNOSIS — E11.9 TYPE 2 DIABETES MELLITUS WITHOUT COMPLICATION, WITHOUT LONG-TERM CURRENT USE OF INSULIN (HCC): ICD-10-CM

## 2022-09-20 RX ORDER — RAMIPRIL 1.25 MG/1
CAPSULE ORAL
Qty: 90 CAPSULE | Refills: 1 | Status: SHIPPED | OUTPATIENT
Start: 2022-09-20

## 2022-10-04 ENCOUNTER — HOSPITAL ENCOUNTER (OUTPATIENT)
Dept: MRI IMAGING | Age: 72
Discharge: HOME | End: 2022-10-06
Payer: MEDICARE

## 2022-10-04 ENCOUNTER — HOSPITAL ENCOUNTER (OUTPATIENT)
Dept: ULTRASOUND IMAGING | Age: 72
Discharge: HOME | End: 2022-10-06
Payer: MEDICARE

## 2022-10-04 DIAGNOSIS — R27.0 ATAXIA: ICD-10-CM

## 2022-10-04 DIAGNOSIS — M51.36 DEGENERATIVE DISC DISEASE, LUMBAR: ICD-10-CM

## 2022-10-04 DIAGNOSIS — E04.2 MULTINODULAR GOITER: ICD-10-CM

## 2022-10-04 PROCEDURE — 72148 MRI LUMBAR SPINE W/O DYE: CPT

## 2022-10-04 PROCEDURE — 76536 US EXAM OF HEAD AND NECK: CPT

## 2022-10-06 ENCOUNTER — TELEPHONE (OUTPATIENT)
Dept: PRIMARY CARE CLINIC | Age: 72
End: 2022-10-06

## 2022-10-06 DIAGNOSIS — M48.061 SPINAL STENOSIS AT L4-L5 LEVEL: Primary | ICD-10-CM

## 2022-10-06 NOTE — TELEPHONE ENCOUNTER
Patient discussed MRI of lumbar sacral spine which shows severe spinal stenosis between L3-4. Also moderate spinal stenosis between L4-5. Therefore we will set him up with pain management for epidurals most likely.

## 2022-10-09 ENCOUNTER — TELEPHONE (OUTPATIENT)
Dept: ENDOCRINOLOGY | Age: 72
End: 2022-10-09

## 2022-10-10 NOTE — TELEPHONE ENCOUNTER
Endocrine staff,   Please notify pt that I have reviewed your recent results. Great!  Thyroid US showed that thyroid nodules remained stable

## 2022-10-25 ENCOUNTER — OFFICE VISIT (OUTPATIENT)
Dept: PAIN MANAGEMENT | Age: 72
End: 2022-10-25
Payer: MEDICARE

## 2022-10-25 VITALS
HEIGHT: 71 IN | RESPIRATION RATE: 16 BRPM | TEMPERATURE: 97.1 F | SYSTOLIC BLOOD PRESSURE: 122 MMHG | DIASTOLIC BLOOD PRESSURE: 80 MMHG | HEART RATE: 74 BPM | WEIGHT: 187 LBS | BODY MASS INDEX: 26.18 KG/M2 | OXYGEN SATURATION: 91 %

## 2022-10-25 DIAGNOSIS — E11.40 TYPE 2 DIABETES MELLITUS WITH DIABETIC NEUROPATHY, WITHOUT LONG-TERM CURRENT USE OF INSULIN (HCC): ICD-10-CM

## 2022-10-25 DIAGNOSIS — M47.816 LUMBAR FACET ARTHROPATHY: ICD-10-CM

## 2022-10-25 DIAGNOSIS — M21.371 FOOT DROP, RIGHT: ICD-10-CM

## 2022-10-25 DIAGNOSIS — M48.061 SPINAL STENOSIS OF LUMBAR REGION WITHOUT NEUROGENIC CLAUDICATION: ICD-10-CM

## 2022-10-25 DIAGNOSIS — M51.9 LUMBAR DISC DISORDER: ICD-10-CM

## 2022-10-25 DIAGNOSIS — G89.4 CHRONIC PAIN SYNDROME: Primary | ICD-10-CM

## 2022-10-25 DIAGNOSIS — M47.816 LUMBAR SPONDYLOSIS: ICD-10-CM

## 2022-10-25 PROCEDURE — 1123F ACP DISCUSS/DSCN MKR DOCD: CPT | Performed by: PAIN MEDICINE

## 2022-10-25 PROCEDURE — 3044F HG A1C LEVEL LT 7.0%: CPT | Performed by: PAIN MEDICINE

## 2022-10-25 PROCEDURE — 99204 OFFICE O/P NEW MOD 45 MIN: CPT | Performed by: PAIN MEDICINE

## 2022-10-25 RX ORDER — SODIUM CHLORIDE 9 MG/ML
INJECTION, SOLUTION INTRAVENOUS PRN
Status: CANCELLED | OUTPATIENT
Start: 2022-10-25

## 2022-10-25 RX ORDER — SODIUM CHLORIDE 0.9 % (FLUSH) 0.9 %
5-40 SYRINGE (ML) INJECTION EVERY 12 HOURS SCHEDULED
Status: CANCELLED | OUTPATIENT
Start: 2022-10-25

## 2022-10-25 RX ORDER — IBUPROFEN 200 MG
200 TABLET ORAL PRN
COMMUNITY

## 2022-10-25 RX ORDER — GABAPENTIN 100 MG/1
100 CAPSULE ORAL 2 TIMES DAILY
Qty: 120 CAPSULE | Refills: 0 | Status: SHIPPED | OUTPATIENT
Start: 2022-10-25 | End: 2022-11-24

## 2022-10-25 RX ORDER — SODIUM CHLORIDE 0.9 % (FLUSH) 0.9 %
5-40 SYRINGE (ML) INJECTION PRN
Status: CANCELLED | OUTPATIENT
Start: 2022-10-25

## 2022-10-25 NOTE — PROGRESS NOTES
Do you currently have any of the following:    Fever: No  Headache:  No  Cough: No  Shortness of breath: No  Exposed to anyone with these symptoms: Lainey Isabel presents to the [facility] on 10/25/2022. Omar Hickey is complaining of pain in his lower back and legs. . The pain is constant. The pain is described as aching, throbbing, shooting, sharp, and burning. Pain is rated on his best day at a 7, on his worst day at a 10, and on average at a 8 on the VAS scale. He took his last dose of  meloxicam  . Omar Hickey does not have issues with constipation. Any procedures since your last visit: No,       He is  on NSAIDS and  is not on anticoagulation medications to include none and is managed by NA. Pacemaker or defibrillator: No Physician managing device is NA. Medication Contract and Consent for Opioid Use Documents Filed        No documents found                       /80   Pulse 74   Temp 97.1 °F (36.2 °C) (Infrared)   Resp 16   Ht 5' 11\" (1.803 m)   Wt 187 lb (84.8 kg)   SpO2 91%   BMI 26.08 kg/m²      No LMP for male patient.

## 2022-10-25 NOTE — PROGRESS NOTES
[facility]        [address] [facility], [address] [facility]      [phone]          Consult Note      Patient:  MARGIE Au 1950    Date of Service:  10/25/22    Requesting Physician:  Justin Young    Reason for Consult:      Patient presents with complaints of low back pain that started 3 month and has been progressively getting worse. Pain is described as sharp/intermittent. Pain is aggravated by standing/walking. Pain is relieved by sitting down. HISTORY OF PRESENT ILLNESS:      Pain does radiate to both lower extremities both lower extremities down to the feet. He  has numbness, tingling of the both lower extremities and does not have bladder or bowel dysfunction. He has not been on anticoagulation medications to include none. The patient  has not been on herbal supplements. The patient is diabetic. Imaging:   Lumbar spine MRI :  1. No fracture or bony destructive lesion. 2. Severe central canal stenosis at L3-4. Moderate stenosis at L4-5.   3.  Multilevel neural foraminal stenoses, worst (severe) at the bilateral   L3-4 and L4-5 levels, as well as the right L5-S1 level. 4. Fluid along the interspinous bursa at L1-2 and L3-4. Clinical correlation   for [place] disease is recommended. Previous treatments: Physical Therapy and medications. .      Opioid Agreement:  Renewal date:N/A    Past Medical History:   Diagnosis Date    Agent orange exposure     Chronic cough     uses inhaler /to have Bronch 2019    Colon polyps 2019,     fOR or 10-19-21    COPD (chronic obstructive pulmonary disease) ([facility]     Diabetes ([facility]     Hyperlipidemia     Hypertension     Rash     noted 19- says it is on back & shoulders    Restless leg     Spinal stenosis at L4-L5 level     [facility]    Thyroid nodule 2020    R     Past Surgical History:   Procedure Laterality Date    APPENDECTOMY  2000?     ruptured BRONCHOSCOPY N/A 8/22/2019    BRONCHOSCOPY DIAGNOSTIC OR CELL [address] ONLY performed by Rosette Lui MD at [address]  2000?    open    COLECTOMY N/A 10/19/2021    LAPAROSCOPIC ROBOTIC XI ASSISTED RIGHT COLECTOMY performed by Austyn Streeter, DO at [address] [facility]  12 years ago    COLONOSCOPY N/A 3/7/2019    COLONOSCOPY POLYPECTOMY SNARE/COLD BIOPSY performed by Austyn Streeter, DO at [address] N/A 8/26/2021    COLONOSCOPY POLYPECTOMY HOT BIOPSY performed by Austyn Streeter, DO at [address]  years ago    LITHOTRIPSY  years ago    UPPER GASTROINTESTINAL ENDOSCOPY N/A 8/26/2021    EGD BIOPSY performed by Austyn Streeter, DO at [address]     Prior to Admission medications    Medication Sig Start Date End Date Taking?  Authorizing Provider   ibuprofen (ADVIL;MOTRIN) 200 MG tablet Take 200 mg by mouth as needed for Pain   Yes Historical Provider, MD   ramipril (ALTACE) 1.25 MG capsule TAKE 1 CAPSULE DAILY 9/20/22  Yes Vinod Ojeda, DO   fluticasone-umeclidin-vilant (TRELEGY ELLIPTA) 100-62.5-25 MCG/INH AEPB Inhale 1 puff into the lungs daily 8/22/22  Yes Vinod Ojeda DO   meloxicam (MOBIC) 15 MG tablet Take 1 tablet by mouth daily as needed for Pain 8/22/22  Yes Vinod Ojeda DO   atorvastatin (LIPITOR) 10 MG tablet TAKE 1 TABLET DAILY 4/21/22  Yes Vinod Ojeda DO   SITagliptin-metFORMIN (JANUMET XR)  MG TB24 per extended release tablet Take 1 tablet by mouth daily 4/21/22  Yes Vinod Ojeda DO   vitamin D (CHOLECALCIFEROL) [address]886 UNIT CAPS One weekly 4/21/22  Yes Vinod Ojeda DO   albuterol sulfate  (90 Base) MCG/ACT inhaler Inhale 2 puffs into the lungs every 6 hours as needed for Wheezing or Shortness of Breath 9/23/21  Yes Vinod Ojeda DO   omeprazole (PRILOSEC) 40 MG delayed release capsule Take 1 capsule by mouth 2 times daily 8/26/21  Yes Keisha Rubio Glenn Nolen,    Icosapent Ethyl (VASCEPA) 1 g CAPS capsule Take 2 capsules by mouth 2 times daily 8/12/21  Yes Vinod Ojeda DO   rOPINIRole (REQUIP) 1 MG tablet Take 1 tablet by mouth nightly 2/8/21  Yes Vinod Ojeda DO   ketorolac (TORADOL) 30 MG/ML injection Inject 1 mL into the muscle once for 1 dose 7/21/22 7/21/22  Vinod Ojeda DO     Allergies   Allergen Reactions    Pcn [Penicillins] Hives    Tape Alline Gu Tape]      blisters     Social History     Socioeconomic History    Marital status:      Spouse name: Not on file    Number of children: Not on file    Years of education: Not on file    Highest education level: Not on file   Occupational History    Not on file   Tobacco Use    Smoking status: Every Day     Packs/day: 1.50     Years: 35.00     Pack years: 52.50     Types: Cigarettes    Smokeless tobacco: Never   Vaping Use    Vaping Use: Never used   Substance and Sexual Activity    Alcohol use: Not Currently    Drug use: No    Sexual activity: Never   Other Topics Concern    Not on file   Social History Narrative    Not on file     Social Determinants of Health     Financial Resource Strain: Low Risk     Difficulty of Paying Living Expenses: Not hard at all   Food Insecurity: No Food Insecurity    Worried About Running Out of Food in the Last Year: Never true    Ran Out of Food in the Last Year: Never true   Transportation Needs: Not on file   Physical Activity: Sufficiently Active    Days of Exercise per Week: 5 days    Minutes of Exercise per Session: 60 min   Stress: Not on file   Social Connections: Not on file   Intimate Partner Violence: Not on file   Housing Stability: Not on file     Family History   Problem Relation Age of Onset    Stroke Father      REVIEW OF SYSTEMS:     Patient specifically denies fever/chills, chest pain, shortness of breath, new bowel or bladder complaints. All other review of systems was negative.     PHYSICAL EXAMINATION:      /80 Pulse 74   Temp 97.1 °F (36.2 °C) (Infrared)   Resp 16   Ht 5' 11\" (1.803 m)   Wt 187 lb (84.8 kg)   SpO2 91%   BMI 26.08 kg/m²     General:      General appearance:   elderly, pleasant, and well-hydrated. , in moderate discomfort and A & O x3  Build:Normal Weight    HEENT:    Head:normocephalic and atraumatic  Sclera: icterus absent,     Lungs:    Breathing:Breathing Pattern: regular, no distress    Abdomen:    Shape:non-distended and normal  Tenderness:none    Lumbar spine:    Spine inspection:normal   CVA tenderness:No   Palpation:tenderness paravertebral muscles, facet loading, left, right, positive, and tenderness. Range of motion:abnormal mildly Lateral bending, flexion, extension rotation bilateral and is  painful. Musculoskeletal:    Trigger points in Paraveteral:absent bilaterally  SI joint tenderness:negative right, negative left              FREDY test:not done right, not done             left  Piriformis tenderness:negative right, negative left  Trochanteric bursa tenderness:negative right, negative left  SLR:negative right, negative left, sitting     Extremities:    Tremors:None bilaterally upper and lower  Range of motion:pain with internal rotation of hips negative. Intact:Yes  Edema:Normal    Neurological:    Sensory:diminished to light touch bilateral legs. Motor:                         Right Quadriceps4/5          Left Quadriceps5/5           Right Gastrocnemius4-/5    Left Gastrocnemius5/5  Right Ant Tibialis2/5  Left Ant Tibialis5/5  Reflexes:    Right Quadriceps reflex0  Left Quadriceps reflex2+  Right Achilles reflex0  Left Achilles reflex0  [address]    Dermatology:    Skin:no unusual rashes and no skin lesions    Impression:  Low back pain with radiation to bilateral lower extremities. Lumbar spine MRI 2022 spinal stenosis worst at L3-4 and moderate at L4-5. Plan:  Reviewed lumbar spine MRI including actual images and discussed with the patient.   Discussed pathology of spinal stenosis. Discussed lumbar epidural including R/B/A, patient agrees. Will start patient on Gabapentin 200 mg to be titrated to 400 mg daily. Will refer patient for right orthotic (right foot drop). OARRS report reviewed 10/2022. Patient encouraged to stay active, currently in physical therapy. Treatment plan discussed with the patient including medications and procedure side effects. We discussed with the patient that combining opioids, benzodiazepines, alcohol, illicit drugs or sleep aids increases the risk of respiratory depression including death. We discussed that these medications may cause drowsiness, sedation or dizziness and have counseled the patient not to drive or operate machinery. We have discussed that these medications will be prescribed only by one provider. We have discussed with the patient about age related risk factors and have thoroughly discussed the importance of taking these medications as prescribed. The patient verbalizes understanding. ccrefsimoneing rigo Morfin M.D.

## 2022-11-07 ENCOUNTER — TELEPHONE (OUTPATIENT)
Dept: PAIN MANAGEMENT | Age: 72
End: 2022-11-07

## 2022-11-07 PROBLEM — M54.16 LUMBAR RADICULOPATHY: Status: ACTIVE | Noted: 2022-11-07

## 2022-11-07 NOTE — TELEPHONE ENCOUNTER
Call to Zuly Mesa that procedure was approved for 11/14/2022 and that [facility] should call him a few days before for the pre op call and after 3:00 PM the business day before with the arrival time. Instructed Kin to hold ibuprofen for 24 hours, naprosyn and mobic for 4 days and any aspirin containing products or fish oil for 7 days. Instructed to call office back if any questions. Uche Barney verbalized understanding.     Electronically signed by Wing Hines RN on 11/7/2022 at 2:50 PM

## 2022-11-14 ENCOUNTER — HOSPITAL ENCOUNTER (OUTPATIENT)
Age: 72
Setting detail: OUTPATIENT SURGERY
Discharge: HOME | End: 2022-11-14
Attending: PAIN MEDICINE | Admitting: PAIN MEDICINE
Payer: MEDICARE

## 2022-11-14 ENCOUNTER — HOSPITAL ENCOUNTER (OUTPATIENT)
Dept: OPERATING ROOM | Age: 72
Setting detail: OUTPATIENT SURGERY
Discharge: HOME | End: 2022-11-14
Attending: PAIN MEDICINE
Payer: MEDICARE

## 2022-11-14 VITALS
DIASTOLIC BLOOD PRESSURE: 72 MMHG | HEIGHT: 71 IN | RESPIRATION RATE: 16 BRPM | OXYGEN SATURATION: 97 % | HEART RATE: 64 BPM | BODY MASS INDEX: 26.18 KG/M2 | WEIGHT: 187 LBS | SYSTOLIC BLOOD PRESSURE: 147 MMHG | TEMPERATURE: 97 F

## 2022-11-14 DIAGNOSIS — M54.16 LUMBAR RADICULOPATHY: ICD-10-CM

## 2022-11-14 PROCEDURE — 6360000002 HC RX W HCPCS: Performed by: PAIN MEDICINE

## 2022-11-14 PROCEDURE — 3600000005 HC SURGERY LEVEL 5 BASE: Performed by: PAIN MEDICINE

## 2022-11-14 PROCEDURE — 7100000010 HC PHASE II RECOVERY - FIRST 15 MIN: Performed by: PAIN MEDICINE

## 2022-11-14 PROCEDURE — 62323 NJX INTERLAMINAR LMBR/SAC: CPT | Performed by: PAIN MEDICINE

## 2022-11-14 PROCEDURE — 6360000004 HC RX CONTRAST MEDICATION: Performed by: PAIN MEDICINE

## 2022-11-14 PROCEDURE — 2709999900 HC NON-CHARGEABLE SUPPLY: Performed by: PAIN MEDICINE

## 2022-11-14 PROCEDURE — 3209999900 FLUORO FOR SURGICAL PROCEDURES

## 2022-11-14 PROCEDURE — 2500000003 HC RX 250 WO HCPCS: Performed by: PAIN MEDICINE

## 2022-11-14 PROCEDURE — 7100000011 HC PHASE II RECOVERY - ADDTL 15 MIN: Performed by: PAIN MEDICINE

## 2022-11-14 RX ORDER — SODIUM CHLORIDE 0.9 % (FLUSH) 0.9 %
5-40 SYRINGE (ML) INJECTION EVERY 12 HOURS SCHEDULED
Status: DISCONTINUED | OUTPATIENT
Start: 2022-11-14 | End: 2022-11-14 | Stop reason: HOSPADM

## 2022-11-14 RX ORDER — LIDOCAINE HYDROCHLORIDE 5 MG/ML
INJECTION, SOLUTION INFILTRATION; INTRAVENOUS PRN
Status: DISCONTINUED | OUTPATIENT
Start: 2022-11-14 | End: 2022-11-14 | Stop reason: ALTCHOICE

## 2022-11-14 RX ORDER — SODIUM CHLORIDE 9 MG/ML
INJECTION, SOLUTION INTRAVENOUS PRN
Status: DISCONTINUED | OUTPATIENT
Start: 2022-11-14 | End: 2022-11-14 | Stop reason: HOSPADM

## 2022-11-14 RX ORDER — SODIUM CHLORIDE 0.9 % (FLUSH) 0.9 %
5-40 SYRINGE (ML) INJECTION PRN
Status: DISCONTINUED | OUTPATIENT
Start: 2022-11-14 | End: 2022-11-14 | Stop reason: HOSPADM

## 2022-11-14 ASSESSMENT — PAIN - FUNCTIONAL ASSESSMENT: PAIN_FUNCTIONAL_ASSESSMENT: 0-10

## 2022-11-14 ASSESSMENT — PAIN SCALES - GENERAL
PAINLEVEL_OUTOF10: 0
PAINLEVEL_OUTOF10: 0

## 2022-11-14 NOTE — OP NOTE
2022    Patient: Dean Dawson  :  1950  Age:  74 y.o. Sex:  male     PRE-OPERATIVE DIAGNOSIS: Lumbar disc displacement, lumbar radiculopathy. POST-OPERATIVE DIAGNOSIS: Same. PROCEDURE: Fluoroscopic guided therapeutic lumbar epidural steroid injection at the L4-5 level (# 1). SURGEON: ELISA Sanon M.D.. ANESTHESIA: Local    ESTIMATED BLOOD LOSS: None.  ______________________________________________________________________    BRIEF HISTORY:  Dean Dawson comes in today for first lumbar epidural injection at L4-5 level. The potential complications of this procedure were discussed with him again today. He has elected to undergo the aforementioned procedure. Sulaiman complete History & Physical examination were reviewed in depth, a copy of which is in the chart. DESCRIPTION OF PROCEDURE:    After confirming written and informed consent, a time-out was performed and Sulaiman name and date of birth, the procedure to be performed as well as the plan for the location of the needle insertion were confirmed. The patient was brought into the procedure room and placed in the prone position on the fluoroscopy table. A pillow was placed under the patient's lower abdomen/upper pelvis to increase lumbar interlaminar space. Standard monitors were placed, and vital signs were observed throughout the procedure. The area of the lumbar spine was prepped with chloraprep and draped in a sterile manner. The L4-5 interspace was identified and marked under AP fluoroscopy. The skin and subcutaneous tissues at the above level were anesthestized with 0.5% lidocaine. With intermittent fluoroscopy, an # 18 gauge 3 1/2 tuohy epidural needle was inserted and directed toward the interlaminar space. The needle was slowly advanced using loss of resistance technique and 5 cc glass syringe  until the tip of the epidural needle has passed through the ligamentum flavum and entered the epidural space.  AP and lateral fluoroscopic imaging is performed to verify that the epidural needle is properly placed. Negative aspiration of blood and CSF was confirmed. 0.5 ml of omnipaque 240 was used for confirmation of even epidural spread under both live and AP fluoroscopy. After negative aspiration, a solution of 0.5 % Lidocaine 1 ml and 60 mg DepoMedrol was easily injected. The needle was gently removed intact . The patient back was cleaned and a Band-Aid was placed over the needle insertion point. Disposition the patient tolerated the procedure well and there were no complications . Vital signs remained stable throughout the procedure. The patient was escorted to the recovery area where they remained until discharge and written discharge instructions for the procedure were given. Plan: Uche Barney will return to our pain management center as scheduled.      Bri England MD

## 2022-11-14 NOTE — H&P
[address] [facility], [address] [place] [address] Jatinder  [phone]    Procedure History & Physical      Eloisa Dawson     HPI:    Patient  is here for low back and leg pain for LESI L4-5  Labs/imaging studies reviewed   All question and concerns addressed including R/B/A associated with the procedure    Past Medical History:   Diagnosis Date    Agent orange exposure     Chronic cough     uses inhaler /to have Bronch 8/22/2019    Colon polyps 03/07/2019, 9-2021    fOR or 10-19-21    COPD (chronic obstructive pulmonary disease) ([facility] [facility]     Diabetes ([facility] [facility]     Hyperlipidemia     Hypertension     Rash     noted 2/28/19- says it is on back & shoulders    Restless leg     Spinal stenosis at L4-L5 level 2009    [facility]    Thyroid nodule 2020    R       Past Surgical History:   Procedure Laterality Date    APPENDECTOMY  2000? ruptured    BRONCHOSCOPY N/A 8/22/2019    BRONCHOSCOPY DIAGNOSTIC OR CELL [address] ONLY performed by Priscila De La Cruz MD at [facility]  2000?    open    COLECTOMY N/A 10/19/2021    LAPAROSCOPIC ROBOTIC XI ASSISTED RIGHT COLECTOMY performed by Sammye Kanner, DO at [address],[facility] [address]  12 years ago    COLONOSCOPY N/A 3/7/2019    COLONOSCOPY POLYPECTOMY SNARE/COLD BIOPSY performed by Sammye Kanner, DO at [address] N/A 8/26/2021    COLONOSCOPY POLYPECTOMY HOT BIOPSY performed by Sammye Kanner, DO at [address] [facility]  years ago    LITHOTRIPSY  years ago    UPPER GASTROINTESTINAL ENDOSCOPY N/A 8/26/2021    EGD BIOPSY performed by Sammye Kanner, DO at [facility]       Prior to Admission medications    Medication Sig Start Date End Date Taking? Authorizing Provider   ibuprofen (ADVIL;MOTRIN) 200 MG tablet Take 200 mg by mouth as needed for Pain    Historical Provider, MD   gabapentin (NEURONTIN) 100 MG capsule Take 1 capsule by mouth 2 times daily for 30 days.  Take 2 capsules QHS for three days then 2 capsules BID as tolerated 10/25/22 11/24/22  Azra Hughes MD   ramipril (ALTACE) 1.25 MG capsule TAKE 1 CAPSULE DAILY 9/20/22   [facility]   fluticasone-umeclidin-vilant (TRELEGY ELLIPTA) 100-62.5-25 MCG/INH AEPB Inhale 1 puff into the lungs daily 8/22/22   [facility] Peralta Samantha, DO   meloxicam (MOBIC) 15 MG tablet Take 1 tablet by mouth daily as needed for Pain 8/22/22   [facility], DO   SITagliptin-metFORMIN (JANUMET XR)  MG TB24 per extended release tablet Take 1 tablet by mouth daily 4/21/22   [facility] Samantha, DO   vitamin D (CHOLECALCIFEROL) [address]9217 UNIT CAPS One weekly 4/21/22   [facility] Samantha, DO   albuterol sulfate  (90 Base) MCG/ACT inhaler Inhale 2 puffs into the lungs every 6 hours as needed for Wheezing or Shortness of Breath 9/23/21   [facility] Sam[facility], DO   rOPINIRole (REQUIP) 1 MG tablet Take 1 tablet by mouth nightly 2/8/21   [facility] Samantha, DO       Allergies   Allergen Reactions    Pcn [Penicillins] Hives    Tape Elizabeth Sickle Tape]      blisters       Social History     Socioeconomic History    Marital status:      Spouse name: Not on file    Number of children: Not on file    Years of education: Not on file    Highest education level: Not on file   Occupational History    Not on file   Tobacco Use    Smoking status: Every Day     Packs/day: 1.50     Years: 35.00     Pack years: 52.50     Types: Cigarettes    Smokeless tobacco: Never   Vaping Use    Vaping Use: Never used   Substance and Sexual Activity    Alcohol use: Not Currently    Drug use: No    Sexual activity: Never   Other Topics Concern    Not on file   Social History Narrative    Not on file     Social Determinants of Health     Financial Resource Strain: Low Risk     Difficulty of Paying Living Expenses: Not hard at all   Food Insecurity: No Food Insecurity    Worried About [address] in the Last Year: Never true    [address] [facility] [address] in the Last Year: Never true   Transportation Needs: Not on file   Physical Activity: Sufficiently Active    Days of Exercise per Week: 5 days    Minutes of Exercise per Session: 60 min   Stress: Not on file   Social Connections: Not on file   Intimate Partner Violence: Not on file   Housing Stability: Not on file       Family History   Problem Relation Age of Onset    Stroke Father          REVIEW OF SYSTEMS:    CONSTITUTIONAL:  negative for  fevers, chills, sweats and fatigue    RESPIRATORY:  negative for  dry cough, cough with sputum, dyspnea, wheezing and chest pain    CARDIOVASCULAR:  negative for chest pain, dyspnea, palpitations, syncope    GASTROINTESTINAL:  negative for nausea, vomiting, change in bowel habits, diarrhea, constipation and abdominal pain    MUSCULOSKELETAL: negative for muscle weakness    SKIN: negative for itching or rashes. BEHAVIOR/PSYCH:  negative for poor appetite, increased appetite, decreased sleep and poor concentration    All other systems negative      PHYSICAL EXAM:    VITALS:  /66   Pulse 65   Temp 97 °F (36.1 °C) (Skin)   Resp 16   Ht 5' 11\" (1.803 m)   Wt 187 lb (84.8 kg)   SpO2 98%   BMI 26.08 kg/m²     CONSTITUTIONAL:  awake, alert, cooperative, no apparent distress, and appears stated age    EYES: PERRLA, EOMI    LUNGS:  No increased work of breathing, no audible wheezing    CARDIOVASCULAR:  regular rate and rhythm    ABDOMEN:  Soft non tender non distended     EXTREMITIES: no signs of clubbing or cyanosis. MUSCULOSKELETAL: negative for flaccid muscle tone or spastic movements. SKIN: gross examination reveals no signs of rashes, or diaphoresis. NEURO: Cranial nerves II-XII grossly intact. No signs of agitated mood. Assessment/Plan:    Low back and leg pain for LESI L4-5.

## 2022-11-14 NOTE — DISCHARGE INSTRUCTIONS
[facility]) Pain Management Department  [phone]   Post-Pain Block/ Radiofrequency Home Going Instructions    1-Go home, rest for the remainder of the day  2-Please do not lift over 20 pounds the day of the injection  3-If you received sedation No: alcohol, driving, operating lawn mowers, plows, tractors or other dangerous equipment until next morning. Do not make important decisions or sign legal documents for 24 hours. You may experience light headedness, dizziness, nausea or sleepiness after sedation. Do not stay alone. A responsible adult must be with you for 24 hours. You could be nauseated from the medications you have received. Your IV site may be sore and bruised. 4-No dietary restrictions     5-Resume all medications the same day, blood thinners to be resumed 24 hours after injection    6-Keep the surgical site clean and dry, you may shower the next morning and remove the      dressing. 7- No sitz baths, tub baths or hot tubs/swimming for 24 hours. 8- If you have any pain at the injection site(s), application of an ice pack to the area should be       helpful, 20 minutes on/20 minutes off for next 48 hours. 9- [facility] if you develop. Fever greater than 100.4 F  Have bleeding or drainage from the puncture site  Have progressive Leg/arm numbness and or weakness  Loss of control of bowel and or bladder (wet/soil yourself)  Severe headache with inability to lift head  10-You may return to work the next day      Infection After Surgery: Care Instructions  Overview  After surgery, an infection is always possible. It doesn't mean that the surgery didn't go well. Because an infection can be serious, your doctor has taken steps to manage it. Your doctor checked the infection and cleaned it if necessary. Your doctor may have made an opening in the area so that the pus can drain out. You may have gauze in the cut so that the area will stay open and keep draining. You may need antibiotics. You will need to follow up with your doctor to make sure the infection has gone away. Follow-up care is a key part of your treatment and safety. Be sure to make and go to all appointments, and call your doctor if you are having problems. It's also a good idea to know your test results and keep a list of the medicines you take. How can you care for yourself at home? Make sure your surgeon knows about the infection, especially if you saw another doctor about your symptoms. If your doctor prescribed antibiotics, take them as directed. Do not stop taking them just because you feel better. You need to take the full course of antibiotics. Ask your doctor if you can take an over-the-counter pain medicine, such as acetaminophen (Tylenol), ibuprofen (Advil, Motrin), or naproxen (Aleve). Be safe with medicines. Read and follow all instructions on the label. Do not take two or more pain medicines at the same time unless the doctor told you to. Many pain medicines have acetaminophen, which is Tylenol. Too much acetaminophen (Tylenol) can be harmful. Prop up the area on a pillow anytime you sit or lie down during the next 3 days. Try to keep it above the level of your heart. This will help reduce swelling. Keep the skin clean and dry. You may have a bandage over the cut (incision). A bandage helps the incision heal and protects it. Your doctor will tell you how to take care of this. Keep it clean and dry. You may have drainage from the wound. If your doctor told you how to care for your incision, follow your doctor's instructions. If you did not get instructions, follow this general advice:  Wash around the incision with clean water 2 times a day. Don't use hydrogen peroxide or alcohol, which can slow healing. When should you call for help? Call your doctor now or seek immediate medical care if:    You have signs that your infection is getting worse, such as:   Increased pain, swelling, warmth, or redness in the area. Red streaks leading from the area. Pus draining from the wound. A new or higher fever. Watch closely for changes in your health, and be sure to contact your doctor if you have any problems. Where can you learn more? Go to [email or web address] and sign in to your [employer] account. Enter C340 in the [employer] Navigation box to learn more about \"Infection After Surgery: Care Instructions. \"     If you do not have an account, please click on the \"Sign Up Now\" link. Current as of: January 20, 2022               Content Version: 13.4  © 7733-5853 Healthwise, [employer]. Care instructions adapted under license by [facility] ([facility]). If you have questions about a medical condition or this instruction, always ask your healthcare professional. Maria M Christensen any warranty or liability for your use of this information.

## 2022-11-23 ENCOUNTER — OFFICE VISIT (OUTPATIENT)
Dept: PAIN MANAGEMENT | Age: 72
End: 2022-11-23
Payer: MEDICARE

## 2022-11-23 VITALS
BODY MASS INDEX: 26.18 KG/M2 | HEIGHT: 71 IN | RESPIRATION RATE: 16 BRPM | OXYGEN SATURATION: 94 % | WEIGHT: 187 LBS | DIASTOLIC BLOOD PRESSURE: 82 MMHG | HEART RATE: 81 BPM | TEMPERATURE: 97.3 F | SYSTOLIC BLOOD PRESSURE: 134 MMHG

## 2022-11-23 DIAGNOSIS — M48.061 SPINAL STENOSIS OF LUMBAR REGION WITHOUT NEUROGENIC CLAUDICATION: ICD-10-CM

## 2022-11-23 DIAGNOSIS — M21.371 FOOT DROP, RIGHT: ICD-10-CM

## 2022-11-23 DIAGNOSIS — M47.816 LUMBAR SPONDYLOSIS: ICD-10-CM

## 2022-11-23 DIAGNOSIS — M47.816 LUMBAR FACET ARTHROPATHY: ICD-10-CM

## 2022-11-23 DIAGNOSIS — M51.9 LUMBAR DISC DISORDER: ICD-10-CM

## 2022-11-23 DIAGNOSIS — G89.4 CHRONIC PAIN SYNDROME: Primary | ICD-10-CM

## 2022-11-23 DIAGNOSIS — E11.40 TYPE 2 DIABETES MELLITUS WITH DIABETIC NEUROPATHY, WITHOUT LONG-TERM CURRENT USE OF INSULIN (HCC): ICD-10-CM

## 2022-11-23 PROCEDURE — 3044F HG A1C LEVEL LT 7.0%: CPT | Performed by: PAIN MEDICINE

## 2022-11-23 PROCEDURE — 99213 OFFICE O/P EST LOW 20 MIN: CPT | Performed by: PAIN MEDICINE

## 2022-11-23 PROCEDURE — 1123F ACP DISCUSS/DSCN MKR DOCD: CPT | Performed by: PAIN MEDICINE

## 2022-11-23 RX ORDER — SODIUM CHLORIDE 9 MG/ML
INJECTION, SOLUTION INTRAVENOUS PRN
OUTPATIENT
Start: 2022-11-23

## 2022-11-23 RX ORDER — SODIUM CHLORIDE 0.9 % (FLUSH) 0.9 %
5-40 SYRINGE (ML) INJECTION PRN
OUTPATIENT
Start: 2022-11-23

## 2022-11-23 RX ORDER — SODIUM CHLORIDE 0.9 % (FLUSH) 0.9 %
5-40 SYRINGE (ML) INJECTION EVERY 12 HOURS SCHEDULED
OUTPATIENT
Start: 2022-11-23

## 2022-11-23 NOTE — PROGRESS NOTES
Do you currently have any of the following:    Fever: No  Headache:  No  Cough: No  Shortness of breath: No  Exposed to anyone with these symptoms: No                                                                                                                Phoebe Gan presents to the [facility] on 11/23/2022. Gordon Ann is complaining of pain in his lower back and bilateral legs. . The pain is constant. When walking it is bad. The pain is described as aching, throbbing, shooting, stabbing, and sharp. Pain is rated on his best day at a 4, on his worst day at a 10, and on average at a 6 on the VAS scale. He took his last dose of Neurontin, Motrin, and meloxicam.  . Gordon Ann does not have issues with constipation. Any procedures since your last visit: Yes, with 25 % relief. It has helped a little bit, but he is still unstable and has back pain. He is  on NSAIDS and  is not on anticoagulation medications to include none and is managed by NA. Pacemaker or defibrillator: No Physician managing device is NA. Medication Contract and Consent for Opioid Use Documents Filed        No documents found                       /82   Pulse 81   Temp 97.3 °F (36.3 °C) (Infrared)   Resp 16   Ht 5' 11\" (1.803 m)   Wt 187 lb (84.8 kg)   SpO2 94%   BMI 26.08 kg/m²      No LMP for male patient.

## 2022-11-23 NOTE — PROGRESS NOTES
[address] [facility], [address] [place] [address] Jatinder  [phone]    Follow up Note      Nataly Byers     Date of Visit:  11/23/2022    CC:  Patient presents for follow up   Chief Complaint   Patient presents with    Follow Up After Procedure      Fluoroscopic guided therapeutic lumbar epidural steroid injection at the L4-5 level (# 1). HPI:    Pain is  slightly better . Appropriate analgesia with current medications regimen: n/A. Change in quality of symptoms:no. Medication side effects:not applicable . Recent diagnostic testing:none. Recent interventional procedures:LESI L4-5 with slight improvement in his pain. He has not been on anticoagulation medications to include none. The patient  has not been on herbal supplements. The patient is diabetic. Imaging:   Lumbar spine MRI 2022:  1. No fracture or bony destructive lesion. 2. Severe central canal stenosis at L3-4. Moderate stenosis at L4-5.   3.  Multilevel neural foraminal stenoses, worst (severe) at the bilateral   L3-4 and L4-5 levels, as well as the right L5-S1 level. 4. Fluid along the interspinous bursa at L1-2 and L3-4. Clinical correlation   for [place]rup disease is recommended. Previous treatments: Physical Therapy and medications. .      Potential Aberrant Drug-Related Behavior:    No    Urine Drug Screening:  None    OARRS report:  11/2022 consistent     Opioid Agreement:  Renewal date:N/A    Past Medical History:   Diagnosis Date    Agent orange exposure     Chronic cough     uses inhaler /to have Bronch 8/22/2019    Colon polyps 03/07/2019, 9-2021    fOR or 10-19-21    COPD (chronic obstructive pulmonary disease) ([facility] [address]     Diabetes ([facility] [address]     Hyperlipidemia     Hypertension     Rash     noted 2/28/19- says it is on back & shoulders    Restless leg     Spinal stenosis at L4-L5 level 2009    [facility]    Thyroid nodule 2020    R     Past Surgical History:   Procedure Laterality Date    APPENDECTOMY  2000? ruptured    BRONCHOSCOPY N/A 8/22/2019    BRONCHOSCOPY DIAGNOSTIC OR CELL [address] ONLY performed by Emely Corbett MD at [place] [address]  2000?    open    COLECTOMY N/A 10/19/2021    LAPAROSCOPIC ROBOTIC XI ASSISTED RIGHT COLECTOMY performed by Aaron Freire DO at [place] [address]  12 years ago    COLONOSCOPY N/A 3/7/2019    COLONOSCOPY POLYPECTOMY SNARE/COLD BIOPSY performed by Aaron Freire DO at [facility] N/A 8/26/2021    COLONOSCOPY POLYPECTOMY HOT BIOPSY performed by Aaron Freire DO at [facility]  years ago    LITHOTRIPSY  years ago    PAIN MANAGEMENT PROCEDURE N/A 11/14/2022    LUMBAR EPIDURAL STEROID INJECTION L4-5 WITH LOW VOLUME AND 60 DEPO performed by Sydni Ann MD at [facility] 8/26/2021    EGD BIOPSY performed by Aaron Freire DO at [facility]     Prior to Admission medications    Medication Sig Start Date End Date Taking? Authorizing Provider   ibuprofen (ADVIL;MOTRIN) 200 MG tablet Take 200 mg by mouth as needed for Pain   Yes Historical Provider, MD   gabapentin (NEURONTIN) 100 MG capsule Take 1 capsule by mouth 2 times daily for 30 days.  Take 2 capsules QHS for three days then 2 capsules BID as tolerated 10/25/22 11/24/22 Yes Sydni Ann MD   ramipril (ALTACE) 1.25 MG capsule TAKE 1 CAPSULE DAILY 9/20/22  Yes Oumou Ojeda, DO   fluticasone-umeclidin-vilant (TRELEGY ELLIPTA) 100-62.5-25 MCG/INH AEPB Inhale 1 puff into the lungs daily 8/22/22  Yes Vinod Ojeda DO   meloxicam (MOBIC) 15 MG tablet Take 1 tablet by mouth daily as needed for Pain 8/22/22  Yes Vinod Ojeda DO   SITagliptin-metFORMIN (JANUMET XR)  MG TB24 per extended release tablet Take 1 tablet by mouth daily 4/21/22  Yes Vinod Ojeda DO   vitamin D (CHOLECALCIFEROL) 28976 UNIT CAPS One weekly 4/21/22  Yes Vinod Ojeda DO   albuterol sulfate  (90 Base) MCG/ACT inhaler Inhale 2 puffs into the lungs every 6 hours as needed for Wheezing or Shortness of Breath 9/23/21  Yes Vinod Ojeda DO   rOPINIRole (REQUIP) 1 MG tablet Take 1 tablet by mouth nightly 2/8/21  Yes Vinod Ojeda DO     Allergies   Allergen Reactions    Pcn [Penicillins] Hives    Tape [place] Tape]      blisters     Social History     Socioeconomic History    Marital status:      Spouse name: Not on file    Number of children: Not on file    Years of education: Not on file    Highest education level: Not on file   Occupational History    Not on file   Tobacco Use    Smoking status: Every Day     Packs/day: 1.50     Years: 35.00     Pack years: 52.50     Types: Cigarettes    Smokeless tobacco: Never   Vaping Use    Vaping Use: Never used   Substance and Sexual Activity    Alcohol use: Not Currently    Drug use: No    Sexual activity: Never   Other Topics Concern    Not on file   Social History Narrative    Not on file     Social Determinants of Health     Financial Resource Strain: Low Risk     Difficulty of Paying Living Expenses: Not hard at all   Food Insecurity: No Food Insecurity    Worried About Running Out of Food in the Last Year: Never true    Ran Out of Food in the Last Year: Never true   Transportation Needs: Not on file   Physical Activity: Sufficiently Active    Days of Exercise per Week: 5 days    Minutes of Exercise per Session: 60 min   Stress: Not on file   Social Connections: Not on file   Intimate Partner Violence: Not on file   Housing Stability: Not on file       Family History   Problem Relation Age of Onset    Stroke Father      REVIEW OF SYSTEMS:     Alisha Diallo denies fever/chills, chest pain, shortness of breath, new bowel or bladder complaints. All other review of systems was negative.     PHYSICAL EXAMINATION:      /82   Pulse 81   Temp 97.3 °F (36.3 °C) (Infrared)   Resp 16   Ht 5' 11\" (1.803 m)   Wt 187 lb (84.8 kg)   SpO2 94%   BMI 26.08 kg/m²     General:       General appearance:   elderly, pleasant, and well-hydrated. , in moderate discomfort and A & O x3  Build:Normal Weight     HEENT:     Head:normocephalic and atraumatic  Sclera: icterus absent,      Lungs:     Breathing:Breathing Pattern: regular, no distress     Abdomen:     Shape:non-distended and normal  Tenderness:none     Lumbar spine:     Spine inspection:normal   CVA tenderness:No   Palpation:tenderness paravertebral muscles, facet loading, left, right, positive, and tenderness. Range of motion:not tested. Musculoskeletal:     Trigger points in Paravet[place]:absent bilaterally  SI joint tenderness:negative right, negative left  SLR:negative right, negative left, sitting      Extremities:     Tremors:None bilaterally upper and lower  Range of motion:pain with internal rotation of hips negative. Intact:Yes  Edema:Normal     Neurological:     Sensory:diminished to light touch bilateral legs. Motor:                         Right Quadriceps4/5          Left Quadriceps5/5           Right Gastrocnemius4-/5    Left Gastrocnemius5/5  Right Ant Tibialis2/5  Left Ant Tibialis5/5  [address]     Dermatology:     Skin:no unusual rashes and no skin lesions     Impression:  Low back pain with radiation to bilateral lower extremities. Lumbar spine MRI 2022 spinal stenosis worst at L3-4 and moderate at L4-5. Plan:  Follow up on his low back pain with no acute issues. Patient is s/p LESI L4-5 with slight improvement in his pain, discussed repeating but will change to L3-4 patient agrees. Discontinue Gabapentin (not helping)  Awaiting right orthotic (right foot drop). OARRS report reviewed 11/2022. Patient encouraged to stay active, currently in physical therapy. Treatment plan discussed with the patient including medications and procedure side effects.      We discussed with the patient that combining opioids, benzodiazepines, alcohol, illicit drugs or sleep aids increases the risk of respiratory depression including death. We discussed that these medications may cause drowsiness, sedation or dizziness and have counseled the patient not to drive or operate machinery. We have discussed that these medications will be prescribed only by one provider. We have discussed with the patient about age related risk factors and have thoroughly discussed the importance of taking these medications as prescribed. The patient verbalizes understanding. gema Powell M.D.

## 2022-12-20 ENCOUNTER — TELEPHONE (OUTPATIENT)
Dept: PAIN MANAGEMENT | Age: 72
End: 2022-12-20

## 2022-12-20 ENCOUNTER — ANESTHESIA EVENT (OUTPATIENT)
Dept: OPERATING ROOM | Age: 72
End: 2022-12-20
Payer: MEDICARE

## 2022-12-20 NOTE — TELEPHONE ENCOUNTER
Call to Phoebe Gan that procedure was approved for 12/29/2022 and that [address] [place] [address] should call him a few days before for the pre op call and after 3:00 PM the business day before with the arrival time. Instructed Kin to hold ibuprofen for 24 hours, naprosyn for 4 days and any aspirin containing products or fish oil for 7 days. Instructed to call office back if any questions. Lawerence Pear verbalized understanding.     Electronically signed by Diana Ingram RN on 12/20/2022 at 1:36 PM

## 2022-12-21 ASSESSMENT — LIFESTYLE VARIABLES: SMOKING_STATUS: 1

## 2022-12-21 NOTE — ANESTHESIA PRE PROCEDURE
Department of Anesthesiology  Preprocedure Note       Name:  Sesar Bedoya   Age:  74 y.o.  :  1950                                          MRN:  [id number]         Date:  2022      Surgeon: Olga Bruno):  Odilia Duckworth MD    Procedure: Procedure(s):  LUMBAR EPIDURAL STEROID INJECTION L3-4 WITH LOW VOLUME AND 80 DEPO    Medications prior to admission:   Prior to Admission medications    Medication Sig Start Date End Date Taking? Authorizing Provider   ibuprofen (ADVIL;MOTRIN) 200 MG tablet Take 200 mg by mouth as needed for Pain    Historical Provider, MD   gabapentin (NEURONTIN) 100 MG capsule Take 1 capsule by mouth 2 times daily for 30 days. Take 2 capsules QHS for three days then 2 capsules BID as tolerated  Patient not taking: Reported on 2022 10/25/22 11/24/22  Odilia Duckworth MD   ramipril (ALTACE) 1.25 MG capsule TAKE 1 CAPSULE DAILY 22   [place] Econom[facility], DO   fluticasone-umeclidin-vilant (TRELEGY ELLIPTA) 190-55.9-82 MCG/INH AEPB Inhale 1 puff into the lungs daily 22   [facility], DO   meloxicam (MOBIC) 15 MG tablet Take 1 tablet by mouth daily as needed for Pain 22   [place] Economus, DO   SITagliptin-metFORMIN (JANUMET XR)  MG TB24 per extended release tablet Take 1 tablet by mouth daily 22   [facility], DO   vitamin D (CHOLECALCIFEROL) 67270 UNIT CAPS One weekly 22   [place] Econom[facility], DO   albuterol sulfate  (90 Base) MCG/ACT inhaler Inhale 2 puffs into the lungs every 6 hours as needed for Wheezing or Shortness of Breath 21   [facility], DO   rOPINIRole (REQUIP) 1 MG tablet Take 1 tablet by mouth nightly 21   [facility], DO       Current medications:    No current facility-administered medications for this encounter.      Current Outpatient Medications   Medication Sig Dispense Refill    ibuprofen (ADVIL;MOTRIN) 200 MG tablet Take 200 mg by mouth as needed for Pain      gabapentin (NEURONTIN) 100 MG capsule Take 1 capsule by mouth 2 times daily for 30 days. Take 2 capsules QHS for three days then 2 capsules BID as tolerated (Patient not taking: Reported on 12/20/2022) 120 capsule 0    ramipril (ALTACE) 1.25 MG capsule TAKE 1 CAPSULE DAILY 90 capsule 1    fluticasone-umeclidin-vilant (TRELEGY ELLIPTA) 100-62.5-25 MCG/INH AEPB Inhale 1 puff into the lungs daily 1 each 3    meloxicam (MOBIC) 15 MG tablet Take 1 tablet by mouth daily as needed for Pain 30 tablet 1    SITagliptin-metFORMIN (JANUMET XR)  MG TB24 per extended release tablet Take 1 tablet by mouth daily 90 tablet 1    vitamin D (CHOLECALCIFEROL) 86301 UNIT CAPS One weekly 12 capsule 1    albuterol sulfate  (90 Base) MCG/ACT inhaler Inhale 2 puffs into the lungs every 6 hours as needed for Wheezing or Shortness of Breath 18 g 1    rOPINIRole (REQUIP) 1 MG tablet Take 1 tablet by mouth nightly 90 tablet 1       Allergies: Allergies   Allergen Reactions    Pcn [Penicillins] Hives    Tape Katy Erp Tape]      Surgical tape-blisters       Problem List:    Patient Active Problem List   Diagnosis Code    COPD (chronic obstructive pulmonary disease) ([facility] [address] J44.9    Type 2 diabetes mellitus with diabetic neuropathy, without long-term current use of insulin (HCC) E11.40    Hyperlipemia E78.5    Nephrolithiasis  N20.0    Tobacco abuse Z72.0    Intractable ophthalmoplegic migraine G43. B1    Adenomatous polyp of colon D12.6    Chronic pain syndrome G89.4    Lumbar facet arthropathy M47.816    Lumbar disc disorder M51.9    Lumbar spondylosis M47.816    Spinal stenosis of lumbar region without neurogenic claudication M48.061    Lumbar radiculopathy M54.16       Past Medical History:        Diagnosis Date    Agent orange exposure     Chronic cough     uses inhaler /to have Bronch 8/22/2019    Colon polyps 03/07/2019, 9-2021    fOR or 10-19-21    COPD (chronic obstructive pulmonary disease) ([facility] [address]  Diabetes ([address]     Hyperlipidemia     Hypertension     Rash     noted 2/28/19- says it is on back & shoulders    Restless leg     Spinal stenosis at L4-L5 level 2009    [facility] Thyroid nodule 2020    R       Past Surgical History:        Procedure Laterality Date    APPENDECTOMY  2000?     ruptured    BRONCHOSCOPY N/A 8/22/2019    BRONCHOSCOPY DIAGNOSTIC OR [facility] ONLY performed by Rosa Francis MD at [address]  2000?    open    COLECTOMY N/A 10/19/2021    LAPAROSCOPIC ROBOTIC XI ASSISTED RIGHT COLECTOMY performed by Rosa M Mora DO at [address] [facility]  12 years ago    COLONOSCOPY N/A 3/7/2019    COLONOSCOPY POLYPECTOMY SNARE/COLD BIOPSY performed by Rosa M Mora DO at [address] COLONOSCOPY N/A 8/26/2021    COLONOSCOPY POLYPECTOMY HOT BIOPSY performed by Rosa M Mora DO at [address] [facility]  years ago    LITHOTRIPSY  years ago    PAIN MANAGEMENT PROCEDURE N/A 11/14/2022    LUMBAR EPIDURAL STEROID INJECTION L4-5 WITH LOW VOLUME AND 60 DEPO performed by Azra Hughes MD at [address] N/A 8/26/2021    EGD BIOPSY performed by Rosa M Mora DO at [address] History:    Social History     Tobacco Use    Smoking status: Every Day     Packs/day: 1.50     Years: 35.00     Pack years: 52.50     Types: Cigarettes    Smokeless tobacco: Never   Substance Use Topics    Alcohol use: Not Currently                                Ready to quit: Not Answered  Counseling given: Not Answered      Vital Signs (Current):   Vitals:    12/20/22 1503   Weight: 185 lb (83.9 kg)   Height: 6' (1.829 m)                                              BP Readings from Last 3 Encounters:   11/23/22 134/82   11/14/22 (!) 147/72   10/25/22 122/80       NPO Status:                                                                                 BMI:   Wt Readings from Last 3 Encounters: 12/20/22 185 lb (83.9 kg)   11/23/22 187 lb (84.8 kg)   11/09/22 187 lb (84.8 kg)     Body mass index is 25.09 kg/m². CBC:   Lab Results   Component Value Date/Time    WBC 8.5 08/22/2022 10:52 AM    RBC 5.27 08/22/2022 10:52 AM    HGB 16.0 08/22/2022 10:52 AM    HCT 49.3 08/22/2022 10:52 AM    MCV 93.5 08/22/2022 10:52 AM    RDW 13.1 08/22/2022 10:52 AM     08/22/2022 10:52 AM       CMP:   Lab Results   Component Value Date/Time     08/22/2022 10:52 AM    K 4.8 08/22/2022 10:52 AM    K 4.5 10/23/2021 04:15 AM     08/22/2022 10:52 AM    CO2 24 08/22/2022 10:52 AM    BUN 12 08/22/2022 10:52 AM    CREATININE 1.3 08/22/2022 10:52 AM    GFRAA >60 08/22/2022 10:52 AM    LABGLOM 54 08/22/2022 10:52 AM    GLUCOSE 158 08/22/2022 10:52 AM    PROT 6.7 08/22/2022 10:52 AM    CALCIUM 9.2 08/22/2022 10:52 AM    BILITOT 1.0 08/22/2022 10:52 AM    ALKPHOS 187 08/22/2022 10:52 AM    AST 19 08/22/2022 10:52 AM    ALT 15 08/22/2022 10:52 AM       POC Tests: No results for input(s): POCGLU, POCNA, POCK, POCCL, POCBUN, POCHEMO, POCHCT in the last 72 hours. Coags: No results found for: PROTIME, INR, APTT    HCG (If Applicable): No results found for: PREGTESTUR, PREGSERUM, HCG, HCGQUANT     ABGs: No results found for: PHART, PO2ART, KEB1EPF, PKC4IBH, BEART, F4EPCAUO     Type & Screen (If Applicable):  No results found for: LABABO, LABRH    Drug/Infectious Status (If Applicable):  No results found for: HIV, HEPCAB    COVID-19 Screening (If Applicable): No results found for: COVID19        Anesthesia Evaluation  Patient summary reviewed  Airway: Mallampati: II  TM distance: >3 FB   Neck ROM: full  Mouth opening: > = 3 FB   Dental:          Pulmonary: breath sounds clear to auscultation  (+) COPD:  current smoker (52 pk yrs)          Patient smoked on day of surgery. ROS comment: Chronic cough.  Using inhaler only when needed    Cardiovascular:    (+) hypertension:, hyperlipidemia        Rhythm: regular             Beta Blocker:  Not on Beta Blocker         Neuro/Psych:   (+) neuromuscular disease:, headaches:,             GI/Hepatic/Renal:             Endo/Other:    (+) DiabetesType II DM, , .                  ROS comment: Thyroid nodule Abdominal:             Vascular: Other Findings:           Anesthesia Plan      MAC     ASA 3     (Coughing but clear to auscultation,)  Induction: intravenous. MIPS: Prophylactic antiemetics administered. Anesthetic plan and risks discussed with patient. Plan discussed with CRNA. Yovana Vital MD   12/21/2022      DOS STAFF ADDENDUM:    Pt seen and examined, chart reviewed (including anesthesia, drug and allergy history). Anesthetic plan, risks, benefits, alternatives, and personnel involved discussed with patient. Patient verbalized an understanding and agrees to proceed. Plan discussed with care team members and agreed upon.     Maxine Castaneda MD  Staff Anesthesiologist  1:38 PM

## 2022-12-22 ENCOUNTER — HOSPITAL ENCOUNTER (OUTPATIENT)
Age: 72
Setting detail: OUTPATIENT SURGERY
Discharge: HOME | End: 2022-12-22
Attending: PAIN MEDICINE | Admitting: PAIN MEDICINE
Payer: MEDICARE

## 2022-12-22 ENCOUNTER — ANESTHESIA (OUTPATIENT)
Dept: OPERATING ROOM | Age: 72
End: 2022-12-22
Payer: MEDICARE

## 2022-12-22 ENCOUNTER — HOSPITAL ENCOUNTER (OUTPATIENT)
Dept: OPERATING ROOM | Age: 72
Setting detail: OUTPATIENT SURGERY
Discharge: HOME | End: 2022-12-22
Attending: PAIN MEDICINE
Payer: MEDICARE

## 2022-12-22 VITALS
WEIGHT: 185 LBS | OXYGEN SATURATION: 94 % | SYSTOLIC BLOOD PRESSURE: 126 MMHG | DIASTOLIC BLOOD PRESSURE: 56 MMHG | RESPIRATION RATE: 14 BRPM | HEIGHT: 72 IN | TEMPERATURE: 98 F | BODY MASS INDEX: 25.06 KG/M2 | HEART RATE: 61 BPM

## 2022-12-22 DIAGNOSIS — M54.16 LUMBAR RADICULOPATHY: ICD-10-CM

## 2022-12-22 LAB — METER GLUCOSE: 116 MG/DL (ref 74–99)

## 2022-12-22 PROCEDURE — 2709999900 HC NON-CHARGEABLE SUPPLY: Performed by: PAIN MEDICINE

## 2022-12-22 PROCEDURE — 3600000005 HC SURGERY LEVEL 5 BASE: Performed by: PAIN MEDICINE

## 2022-12-22 PROCEDURE — 6360000004 HC RX CONTRAST MEDICATION: Performed by: PAIN MEDICINE

## 2022-12-22 PROCEDURE — 2500000003 HC RX 250 WO HCPCS: Performed by: PAIN MEDICINE

## 2022-12-22 PROCEDURE — 2580000003 HC RX 258: Performed by: ANESTHESIOLOGY

## 2022-12-22 PROCEDURE — 3700000000 HC ANESTHESIA ATTENDED CARE: Performed by: PAIN MEDICINE

## 2022-12-22 PROCEDURE — 6360000002 HC RX W HCPCS: Performed by: PAIN MEDICINE

## 2022-12-22 PROCEDURE — 3209999900 FLUORO FOR SURGICAL PROCEDURES

## 2022-12-22 PROCEDURE — 6360000002 HC RX W HCPCS: Performed by: NURSE ANESTHETIST, CERTIFIED REGISTERED

## 2022-12-22 PROCEDURE — 7100000010 HC PHASE II RECOVERY - FIRST 15 MIN: Performed by: PAIN MEDICINE

## 2022-12-22 PROCEDURE — 82962 GLUCOSE BLOOD TEST: CPT | Performed by: PAIN MEDICINE

## 2022-12-22 PROCEDURE — 82962 GLUCOSE BLOOD TEST: CPT

## 2022-12-22 PROCEDURE — 62323 NJX INTERLAMINAR LMBR/SAC: CPT | Performed by: PAIN MEDICINE

## 2022-12-22 PROCEDURE — 7100000011 HC PHASE II RECOVERY - ADDTL 15 MIN: Performed by: PAIN MEDICINE

## 2022-12-22 RX ORDER — MIDAZOLAM HYDROCHLORIDE 1 MG/ML
INJECTION INTRAMUSCULAR; INTRAVENOUS PRN
Status: DISCONTINUED | OUTPATIENT
Start: 2022-12-22 | End: 2022-12-22 | Stop reason: SDUPTHER

## 2022-12-22 RX ORDER — SODIUM CHLORIDE 0.9 % (FLUSH) 0.9 %
5-40 SYRINGE (ML) INJECTION EVERY 12 HOURS SCHEDULED
Status: DISCONTINUED | OUTPATIENT
Start: 2022-12-22 | End: 2022-12-22 | Stop reason: HOSPADM

## 2022-12-22 RX ORDER — SODIUM CHLORIDE, SODIUM LACTATE, POTASSIUM CHLORIDE, CALCIUM CHLORIDE 600; 310; 30; 20 MG/100ML; MG/100ML; MG/100ML; MG/100ML
INJECTION, SOLUTION INTRAVENOUS CONTINUOUS
Status: DISCONTINUED | OUTPATIENT
Start: 2022-12-22 | End: 2022-12-22 | Stop reason: HOSPADM

## 2022-12-22 RX ORDER — FENTANYL CITRATE 50 UG/ML
INJECTION, SOLUTION INTRAMUSCULAR; INTRAVENOUS PRN
Status: DISCONTINUED | OUTPATIENT
Start: 2022-12-22 | End: 2022-12-22 | Stop reason: SDUPTHER

## 2022-12-22 RX ORDER — SODIUM CHLORIDE 9 MG/ML
INJECTION, SOLUTION INTRAVENOUS PRN
Status: DISCONTINUED | OUTPATIENT
Start: 2022-12-22 | End: 2022-12-22 | Stop reason: HOSPADM

## 2022-12-22 RX ORDER — LIDOCAINE HYDROCHLORIDE 5 MG/ML
INJECTION, SOLUTION INFILTRATION; INTRAVENOUS PRN
Status: DISCONTINUED | OUTPATIENT
Start: 2022-12-22 | End: 2022-12-22 | Stop reason: HOSPADM

## 2022-12-22 RX ORDER — SODIUM CHLORIDE 0.9 % (FLUSH) 0.9 %
5-40 SYRINGE (ML) INJECTION PRN
Status: DISCONTINUED | OUTPATIENT
Start: 2022-12-22 | End: 2022-12-22 | Stop reason: HOSPADM

## 2022-12-22 RX ADMIN — FENTANYL CITRATE 100 MCG: 50 INJECTION INTRAMUSCULAR; INTRAVENOUS at 14:05

## 2022-12-22 RX ADMIN — MIDAZOLAM 2 MG: 1 INJECTION INTRAMUSCULAR; INTRAVENOUS at 14:05

## 2022-12-22 RX ADMIN — SODIUM CHLORIDE, POTASSIUM CHLORIDE, SODIUM LACTATE AND CALCIUM CHLORIDE: 600; 310; 30; 20 INJECTION, SOLUTION INTRAVENOUS at 13:37

## 2022-12-22 ASSESSMENT — PAIN - FUNCTIONAL ASSESSMENT
PAIN_FUNCTIONAL_ASSESSMENT: 0-10
PAIN_FUNCTIONAL_ASSESSMENT: PREVENTS OR INTERFERES SOME ACTIVE ACTIVITIES AND ADLS

## 2022-12-22 ASSESSMENT — PAIN DESCRIPTION - DESCRIPTORS: DESCRIPTORS: ACHING;NUMBNESS

## 2022-12-22 NOTE — OP NOTE
2022    Patient: Duc Sharma  :  1950  Age:  74 y.o. Sex:  male     PRE-OPERATIVE DIAGNOSIS: Lumbar disc displacement, lumbar radiculopathy. POST-OPERATIVE DIAGNOSIS: Same. PROCEDURE: Fluoroscopic guided therapeutic lumbar epidural steroid injection at the L3-4 level (# 1). SURGEON: ELISA Treadwell M.D.. ANESTHESIA: MAC.    ESTIMATED BLOOD LOSS: None.  ______________________________________________________________________    BRIEF HISTORY:  Duc Sharma comes in today for first lumbar epidural injection at L3-4 level. The potential complications of this procedure were discussed with him again today. He has elected to undergo the aforementioned procedure. Sulaiman complete History & Physical examination were reviewed in depth, a copy of which is in the chart. DESCRIPTION OF PROCEDURE:    After confirming written and informed consent, a time-out was performed and Sulaiman name and date of birth, the procedure to be performed as well as the plan for the location of the needle insertion were confirmed. The patient was brought into the procedure room and placed in the prone position on the fluoroscopy table. A pillow was placed under the patient's lower abdomen/upper pelvis to increase lumbar interlaminar space. Standard monitors were placed, and vital signs were observed throughout the procedure. The area of the lumbar spine was prepped with chloraprep and draped in a sterile manner. The L3-4 interspace was identified and marked under AP fluoroscopy. The skin and subcutaneous tissues at the above level were anesthestized with 0.5% lidocaine. With intermittent fluoroscopy, an # 18 gauge 3 1/2 tuohy epidural needle was inserted and directed toward the interlaminar space. The needle was slowly advanced using loss of resistance technique and 5 cc glass syringe  until the tip of the epidural needle has passed through the ligamentum flavum and entered the epidural space.  AP and lateral fluoroscopic imaging is performed to verify that the epidural needle is properly placed. Negative aspiration of blood and CSF was confirmed. 0.5 ml of omnipaque 240 was used for confirmation of even epidural spread under both live and AP fluoroscopy. After negative aspiration, a solution of 0.5 % Lidocaine 1 ml and 60 mg DepoMedrol was easily injected. The needle was gently removed intact . The patient back was cleaned and a Band-Aid was placed over the needle insertion point. Disposition the patient tolerated the procedure well and there were no complications . Vital signs remained stable throughout the procedure. The patient was escorted to the recovery area where they remained until discharge and written discharge instructions for the procedure were given. Plan: Adelaide Barreto will return to our pain management center as scheduled.      Baldo Monsivais MD

## 2022-12-22 NOTE — ANESTHESIA POSTPROCEDURE EVALUATION
Department of Anesthesiology  Postprocedure Note    Patient: Dean Dawson  MRN: [id number]  YOB: 1950  Date of evaluation: 12/22/2022      Procedure Summary     Date: 12/22/22 Room / Location: [address] [place] [address] 04 / [facility]    Anesthesia Start: 2247 Anesthesia Stop: 6323    Procedure: LUMBAR EPIDURAL STEROID INJECTION L3-4 WITH LOW VOLUME AND 80 DEPO (Back) Diagnosis:       Lumbar radiculopathy      (Lumbar radiculopathy [M54.16])    Surgeons: Bridger Hernandez MD Responsible Provider: Aviva Mercado MD    Anesthesia Type: MAC ASA Status: 3          Anesthesia Type: MAC    Carey Phase I: Carey Score: 10    Carey Phase II: Carey Score: 10      Anesthesia Post Evaluation    Patient location during evaluation: PACU  Patient participation: complete - patient participated  Level of consciousness: awake  Pain score: 0  Airway patency: patent  Nausea & Vomiting: no nausea  Complications: no  Cardiovascular status: hemodynamically stable  Respiratory status: acceptable  Hydration status: stable  Multimodal analgesia pain management approach

## 2022-12-22 NOTE — DISCHARGE INSTRUCTIONS
[facility]) Pain Management Department  [phone]   Post-Pain Block/ Radiofrequency Home Going Instructions    1-Go home, rest for the remainder of the day  2-Please do not lift over 20 pounds the day of the injection  3-If you received sedation No: alcohol, driving, operating lawn mowers, plows, tractors or other dangerous equipment until next morning. Do not make important decisions or sign legal documents for 24 hours. You may experience light headedness, dizziness, nausea or sleepiness after sedation. Do not stay alone. A responsible adult must be with you for 24 hours. You could be nauseated from the medications you have received. Your IV site may be sore and bruised. 4-No dietary restrictions     5-Resume all medications the same day, blood thinners to be resumed 24 hours after injection    6-Keep the surgical site clean and dry, you may shower the next morning and remove the      dressing. 7- No sitz baths, tub baths or hot tubs/swimming for 24 hours. 8- If you have any pain at the injection site(s), application of an ice pack to the area should be       helpful, 20 minutes on/20 minutes off for next 48 hours. 9- [facility] if you develop. Fever greater than 100.4 F  Have bleeding or drainage from the puncture site  Have progressive Leg/arm numbness and or weakness  Loss of control of bowel and or bladder (wet/soil yourself)  Severe headache with inability to lift head  10-You may return to work the next day                Infection After Surgery: Care Instructions  Overview  After surgery, an infection is always possible. It doesn't mean that the surgery didn't go well. Because an infection can be serious, your doctor has taken steps to manage it. Your doctor checked the infection and cleaned it if necessary. Your doctor may have made an opening in the area so that the pus can drain out.  You may have gauze in the cut so that the area will stay open and keep draining. You may need antibiotics. You will need to follow up with your doctor to make sure the infection has gone away. Follow-up care is a key part of your treatment and safety. Be sure to make and go to all appointments, and call your doctor if you are having problems. It's also a good idea to know your test results and keep a list of the medicines you take. How can you care for yourself at home? Make sure your surgeon knows about the infection, especially if you saw another doctor about your symptoms. If your doctor prescribed antibiotics, take them as directed. Do not stop taking them just because you feel better. You need to take the full course of antibiotics. Ask your doctor if you can take an over-the-counter pain medicine, such as acetaminophen (Tylenol), ibuprofen (Advil, Motrin), or naproxen (Aleve). Be safe with medicines. Read and follow all instructions on the label. Do not take two or more pain medicines at the same time unless the doctor told you to. Many pain medicines have acetaminophen, which is Tylenol. Too much acetaminophen (Tylenol) can be harmful. Prop up the area on a pillow anytime you sit or lie down during the next 3 days. Try to keep it above the level of your heart. This will help reduce swelling. Keep the skin clean and dry. You may have a bandage over the cut (incision). A bandage helps the incision heal and protects it. Your doctor will tell you how to take care of this. Keep it clean and dry. You may have drainage from the wound. If your doctor told you how to care for your incision, follow your doctor's instructions. If you did not get instructions, follow this general advice:  Wash around the incision with clean water 2 times a day. Don't use hydrogen peroxide or alcohol, which can slow healing. When should you call for help? Call your doctor now or seek immediate medical care if:    You have signs that your infection is getting worse, such as:   Increased pain, swelling, warmth, or redness in the area. Red streaks leading from the area. Pus draining from the wound. A new or higher fever. Watch closely for changes in your health, and be sure to contact your doctor if you have any problems. Where can you learn more? Go to http://www.woods.com/ and enter C340 to learn more about \"Infection After Surgery: Care Instructions. \"  Current as of: January 20, 2022               Content Version: 13.5  © 2006-2022 [employer]. Care instructions adapted under license by [facility] ([facility]). If you have questions about a medical condition or this instruction, always ask your healthcare professional. [place] [address] any warranty or liability for your use of this information. Nausea and Vomiting After Surgery: Care Instructions  Your Care Instructions     After you've had surgery, you may feel sick to your stomach (nauseated) or you may vomit. Sometimes anesthesia can make you feel sick. It's a common side effect and often doesn't last long. Pain also can make you feel sick or vomit. After the anesthesia wears off, you may feel pain from the incision (cut). That pain could then upset your stomach. Taking pain medicine can also make you feel sick to your stomach. Whatever the cause, you may get medicine that can help. There are also some things you can do at home to prevent nausea and feel better. The doctor has checked you carefully, but problems can develop later. If you notice any problems or new symptoms, get medical treatment right away. Follow-up care is a key part of your treatment and safety. Be sure to make and go to all appointments, and call your doctor if you are having problems. It's also a good idea to know your test results and keep a list of the medicines you take. How can you care for yourself at home? Be safe with medicines. Read and follow all instructions on the label.   If the doctor gave you a prescription medicine for pain, take it as prescribed. If you are not taking a prescription pain medicine, ask your doctor if you can take an over-the-counter medicine. Take your pain medicine as soon as you have pain. It works better if you take it before the pain gets bad. Call your doctor if you have any problems with your medicine. Rest in bed until you feel better. To prevent dehydration, drink plenty of fluids. Choose water and other clear liquids until you feel better. If you have kidney, heart, or liver disease and have to limit fluids, talk with your doctor before you increase the amount of fluids you drink. When you are able to eat, try clear soups, mild foods, and liquids until all symptoms are gone for 12 to 48 hours. Other good choices include dry toast, crackers, cooked cereal, and gelatin dessert, such as Jell-O. Do not smoke. Smoking and being around smoke can make nausea worse. If you need help quitting, talk to your doctor about stop-smoking programs and medicines. These can increase your chances of quitting for good. When should you call for help? Call 911  anytime you think you may need emergency care. For example, call if:    You passed out (lost consciousness). Call your doctor now or seek immediate medical care if:    You have new or worse nausea or vomiting. You are too sick to your stomach to drink any fluids. You cannot keep down fluids. You have symptoms of dehydration, such as:  Dry eyes and a dry mouth. Passing only a little urine. Feeling thirstier than usual.     Your pain medicine is not helping. You are dizzy or lightheaded, or you feel like you may faint. Watch closely for changes in your health, and be sure to contact your doctor if:    You do not get better as expected. Current as of: March 9, 2022               Content Version: 13.5  © 8337-1795 [employer], [employer]. Care instructions adapted under license by [facility] [address]  If you have questions about a medical condition or this instruction, always ask your healthcare professional. [place] [address] any warranty or liability for your use of this information.

## 2022-12-22 NOTE — H&P
[address]  [address] [facility], [address] [facility] [place]  [phone]    Procedure History & Physical      [place] Like     HPI:    Patient  is here for low back and leg pain for LESI L3-4. Labs/imaging studies reviewed   All question and concerns addressed including R/B/A associated with the procedure    Past Medical History:   Diagnosis Date    Agent orange exposure     Chronic cough     uses inhaler /to have Bronch 8/22/2019    Colon polyps 03/07/2019, 9-2021    fOR or 10-19-21    COPD (chronic obstructive pulmonary disease) ([facility] [address]     Diabetes ([facility] [address]     Hyperlipidemia     Hypertension     Rash     noted 2/28/19- says it is on back & shoulders    Restless leg     Spinal stenosis at L4-L5 level 2009    [facility] Myrtie Bui    Thyroid nodule 2020    R       Past Surgical History:   Procedure Laterality Date    APPENDECTOMY  2000? ruptured    BRONCHOSCOPY N/A 8/22/2019    BRONCHOSCOPY DIAGNOSTIC OR CELL [address] ONLY performed by Jose Carlos Singh MD at [facility]  2000?    open    COLECTOMY N/A 10/19/2021    LAPAROSCOPIC ROBOTIC XI ASSISTED RIGHT COLECTOMY performed by Benito Hurst DO at [address],[facility] [address]  12 years ago    COLONOSCOPY N/A 3/7/2019    COLONOSCOPY POLYPECTOMY SNARE/COLD BIOPSY performed by Benito Hurst DO at [address] [address] N/A 8/26/2021    COLONOSCOPY POLYPECTOMY HOT BIOPSY performed by Beniot Hurst DO at [address] [facility]  years ago    LITHOTRIPSY  years ago    PAIN MANAGEMENT PROCEDURE N/A 11/14/2022    LUMBAR EPIDURAL STEROID INJECTION L4-5 WITH LOW VOLUME AND 60 DEPO performed by Hui aTm MD at [address] [facility] 8/26/2021    EGD BIOPSY performed by Benito Hurst DO at [address] [facility]       Prior to Admission medications    Medication Sig Start Date End Date Taking?  Authorizing Provider   ibuprofen (ADVIL;MOTRIN) 200 MG tablet Take 200 mg by mouth as needed for Pain    Historical Provider, MD   gabapentin (NEURONTIN) 100 MG capsule Take 1 capsule by mouth 2 times daily for 30 days.  Take 2 capsules QHS for three days then 2 capsules BID as tolerated  Patient not taking: Reported on 12/20/2022 10/25/22 11/24/22  Talia Scott MD   ramipril (ALTACE) 1.25 MG capsule TAKE 1 CAPSULE DAILY 9/20/22   Rachel Ojeda, DO   fluticasone-umeclidin-vilant (TRELEGY ELLIPTA) 100-62.5-25 MCG/INH AEPB Inhale 1 puff into the lungs daily 8/22/22   Rachel Ojeda, DO   meloxicam (MOBIC) 15 MG tablet Take 1 tablet by mouth daily as needed for Pain 8/22/22   Rachel Ojeda, DO   SITagliptin-metFORMIN (JANUMET XR)  MG TB24 per extended release tablet Take 1 tablet by mouth daily 4/21/22   Rachel Ojeda, DO   vitamin D (CHOLECALCIFEROL) 85508 UNIT CAPS One weekly 4/21/22   Rachel Ojeda, DO   albuterol sulfate  (90 Base) MCG/ACT inhaler Inhale 2 puffs into the lungs every 6 hours as needed for Wheezing or Shortness of Breath 9/23/21   Rachel Ojeda, DO   rOPINIRole (REQUIP) 1 MG tablet Take 1 tablet by mouth nightly 2/8/21   Rachel Ojeda, DO       Allergies   Allergen Reactions    Pcn [Penicillins] Hives    Tape Bashir Dame Tape]      Surgical tape-blisters       Social History     Socioeconomic History    Marital status:      Spouse name: Not on file    Number of children: Not on file    Years of education: Not on file    Highest education level: Not on file   Occupational History    Not on file   Tobacco Use    Smoking status: Every Day     Packs/day: 1.50     Years: 35.00     Pack years: 52.50     Types: Cigarettes    Smokeless tobacco: Never   Vaping Use    Vaping Use: Never used   Substance and Sexual Activity    Alcohol use: Not Currently    Drug use: No    Sexual activity: Never   Other Topics Concern    Not on file   Social History Narrative    Not on file     Social Determinants of Health Financial Resource Strain: Low Risk     Difficulty of Paying Living Expenses: Not hard at all   Food Insecurity: No Food Insecurity    Worried About Running Out of Food in the Last Year: Never true    Ran Out of Food in the Last Year: Never true   Transportation Needs: Not on file   Physical Activity: Sufficiently Active    Days of Exercise per Week: 5 days    Minutes of Exercise per Session: 60 min   Stress: Not on file   Social Connections: Not on file   Intimate Partner Violence: Not on file   Housing Stability: Not on file       Family History   Problem Relation Age of Onset    Stroke Father          REVIEW OF SYSTEMS:    CONSTITUTIONAL:  negative for  fevers, chills, sweats and fatigue    RESPIRATORY:  negative for  dry cough, cough with sputum, dyspnea, wheezing and chest pain    CARDIOVASCULAR:  negative for chest pain, dyspnea, palpitations, syncope    GASTROINTESTINAL:  negative for nausea, vomiting, change in bowel habits, diarrhea, constipation and abdominal pain    MUSCULOSKELETAL: negative for muscle weakness    SKIN: negative for itching or rashes. BEHAVIOR/PSYCH:  negative for poor appetite, increased appetite, decreased sleep and poor concentration    All other systems negative      PHYSICAL EXAM:    VITALS:  BP (!) 129/58   Pulse 60   Temp 98 °F (36.7 °C) (Skin)   Resp 18   Ht 6' (1.829 m)   Wt 185 lb (83.9 kg)   SpO2 97%   BMI 25.09 kg/m²     CONSTITUTIONAL:  awake, alert, cooperative, no apparent distress, and appears stated age    EYES: PERRLA, EOMI    LUNGS:  No increased work of breathing, no audible wheezing    CARDIOVASCULAR:  regular rate and rhythm    ABDOMEN:  Soft non tender non distended     EXTREMITIES: no signs of clubbing or cyanosis. MUSCULOSKELETAL: negative for flaccid muscle tone or spastic movements. SKIN: gross examination reveals no signs of rashes, or diaphoresis. NEURO: Cranial nerves II-XII grossly intact. No signs of agitated mood. Assessment/Plan:    Low back and leg pain for LESI L3-4.

## 2023-01-06 ENCOUNTER — OFFICE VISIT (OUTPATIENT)
Dept: PAIN MANAGEMENT | Age: 73
End: 2023-01-06
Payer: MEDICARE

## 2023-01-06 VITALS
HEART RATE: 62 BPM | TEMPERATURE: 97.1 F | OXYGEN SATURATION: 95 % | BODY MASS INDEX: 25.06 KG/M2 | SYSTOLIC BLOOD PRESSURE: 142 MMHG | DIASTOLIC BLOOD PRESSURE: 82 MMHG | HEIGHT: 72 IN | WEIGHT: 185 LBS | RESPIRATION RATE: 18 BRPM

## 2023-01-06 DIAGNOSIS — M47.816 LUMBAR SPONDYLOSIS: ICD-10-CM

## 2023-01-06 DIAGNOSIS — M48.061 SPINAL STENOSIS OF LUMBAR REGION WITHOUT NEUROGENIC CLAUDICATION: ICD-10-CM

## 2023-01-06 DIAGNOSIS — M21.371 FOOT DROP, RIGHT: ICD-10-CM

## 2023-01-06 DIAGNOSIS — M47.816 LUMBAR FACET ARTHROPATHY: ICD-10-CM

## 2023-01-06 DIAGNOSIS — G89.4 CHRONIC PAIN SYNDROME: Primary | ICD-10-CM

## 2023-01-06 DIAGNOSIS — M51.9 LUMBAR DISC DISORDER: ICD-10-CM

## 2023-01-06 PROCEDURE — 1123F ACP DISCUSS/DSCN MKR DOCD: CPT | Performed by: PAIN MEDICINE

## 2023-01-06 PROCEDURE — G8427 DOCREV CUR MEDS BY ELIG CLIN: HCPCS | Performed by: PAIN MEDICINE

## 2023-01-06 PROCEDURE — G8484 FLU IMMUNIZE NO ADMIN: HCPCS | Performed by: PAIN MEDICINE

## 2023-01-06 PROCEDURE — 4004F PT TOBACCO SCREEN RCVD TLK: CPT | Performed by: PAIN MEDICINE

## 2023-01-06 PROCEDURE — G8417 CALC BMI ABV UP PARAM F/U: HCPCS | Performed by: PAIN MEDICINE

## 2023-01-06 PROCEDURE — 99214 OFFICE O/P EST MOD 30 MIN: CPT | Performed by: PAIN MEDICINE

## 2023-01-06 PROCEDURE — 3017F COLORECTAL CA SCREEN DOC REV: CPT | Performed by: PAIN MEDICINE

## 2023-01-06 PROCEDURE — 99213 OFFICE O/P EST LOW 20 MIN: CPT | Performed by: PAIN MEDICINE

## 2023-01-06 RX ORDER — PREGABALIN 50 MG/1
50 CAPSULE ORAL 2 TIMES DAILY
Qty: 60 CAPSULE | Refills: 0 | Status: SHIPPED | OUTPATIENT
Start: 2023-01-06 | End: 2023-02-05

## 2023-01-06 NOTE — PROGRESS NOTES
Do you currently have any of the following:    Fever: No  Headache:  No  Cough: No  Shortness of breath: No  Exposed to anyone with these symptoms: No                                                                                                                Fermín Lan presents to the [facility] on 1/6/2023. Tasha Parker is complaining of pain in bilateral lower extremities. The pain is intermittent. The pain is described as shooting and burning. Pain is rated on his best day at a 8, on his worst day at a 10, and on average at a 8 on the VAS scale. He took his last dose of Motrin, Tylenol, and meloxicam  yesterday. Tasha Parker does have issues with constipation. Any procedures since your last visit: Yes, with 10 % relief. He is  on NSAIDS and  is not on anticoagulation medications to include none and is managed by NA. Pacemaker or defibrillator: No Physician managing device is NA. Medication Contract and Consent for Opioid Use Documents Filed        No documents found                       BP (!) 142/80   Temp 97.1 °F (36.2 °C) (Infrared)   Resp 18   Ht 6' (1.829 m)   Wt 185 lb (83.9 kg)   BMI 25.09 kg/m²      No LMP for male patient.

## 2023-01-06 NOTE — PROGRESS NOTES
[address]  [address] [facility], [address] [place] [address], [facility] [place]  [phone]    Follow up Note      Magnus Harper     Date of Visit:  1/6/2023    CC:  Patient presents for follow up   Chief Complaint   Patient presents with    Back Pain    Leg Pain     bilateral     HPI:    Pain is unchanged. Appropriate analgesia with current medications regimen: n/A. Change in quality of symptoms:no. Medication side effects:not applicable . Recent diagnostic testing:none. Recent interventional procedures:LESI L3-4 with less than expected response. He has not been on anticoagulation medications to include none. The patient  has not been on herbal supplements. The patient is diabetic. Imaging:   Lumbar spine MRI 2022:  1. No fracture or bony destructive lesion. 2. Severe central canal stenosis at L3-4. Moderate stenosis at L4-5.   3.  Multilevel neural foraminal stenoses, worst (severe) at the bilateral   L3-4 and L4-5 levels, as well as the right L5-S1 level. 4. Fluid along the interspinous bursa at L1-2 and L3-4. Clinical correlation   for [place] disease is recommended. Previous treatments: Physical Therapy and medications. .      Potential Aberrant Drug-Related Behavior:    No    Urine Drug Screening:  None    OARRS report:  01/2023 consistent     Opioid Agreement:  Renewal date:N/A    Past Medical History:   Diagnosis Date    Agent orange exposure     Chronic cough     uses inhaler /to have Bronch 8/22/2019    Colon polyps 03/07/2019, 9-2021    fOR or 10-19-21    COPD (chronic obstructive pulmonary disease) ([facility] [address]     Diabetes ([facility] [address]     Hyperlipidemia     Hypertension     Rash     noted 2/28/19- says it is on back & shoulders    Restless leg     Spinal stenosis at L4-L5 level 2009    [facility] Sharen Osler    Thyroid nodule 2020    R     Past Surgical History:   Procedure Laterality Date    APPENDECTOMY  2000?     ruptured    BRONCHOSCOPY N/A 8/22/2019    BRONCHOSCOPY DIAGNOSTIC OR CELL [address] ONLY performed by Kenia Berman MD at [address]  2000?    open    COLECTOMY N/A 10/19/2021    LAPAROSCOPIC ROBOTIC XI ASSISTED RIGHT COLECTOMY performed by Shasta Gilman DO at [address] [facility]  12 years ago    COLONOSCOPY N/A 3/7/2019    COLONOSCOPY POLYPECTOMY SNARE/COLD BIOPSY performed by Shasta Gilman DO at [address] [facility] N/A 8/26/2021    COLONOSCOPY POLYPECTOMY HOT BIOPSY performed by Shasta Gilman DO at [address] [facility]  years ago    LITHOTRIPSY  years ago    PAIN MANAGEMENT PROCEDURE N/A 11/14/2022    LUMBAR EPIDURAL STEROID INJECTION L4-5 WITH LOW VOLUME AND 60 DEPO performed by Alina Flores MD at [address] N/A 12/22/2022    LUMBAR EPIDURAL STEROID INJECTION L3-4 WITH LOW VOLUME AND 80 DEPO performed by Alina Flores MD at [address] [facility] 8/26/2021    EGD BIOPSY performed by Shasta Gilman DO at [address] [facility]     Prior to Admission medications    Medication Sig Start Date End Date Taking?  Authorizing Provider   ibuprofen (ADVIL;MOTRIN) 200 MG tablet Take 200 mg by mouth as needed for Pain   Yes Historical Provider, MD   ramipril (ALTACE) 1.25 MG capsule TAKE 1 CAPSULE DAILY 9/20/22  Yes Vinod Ojeda DO   fluticasone-umeclidin-vilant (TRELEGY ELLIPTA) 100-62.5-25 MCG/INH AEPB Inhale 1 puff into the lungs daily 8/22/22  Yes Vinod Ojeda DO   meloxicam (MOBIC) 15 MG tablet Take 1 tablet by mouth daily as needed for Pain 8/22/22  Yes Vinod Ojeda DO   SITagliptin-metFORMIN (JANUMET XR)  MG TB24 per extended release tablet Take 1 tablet by mouth daily 4/21/22  Yes Vinod Ojeda DO   vitamin D (CHOLECALCIFEROL) 15625 UNIT CAPS One weekly 4/21/22  Yes Vinod Ojeda DO   albuterol sulfate  (90 Base) MCG/ACT inhaler Inhale 2 puffs into the lungs every 6 hours as needed for Wheezing or Shortness of Breath 9/23/21  Yes Vinod Ojeda, DO   rOPINIRole (REQUIP) 1 MG tablet Take 1 tablet by mouth nightly 2/8/21  Yes Vinod Ojeda DO   gabapentin (NEURONTIN) 100 MG capsule Take 1 capsule by mouth 2 times daily for 30 days. Take 2 capsules QHS for three days then 2 capsules BID as tolerated  Patient not taking: Reported on 12/20/2022 10/25/22 11/24/22  Iron Gross MD     Allergies   Allergen Reactions    Pcn [Penicillins] Hives    Tape Villae Knows Tape]      Surgical tape-blisters     Social History     Socioeconomic History    Marital status:      Spouse name: Not on file    Number of children: Not on file    Years of education: Not on file    Highest education level: Not on file   Occupational History    Not on file   Tobacco Use    Smoking status: Every Day     Packs/day: 1.50     Years: 35.00     Pack years: 52.50     Types: Cigarettes    Smokeless tobacco: Never   Vaping Use    Vaping Use: Never used   Substance and Sexual Activity    Alcohol use: Not Currently    Drug use: No    Sexual activity: Never   Other Topics Concern    Not on file   Social History Narrative    Not on file     Social Determinants of Health     Financial Resource Strain: Low Risk     Difficulty of Paying Living Expenses: Not hard at all   Food Insecurity: No Food Insecurity    Worried About Running Out of Food in the Last Year: Never true    Ran Out of Food in the Last Year: Never true   Transportation Needs: Not on file   Physical Activity: Sufficiently Active    Days of Exercise per Week: 5 days    Minutes of Exercise per Session: 60 min   Stress: Not on file   Social Connections: Not on file   Intimate Partner Violence: Not on file   Housing Stability: Not on file     Family History   Problem Relation Age of Onset    Stroke Father      REVIEW OF SYSTEMS:     Tomas Mancera denies fever/chills, chest pain, shortness of breath, new bowel or bladder complaints.  All other review of systems was negative. PHYSICAL EXAMINATION:      BP (!) 142/82   Pulse 62   Temp 97.1 °F (36.2 °C) (Infrared)   Resp 18   Ht 6' (1.829 m)   Wt 185 lb (83.9 kg)   SpO2 95%   BMI 25.09 kg/m²     General:       General appearance:   elderly, pleasant, and well-hydrated. , in moderate discomfort and A & O x3  Build:Normal Weight     HEENT:     Head:normocephalic and atraumatic  Sclera: icterus absent,      Lungs:     Breathing:Breathing Pattern: regular, no distress     Abdomen:     Shape:non-distended and normal  Tenderness:none     Lumbar spine:     Spine inspection:normal   CVA tenderness:No   Palpation:tenderness paravertebral muscles, facet loading, left, right, positive, and tenderness. Range of motion:not tested. Musculoskeletal:     Trigger points in Paraveteral:absent bilaterally  SI joint tenderness:negative right, negative left  SLR:negative right, negative left, sitting      Extremities:     Tremors:None bilaterally upper and lower  Range of motion:pain with internal rotation of hips negative. Intact:Yes  Edema:Normal     Neurological:     Sensory:diminished to light touch bilateral legs. Motor:                         Right Quadriceps4/5          Left Quadriceps5/5           Right Gastrocnemius4-/5    Left Gastrocnemius5/5  Right Ant Tibialis2/5  Left Ant Tibialis5/5  [address]     Dermatology:     Skin:no unusual rashes and no skin lesions     Impression:  Low back pain with radiation to bilateral lower extremities. Lumbar spine MRI 2022 spinal stenosis worst at L3-4 and moderate at L4-5. Failed Gabapentin  Plan:  Follow up on his low back pain with no acute issues. Patient is s/p LESI L3-4 with less than expected response. Will refer patient to neurosurgery for second opinion. Will start patient on Lyrica 50 mg BID. Awaiting right orthotic (right foot drop). OARRS report reviewed 01/2023. Patient encouraged to stay active, currently in physical therapy.   Treatment plan discussed with the patient including medications and procedure side effects. We discussed with the patient that combining opioids, benzodiazepines, alcohol, illicit drugs or sleep aids increases the risk of respiratory depression including death. We discussed that these medications may cause drowsiness, sedation or dizziness and have counseled the patient not to drive or operate machinery. We have discussed that these medications will be prescribed only by one provider. We have discussed with the patient about age related risk factors and have thoroughly discussed the importance of taking these medications as prescribed. The patient verbalizes understanding. gema Walton M.D.

## 2023-01-17 ENCOUNTER — OFFICE VISIT (OUTPATIENT)
Dept: NEUROSURGERY | Age: 73
End: 2023-01-17
Payer: MEDICARE

## 2023-01-17 ENCOUNTER — HOSPITAL ENCOUNTER (OUTPATIENT)
Dept: GENERAL RADIOLOGY | Age: 73
Discharge: HOME | End: 2023-01-19
Payer: MEDICARE

## 2023-01-17 ENCOUNTER — HOSPITAL ENCOUNTER (OUTPATIENT)
Age: 73
Discharge: HOME | End: 2023-01-19
Payer: MEDICARE

## 2023-01-17 VITALS
WEIGHT: 185 LBS | DIASTOLIC BLOOD PRESSURE: 74 MMHG | OXYGEN SATURATION: 94 % | TEMPERATURE: 97.7 F | RESPIRATION RATE: 16 BRPM | BODY MASS INDEX: 25.06 KG/M2 | HEART RATE: 62 BPM | HEIGHT: 72 IN | SYSTOLIC BLOOD PRESSURE: 119 MMHG

## 2023-01-17 DIAGNOSIS — M47.816 LUMBAR SPONDYLOSIS: ICD-10-CM

## 2023-01-17 DIAGNOSIS — M48.061 SPINAL STENOSIS OF LUMBAR REGION WITHOUT NEUROGENIC CLAUDICATION: ICD-10-CM

## 2023-01-17 DIAGNOSIS — M47.816 LUMBAR SPONDYLOSIS: Primary | ICD-10-CM

## 2023-01-17 PROCEDURE — 1123F ACP DISCUSS/DSCN MKR DOCD: CPT | Performed by: NEUROLOGICAL SURGERY

## 2023-01-17 PROCEDURE — 3017F COLORECTAL CA SCREEN DOC REV: CPT | Performed by: NEUROLOGICAL SURGERY

## 2023-01-17 PROCEDURE — 99202 OFFICE O/P NEW SF 15 MIN: CPT

## 2023-01-17 PROCEDURE — G8427 DOCREV CUR MEDS BY ELIG CLIN: HCPCS | Performed by: NEUROLOGICAL SURGERY

## 2023-01-17 PROCEDURE — 99204 OFFICE O/P NEW MOD 45 MIN: CPT | Performed by: NEUROLOGICAL SURGERY

## 2023-01-17 PROCEDURE — 72120 X-RAY BEND ONLY L-S SPINE: CPT

## 2023-01-17 PROCEDURE — 4004F PT TOBACCO SCREEN RCVD TLK: CPT | Performed by: NEUROLOGICAL SURGERY

## 2023-01-17 PROCEDURE — G8484 FLU IMMUNIZE NO ADMIN: HCPCS | Performed by: NEUROLOGICAL SURGERY

## 2023-01-17 PROCEDURE — G8417 CALC BMI ABV UP PARAM F/U: HCPCS | Performed by: NEUROLOGICAL SURGERY

## 2023-01-17 NOTE — PROGRESS NOTES
[facility] NEUROSURGERY  [address] [phone]       Chief Complaint:   Chief Complaint   Patient presents with    New Patient     Referred by dr Micaela Ambrosio had 2 epidurals with no relief  no balance  doing therapy now  goes twice a week for the last 3 months        HPI:     I had the pleasure of seeing Liane Mora today in neurosurgical clinic. As you know this delightful 75-year-old, right-handed, , father of 1, current pack and 1/2/day smoker nondrinker dry-cleaning  part-time presents with a history of low back pain for more than a year with progression. Patient describes the pain is burning and stabbing and extends down the anterior and posterior aspects of his legs to his feet. He is quite concerned because despite epidural injections last of which was performed in December and current involvement in physical therapy at [facility] for the last 3 months he has had a progressive foot drop on the right over the last 6 months. He denied any siri trauma to the region. He does state that this is affecting his balance and he is worried about his employment. Bending forward does tend to alleviate some of the pain but not all. He does endorse calf cramping. There is been no loss of bowel or bladder function. Past Medical History:   Diagnosis Date    Agent orange exposure     Chronic cough     uses inhaler /to have Bronch 8/22/2019    Colon polyps 03/07/2019, 9-2021    fOR or 10-19-21    COPD (chronic obstructive pulmonary disease) ([facility]     Diabetes ([facility]     Hyperlipidemia     Hypertension     Rash     noted 2/28/19- says it is on back & shoulders    Restless leg     Spinal stenosis at L4-L5 level 2009    [facility] Mellissa Thompson    Thyroid nodule 2020    R     Past Surgical History:   Procedure Laterality Date    APPENDECTOMY  2000?     ruptured    BRONCHOSCOPY N/A 8/22/2019    BRONCHOSCOPY DIAGNOSTIC OR CELL [address] ONLY performed by Pako Gold MD at [facility]  2000?    open    COLECTOMY N/A 10/19/2021    LAPAROSCOPIC ROBOTIC XI ASSISTED RIGHT COLECTOMY performed by Fortino Donovan DO at [place] [address]  12 years ago    COLONOSCOPY N/A 3/7/2019    COLONOSCOPY POLYPECTOMY SNARE/COLD BIOPSY performed by Fortino Donovan DO at [facility] ENDOSCOPY    COLONOSCOPY N/A 8/26/2021    COLONOSCOPY POLYPECTOMY HOT BIOPSY performed by Fortino Donovan DO at [address] [place] [address]  years ago    LITHOTRIPSY  years ago    PAIN MANAGEMENT PROCEDURE N/A 11/14/2022    LUMBAR EPIDURAL STEROID INJECTION L4-5 WITH LOW VOLUME AND 61 DEPO performed by Ernie Zimmer MD at [facility] N/A 12/22/2022    LUMBAR EPIDURAL STEROID INJECTION L3-4 WITH LOW VOLUME AND [de-identified] DEPO performed [facility] [address] [facility] N/A 8/26/2021    EGD BIOPSY performed by Fortino Donovan DO at [facility] ENDOSCOPY      Family History   Problem Relation Age of Onset    Stroke Father       Social History     Socioeconomic History    Marital status:      Spouse name: Not on file    Number of children: Not on file    Years of education: Not on file    Highest education level: Not on file   Occupational History    Not on file   Tobacco Use    Smoking status: Every Day     Packs/day: 1.50     Years: 35.00     Pack years: 52.50     Types: Cigarettes    Smokeless tobacco: Never   Vaping Use    Vaping Use: Never used   Substance and Sexual Activity    Alcohol use: Not Currently    Drug use: No    Sexual activity: Never   Other Topics Concern    Not on file   Social History Narrative    Not on file     Social Determinants of Health     Financial Resource Strain: Low Risk     Difficulty of Paying Living Expenses: Not hard at all   Food Insecurity: No Food Insecurity    Worried About [facility] in the Last Year: Never true    Ran Out of Food in the Last Year: Never true   Transportation Needs: Not on file   Physical Activity: Sufficiently Active    Days of Exercise per Week: 5 days    Minutes of Exercise per Session: 60 min   Stress: Not on file   Social Connections: Not on file   Intimate Partner Violence: Not on file   Housing Stability: Not on file       Medications:   Current Outpatient Medications   Medication Sig Dispense Refill    pregabalin (LYRICA) 50 MG capsule Take 1 capsule by mouth 2 times daily for 30 days. Max Daily Amount: 100 mg 60 capsule 0    ibuprofen (ADVIL;MOTRIN) 200 MG tablet Take 200 mg by mouth as needed for Pain      gabapentin (NEURONTIN) 100 MG capsule Take 1 capsule by mouth 2 times daily for 30 days. Take 2 capsules QHS for three days then 2 capsules BID as tolerated (Patient not taking: Reported on 12/20/2022) 120 capsule 0    ramipril (ALTACE) 1.25 MG capsule TAKE 1 CAPSULE DAILY 90 capsule 1    fluticasone-umeclidin-vilant (TRELEGY ELLIPTA) 100-62.5-25 MCG/INH AEPB Inhale 1 puff into the lungs daily 1 each 3    meloxicam (MOBIC) 15 MG tablet Take 1 tablet by mouth daily as needed for Pain 30 tablet 1    SITagliptin-metFORMIN (JANUMET XR)  MG TB24 per extended release tablet Take 1 tablet by mouth daily 90 tablet 1    vitamin D (CHOLECALCIFEROL) [address]5230 UNIT CAPS One weekly 12 capsule 1    albuterol sulfate  (90 Base) MCG/ACT inhaler Inhale 2 puffs into the lungs every 6 hours as needed for Wheezing or Shortness of Breath 18 g 1    rOPINIRole (REQUIP) 1 MG tablet Take 1 tablet by mouth nightly 90 tablet 1     No current facility-administered medications for this visit. Allergies:    Pcn [penicillins] and Tape [adhesive tape]       Review of Systems:    Denies any chest pain, headache, dyspnea, recent weight loss, fevers, chills or night sweats.        Physical Examination:    /74   Pulse 62   Temp 97.7 °F (36.5 °C)   Resp 16   Ht 6' (1.829 m)   Wt 185 lb (83.9 kg)   SpO2 94% BMI 25.09 kg/m²      On focused neurological examination, he  is awake alert oriented and rationally conversant. Speech is clear and fluent. Pupils are equal and reactive to light bilaterally, extraocular movements are intact, visual fields are full to confrontation. His  face is symmetric and grossly intact to fine touch. Uvula and tongue are both midline. Shoulder shrug is symmetric and strong. Motor examination reveals preserved power in the upper and lower extremities at 5 out of 5 throughout, with the exception of right hip flexion 4 out of 5, right knee extension 4 out of 5, 1 out of 5 dorsi flexion on the right 3 out of 5 plantar flexion on the right 3 out of 5 eversion 4 out of 5 inversion and 1 out of 5 EHL on the right. Reflexes are symmetric and brisk stepped his right patella which is diminished at 1. plantar responses are downgoing. There is no clonus. Patient is intact to fine touch in all dermatomes throughout. Straight leg raise is negative. Palpation of the spine reveals no kyphotic or scoliotic gross deformities. There is no pain to deep percussion nor palpation. ASSESSMENT:    I personally reviewed Oren Issa's radiographic images, particularly his MRI of the lumbar spine dated 4 October 2022 and which demonstrates significant spinal stenosis most pronounced at L3-4 as well as 4 5 and eccentric to the right at L5-S1. 1. Lumbar spondylosis  -     XR LUMBAR SPINE FLEXION AND EXTENSION ONLY; Future  2. Spinal stenosis of lumbar region without neurogenic claudication  -     XR LUMBAR SPINE FLEXION AND EXTENSION ONLY; Future    MEDICAL DECISION MAKING & PLAN:    I showed Mr. Justine Mclean his images and explained the findings. Certainly he is performed all conservative measures to the maximum namely pain management and actively performing physical therapy. I am concerned about his right lower extremity siri weakness and foot drop.   We will obtain flexion-extension x-rays to determine if there are mobile listhesis is contributing to this gentlemen's problems. Risks benefits and alternatives of lumbar decompression were explained at length the patient which included but were not limited to the risk of infection, bleeding, CSF leak, failure to offer benefit, need for reoperation, paralysis, paresthesia, injury to bowel bladder or sexual function, coma, cardiovascular collapse and even death. He articulated his understanding. We will proceed with planning for surgery pending the results of his flexion-extension x-rays. We will need medical clearance. Should he develop any new symptoms in the interim I instructed him to call our office or present himself to the emergency room. Thank you so much for allowing us to participate in the care of this patient. Return in about 2 weeks (around 1/31/2023) for needs tests completed prior to appt, discuss results. Electronically signed by Sanjana Liao MD on 1/17/2023 at 12:39 PM       NOTE: This report was transcribed using voice recognition software.  Every effort was made to ensure accuracy; however, inadvertent computerized transcription errors may be present

## 2023-01-25 ENCOUNTER — PREP FOR PROCEDURE (OUTPATIENT)
Dept: NEUROSURGERY | Age: 73
End: 2023-01-25

## 2023-01-26 ENCOUNTER — PREP FOR PROCEDURE (OUTPATIENT)
Dept: NEUROSURGERY | Age: 73
End: 2023-01-26

## 2023-01-26 DIAGNOSIS — Z01.818 PRE-OP TESTING: Primary | ICD-10-CM

## 2023-01-26 RX ORDER — SODIUM CHLORIDE 9 MG/ML
INJECTION, SOLUTION INTRAVENOUS CONTINUOUS
Status: CANCELLED | OUTPATIENT
Start: 2023-01-26

## 2023-01-26 RX ORDER — SODIUM CHLORIDE 0.9 % (FLUSH) 0.9 %
5-40 SYRINGE (ML) INJECTION PRN
Status: CANCELLED | OUTPATIENT
Start: 2023-01-26

## 2023-01-26 RX ORDER — SODIUM CHLORIDE 0.9 % (FLUSH) 0.9 %
5-40 SYRINGE (ML) INJECTION EVERY 12 HOURS SCHEDULED
Status: CANCELLED | OUTPATIENT
Start: 2023-01-26

## 2023-01-26 RX ORDER — SODIUM CHLORIDE 9 MG/ML
INJECTION, SOLUTION INTRAVENOUS PRN
Status: CANCELLED | OUTPATIENT
Start: 2023-01-26

## 2023-01-27 ENCOUNTER — OFFICE VISIT (OUTPATIENT)
Dept: PRIMARY CARE CLINIC | Age: 73
End: 2023-01-27

## 2023-01-27 VITALS
BODY MASS INDEX: 25.6 KG/M2 | RESPIRATION RATE: 16 BRPM | SYSTOLIC BLOOD PRESSURE: 132 MMHG | OXYGEN SATURATION: 98 % | HEIGHT: 72 IN | DIASTOLIC BLOOD PRESSURE: 62 MMHG | TEMPERATURE: 97.4 F | WEIGHT: 189 LBS | HEART RATE: 62 BPM

## 2023-01-27 DIAGNOSIS — M48.061 SPINAL STENOSIS AT L4-L5 LEVEL: ICD-10-CM

## 2023-01-27 DIAGNOSIS — E11.40 TYPE 2 DIABETES MELLITUS WITH DIABETIC NEUROPATHY, WITHOUT LONG-TERM CURRENT USE OF INSULIN (HCC): ICD-10-CM

## 2023-01-27 DIAGNOSIS — Z23 NEED FOR INFLUENZA VACCINATION: ICD-10-CM

## 2023-01-27 DIAGNOSIS — J43.2 CENTRILOBULAR EMPHYSEMA (HCC): ICD-10-CM

## 2023-01-27 DIAGNOSIS — E11.9 TYPE 2 DIABETES MELLITUS WITHOUT COMPLICATION, WITHOUT LONG-TERM CURRENT USE OF INSULIN (HCC): Primary | ICD-10-CM

## 2023-01-27 DIAGNOSIS — Z01.818 PRE-OP EXAM: ICD-10-CM

## 2023-01-27 LAB — HBA1C MFR BLD: 6.3 %

## 2023-01-27 ASSESSMENT — PATIENT HEALTH QUESTIONNAIRE - PHQ9
SUM OF ALL RESPONSES TO PHQ9 QUESTIONS 1 & 2: 2
SUM OF ALL RESPONSES TO PHQ QUESTIONS 1-9: 2
1. LITTLE INTEREST OR PLEASURE IN DOING THINGS: 1
SUM OF ALL RESPONSES TO PHQ QUESTIONS 1-9: 2
SUM OF ALL RESPONSES TO PHQ QUESTIONS 1-9: 2
2. FEELING DOWN, DEPRESSED OR HOPELESS: 1
SUM OF ALL RESPONSES TO PHQ QUESTIONS 1-9: 2

## 2023-01-27 ASSESSMENT — ENCOUNTER SYMPTOMS
VISUAL CHANGE: 0
BLURRED VISION: 0

## 2023-01-27 ASSESSMENT — COPD QUESTIONNAIRES: COPD: 1

## 2023-01-27 NOTE — LETTER
[facility]  [address] [place] [id number]  Phone: [phone]  Fax: [phone] Vamsi Spears DO         January 27, 2023     Patient: Roberto Carlos Glover   YOB: 1950   Date of Visit: 1/27/2023       To Whom It May Concern: It is my medical opinion that [place] requires a disability parking placard for the following reasons:  He has limited walking ability due to an orthopedic condition. Duration of need: 3 years    If you have any questions or concerns, please don't hesitate to call.     Sincerely,        Edward Kahn DO

## 2023-01-27 NOTE — PROGRESS NOTES
Jamshid Clemens, a male of 74 y.o. came to the office 1/27/2023. Patient Active Problem List   Diagnosis    COPD (chronic obstructive pulmonary disease) ([facility]    Type 2 diabetes mellitus with diabetic neuropathy, without long-term current use of insulin ([facility]    Hyperlipemia    Nephrolithiasis     Tobacco abuse    Intractable ophthalmoplegic migraine    Adenomatous polyp of colon    Chronic pain syndrome    Lumbar facet arthropathy    Lumbar disc disorder    Lumbar spondylosis    Spinal stenosis of lumbar region without neurogenic claudication    Lumbar radiculopathy          Diabetes  He presents for his follow-up diabetic visit. He has type 2 diabetes mellitus. Associated symptoms include foot paresthesias and weakness (legs). Pertinent negatives for diabetes include no blurred vision, no chest pain, no fatigue, no foot ulcerations, no polydipsia, no polyuria, no visual change and no weight loss. Symptoms are stable. COPD  This is a chronic problem. The current episode started more than 1 year ago. Pertinent negatives include no chest pain or weight loss. His symptoms are aggravated by exposure to smoke (still smokng 1ppd). His symptoms are alleviated by steroid inhaler and beta-agonist. Risk factors for lung disease include smoking/tobacco exposure. His past medical history is significant for COPD.      pre op exam: will be getting L3-L5 laminectomy on 2/15. Has labs ordered for 2/8. Had to quit job due to pain. Needing cane to walk. Leg pain in both legs and even having drop foot in R.          Allergies   Allergen Reactions    Pcn [Penicillins] Hives    Tape Ricke Guard Tape]      Surgical tape-blisters       Current Outpatient Medications on File Prior to Visit   Medication Sig Dispense Refill    ibuprofen (ADVIL;MOTRIN) 200 MG tablet Take 200 mg by mouth as needed for Pain      ramipril (ALTACE) 1.25 MG capsule TAKE 1 CAPSULE DAILY 90 capsule 1    fluticasone-umeclidin-vilant (TRELEGY ELLIPTA) 100-62.5-25 MCG/INH AEPB Inhale 1 puff into the lungs daily (Patient taking differently: Inhale 1 puff into the lungs daily as needed) 1 each 3    SITagliptin-metFORMIN (JANUMET XR)  MG TB24 per extended release tablet Take 1 tablet by mouth daily 90 tablet 1    albuterol sulfate  (90 Base) MCG/ACT inhaler Inhale 2 puffs into the lungs every 6 hours as needed for Wheezing or Shortness of Breath 18 g 1    rOPINIRole (REQUIP) 1 MG tablet Take 1 tablet by mouth nightly (Patient taking differently: Take 1 mg by mouth nightly as needed) 90 tablet 1    pregabalin (LYRICA) 50 MG capsule Take 1 capsule by mouth 2 times daily for 30 days. Max Daily Amount: 100 mg (Patient not taking: Reported on 1/27/2023) 60 capsule 0    gabapentin (NEURONTIN) 100 MG capsule Take 1 capsule by mouth 2 times daily for 30 days. Take 2 capsules QHS for three days then 2 capsules BID as tolerated (Patient not taking: Reported on 12/20/2022) 120 capsule 0    meloxicam (MOBIC) 15 MG tablet Take 1 tablet by mouth daily as needed for Pain (Patient not taking: Reported on 1/27/2023) 30 tablet 1    vitamin D (CHOLECALCIFEROL) 48819 UNIT CAPS One weekly (Patient not taking: Reported on 1/27/2023) 12 capsule 1     No current facility-administered medications on file prior to visit. Review of Systems   Constitutional:  Negative for fatigue and weight loss. Eyes:  Negative for blurred vision. Cardiovascular:  Negative for chest pain. Endocrine: Negative for polydipsia and polyuria. Neurological:  Positive for weakness (legs). other review of systems reviewed and are negative    OBJECTIVE:  /62 (Site: Left Upper Arm)   Pulse 62   Temp 97.4 °F (36.3 °C)   Resp 16   Ht 6' (1.829 m)   Wt 189 lb (85.7 kg)   SpO2 98%   BMI 25.63 kg/m²      Physical Exam  Constitutional:       General: He is not in acute distress. Eyes:      General: No scleral icterus.      Conjunctiva/sclera: Conjunctivae normal.   Neck:      Thyroid: No thyromegaly. Vascular: Carotid bruit present. Cardiovascular:      Rate and Rhythm: Normal rate and regular rhythm. Heart sounds: No murmur heard. Pulmonary:      Effort: Pulmonary effort is normal.      Breath sounds: Examination of the right-middle field reveals rhonchi. Decreased breath sounds and rhonchi present. Musculoskeletal:      Cervical back: Neck supple. Lymphadenopathy:      Cervical: No cervical adenopathy. Skin:     General: Skin is warm and dry. Neurological:      Mental Status: He is alert and oriented to person, place, and time. Psychiatric:         Mood and Affect: Mood normal.       ASSESSMENT AND PLAN:    Kvng Rea was seen today for pre-op exam.    Diagnoses and all orders for this visit:    Type 2 diabetes mellitus without complication, without long-term current use of insulin (HCC)  -     POCT glycosylated hemoglobin (Hb A1C)    Need for influenza vaccination  -     Influenza, FLUAD, (age 72 y+), IM, Preservative Free, 0.5 mL    Pre-op exam    Centrilobular emphysema (HCC)    Type 2 diabetes mellitus with diabetic neuropathy, without long-term current use of insulin (HCC)    Spinal stenosis at L4-L5 level     Latest Reference Range & Units 1/27/23 11:46   Hemoglobin A1C % 6.3     - continue med for DM  - use Trelegy daily  - quit cig's  - cleared for surgery   - get pre op labs as ordered. - handicap placard for 3 yrs. Return in about 4 months (around 5/27/2023) for or for acute problem.     Nicholas Ojeda,

## 2023-02-01 NOTE — PROGRESS NOTES
[address]   PRE-ADMISSION TESTING GENERAL INSTRUCTIONS  JUMANA Phone Number: [phone]      GENERAL INSTRUCTIONS:  your surgery time may be adjusted. If your time changes, we will call you the day before with your new times. [x] Antibacterial Soap Shower Night before and/or AM of surgery. [x] Do not wear lotions, powders, deodorant. [x] Nothing by mouth after midnight; including gum, candy, mints, or water. [x] You may brush your teeth, gargle, but do NOT swallow water. [x] No tobacco products within 24 hours of your surgery. [x] Jewelry or valuables should not be brought to the hospital.   [x] Arrange transportation with a responsible adult  to and from the hospital. Arrange for someone to be with you for the remainder of the day and for 24 hours after your procedure due to having had anesthesia. -Who will be your  for transportation? Dominic Denis will be staying with you for 24 hrs after your procedure? Lui Renee  [x] Bring insurance card and photo ID. [x] Transfusion Bracelet: Please bring with you to hospital, day of surgery. PARKING INSTRUCTIONS:   [x] ARRIVAL DATE & TIME: FEB. 15 AT 5 AM  [x] Enter into the [employer]. Two people may accompany you. Masks are not required but are recommended. [x] Parking Lot \"I\" is where you will park. It is located on the corner of [address] and [address]. The entrance is on [address]. Upon entering the parking lot, a voucher ticket will print    EDUCATION INSTRUCTIONS:         [x] Tabitha 75 placed in chart. [x] Pre-admission Testing educational folder given  [x] Incentive Spirometry,coughing & deep breathing exercises reviewed. [address] GIVEN. [x] Medication information sheet(s)   [x] Fluoroscopy-Xray used in surgery reviewed with patient. Educational pamphlet placed in chart. [x] Pain: Post-op pain is normal and to be expected.  You will be asked to rate your pain from 0-10. Please ask for pain medication if needed. [x] Other: NO BENDING OVER, TWISTING OR LIFTING UNTIL PERMITTED BY SURGEON. MEDICATION INSTRUCTIONS:  [x] Bring a complete list of your medications, please write the last time you took the medicine, give this list to the nurse in Pre-Op. [x] Take only the following medications the morning of surgery with 1-2 ounces of water: PREGABALIN  [x] Stop all herbal supplements and vitamins 5 days before surgery. Stop NSAIDS 7 days before surgery. [x] DO NOT take any diabetic medicine the morning of surgery. [x] If you are diabetic and your blood sugar is low or you feel symptomatic, you may drink 1-2 ounces of apple juice or take a glucose tablet.            -The morning of your procedure, you may call the pre-op area if you have concerns about your blood sugar [phone]. [x] Use your inhalers the morning of surgery. Bring your emergency inhaler with you day of surgery. [] Follow physician instructions regarding any blood thinners you may be taking. WHAT TO EXPECT:    [x] The day of surgery you will be greeted and checked in by the [employer].  In addition, you will be registered in the [place] by a Patient Access Representative. Please bring your photo ID and insurance card. A nurse will greet you in accordance to the time you are needed in the pre-op area to prepare you for surgery. Please do not be discouraged if you are not greeted in the order you arrive as there are many variables that are involved in patient preparation. Your patience is greatly appreciated as you wait for your nurse. Please bring in items such as: books, magazines, newspapers, electronics, or any other items  to occupy your time in the waiting area. [x]  Delays may occur with surgery and staff will make a sincere effort to keep you informed of delays.   If any delays occur with your procedure, we apologize ahead of time for your inconvenience as we recognize the value of your time.

## 2023-02-08 ENCOUNTER — HOSPITAL ENCOUNTER (OUTPATIENT)
Dept: PREADMISSION TESTING | Age: 73
Discharge: HOME | End: 2023-02-08
Payer: MEDICARE

## 2023-02-08 ENCOUNTER — HOSPITAL ENCOUNTER (OUTPATIENT)
Dept: GENERAL RADIOLOGY | Age: 73
Discharge: HOME | End: 2023-02-10
Payer: MEDICARE

## 2023-02-08 VITALS
OXYGEN SATURATION: 97 % | SYSTOLIC BLOOD PRESSURE: 131 MMHG | RESPIRATION RATE: 20 BRPM | TEMPERATURE: 97.3 F | DIASTOLIC BLOOD PRESSURE: 61 MMHG | HEART RATE: 62 BPM | BODY MASS INDEX: 26.92 KG/M2 | WEIGHT: 193 LBS

## 2023-02-08 DIAGNOSIS — Z01.818 PRE-OP TESTING: ICD-10-CM

## 2023-02-08 DIAGNOSIS — R91.1 LUNG NODULE: Primary | ICD-10-CM

## 2023-02-08 LAB
ABO/RH: NORMAL
ANION GAP SERPL CALCULATED.3IONS-SCNC: 11 MMOL/L (ref 7–16)
ANTIBODY SCREEN: NORMAL
BACTERIA: NORMAL /HPF
BASOPHILS ABSOLUTE: 0.13 E9/L (ref 0–0.2)
BASOPHILS RELATIVE PERCENT: 1.4 % (ref 0–2)
BILIRUBIN URINE: NEGATIVE
BLOOD, URINE: NEGATIVE
BUN BLDV-MCNC: 9 MG/DL (ref 6–23)
CALCIUM SERPL-MCNC: 8.8 MG/DL (ref 8.6–10.2)
CHLORIDE BLD-SCNC: 100 MMOL/L (ref 98–107)
CLARITY: CLEAR
CO2: 22 MMOL/L (ref 22–29)
COLOR: YELLOW
CREAT SERPL-MCNC: 1.1 MG/DL (ref 0.7–1.2)
EOSINOPHILS ABSOLUTE: 0.33 E9/L (ref 0.05–0.5)
EOSINOPHILS RELATIVE PERCENT: 3.7 % (ref 0–6)
GFR SERPL CREATININE-BSD FRML MDRD: >60 ML/MIN/1.73
GLUCOSE BLD-MCNC: 136 MG/DL (ref 74–99)
GLUCOSE URINE: NEGATIVE MG/DL
HCT VFR BLD CALC: 46.6 % (ref 37–54)
HEMOGLOBIN: 15.9 G/DL (ref 12.5–16.5)
IMMATURE GRANULOCYTES #: 0.06 E9/L
IMMATURE GRANULOCYTES %: 0.7 % (ref 0–5)
INR BLD: 1.1
KETONES, URINE: NEGATIVE MG/DL
LEUKOCYTE ESTERASE, URINE: ABNORMAL
LYMPHOCYTES ABSOLUTE: 2.04 E9/L (ref 1.5–4)
LYMPHOCYTES RELATIVE PERCENT: 22.6 % (ref 20–42)
MCH RBC QN AUTO: 30.5 PG (ref 26–35)
MCHC RBC AUTO-ENTMCNC: 34.1 % (ref 32–34.5)
MCV RBC AUTO: 89.4 FL (ref 80–99.9)
MONOCYTES ABSOLUTE: 0.6 E9/L (ref 0.1–0.95)
MONOCYTES RELATIVE PERCENT: 6.6 % (ref 2–12)
NEUTROPHILS ABSOLUTE: 5.87 E9/L (ref 1.8–7.3)
NEUTROPHILS RELATIVE PERCENT: 65 % (ref 43–80)
NITRITE, URINE: NEGATIVE
PDW BLD-RTO: 12.9 FL (ref 11.5–15)
PH UA: 5.5 (ref 5–9)
PLATELET # BLD: 215 E9/L (ref 130–450)
PMV BLD AUTO: 11 FL (ref 7–12)
POTASSIUM REFLEX MAGNESIUM: 4.4 MMOL/L (ref 3.5–5)
PROTEIN UA: NEGATIVE MG/DL
PROTHROMBIN TIME: 12.4 SEC (ref 9.3–12.4)
RBC # BLD: 5.21 E12/L (ref 3.8–5.8)
RBC UA: NORMAL /HPF (ref 0–2)
SODIUM BLD-SCNC: 133 MMOL/L (ref 132–146)
SPECIFIC GRAVITY UA: 1.02 (ref 1–1.03)
UROBILINOGEN, URINE: 0.2 E.U./DL
WBC # BLD: 9 E9/L (ref 4.5–11.5)
WBC UA: NORMAL /HPF (ref 0–5)

## 2023-02-08 PROCEDURE — 93005 ELECTROCARDIOGRAM TRACING: CPT

## 2023-02-08 PROCEDURE — 85610 PROTHROMBIN TIME: CPT

## 2023-02-08 PROCEDURE — 81001 URINALYSIS AUTO W/SCOPE: CPT

## 2023-02-08 PROCEDURE — 80048 BASIC METABOLIC PNL TOTAL CA: CPT

## 2023-02-08 PROCEDURE — 87088 URINE BACTERIA CULTURE: CPT

## 2023-02-08 PROCEDURE — 86850 RBC ANTIBODY SCREEN: CPT

## 2023-02-08 PROCEDURE — 36415 COLL VENOUS BLD VENIPUNCTURE: CPT

## 2023-02-08 PROCEDURE — 86900 BLOOD TYPING SEROLOGIC ABO: CPT

## 2023-02-08 PROCEDURE — 85025 COMPLETE CBC W/AUTO DIFF WBC: CPT

## 2023-02-08 PROCEDURE — 71046 X-RAY EXAM CHEST 2 VIEWS: CPT

## 2023-02-08 PROCEDURE — 86901 BLOOD TYPING SEROLOGIC RH(D): CPT

## 2023-02-09 ENCOUNTER — HOSPITAL ENCOUNTER (OUTPATIENT)
Dept: CT IMAGING | Age: 73
Discharge: HOME | End: 2023-02-11
Payer: MEDICARE

## 2023-02-09 DIAGNOSIS — R91.1 LUNG NODULE: ICD-10-CM

## 2023-02-09 PROCEDURE — 6360000004 HC RX CONTRAST MEDICATION: Performed by: RADIOLOGY

## 2023-02-09 PROCEDURE — 71270 CT THORAX DX C-/C+: CPT

## 2023-02-09 RX ADMIN — IOPAMIDOL 50 ML: 755 INJECTION, SOLUTION INTRAVENOUS at 11:04

## 2023-02-10 LAB
EKG ATRIAL RATE: 64 BPM
EKG P AXIS: 36 DEGREES
EKG P-R INTERVAL: 168 MS
EKG Q-T INTERVAL: 402 MS
EKG QRS DURATION: 94 MS
EKG QTC CALCULATION (BAZETT): 414 MS
EKG R AXIS: 35 DEGREES
EKG T AXIS: 68 DEGREES
EKG VENTRICULAR RATE: 64 BPM
URINE CULTURE, ROUTINE: NORMAL

## 2023-02-14 ENCOUNTER — ANESTHESIA EVENT (OUTPATIENT)
Dept: OPERATING ROOM | Age: 73
End: 2023-02-14
Payer: MEDICARE

## 2023-02-15 ENCOUNTER — HOSPITAL ENCOUNTER (OUTPATIENT)
Age: 73
Setting detail: OBSERVATION
Discharge: HOME | End: 2023-02-17
Attending: NEUROLOGICAL SURGERY | Admitting: NEUROLOGICAL SURGERY
Payer: MEDICARE

## 2023-02-15 ENCOUNTER — APPOINTMENT (OUTPATIENT)
Dept: GENERAL RADIOLOGY | Age: 73
End: 2023-02-15
Attending: NEUROLOGICAL SURGERY
Payer: MEDICARE

## 2023-02-15 ENCOUNTER — ANESTHESIA (OUTPATIENT)
Dept: OPERATING ROOM | Age: 73
End: 2023-02-15
Payer: MEDICARE

## 2023-02-15 DIAGNOSIS — M48.062 LUMBAR STENOSIS WITH NEUROGENIC CLAUDICATION: ICD-10-CM

## 2023-02-15 DIAGNOSIS — Z98.890 S/P LAMINECTOMY: ICD-10-CM

## 2023-02-15 DIAGNOSIS — Z01.812 PRE-OPERATIVE LABORATORY EXAMINATION: Primary | ICD-10-CM

## 2023-02-15 LAB
METER GLUCOSE: 128 MG/DL (ref 74–99)
METER GLUCOSE: 151 MG/DL (ref 74–99)
METER GLUCOSE: 157 MG/DL (ref 74–99)
METER GLUCOSE: 228 MG/DL (ref 74–99)

## 2023-02-15 PROCEDURE — 97530 THERAPEUTIC ACTIVITIES: CPT

## 2023-02-15 PROCEDURE — 2580000003 HC RX 258: Performed by: STUDENT IN AN ORGANIZED HEALTH CARE EDUCATION/TRAINING PROGRAM

## 2023-02-15 PROCEDURE — 82962 GLUCOSE BLOOD TEST: CPT

## 2023-02-15 PROCEDURE — 2709999900 HC NON-CHARGEABLE SUPPLY: Performed by: NEUROLOGICAL SURGERY

## 2023-02-15 PROCEDURE — 97165 OT EVAL LOW COMPLEX 30 MIN: CPT

## 2023-02-15 PROCEDURE — 2720000010 HC SURG SUPPLY STERILE: Performed by: NEUROLOGICAL SURGERY

## 2023-02-15 PROCEDURE — 3600000017 HC SURGERY HYBRID ADDL 15MIN: Performed by: NEUROLOGICAL SURGERY

## 2023-02-15 PROCEDURE — G0378 HOSPITAL OBSERVATION PER HR: HCPCS

## 2023-02-15 PROCEDURE — 63047 LAM FACETEC & FORAMOT LUMBAR: CPT | Performed by: NEUROLOGICAL SURGERY

## 2023-02-15 PROCEDURE — 6360000002 HC RX W HCPCS: Performed by: STUDENT IN AN ORGANIZED HEALTH CARE EDUCATION/TRAINING PROGRAM

## 2023-02-15 PROCEDURE — 2500000003 HC RX 250 WO HCPCS

## 2023-02-15 PROCEDURE — 2500000003 HC RX 250 WO HCPCS: Performed by: NEUROLOGICAL SURGERY

## 2023-02-15 PROCEDURE — 2580000003 HC RX 258

## 2023-02-15 PROCEDURE — 6370000000 HC RX 637 (ALT 250 FOR IP): Performed by: STUDENT IN AN ORGANIZED HEALTH CARE EDUCATION/TRAINING PROGRAM

## 2023-02-15 PROCEDURE — 6370000000 HC RX 637 (ALT 250 FOR IP): Performed by: NEUROLOGICAL SURGERY

## 2023-02-15 PROCEDURE — 3700000001 HC ADD 15 MINUTES (ANESTHESIA): Performed by: NEUROLOGICAL SURGERY

## 2023-02-15 PROCEDURE — 6360000002 HC RX W HCPCS: Performed by: NEUROLOGICAL SURGERY

## 2023-02-15 PROCEDURE — 63047 LAM FACETEC & FORAMOT LUMBAR: CPT | Performed by: STUDENT IN AN ORGANIZED HEALTH CARE EDUCATION/TRAINING PROGRAM

## 2023-02-15 PROCEDURE — 6360000002 HC RX W HCPCS

## 2023-02-15 PROCEDURE — 6360000002 HC RX W HCPCS: Performed by: ANESTHESIOLOGY

## 2023-02-15 PROCEDURE — 6370000000 HC RX 637 (ALT 250 FOR IP): Performed by: INTERNAL MEDICINE

## 2023-02-15 PROCEDURE — 7100000000 HC PACU RECOVERY - FIRST 15 MIN: Performed by: NEUROLOGICAL SURGERY

## 2023-02-15 PROCEDURE — 2580000003 HC RX 258: Performed by: NEUROLOGICAL SURGERY

## 2023-02-15 PROCEDURE — C1729 CATH, DRAINAGE: HCPCS | Performed by: NEUROLOGICAL SURGERY

## 2023-02-15 PROCEDURE — 3700000000 HC ANESTHESIA ATTENDED CARE: Performed by: NEUROLOGICAL SURGERY

## 2023-02-15 PROCEDURE — 3209999900 FLUORO FOR SURGICAL PROCEDURES

## 2023-02-15 PROCEDURE — 3600000007 HC SURGERY HYBRID BASE: Performed by: NEUROLOGICAL SURGERY

## 2023-02-15 PROCEDURE — 7100000001 HC PACU RECOVERY - ADDTL 15 MIN: Performed by: NEUROLOGICAL SURGERY

## 2023-02-15 PROCEDURE — 63048 LAM FACETEC &FORAMOT EA ADDL: CPT | Performed by: NEUROLOGICAL SURGERY

## 2023-02-15 PROCEDURE — 63048 LAM FACETEC &FORAMOT EA ADDL: CPT | Performed by: STUDENT IN AN ORGANIZED HEALTH CARE EDUCATION/TRAINING PROGRAM

## 2023-02-15 PROCEDURE — 2700000000 HC OXYGEN THERAPY PER DAY

## 2023-02-15 PROCEDURE — A4217 STERILE WATER/SALINE, 500 ML: HCPCS | Performed by: NEUROLOGICAL SURGERY

## 2023-02-15 PROCEDURE — 97161 PT EVAL LOW COMPLEX 20 MIN: CPT

## 2023-02-15 RX ORDER — DROPERIDOL 2.5 MG/ML
0.62 INJECTION, SOLUTION INTRAMUSCULAR; INTRAVENOUS
Status: DISCONTINUED | OUTPATIENT
Start: 2023-02-15 | End: 2023-02-15 | Stop reason: HOSPADM

## 2023-02-15 RX ORDER — CYCLOBENZAPRINE HCL 10 MG
10 TABLET ORAL EVERY 12 HOURS PRN
Status: DISCONTINUED | OUTPATIENT
Start: 2023-02-15 | End: 2023-02-17 | Stop reason: HOSPADM

## 2023-02-15 RX ORDER — SODIUM PHOSPHATE, DIBASIC AND SODIUM PHOSPHATE, MONOBASIC 7; 19 G/133ML; G/133ML
1 ENEMA RECTAL DAILY PRN
Status: DISCONTINUED | OUTPATIENT
Start: 2023-02-15 | End: 2023-02-17 | Stop reason: HOSPADM

## 2023-02-15 RX ORDER — SODIUM CHLORIDE 0.9 % (FLUSH) 0.9 %
5-40 SYRINGE (ML) INJECTION PRN
Status: DISCONTINUED | OUTPATIENT
Start: 2023-02-15 | End: 2023-02-17 | Stop reason: HOSPADM

## 2023-02-15 RX ORDER — FENTANYL CITRATE 50 UG/ML
INJECTION, SOLUTION INTRAMUSCULAR; INTRAVENOUS PRN
Status: DISCONTINUED | OUTPATIENT
Start: 2023-02-15 | End: 2023-02-15 | Stop reason: SDUPTHER

## 2023-02-15 RX ORDER — ARFORMOTEROL TARTRATE 15 UG/2ML
15 SOLUTION RESPIRATORY (INHALATION) 2 TIMES DAILY PRN
Status: DISCONTINUED | OUTPATIENT
Start: 2023-02-15 | End: 2023-02-17 | Stop reason: HOSPADM

## 2023-02-15 RX ORDER — SODIUM CHLORIDE 0.9 % (FLUSH) 0.9 %
5-40 SYRINGE (ML) INJECTION PRN
Status: DISCONTINUED | OUTPATIENT
Start: 2023-02-15 | End: 2023-02-15 | Stop reason: HOSPADM

## 2023-02-15 RX ORDER — DEXAMETHASONE SODIUM PHOSPHATE 10 MG/ML
INJECTION INTRAMUSCULAR; INTRAVENOUS PRN
Status: DISCONTINUED | OUTPATIENT
Start: 2023-02-15 | End: 2023-02-15 | Stop reason: SDUPTHER

## 2023-02-15 RX ORDER — OXYCODONE HYDROCHLORIDE 5 MG/1
5 TABLET ORAL EVERY 4 HOURS PRN
Status: DISCONTINUED | OUTPATIENT
Start: 2023-02-15 | End: 2023-02-17 | Stop reason: HOSPADM

## 2023-02-15 RX ORDER — SODIUM CHLORIDE, SODIUM LACTATE, POTASSIUM CHLORIDE, CALCIUM CHLORIDE 600; 310; 30; 20 MG/100ML; MG/100ML; MG/100ML; MG/100ML
INJECTION, SOLUTION INTRAVENOUS CONTINUOUS PRN
Status: DISCONTINUED | OUTPATIENT
Start: 2023-02-15 | End: 2023-02-15 | Stop reason: SDUPTHER

## 2023-02-15 RX ORDER — ALBUTEROL SULFATE 90 UG/1
2 AEROSOL, METERED RESPIRATORY (INHALATION) EVERY 6 HOURS PRN
Status: DISCONTINUED | OUTPATIENT
Start: 2023-02-15 | End: 2023-02-15

## 2023-02-15 RX ORDER — ROPINIROLE 1 MG/1
1 TABLET, FILM COATED ORAL NIGHTLY PRN
Status: DISCONTINUED | OUTPATIENT
Start: 2023-02-15 | End: 2023-02-17 | Stop reason: HOSPADM

## 2023-02-15 RX ORDER — ONDANSETRON 2 MG/ML
4 INJECTION INTRAMUSCULAR; INTRAVENOUS EVERY 6 HOURS PRN
Status: DISCONTINUED | OUTPATIENT
Start: 2023-02-15 | End: 2023-02-17 | Stop reason: HOSPADM

## 2023-02-15 RX ORDER — SODIUM CHLORIDE 0.9 % (FLUSH) 0.9 %
5-40 SYRINGE (ML) INJECTION EVERY 12 HOURS SCHEDULED
Status: DISCONTINUED | OUTPATIENT
Start: 2023-02-15 | End: 2023-02-17 | Stop reason: HOSPADM

## 2023-02-15 RX ORDER — BUPIVACAINE HYDROCHLORIDE 2.5 MG/ML
INJECTION, SOLUTION EPIDURAL; INFILTRATION; INTRACAUDAL PRN
Status: DISCONTINUED | OUTPATIENT
Start: 2023-02-15 | End: 2023-02-15 | Stop reason: ALTCHOICE

## 2023-02-15 RX ORDER — PHENYLEPHRINE HCL IN 0.9% NACL 1 MG/10 ML
SYRINGE (ML) INTRAVENOUS PRN
Status: DISCONTINUED | OUTPATIENT
Start: 2023-02-15 | End: 2023-02-15 | Stop reason: SDUPTHER

## 2023-02-15 RX ORDER — ROCURONIUM BROMIDE 10 MG/ML
INJECTION, SOLUTION INTRAVENOUS PRN
Status: DISCONTINUED | OUTPATIENT
Start: 2023-02-15 | End: 2023-02-15 | Stop reason: SDUPTHER

## 2023-02-15 RX ORDER — PROPOFOL 10 MG/ML
INJECTION, EMULSION INTRAVENOUS PRN
Status: DISCONTINUED | OUTPATIENT
Start: 2023-02-15 | End: 2023-02-15 | Stop reason: SDUPTHER

## 2023-02-15 RX ORDER — SODIUM CHLORIDE 9 MG/ML
25 INJECTION, SOLUTION INTRAVENOUS PRN
Status: DISCONTINUED | OUTPATIENT
Start: 2023-02-15 | End: 2023-02-15 | Stop reason: HOSPADM

## 2023-02-15 RX ORDER — METHYLPREDNISOLONE ACETATE 80 MG/ML
INJECTION, SUSPENSION INTRA-ARTICULAR; INTRALESIONAL; INTRAMUSCULAR; SOFT TISSUE PRN
Status: DISCONTINUED | OUTPATIENT
Start: 2023-02-15 | End: 2023-02-15 | Stop reason: ALTCHOICE

## 2023-02-15 RX ORDER — MIDAZOLAM HYDROCHLORIDE 1 MG/ML
INJECTION INTRAMUSCULAR; INTRAVENOUS PRN
Status: DISCONTINUED | OUTPATIENT
Start: 2023-02-15 | End: 2023-02-15 | Stop reason: SDUPTHER

## 2023-02-15 RX ORDER — MORPHINE SULFATE 2 MG/ML
2 INJECTION, SOLUTION INTRAMUSCULAR; INTRAVENOUS
Status: DISCONTINUED | OUTPATIENT
Start: 2023-02-15 | End: 2023-02-17 | Stop reason: HOSPADM

## 2023-02-15 RX ORDER — BACITRACIN ZINC 500 [USP'U]/G
OINTMENT TOPICAL PRN
Status: DISCONTINUED | OUTPATIENT
Start: 2023-02-15 | End: 2023-02-15 | Stop reason: ALTCHOICE

## 2023-02-15 RX ORDER — ALBUTEROL SULFATE 2.5 MG/3ML
2.5 SOLUTION RESPIRATORY (INHALATION) EVERY 6 HOURS PRN
Status: DISCONTINUED | OUTPATIENT
Start: 2023-02-15 | End: 2023-02-17 | Stop reason: HOSPADM

## 2023-02-15 RX ORDER — DIPHENHYDRAMINE HYDROCHLORIDE 50 MG/ML
12.5 INJECTION INTRAMUSCULAR; INTRAVENOUS
Status: DISCONTINUED | OUTPATIENT
Start: 2023-02-15 | End: 2023-02-15 | Stop reason: HOSPADM

## 2023-02-15 RX ORDER — ACETAMINOPHEN 325 MG/1
650 TABLET ORAL
Status: DISCONTINUED | OUTPATIENT
Start: 2023-02-15 | End: 2023-02-15 | Stop reason: HOSPADM

## 2023-02-15 RX ORDER — BUDESONIDE 0.5 MG/2ML
0.5 INHALANT ORAL 2 TIMES DAILY PRN
Status: DISCONTINUED | OUTPATIENT
Start: 2023-02-15 | End: 2023-02-17 | Stop reason: HOSPADM

## 2023-02-15 RX ORDER — DEXTROSE MONOHYDRATE 100 MG/ML
INJECTION, SOLUTION INTRAVENOUS CONTINUOUS PRN
Status: DISCONTINUED | OUTPATIENT
Start: 2023-02-15 | End: 2023-02-17 | Stop reason: HOSPADM

## 2023-02-15 RX ORDER — SODIUM CHLORIDE 9 MG/ML
INJECTION, SOLUTION INTRAVENOUS CONTINUOUS
Status: DISCONTINUED | OUTPATIENT
Start: 2023-02-15 | End: 2023-02-15 | Stop reason: HOSPADM

## 2023-02-15 RX ORDER — MORPHINE SULFATE 1 MG/ML
INJECTION, SOLUTION EPIDURAL; INTRATHECAL; INTRAVENOUS PRN
Status: DISCONTINUED | OUTPATIENT
Start: 2023-02-15 | End: 2023-02-15 | Stop reason: ALTCHOICE

## 2023-02-15 RX ORDER — SODIUM CHLORIDE 0.9 % (FLUSH) 0.9 %
5-40 SYRINGE (ML) INJECTION EVERY 12 HOURS SCHEDULED
Status: DISCONTINUED | OUTPATIENT
Start: 2023-02-15 | End: 2023-02-15 | Stop reason: HOSPADM

## 2023-02-15 RX ORDER — LIDOCAINE HYDROCHLORIDE AND EPINEPHRINE 10; 10 MG/ML; UG/ML
INJECTION, SOLUTION INFILTRATION; PERINEURAL PRN
Status: DISCONTINUED | OUTPATIENT
Start: 2023-02-15 | End: 2023-02-15 | Stop reason: ALTCHOICE

## 2023-02-15 RX ORDER — ALOGLIPTIN 12.5 MG/1
12.5 TABLET, FILM COATED ORAL DAILY
Status: DISCONTINUED | OUTPATIENT
Start: 2023-02-16 | End: 2023-02-17 | Stop reason: HOSPADM

## 2023-02-15 RX ORDER — POLYETHYLENE GLYCOL 3350 17 G/17G
17 POWDER, FOR SOLUTION ORAL DAILY
Status: DISCONTINUED | OUTPATIENT
Start: 2023-02-15 | End: 2023-02-17 | Stop reason: HOSPADM

## 2023-02-15 RX ORDER — INSULIN LISPRO 100 [IU]/ML
0-8 INJECTION, SOLUTION INTRAVENOUS; SUBCUTANEOUS
Status: DISCONTINUED | OUTPATIENT
Start: 2023-02-15 | End: 2023-02-17 | Stop reason: HOSPADM

## 2023-02-15 RX ORDER — METFORMIN HYDROCHLORIDE 500 MG/1
500 TABLET, EXTENDED RELEASE ORAL
Status: DISCONTINUED | OUTPATIENT
Start: 2023-02-16 | End: 2023-02-17 | Stop reason: HOSPADM

## 2023-02-15 RX ORDER — ACETAMINOPHEN 325 MG/1
650 TABLET ORAL EVERY 6 HOURS
Status: DISCONTINUED | OUTPATIENT
Start: 2023-02-15 | End: 2023-02-17 | Stop reason: HOSPADM

## 2023-02-15 RX ORDER — MIDAZOLAM HYDROCHLORIDE 2 MG/2ML
2 INJECTION, SOLUTION INTRAMUSCULAR; INTRAVENOUS
Status: DISCONTINUED | OUTPATIENT
Start: 2023-02-15 | End: 2023-02-15 | Stop reason: HOSPADM

## 2023-02-15 RX ORDER — MORPHINE SULFATE 4 MG/ML
4 INJECTION, SOLUTION INTRAMUSCULAR; INTRAVENOUS
Status: DISCONTINUED | OUTPATIENT
Start: 2023-02-15 | End: 2023-02-17 | Stop reason: HOSPADM

## 2023-02-15 RX ORDER — LABETALOL HYDROCHLORIDE 5 MG/ML
5 INJECTION, SOLUTION INTRAVENOUS
Status: DISCONTINUED | OUTPATIENT
Start: 2023-02-15 | End: 2023-02-15 | Stop reason: HOSPADM

## 2023-02-15 RX ORDER — SODIUM CHLORIDE 9 MG/ML
INJECTION, SOLUTION INTRAVENOUS PRN
Status: DISCONTINUED | OUTPATIENT
Start: 2023-02-15 | End: 2023-02-15 | Stop reason: HOSPADM

## 2023-02-15 RX ORDER — IPRATROPIUM BROMIDE AND ALBUTEROL SULFATE 2.5; .5 MG/3ML; MG/3ML
1 SOLUTION RESPIRATORY (INHALATION)
Status: DISCONTINUED | OUTPATIENT
Start: 2023-02-15 | End: 2023-02-15 | Stop reason: HOSPADM

## 2023-02-15 RX ORDER — SENNA AND DOCUSATE SODIUM 50; 8.6 MG/1; MG/1
1 TABLET, FILM COATED ORAL 2 TIMES DAILY
Status: DISCONTINUED | OUTPATIENT
Start: 2023-02-15 | End: 2023-02-17 | Stop reason: HOSPADM

## 2023-02-15 RX ORDER — LIDOCAINE HYDROCHLORIDE 20 MG/ML
INJECTION, SOLUTION INTRAVENOUS PRN
Status: DISCONTINUED | OUTPATIENT
Start: 2023-02-15 | End: 2023-02-15 | Stop reason: SDUPTHER

## 2023-02-15 RX ORDER — SODIUM CHLORIDE 9 MG/ML
INJECTION, SOLUTION INTRAVENOUS PRN
Status: DISCONTINUED | OUTPATIENT
Start: 2023-02-15 | End: 2023-02-17 | Stop reason: HOSPADM

## 2023-02-15 RX ORDER — OXYCODONE HYDROCHLORIDE 10 MG/1
10 TABLET ORAL EVERY 4 HOURS PRN
Status: DISCONTINUED | OUTPATIENT
Start: 2023-02-15 | End: 2023-02-17 | Stop reason: HOSPADM

## 2023-02-15 RX ORDER — ONDANSETRON 2 MG/ML
INJECTION INTRAMUSCULAR; INTRAVENOUS PRN
Status: DISCONTINUED | OUTPATIENT
Start: 2023-02-15 | End: 2023-02-15 | Stop reason: SDUPTHER

## 2023-02-15 RX ORDER — RAMIPRIL 1.25 MG/1
1.25 CAPSULE ORAL DAILY
Status: DISCONTINUED | OUTPATIENT
Start: 2023-02-15 | End: 2023-02-17 | Stop reason: HOSPADM

## 2023-02-15 RX ORDER — INSULIN LISPRO 100 [IU]/ML
0-4 INJECTION, SOLUTION INTRAVENOUS; SUBCUTANEOUS NIGHTLY
Status: DISCONTINUED | OUTPATIENT
Start: 2023-02-15 | End: 2023-02-17 | Stop reason: HOSPADM

## 2023-02-15 RX ORDER — ONDANSETRON 4 MG/1
4 TABLET, ORALLY DISINTEGRATING ORAL EVERY 8 HOURS PRN
Status: DISCONTINUED | OUTPATIENT
Start: 2023-02-15 | End: 2023-02-17 | Stop reason: HOSPADM

## 2023-02-15 RX ORDER — PREGABALIN 25 MG/1
50 CAPSULE ORAL 2 TIMES DAILY
Status: DISCONTINUED | OUTPATIENT
Start: 2023-02-15 | End: 2023-02-17 | Stop reason: HOSPADM

## 2023-02-15 RX ORDER — ONDANSETRON 2 MG/ML
4 INJECTION INTRAMUSCULAR; INTRAVENOUS
Status: DISCONTINUED | OUTPATIENT
Start: 2023-02-15 | End: 2023-02-15 | Stop reason: HOSPADM

## 2023-02-15 RX ORDER — HYDRALAZINE HYDROCHLORIDE 20 MG/ML
5 INJECTION INTRAMUSCULAR; INTRAVENOUS
Status: DISCONTINUED | OUTPATIENT
Start: 2023-02-15 | End: 2023-02-15 | Stop reason: HOSPADM

## 2023-02-15 RX ADMIN — LIDOCAINE HYDROCHLORIDE 100 MG: 20 INJECTION, SOLUTION INTRAVENOUS at 07:04

## 2023-02-15 RX ADMIN — ACETAMINOPHEN 650 MG: 325 TABLET ORAL at 15:59

## 2023-02-15 RX ADMIN — HYDROMORPHONE HYDROCHLORIDE 0.5 MG: 1 INJECTION, SOLUTION INTRAMUSCULAR; INTRAVENOUS; SUBCUTANEOUS at 10:59

## 2023-02-15 RX ADMIN — SODIUM CHLORIDE: 9 INJECTION, SOLUTION INTRAVENOUS at 06:01

## 2023-02-15 RX ADMIN — SODIUM CHLORIDE: 9 INJECTION, SOLUTION INTRAVENOUS at 07:24

## 2023-02-15 RX ADMIN — ROCURONIUM BROMIDE 50 MG: 10 INJECTION, SOLUTION INTRAVENOUS at 07:05

## 2023-02-15 RX ADMIN — PROPOFOL 150 MG: 10 INJECTION, EMULSION INTRAVENOUS at 07:04

## 2023-02-15 RX ADMIN — MIDAZOLAM 1 MG: 1 INJECTION INTRAMUSCULAR; INTRAVENOUS at 06:55

## 2023-02-15 RX ADMIN — BISACODYL 5 MG: 5 TABLET, COATED ORAL at 13:22

## 2023-02-15 RX ADMIN — SODIUM CHLORIDE, PRESERVATIVE FREE 10 ML: 5 INJECTION INTRAVENOUS at 11:55

## 2023-02-15 RX ADMIN — INSULIN LISPRO 2 UNITS: 100 INJECTION, SOLUTION INTRAVENOUS; SUBCUTANEOUS at 16:15

## 2023-02-15 RX ADMIN — CEFAZOLIN 2000 MG: 2 INJECTION, POWDER, FOR SOLUTION INTRAMUSCULAR; INTRAVENOUS at 21:14

## 2023-02-15 RX ADMIN — POLYETHYLENE GLYCOL 3350 17 G: 17 POWDER, FOR SOLUTION ORAL at 13:22

## 2023-02-15 RX ADMIN — PROPOFOL 50 MG: 10 INJECTION, EMULSION INTRAVENOUS at 09:48

## 2023-02-15 RX ADMIN — FENTANYL CITRATE 50 MCG: 50 INJECTION, SOLUTION INTRAMUSCULAR; INTRAVENOUS at 07:21

## 2023-02-15 RX ADMIN — OXYCODONE 5 MG: 5 TABLET ORAL at 22:00

## 2023-02-15 RX ADMIN — PREGABALIN 50 MG: 25 CAPSULE ORAL at 21:14

## 2023-02-15 RX ADMIN — HYDROMORPHONE HYDROCHLORIDE 0.5 MG: 1 INJECTION, SOLUTION INTRAMUSCULAR; INTRAVENOUS; SUBCUTANEOUS at 10:42

## 2023-02-15 RX ADMIN — SUGAMMADEX 200 MG: 100 INJECTION, SOLUTION INTRAVENOUS at 10:01

## 2023-02-15 RX ADMIN — MIDAZOLAM 1 MG: 1 INJECTION INTRAMUSCULAR; INTRAVENOUS at 07:04

## 2023-02-15 RX ADMIN — Medication 100 MCG: at 07:30

## 2023-02-15 RX ADMIN — SENNOSIDES AND DOCUSATE SODIUM 1 TABLET: 50; 8.6 TABLET ORAL at 22:00

## 2023-02-15 RX ADMIN — CYCLOBENZAPRINE 10 MG: 10 TABLET, FILM COATED ORAL at 13:22

## 2023-02-15 RX ADMIN — CEFAZOLIN 2000 MG: 2 INJECTION, POWDER, FOR SOLUTION INTRAMUSCULAR; INTRAVENOUS at 16:04

## 2023-02-15 RX ADMIN — ROCURONIUM BROMIDE 10 MG: 10 INJECTION, SOLUTION INTRAVENOUS at 09:01

## 2023-02-15 RX ADMIN — DEXAMETHASONE SODIUM PHOSPHATE 10 MG: 10 INJECTION INTRAMUSCULAR; INTRAVENOUS at 07:12

## 2023-02-15 RX ADMIN — CEFAZOLIN 2000 MG: 2 INJECTION, POWDER, FOR SOLUTION INTRAMUSCULAR; INTRAVENOUS at 07:12

## 2023-02-15 RX ADMIN — SODIUM CHLORIDE, POTASSIUM CHLORIDE, SODIUM LACTATE AND CALCIUM CHLORIDE: 600; 310; 30; 20 INJECTION, SOLUTION INTRAVENOUS at 07:10

## 2023-02-15 RX ADMIN — FENTANYL CITRATE 50 MCG: 50 INJECTION, SOLUTION INTRAMUSCULAR; INTRAVENOUS at 07:37

## 2023-02-15 RX ADMIN — Medication 10 ML: at 21:22

## 2023-02-15 RX ADMIN — ROCURONIUM BROMIDE 10 MG: 10 INJECTION, SOLUTION INTRAVENOUS at 08:25

## 2023-02-15 RX ADMIN — ACETAMINOPHEN 650 MG: 325 TABLET ORAL at 21:14

## 2023-02-15 RX ADMIN — OXYCODONE 5 MG: 5 TABLET ORAL at 17:50

## 2023-02-15 RX ADMIN — SENNOSIDES AND DOCUSATE SODIUM 1 TABLET: 50; 8.6 TABLET ORAL at 13:22

## 2023-02-15 RX ADMIN — ONDANSETRON 4 MG: 2 INJECTION INTRAMUSCULAR; INTRAVENOUS at 09:20

## 2023-02-15 RX ADMIN — FENTANYL CITRATE 100 MCG: 50 INJECTION, SOLUTION INTRAMUSCULAR; INTRAVENOUS at 07:04

## 2023-02-15 ASSESSMENT — PAIN SCALES - GENERAL
PAINLEVEL_OUTOF10: 6
PAINLEVEL_OUTOF10: 5
PAINLEVEL_OUTOF10: 6
PAINLEVEL_OUTOF10: 6
PAINLEVEL_OUTOF10: 2
PAINLEVEL_OUTOF10: 10
PAINLEVEL_OUTOF10: 7
PAINLEVEL_OUTOF10: 7
PAINLEVEL_OUTOF10: 5

## 2023-02-15 ASSESSMENT — PAIN DESCRIPTION - LOCATION
LOCATION: BACK
LOCATION: BACK
LOCATION: LEG
LOCATION: BACK
LOCATION: BACK
LOCATION: LEG

## 2023-02-15 ASSESSMENT — PAIN DESCRIPTION - PAIN TYPE
TYPE: SURGICAL PAIN

## 2023-02-15 ASSESSMENT — PAIN DESCRIPTION - FREQUENCY
FREQUENCY: CONTINUOUS
FREQUENCY: CONTINUOUS

## 2023-02-15 ASSESSMENT — PAIN DESCRIPTION - ONSET
ONSET: ON-GOING
ONSET: ON-GOING

## 2023-02-15 ASSESSMENT — ENCOUNTER SYMPTOMS: DYSPNEA ACTIVITY LEVEL: AFTER AMBULATING 1 FLIGHT OF STAIRS

## 2023-02-15 ASSESSMENT — PAIN DESCRIPTION - ORIENTATION
ORIENTATION: RIGHT;LEFT
ORIENTATION: RIGHT;LEFT
ORIENTATION: LOWER;MID;POSTERIOR
ORIENTATION: POSTERIOR

## 2023-02-15 ASSESSMENT — PAIN - FUNCTIONAL ASSESSMENT
PAIN_FUNCTIONAL_ASSESSMENT: 0-10
PAIN_FUNCTIONAL_ASSESSMENT: PREVENTS OR INTERFERES SOME ACTIVE ACTIVITIES AND ADLS
PAIN_FUNCTIONAL_ASSESSMENT: ACTIVITIES ARE NOT PREVENTED
PAIN_FUNCTIONAL_ASSESSMENT: PREVENTS OR INTERFERES SOME ACTIVE ACTIVITIES AND ADLS
PAIN_FUNCTIONAL_ASSESSMENT: PREVENTS OR INTERFERES SOME ACTIVE ACTIVITIES AND ADLS
PAIN_FUNCTIONAL_ASSESSMENT: ACTIVITIES ARE NOT PREVENTED

## 2023-02-15 ASSESSMENT — PAIN DESCRIPTION - DESCRIPTORS
DESCRIPTORS: SHARP;STABBING;BURNING
DESCRIPTORS: ACHING;DULL;DISCOMFORT
DESCRIPTORS: ACHING;CRAMPING;DISCOMFORT;DULL
DESCRIPTORS: STABBING;SHARP;BURNING
DESCRIPTORS: ACHING;CRAMPING;DISCOMFORT;DULL
DESCRIPTORS: ACHING;DISCOMFORT;DULL

## 2023-02-15 ASSESSMENT — LIFESTYLE VARIABLES: SMOKING_STATUS: 1

## 2023-02-15 NOTE — PROGRESS NOTES
[id number] called and report given to [facility] . Pt placed in transport.

## 2023-02-15 NOTE — ANESTHESIA POSTPROCEDURE EVALUATION
Department of Anesthesiology  Postprocedure Note    Patient: Galilea Garcia  MRN: [id number]  YOB: 1950  Date of evaluation: 2/15/2023      Procedure Summary     Date: 02/15/23 Room / Location: [facility]  / [facility]    Anesthesia Start: 4189 Anesthesia Stop: 9022    Procedure: L3,L4, L5 laminectomies bilaterally with medial facetectomies and foraminotomies (Bilateral: Back) Diagnosis:       Lumbar spinal stenosis      (Lumbar spinal stenosis [M48.061])    Surgeons:  Jody Patel MD Responsible Provider: King Boyer MD    Anesthesia Type: General ASA Status: 3          Anesthesia Type: General    Carey Phase I: Carey Score: 8    Carey Phase II:        Anesthesia Post Evaluation    Patient location during evaluation: PACU  Patient participation: complete - patient participated  Level of consciousness: awake and alert  Airway patency: patent  Nausea & Vomiting: no nausea and no vomiting  Complications: no  Cardiovascular status: hemodynamically stable  Respiratory status: acceptable  Hydration status: euvolemic

## 2023-02-15 NOTE — H&P
[facility] NEUROSURGERY  [id number]         Chief Complaint:        Chief Complaint   Patient presents with    New Patient       Referred by dr Nimco Lagunas had 2 epidurals with no relief  no balance  doing therapy now  goes twice a week for the last 3 months          HPI:      I had the pleasure of seeing Darrell Bourgeois today in neurosurgical clinic. As you know this delightful 77-year-old, right-handed, , father of 1, current pack and 1/2/day smoker nondrinker dry-cleaning  part-time presents with a history of low back pain for more than a year with progression. Patient describes the pain is burning and stabbing and extends down the anterior and posterior aspects of his legs to his feet. He is quite concerned because despite epidural injections last of which was performed in December and current involvement in physical therapy at [facility] for the last 3 months he has had a progressive foot drop on the right over the last 6 months. He denied any siri trauma to the region. He does state that this is affecting his balance and he is worried about his employment. Bending forward does tend to alleviate some of the pain but not all. He does endorse calf cramping. There is been no loss of bowel or bladder function. Past Medical History:   Diagnosis Date    Agent orange exposure      Chronic cough       uses inhaler /to have Bronch 8/22/2019    Colon polyps 03/07/2019, 9-2021     fOR or 10-19-21    COPD (chronic obstructive pulmonary disease) ([facility] [address]      Diabetes ([facility] [address]      Hyperlipidemia      Hypertension      Rash       noted 2/28/19- says it is on back & shoulders    Restless leg      Spinal stenosis at L4-L5 level 2009     [facility]    Thyroid nodule 2020     R            Past Surgical History:   Procedure Laterality Date    APPENDECTOMY   2000?      ruptured BRONCHOSCOPY N/A 8/22/2019     BRONCHOSCOPY DIAGNOSTIC OR [facility] ONLY performed by Pasquale Marin MD at [facility]   2000?     open    COLECTOMY N/A 10/19/2021     LAPAROSCOPIC ROBOTIC XI ASSISTED RIGHT COLECTOMY performed by Nakia Flores DO at [address] [facility]   12 years ago    COLONOSCOPY N/A 3/7/2019     COLONOSCOPY POLYPECTOMY SNARE/COLD BIOPSY performed by Nakia Flores DO at [facility] ENDOSCOPY    COLONOSCOPY N/A 8/26/2021     COLONOSCOPY POLYPECTOMY HOT BIOPSY performed by Nakia Flores DO at [address] [facility]   years ago    LITHOTRIPSY   years ago    PAIN MANAGEMENT PROCEDURE N/A 11/14/2022     LUMBAR EPIDURAL STEROID INJECTION L4-5 WITH LOW VOLUME AND 61 DEPO performed by Amara Moy MD at [address] [facility] N/A 12/22/2022     LUMBAR EPIDURAL STEROID INJECTION L3-4 WITH LOW VOLUME AND [de-identified] DEPO performed by Amara Moy MD at [address] [facility] N/A 8/26/2021     EGD BIOPSY performed by Nakia Flores DO at [facility] ENDOSCOPY            Family History   Problem Relation Age of Onset    Stroke Father        Social History            Socioeconomic History    Marital status:        Spouse name: Not on file    Number of children: Not on file    Years of education: Not on file    Highest education level: Not on file   Occupational History    Not on file   Tobacco Use    Smoking status: Every Day       Packs/day: 1.50       Years: 35.00       Pack years: 52.50       Types: Cigarettes    Smokeless tobacco: Never   Vaping Use    Vaping Use: Never used   Substance and Sexual Activity    Alcohol use: Not Currently    Drug use: No    Sexual activity: Never   Other Topics Concern    Not on file   Social History Narrative    Not on file      Social Determinants of Health          Financial Resource Strain: Low Risk     Difficulty of Paying Living Expenses: Not hard at all   Food Insecurity: No Food Insecurity    Worried About Running Out of Food in the Last Year: Never true    Ran Out of Food in the Last Year: Never true   Transportation Needs: Not on file   Physical Activity: Sufficiently Active    Days of Exercise per Week: 5 days    Minutes of Exercise per Session: 60 min   Stress: Not on file   Social Connections: Not on file   Intimate Partner Violence: Not on file   Housing Stability: Not on file         Medications:          Current Outpatient Medications   Medication Sig Dispense Refill    pregabalin (LYRICA) 50 MG capsule Take 1 capsule by mouth 2 times daily for 30 days. Max Daily Amount: 100 mg 60 capsule 0    ibuprofen (ADVIL;MOTRIN) 200 MG tablet Take 200 mg by mouth as needed for Pain        gabapentin (NEURONTIN) 100 MG capsule Take 1 capsule by mouth 2 times daily for 30 days. Take 2 capsules QHS for three days then 2 capsules BID as tolerated (Patient not taking: Reported on 12/20/2022) 120 capsule 0    ramipril (ALTACE) 1.25 MG capsule TAKE 1 CAPSULE DAILY 90 capsule 1    fluticasone-umeclidin-vilant (TRELEGY ELLIPTA) 100-62.5-25 MCG/INH AEPB Inhale 1 puff into the lungs daily 1 each 3    meloxicam (MOBIC) 15 MG tablet Take 1 tablet by mouth daily as needed for Pain 30 tablet 1    SITagliptin-metFORMIN (JANUMET XR)  MG TB24 per extended release tablet Take 1 tablet by mouth daily 90 tablet 1    vitamin D (CHOLECALCIFEROL) 47377 UNIT CAPS One weekly 12 capsule 1    albuterol sulfate  (90 Base) MCG/ACT inhaler Inhale 2 puffs into the lungs every 6 hours as needed for Wheezing or Shortness of Breath 18 g 1    rOPINIRole (REQUIP) 1 MG tablet Take 1 tablet by mouth nightly 90 tablet 1      No current facility-administered medications for this visit. Allergies:    Pcn [penicillins] and Tape [adhesive tape]         Review of Systems:     Denies any chest pain, headache, dyspnea, recent weight loss, fevers, chills or night sweats.         Physical Examination:     /74 Pulse 62   Temp 97.7 °F (36.5 °C)   Resp 16   Ht 6' (1.829 m)   Wt 185 lb (83.9 kg)   SpO2 94%   BMI 25.09 kg/m²       On focused neurological examination, he  is awake alert oriented and rationally conversant. Speech is clear and fluent. Pupils are equal and reactive to light bilaterally, extraocular movements are intact, visual fields are full to confrontation. His  face is symmetric and grossly intact to fine touch. Uvula and tongue are both midline. Shoulder shrug is symmetric and strong. Motor examination reveals preserved power in the upper and lower extremities at 5 out of 5 throughout, with the exception of right hip flexion 4 out of 5, right knee extension 4 out of 5, 1 out of 5 dorsi flexion on the right 3 out of 5 plantar flexion on the right 3 out of 5 eversion 4 out of 5 inversion and 1 out of 5 EHL on the right. Reflexes are symmetric and brisk stepped his right patella which is diminished at 1. plantar responses are downgoing. There is no clonus. Patient is intact to fine touch in all dermatomes throughout. Straight leg raise is negative. Palpation of the spine reveals no kyphotic or scoliotic gross deformities. There is no pain to deep percussion nor palpation. ASSESSMENT:     I personally reviewed Ben Issa's radiographic images, particularly his MRI of the lumbar spine dated 4 October 2022 and which demonstrates significant spinal stenosis most pronounced at L3-4 as well as 4 5 and eccentric to the right at L5-S1. 1. Lumbar spondylosis  -     XR LUMBAR SPINE FLEXION AND EXTENSION ONLY; Future  2. Spinal stenosis of lumbar region without neurogenic claudication  -     XR LUMBAR SPINE FLEXION AND EXTENSION ONLY; Future     MEDICAL DECISION MAKING & PLAN:     I showed Mr. Aj Siddiqi his images and explained the findings. Certainly he is performed all conservative measures to the maximum namely pain management and actively performing physical therapy.   I am concerned about his right lower extremity siri weakness and foot drop. We will obtain flexion-extension x-rays to determine if there are mobile listhesis is contributing to this gentlemen's problems. Risks benefits and alternatives of lumbar decompression were explained at length the patient which included but were not limited to the risk of infection, bleeding, CSF leak, failure to offer benefit, need for reoperation, paralysis, paresthesia, injury to bowel bladder or sexual function, coma, cardiovascular collapse and even death. He articulated his understanding. We will proceed with planning for surgery pending the results of his flexion-extension x-rays. We will need medical clearance. Should he develop any new symptoms in the interim I instructed him to call our office or present himself to the emergency room. Nsx Staff Updated H and P:    Pt seen and examined. There have been no interval changes. I re-reviewed the risks, benefits and alternatives of lumbar laminectomy as above. All questions were answered and he is eager to proceed.

## 2023-02-15 NOTE — PROGRESS NOTES
[address]  [address] [place] [address]      Date:2/15/2023                 Patient Name: Vlad López  MRN: [id number]  : 1950  Room: [address] [place] [address]-    Referring Provider:  ALIX Cool  Specific Provider Orders/Date: OT evaluation and treat 2/15/23  Evaluating OT: Ned Irene, OTR/L #[id number]    Diagnosis: Lumbar spinal stenosis [M48.061]  Lumbar stenosis with neurogenic claudication [M48.062]      Surgery:   L3,L4, L5 laminectomies bilaterally with medial facetectomies and foraminotomies, 2/15/23    Pertinent Medical History:  has a past medical history of Agent orange exposure, Chronic cough, Colon polyps, COPD (chronic obstructive pulmonary disease) ([facility] [address] Diabetes ([facility] [address] Foot drop, right foot, Hyperlipidemia, Hypertension, Rash, Restless leg, Spinal stenosis at L4-L5 level, and Thyroid nodule.      Precautions:  Fall Risk, spinal, R foot drop- has new AFO    Assessment of current deficits    [x] Functional mobility  [x]ADLs  [] Strength               []Cognition   [x] Functional transfers   [x] IADLs         [x] Safety Awareness   [x]Endurance   [] Fine Coordination              [x] Balance      [] Vision/perception   []Sensation    []Gross Motor Coordination  [] ROM  [] Delirium                   [] Motor Control     OT PLAN OF CARE   OT POC based on physician orders, patient diagnosis and results of clinical assessment    Frequency/Duration   1-3 days/wk for 1 week PRN   Specific OT Treatment Interventions to include:   * Instruction/training on adapted ADL techniques and AE recommendations to increase functional independence within precautions       * Training on energy conservation strategies, correct breathing pattern and techniques to improve independence/tolerance for self-care routine  * Functional transfer/mobility training/DME recommendations for increased independence, safety, and fall prevention  * Patient/Family education to increase follow through with safety techniques and functional independence  * Recommendation of environmental modifications for increased safety with functional transfers/mobility and ADLs  * Therapeutic activities to facilitate/challenge dynamic balance, stand tolerance for increased safety and independence with ADLs  * Positioning to improve skin integrity, interaction with environment and functional independence    Recommended Adaptive Equipment: ww, elevated commode, shower seat , AE for LE dressing and bathing    Home Living: Pt lives with wife in a 2nd floor apt. with 6 steps and one hand rails. Bed and bath on second floor with 2 flights of  steps and oneHR's.   Family/ Outside assistance available: wife  Bathroom setup: tub shower   Equipment [place] [facility]    Prior Level of Function: mod I  with ADLs , mod I  with IADLs; ambulated spc  Driving: yes   Occupation: retired from [employer] and   Medication management: self  Leisure: casino    Pain Level: min back pain    Cognition: A&O: 4/4; Follows multi step directions   Memory:  poor recall of spinal precautions- handout issued   Sequencing:  fair+   Problem solving:  fair+   Judgement/safety:  fair+     Functional Assessment:  AM-PAC Daily Activity Raw Score: 16/24   Initial Eval Status  Date: 2/15/23 Treatment Status  Date: STGs = LTGs  Time frame: 1-3 days   Feeding Independent     Grooming SBA with ww  Mod I with ww standing   UB Dressing SBA  Mod I    LB Dressing Max A unable to perform figure 4  Mod I with AE    Bathing Simulated mod A recommend seat  Mod I seated with LHS   Toileting Simulated mod A from standard height commode recommend elevated commode with HR  Mod I with ww to [facility]   Bed Mobility  Supine to sit: min A   Sit to supine:  n/t  Supine to sit: mod I   Sit to supine: mod I    Functional Transfers Sit to stand min A from bed level and mod A from lower surface  Mod I with ww   Functional Mobility Min A with ww cues for safety and sequencing  Mod I with ww   Balance Sitting:     Static:  SBA    Dynamic:CGA  Standing: min A                                                                        Activity Tolerance Good- O2 97% HR 80 RA   good   Visual/  Perceptual Glasses: reading         Safety Fair+ cues for safety and to recall spinal precautions                                  good     Hand Dominance R    AROM (PROM) Strength Additional Info:    RUE  Shoulders limited to 90* distal WFL 3-/5 fair  and wfl FMC/dexterity noted during ADL tasks       LUE Shoulders limited to 90* distal WFL  3-/5 fair  and wfl FMC/dexterity noted during ADL tasks     Hearing: [facility]   Sensation:   No c/o numbness or tingling   Tone: WFL   Edema: none noted    Comments: Upon arrival patient supine and agreeable to evaluation. Performed OT evaluation with education on spinal precautions and safety and modifications with ADL completion. At end of session, patient sitting up in chair and  with call light and phone within reach, all lines and tubes intact. Nursing notified. Overall patient demonstrated  decreased independence and safety during completion of ADL/functional transfer/mobility tasks. Pt would benefit from continued skilled OT to increase safety and independence with completion of ADL/IADL tasks for functional independence and quality of life. Treatment: OT treatment provided this date includes:   Instruction/training on safety and adapted techniques for completion of ADLs: to increase [place] in self care within precautions  with AE/DME prn   Functional transfer/mobility training/DME recommendations for increased  independence, safety, and fall prevention with  ww.    Instruction/training on energy conservation/work simplification for completion of ADLs: techniques to increase [place] with self care ADLs & iADLs, work simplification to improve endurance   Proper Positioning/Alignment: for optimal healing, skin integrity to prevent breakdown, decrease edema  Skilled monitoring of vitals: to include BP, spO2 & HR during session  Sitting/standing Balance/Tolerance- to increased balance & activity tolerance during ADLs as well as facilitate proper posture and/or positioning. Rehab Potential: Good  for established goals     Patient / Family Goal: home with assistance      Patient and/or family were instructed on functional diagnosis, prognosis/goals and OT plan of care. Demonstrated good understanding. Eval Complexity: low    Time In: 14:10  Time Out: 14:35  Total Treatment Time: 10 min. Min Units   OT Eval Low 46454  x     OT Eval Medium [id number]      OT Eval High [id number]       OT Re-Eval [id number]       Therapeutic Ex 73389       Therapeutic Activities 59872  10  1   ADL/Self Care 35174       Orthotic Management 80077       Neuro Re-Ed [id number]       Non-Billable Time          Evaluation Time includes thorough review of current medical information, gathering information on past medical history/social history and prior level of function, completion of standardized testing/informal observation of tasks, assessment of data and education on plan of care and goals. Baldomero Spears.  Kelly 72, Brooke [address]

## 2023-02-15 NOTE — ANESTHESIA PRE PROCEDURE
Department of Anesthesiology  Preprocedure Note       Name:  Alberto Gale   Age:  74 y.o.  :  1950                                          MRN:  [id number]         Date:  2/15/2023      Surgeon: Jorge Mejia):  Maynor Rachel MD    Procedure: Procedure(s):  L3,L4, L5 laminectomies bilaterally with medial facetectomies and foraminotomies, C-arm, Franc table    Medications prior to admission:   Prior to Admission medications    Medication Sig Start Date End Date Taking? Authorizing Provider   pregabalin (LYRICA) 50 MG capsule Take 1 capsule by mouth 2 times daily for 30 days.  Max Daily Amount: 100 mg 23  Iron Gross MD   ibuprofen (ADVIL;MOTRIN) 200 MG tablet Take 200 mg by mouth as needed for Pain    Historical Provider, MD   ramipril (ALTACE) 1.25 MG capsule TAKE 1 CAPSULE DAILY 22   Mahamed Ojeda, DO   fluticasone-umeclidin-vilant (TRELEGY ELLIPTA) 100-62.5-25 MCG/INH AEPB Inhale 1 puff into the lungs daily  Patient taking differently: Inhale 1 puff into the lungs daily as needed 22   Mahamed Ojeda DO   SITagliptin-metFORMIN (JANUMET XR)  MG TB24 per extended release tablet Take 1 tablet by mouth daily 22   Mahamed Ojeda DO   vitamin D (CHOLECALCIFEROL) 40954 UNIT CAPS One weekly  Patient taking differently: Take 50,000 Units by mouth once a week One weekly 22   Mahamed Ojeda DO   albuterol sulfate  (90 Base) MCG/ACT inhaler Inhale 2 puffs into the lungs every 6 hours as needed for Wheezing or Shortness of Breath 21   Mahamed Ojeda DO   rOPINIRole (REQUIP) 1 MG tablet Take 1 tablet by mouth nightly  Patient taking differently: Take 1 mg by mouth nightly as needed 21   Mahamed Ojeda DO       Current medications:    Current Facility-Administered Medications   Medication Dose Route Frequency Provider Last Rate Last Admin    sodium chloride flush 0.9 % injection 5-40 mL  5-40 mL IntraVENous 2 times per day Lanre HaloMelina house        sodium chloride flush 0.9 % injection 5-40 mL  5-40 mL IntraVENous PRN Lanre HaloALIX house        0.9 % sodium chloride infusion   IntraVENous PRN Lanre Haloharsh PA 25 mL/hr at 02/15/23 0609 NoRateChange at 02/15/23 0609    0.9 % sodium chloride infusion   IntraVENous Continuous ALIX Christianson        ceFAZolin (ANCEF) 2,000 mg in sterile water 20 mL IV syringe  2,000 mg IntraVENous See Admin Instructions ALIX Christianson           Allergies: Allergies   Allergen Reactions    Pcn [Penicillins] Hives    Tape [place] [employer] Tape]      Surgical tape-blisters       Problem List:    Patient Active Problem List   Diagnosis Code    COPD (chronic obstructive pulmonary disease) ([facility] [address] J44.9    Type 2 diabetes mellitus with diabetic neuropathy, without long-term current use of insulin (HCC) E11.40    Hyperlipemia E78.5    Nephrolithiasis  N20.0    Tobacco abuse Z72.0    Intractable ophthalmoplegic migraine G43. B1    Adenomatous polyp of colon D12.6    Chronic pain syndrome G89.4    Lumbar facet arthropathy M47.816    Lumbar disc disorder M51.9    Lumbar spondylosis M47.816    Spinal stenosis of lumbar region without neurogenic claudication M48.061    Lumbar radiculopathy M54.16       Past Medical History:        Diagnosis Date    Agent orange exposure     Chronic cough     uses inhaler /to have Bronch 8/22/2019    Colon polyps 03/07/2019, 9-2021    fOR or 10-19-21    COPD (chronic obstructive pulmonary disease) ([facility] [address]     Diabetes ([facility] [address]     Foot drop, right foot     HAS A BRACE    Hyperlipidemia     Hypertension     Rash     noted 2/28/19- says it is on back & shoulders    Restless leg     Spinal stenosis at L4-L5 level 2009    [facility]fl[facility]    Thyroid nodule 2020    R       Past Surgical History:        Procedure Laterality Date    APPENDECTOMY  2000?     ruptured    BRONCHOSCOPY N/A 8/22/2019    BRONCHOSCOPY DIAGNOSTIC OR CELL 8 Mel Burns ONLY performed by Jaxson Daley MD at [facility] ENDOSCOPY   • CHOLECYSTECTOMY  2000?    open   • COLECTOMY N/A 10/19/2021    LAPAROSCOPIC ROBOTIC XI ASSISTED RIGHT COLECTOMY performed by Job De Santiago DO at [facility] OR   • COLONOSCOPY  12 years ago   • COLONOSCOPY N/A 3/7/2019    COLONOSCOPY POLYPECTOMY SNARE/COLD BIOPSY performed by Job De Santiago DO at [facility] ENDOSCOPY   • COLONOSCOPY N/A 8/26/2021    COLONOSCOPY POLYPECTOMY HOT BIOPSY performed by Job De Santiago DO at [facility] ENDOSCOPY   • KIDNEY STONE SURGERY  years ago   • LITHOTRIPSY  years ago   • PAIN MANAGEMENT PROCEDURE N/A 11/14/2022    LUMBAR EPIDURAL STEROID INJECTION L4-5 WITH LOW VOLUME AND 60 DEPO performed by Hayden Hamlin MD at [facility] OR   • PAIN MANAGEMENT PROCEDURE N/A 12/22/2022    LUMBAR EPIDURAL STEROID INJECTION L3-4 WITH LOW VOLUME AND 80 DEPO performed by Hayden Hamlin MD at [facility] OR   • UPPER GASTROINTESTINAL ENDOSCOPY N/A 8/26/2021    EGD BIOPSY performed by Job De Santiago DO at [facility] ENDOSCOPY       Social History:    Social History     Tobacco Use   • Smoking status: Every Day     Packs/day: 1.50     Years: 35.00     Pack years: 52.50     Types: Cigarettes   • Smokeless tobacco: Never   Substance Use Topics   • Alcohol use: Not Currently                                Ready to quit: No  Counseling given: Yes      Vital Signs (Current):   Vitals:    02/01/23 0857 02/15/23 0537   BP:  (!) 144/67   Pulse:  72   Resp:  20   Temp:  36.4 °C (97.6 °F)   TempSrc:  Temporal   SpO2:  97%   Weight:  193 lb (87.5 kg)   Height: 5' 11\" (1.803 m) 5' 11\" (1.803 m)                                              BP Readings from Last 3 Encounters:   02/15/23 (!) 144/67   02/08/23 131/61   01/27/23 132/62       NPO Status: Time of last liquid consumption: 2100                        Time of last solid consumption: 1600                        Date of last liquid consumption: 02/14/23                        Date of last solid food consumption: 02/14/23    BMI:   Wt  Readings from Last 3 Encounters:   02/15/23 193 lb (87.5 kg)   02/08/23 193 lb (87.5 kg)   01/27/23 189 lb (85.7 kg)     Body mass index is 26.92 kg/m². CBC:   Lab Results   Component Value Date/Time    WBC 9.0 02/08/2023 09:40 AM    RBC 5.21 02/08/2023 09:40 AM    HGB 15.9 02/08/2023 09:40 AM    HCT 46.6 02/08/2023 09:40 AM    MCV 89.4 02/08/2023 09:40 AM    RDW 12.9 02/08/2023 09:40 AM     02/08/2023 09:40 AM       CMP:   Lab Results   Component Value Date/Time     02/08/2023 09:40 AM    K 4.4 02/08/2023 09:40 AM     02/08/2023 09:40 AM    CO2 22 02/08/2023 09:40 AM    BUN 9 02/08/2023 09:40 AM    CREATININE 1.1 02/08/2023 09:40 AM    GFRAA >60 08/22/2022 10:52 AM    LABGLOM >60 02/08/2023 09:40 AM    GLUCOSE 136 02/08/2023 09:40 AM    PROT 6.7 08/22/2022 10:52 AM    CALCIUM 8.8 02/08/2023 09:40 AM    BILITOT 1.0 08/22/2022 10:52 AM    ALKPHOS 187 08/22/2022 10:52 AM    AST 19 08/22/2022 10:52 AM    ALT 15 08/22/2022 10:52 AM       POC Tests: No results for input(s): POCGLU, POCNA, POCK, POCCL, POCBUN, POCHEMO, POCHCT in the last 72 hours. Coags:   Lab Results   Component Value Date/Time    PROTIME 12.4 02/08/2023 09:40 AM    INR 1.1 02/08/2023 09:40 AM       HCG (If Applicable): No results found for: PREGTESTUR, PREGSERUM, HCG, HCGQUANT     ABGs: No results found for: PHART, PO2ART, QTA1RWE, DCZ4PIR, BEART, T8MMEEIL     Type & Screen (If Applicable):  No results found for: LABABO, LABRH    Drug/Infectious Status (If Applicable):  No results found for: HIV, HEPCAB    COVID-19 Screening (If Applicable): No results found for: COVID19    EKG 2/8/23  Normal sinus rhythm with sinus arrhythmia  Normal ECG  No previous ECGs available  Confirmed by Olivia Snow ([id number]) on 2/10/2023 8:32:22 AM    CT Chest 2/9/23  1. There is a new 9 mm noncalcified right upper lobe pulmonary nodule   compared to 08/01/2019.  Consider CT at 3 months, PET/CT, or tissue sampling   per Fleischner Society guidelines. 2. Left nonobstructive nephrolithiasis. CXR 2/8/23  9 mm right chest nodule of indeterminate significance.  Noncontrast CT should   be considered for further evaluation.  Chronic scarring and or atelectasis in   the left lung along the major fissure.  See above. MRI Lumbar Spine 10/4/22  1.  No fracture or bony destructive lesion. 2. Severe central canal stenosis at L3-4.  Moderate stenosis at L4-5. 3.  Multilevel neural foraminal stenoses, worst (severe) at the bilateral   L3-4 and L4-5 levels, as well as the right L5-S1 level. 4. Fluid along the interspinous bursa at L1-2 and L3-4.  Clinical correlation   for [place] disease is recommended. US Neck 10/4/22  1.  Enlarged, heterogeneous, multinodular thyroid gland again seen, when   compared to the previous study performed 09/22/2021. Daphane Flies has been a mild   decrease in size of the thyroid gland.       2.  2 previously right lobe nodules are without significant interval change   and reportedly underwent prior biopsy.  Further evaluation of these nodules   should be based on the prior biopsy result.       3.  No significant interval change in the mid to upper pole right lobe   nodule.  Follow-up thyroid ultrasound in 1 years time is recommended.       4.  Its miss nodule which could represent a previously noted larger nodule. It is difficult to tell.  Regardless, the TR score is 4.  No further   follow-up is recommended. Anesthesia Evaluation  Patient summary reviewed and Nursing notes reviewed no history of anesthetic complications:   Airway: Mallampati: III  TM distance: >3 FB   Neck ROM: full  Mouth opening: > = 3 FB   Dental:    (+) poor dentition      Pulmonary:   (+) COPD ( Inahler used oncew weekly):  decreased breath sounds: bilateral current smoker ( 1ppd)          Patient smoked on day of surgery. ROS comment: RUL Pulm Nodule    CT Chest 2/2023:  1.  There is a new 9 mm noncalcified right upper lobe pulmonary nodule  compared to 08/01/2019.  Consider CT at 3 months, PET/CT, or tissue sampling  per Fleischner Society guidelines. 2. Left nonobstructive nephrolithiasis. Cardiovascular:  Exercise tolerance: poor (<4 METS),   (+) hypertension:, GAN: after ambulating 1 flight of stairs, hyperlipidemia      ECG reviewed  Rhythm: regular  Rate: normal           Beta Blocker:  Not on Beta Blocker      ROS comment: EKG 2/2023:  Normal sinus rhythm with sinus arrhythmia  Normal ECG  No previous ECGs available  Confirmed by Alex Dent ([id number]) on 2/10/2023 8:32:22 AM     Neuro/Psych:   (+) neuromuscular disease:, headaches:,              ROS comment: RLE Numbness/tingling  Lumbar Disc Dx  Spinal stenosis of lumbar region without neurogenic claudication  Lumbar radiculopathy     GI/Hepatic/Renal:   (+) renal disease: kidney stones,           Endo/Other:    (+) Diabetes ( am bg 128)Type II DM, well controlled, , : arthritis: OA., .                 Abdominal:       Abdomen: soft. Vascular: negative vascular ROS. Other Findings:           Anesthesia Plan      general     ASA 3       Induction: inhalational and intravenous. BIS  MIPS: Postoperative opioids intended, Prophylactic antiemetics administered and Postoperative trial extubation. Anesthetic plan and risks discussed with patient and spouse. Use of blood products discussed with patient and spouse whom. Plan discussed with attending. Makenna Marques RN   2/15/2023        --------DOS anesthesiologist addendum-------  Patient seen and evaluated. Plan for GETA discussed with patient. All patient's questions were answered to his satisfaction. Patient consents to and agrees to GETA.   Marilee Martinez MD  2/15/23

## 2023-02-15 NOTE — PROGRESS NOTES
Physical Therapy Initial Assessment     Name: Ledy Nava  : 1950  MRN: [id number]      Date of Service: 2/15/2023    Evaluating PT:  Elpidio Treadwell, PT, DPT [id number]    Room #:  8758/9492-E  Diagnosis:  Lumbar spinal stenosis [M48.061]  Lumbar stenosis with neurogenic claudication [M48.062]  PMHx/PSHx:    Past Medical History:   Diagnosis Date    Agent orange exposure     Chronic cough     uses inhaler /to have Bronch 2019    Colon polyps 2019,     fOR or 10-19-21    COPD (chronic obstructive pulmonary disease) ([facility] [address]     Diabetes (HCC)     Foot drop, right foot     HAS A BRACE    Hyperlipidemia     Hypertension     Rash     noted 19- says it is on back & shoulders    Restless leg     Spinal stenosis at L4-L5 level     [facility]ell[facility]    Thyroid nodule     R     Procedure/Surgery:  L3,L4, L5 laminectomies bilaterally with medial facetectomies and foraminotomies, 2/15/23  Reason for admission:  Spinal stenosis of lumbar region without neurogenic claudication  Precautions:   Fall Risk, spinal, R foot drop- has new AFO at home but has not worn  Equipment Needs:  TBD, owns cane that is in room     SUBJECTIVE:   Pt lives with wife in a 2nd floor apartment; notes he takes stairs to apartment and there are 6 IRON building with one HR. Pt ambulated with SPC PTA. OBJECTIVE:   Initial Evaluation  Date: 2/15/23 Treatment Short Term/ Long Term   Goals   AM-PAC 6 Clicks      Was pt agreeable to Eval/treatment? Yes     Does pt have pain?  Minimal in incision site     Bed Mobility  Rolling: min a  Supine to sit: min a  Sit to supine: NT  Scooting: min a  Rolling: ind  Supine to sit: ind  Sit to supine: ind  Scooting: ind   Transfers Sit to stand: min A<>mod A   Stand to sit: min a  Stand pivot: min A FWW  Sit to stand: ind  Stand to sit: ind  Stand pivot: ind   Ambulation    50 feet with FWW min A  150 feet with AAD ind   Stair negotiation: ascended and descended NT   >10 steps with one rail ind   ROM BUE:  Refer to OT eval   BLE:  WNL     Strength BUE:  Refer to OT eval   BLE:  WNL except R PF 4-/5  4+/5 globally    Balance Sitting EOB:  SBA  Dynamic Standing: min A FWW  Sitting EOB:  ind  Dynamic Standing:  ind     Pt is A & O x 4  Sensation:  Pt denies numbness and tingling to extremities  Edema:  none noted     Vitals:  Blood Pressure at rest -- Blood Pressure post session --   Heart Rate at rest 80 BPM  Heart Rate post session --   SPO2 at rest 97% SPO2 post session --     Therapeutic Exercises:  none performed this visit    Patient education  Pt educated on spinal precautions    Patient response to education:   Pt verbalized understanding Pt demonstrated skill Pt requires further education in this area   x x x     ASSESSMENT:    Conditions Requiring Skilled Therapeutic Intervention:    [x]Decreased strength     []Decreased ROM  [x]Decreased functional mobility  [x]Decreased balance   [x]Decreased endurance   []Decreased posture  []Decreased sensation  []Decreased coordination   []Decreased vision  [x]Decreased safety awareness   [x]Increased pain       Comments:  Pt in bed on arrival, agreeable to PT IE. Pt educated on spinal precautions and required frequent re-education d/t difficulty remembering. Pt with minimal pain throughout session today. Pt has hx of R foot drop, cont to be present with ambulation today with [address]. Pt agreeable to sit up in chair at end of session, left with all needs met and call light in reach. Treatment:  Patient practiced and was instructed in the following treatment:    Bed mobility: performed with cues for safety awareness and proper hand placement to promote improved functional independence. Transfer Training: STS, SPT performed with [address] in bed/from bedside chair   Gait training: short distance in hallway with [address]      Pt's/ family goals   1. Return home safely. Prognosis is good for reaching above PT goals.     Patient and or family understand(s) diagnosis, prognosis, and plan of care. yes    PHYSICAL THERAPY PLAN OF CARE:    PT POC is established based on physician order and patient diagnosis     Referring provider/PT Order:    02/15/23 1200  PT eval and treat  Start:  02/15/23 1200,   End:  02/15/23 1200,   ONE TIME,   Standing Count:  1 Occurrences   R         ALIX Gaviria     Diagnosis:  Lumbar spinal stenosis [M48.061]  Lumbar stenosis with neurogenic claudication [M48.062]  Specific instructions for next treatment:  increase activity as able     Current Treatment Recommendations:     [x] Strengthening to improve independence with functional mobility   [] ROM to improve independence with functional mobility   [x] Balance Training to improve static/dynamic balance and to reduce fall risk  [x] Endurance Training to improve activity tolerance during functional mobility   [x] Transfer Training to improve safety and independence with all functional transfers   [x] Gait Training to improve gait mechanics, endurance and assess need for appropriate assistive device  [x] Stair Training in preparation for safe discharge home and/or into the community   [] Positioning to prevent skin breakdown and contractures  [x] Safety and Education Training   [] Patient/Caregiver Education   [] HEP  [] Other     PT long term treatment goals are located in above grid    Frequency of treatments: 2-5x/week x 1-2 weeks. Time in  1410  Time out  1435    Total Treatment Time  10 minutes     Evaluation Time includes thorough review of current medical information, gathering information on past medical history/social history and prior level of function, completion of standardized testing/informal observation of tasks, assessment of data and education on plan of care and goals.     CPT codes:  [x] Low Complexity PT evaluation 50292  [] Moderate Complexity PT evaluation 04723  [] High Complexity PT evaluation 35188  [] PT Re-evaluation 44971  [] Gait training 95345 -- minutes  [] Manual therapy 64905 -- minutes  [x] Therapeutic activities 45637 10 minutes  [] Therapeutic exercises 94329 -- minutes  [] Neuromuscular reeducation 44523 -- minutes     Mahad Ledbetter, PT, DPT  [id number]

## 2023-02-15 NOTE — BRIEF OP NOTE
Brief Postoperative Note      Patient: Kadi Ibarra  YOB: 1950  MRN: [id number]    Date of Procedure: 2/15/2023    Pre-Op Diagnosis: Lumbar spinal stenosis [M48.061]    Post-Op Diagnosis: same       Procedure(s):  L3,L4, L5 laminectomies bilaterally with medial facetectomies and foraminotomies    Surgeon(s):  Noemy Ann MD    Assistant:  Physician Assistant: ALIX Lai    Anesthesia: General with 1% lidocaine with epi    Estimated Blood Loss (mL): 25 cc    Complications: no immediate    Specimens:   none    Implants:  Duramorph Depomedrol soaked gelfoam sponge      Drains:   Closed/Suction Drain Medial Back Bulb (Active)       Urinary Catheter 02/15/23 Camargo (Active)       Findings: severe ligamentous hypertrophy    Electronically signed by Noemy Ann MD on 2/15/2023 at 9:43 AM

## 2023-02-16 LAB
ANION GAP SERPL CALCULATED.3IONS-SCNC: 9 MMOL/L (ref 7–16)
BASOPHILS ABSOLUTE: 0.03 E9/L (ref 0–0.2)
BASOPHILS RELATIVE PERCENT: 0.2 % (ref 0–2)
BUN BLDV-MCNC: 15 MG/DL (ref 6–23)
CALCIUM SERPL-MCNC: 9 MG/DL (ref 8.6–10.2)
CHLORIDE BLD-SCNC: 101 MMOL/L (ref 98–107)
CO2: 29 MMOL/L (ref 22–29)
CREAT SERPL-MCNC: 1.3 MG/DL (ref 0.7–1.2)
EOSINOPHILS ABSOLUTE: 0 E9/L (ref 0.05–0.5)
EOSINOPHILS RELATIVE PERCENT: 0 % (ref 0–6)
GFR SERPL CREATININE-BSD FRML MDRD: 57 ML/MIN/1.73
GLUCOSE BLD-MCNC: 129 MG/DL (ref 74–99)
HCT VFR BLD CALC: 44.6 % (ref 37–54)
HEMOGLOBIN: 14.3 G/DL (ref 12.5–16.5)
IMMATURE GRANULOCYTES #: 0.13 E9/L
IMMATURE GRANULOCYTES %: 0.8 % (ref 0–5)
LYMPHOCYTES ABSOLUTE: 1.8 E9/L (ref 1.5–4)
LYMPHOCYTES RELATIVE PERCENT: 11.4 % (ref 20–42)
MCH RBC QN AUTO: 30.8 PG (ref 26–35)
MCHC RBC AUTO-ENTMCNC: 32.1 % (ref 32–34.5)
MCV RBC AUTO: 96.1 FL (ref 80–99.9)
METER GLUCOSE: 102 MG/DL (ref 74–99)
METER GLUCOSE: 115 MG/DL (ref 74–99)
METER GLUCOSE: 120 MG/DL (ref 74–99)
METER GLUCOSE: 128 MG/DL (ref 74–99)
MONOCYTES ABSOLUTE: 0.87 E9/L (ref 0.1–0.95)
MONOCYTES RELATIVE PERCENT: 5.5 % (ref 2–12)
NEUTROPHILS ABSOLUTE: 12.98 E9/L (ref 1.8–7.3)
NEUTROPHILS RELATIVE PERCENT: 82.1 % (ref 43–80)
PDW BLD-RTO: 12.9 FL (ref 11.5–15)
PLATELET # BLD: 175 E9/L (ref 130–450)
PMV BLD AUTO: 11.6 FL (ref 7–12)
POTASSIUM REFLEX MAGNESIUM: 5 MMOL/L (ref 3.5–5)
RBC # BLD: 4.64 E12/L (ref 3.8–5.8)
SODIUM BLD-SCNC: 139 MMOL/L (ref 132–146)
WBC # BLD: 15.8 E9/L (ref 4.5–11.5)

## 2023-02-16 PROCEDURE — 6370000000 HC RX 637 (ALT 250 FOR IP): Performed by: INTERNAL MEDICINE

## 2023-02-16 PROCEDURE — 6370000000 HC RX 637 (ALT 250 FOR IP): Performed by: STUDENT IN AN ORGANIZED HEALTH CARE EDUCATION/TRAINING PROGRAM

## 2023-02-16 PROCEDURE — 51798 US URINE CAPACITY MEASURE: CPT

## 2023-02-16 PROCEDURE — G0378 HOSPITAL OBSERVATION PER HR: HCPCS

## 2023-02-16 PROCEDURE — 2580000003 HC RX 258: Performed by: STUDENT IN AN ORGANIZED HEALTH CARE EDUCATION/TRAINING PROGRAM

## 2023-02-16 PROCEDURE — 82962 GLUCOSE BLOOD TEST: CPT

## 2023-02-16 PROCEDURE — 36415 COLL VENOUS BLD VENIPUNCTURE: CPT

## 2023-02-16 PROCEDURE — 97535 SELF CARE MNGMENT TRAINING: CPT

## 2023-02-16 PROCEDURE — 97530 THERAPEUTIC ACTIVITIES: CPT

## 2023-02-16 PROCEDURE — 80048 BASIC METABOLIC PNL TOTAL CA: CPT

## 2023-02-16 PROCEDURE — 99024 POSTOP FOLLOW-UP VISIT: CPT | Performed by: NEUROLOGICAL SURGERY

## 2023-02-16 PROCEDURE — 85025 COMPLETE CBC W/AUTO DIFF WBC: CPT

## 2023-02-16 RX ORDER — NICOTINE 21 MG/24HR
1 PATCH, TRANSDERMAL 24 HOURS TRANSDERMAL DAILY
Status: DISCONTINUED | OUTPATIENT
Start: 2023-02-16 | End: 2023-02-17 | Stop reason: HOSPADM

## 2023-02-16 RX ADMIN — PREGABALIN 50 MG: 25 CAPSULE ORAL at 20:34

## 2023-02-16 RX ADMIN — CYCLOBENZAPRINE 10 MG: 10 TABLET, FILM COATED ORAL at 08:35

## 2023-02-16 RX ADMIN — OXYCODONE 5 MG: 5 TABLET ORAL at 07:11

## 2023-02-16 RX ADMIN — OXYCODONE 5 MG: 5 TABLET ORAL at 11:31

## 2023-02-16 RX ADMIN — SENNOSIDES AND DOCUSATE SODIUM 1 TABLET: 50; 8.6 TABLET ORAL at 20:34

## 2023-02-16 RX ADMIN — ACETAMINOPHEN 650 MG: 325 TABLET ORAL at 01:03

## 2023-02-16 RX ADMIN — ACETAMINOPHEN 650 MG: 325 TABLET ORAL at 14:28

## 2023-02-16 RX ADMIN — METFORMIN HYDROCHLORIDE 500 MG: 500 TABLET, EXTENDED RELEASE ORAL at 08:33

## 2023-02-16 RX ADMIN — OXYCODONE HYDROCHLORIDE 10 MG: 10 TABLET ORAL at 01:03

## 2023-02-16 RX ADMIN — RAMIPRIL 1.25 MG: 1.25 CAPSULE ORAL at 08:33

## 2023-02-16 RX ADMIN — OXYCODONE HYDROCHLORIDE 10 MG: 10 TABLET ORAL at 15:43

## 2023-02-16 RX ADMIN — Medication 10 ML: at 20:35

## 2023-02-16 RX ADMIN — ALOGLIPTIN 12.5 MG: 12.5 TABLET, FILM COATED ORAL at 08:33

## 2023-02-16 RX ADMIN — OXYCODONE HYDROCHLORIDE 10 MG: 10 TABLET ORAL at 20:34

## 2023-02-16 RX ADMIN — Medication 10 ML: at 08:36

## 2023-02-16 RX ADMIN — ACETAMINOPHEN 650 MG: 325 TABLET ORAL at 18:29

## 2023-02-16 RX ADMIN — ACETAMINOPHEN 650 MG: 325 TABLET ORAL at 08:32

## 2023-02-16 RX ADMIN — PREGABALIN 50 MG: 25 CAPSULE ORAL at 08:33

## 2023-02-16 ASSESSMENT — PAIN SCALES - GENERAL
PAINLEVEL_OUTOF10: 0
PAINLEVEL_OUTOF10: 3
PAINLEVEL_OUTOF10: 7
PAINLEVEL_OUTOF10: 6
PAINLEVEL_OUTOF10: 5
PAINLEVEL_OUTOF10: 5

## 2023-02-16 ASSESSMENT — PAIN DESCRIPTION - ORIENTATION
ORIENTATION: POSTERIOR;LOWER
ORIENTATION: RIGHT;LEFT
ORIENTATION: LEFT;RIGHT

## 2023-02-16 ASSESSMENT — PAIN DESCRIPTION - DESCRIPTORS
DESCRIPTORS: ACHING;CRAMPING;DISCOMFORT;DULL
DESCRIPTORS: ACHING;BURNING;DISCOMFORT
DESCRIPTORS: ACHING;SHARP
DESCRIPTORS: ACHING;BURNING;TENDER

## 2023-02-16 ASSESSMENT — PAIN - FUNCTIONAL ASSESSMENT
PAIN_FUNCTIONAL_ASSESSMENT: PREVENTS OR INTERFERES SOME ACTIVE ACTIVITIES AND ADLS
PAIN_FUNCTIONAL_ASSESSMENT: ACTIVITIES ARE NOT PREVENTED
PAIN_FUNCTIONAL_ASSESSMENT: PREVENTS OR INTERFERES SOME ACTIVE ACTIVITIES AND ADLS

## 2023-02-16 ASSESSMENT — PAIN DESCRIPTION - ONSET: ONSET: ON-GOING

## 2023-02-16 ASSESSMENT — PAIN DESCRIPTION - LOCATION
LOCATION: LEG
LOCATION: BACK
LOCATION: BACK
LOCATION: BACK;LEG

## 2023-02-16 ASSESSMENT — PAIN DESCRIPTION - PAIN TYPE: TYPE: SURGICAL PAIN

## 2023-02-16 ASSESSMENT — PAIN DESCRIPTION - FREQUENCY: FREQUENCY: CONTINUOUS

## 2023-02-16 NOTE — CARE COORDINATION
2/16 Care Coordination: Pt an elective admit for L3-L5 laminectomy. PTA  Pt lives with his wife in a 2nd floor apartment, uses the stairs to the apartment and there are 6 SATE in to the  building with one HR. Pt ambulated with SPC PTA. Pt states he was Independent wit all ADL's still drives. Pt will need FWW when discharged. Pt had no preference on DME, called to [facility] DME. Plan is discharge home, his wife will transport. CM/SW will continue to follow for discharge planning.    Ginna PACKN,RN--BC  [phone]

## 2023-02-16 NOTE — CONSULTS
[address]                  [address] [facility],  [address]                                  CONSULTATION    PATIENT NAME: Iantha Frankel                   :        1950  MED REC NO:   [id number]                            ROOM:       8206  ACCOUNT NO:   [de-identified]                           ADMIT DATE: 02/15/2023  PROVIDER:     Uche Fink DO    CONSULT DATE:  2023    MEDICAL CONSULTATION    ADMITTING PHYSICIAN:  Sheyla Patrick MD    REASON FOR CONSULT:  Medical management. PRIMARY CARE PHYSICIAN:  Chandler Vences DO    CHIEF COMPLAINT:  Postop day 1, status post L3 through L5 laminectomy  with medial facetectomy and foraminotomies bilaterally. HISTORY OF PRESENT ILLNESS:  The patient is a 78-year-old  male  who was admitted to the hospital after undergoing L3 through L5  laminectomy on 02/15/2023. PAST MEDICAL HISTORY:  COPD, diabetes mellitus type 2, tobacco abuse,  hyperlipidemia, hypertension, restless leg. PAST SURGICAL HISTORY:  Appendectomy, bronchoscopy, cholecystectomy,  colectomy, kidney stone surgery, lithotripsy. SOCIAL HISTORY:  The patient smokes one-and-half packs of cigarettes a  day, no alcohol. REVIEW OF SYSTEMS:  Remarkable for above-stated chief complaint. ALLERGIES:  PENICILLIN and SURGICAL TAPE. MEDICATIONS PRIOR TO ADMISSION:  Lyrica, Advil, Altace, Trelegy,  Janumet, vitamin D, albuterol inhaler, Requip. PHYSICAL EXAMINATION:  GENERAL APPEARANCE:  Reveals a 78-year-old  male who is alert  and oriented x3, cooperative and a good historian. VITAL SIGNS:  Temperature 95.6, pulse 74, respirations 18, blood  pressure 116/70. HEAD:  Normocephalic, atraumatic. EYES:  Pupils equal and reactive to light. Extraocular muscles intact. Fundi not well visualized. NOSE:  No obstruction, polyp or discharge noted. MOUTH:  Mucosa without lesion.   PHARYNX:  Noninjected without exudate. NECK:  Supple. No JVD. No thyromegaly. No carotid bruits. HEART:  Regular rate and rhythm without murmur. LUNGS:  Clear to auscultation bilaterally. ABDOMEN:  Positive bowel sounds, soft, nontender. No rebound or  guarding. No hepatosplenomegaly. No masses. BACK:  With minimal increased thoracic kyphosis. EXTREMITIES:  Without edema. LYMPH NODES:  No adenopathy noted. SKIN:  Without rash or lesion. IMPRESSION:  Postop day #1, status post L3 through L5 laminectomy, COPD,  diabetes mellitus type 2, tobacco abuse, hypertension, hyperlipidemia,  restless leg syndrome. PLAN:  Continue postop care as per Neurosurgery. We will order nicotine  patch. Serial lab. Pain control. Discharge plan, home when stable.         Mile Garcia DO    D: 02/16/2023 8:43:06       T: 02/16/2023 8:45:43     [email or web address]  Job#: [id number]     Doc#: [id number]    CC:

## 2023-02-16 NOTE — PROGRESS NOTES
Physical Therapy Treatment     Name: Hailee Gonzalez  : 1950  MRN: [id number]      Date of Service: 2023    Evaluating PT:  Lee Falk PT, DPT [id number]    Room #:  5352/2217-Y  Diagnosis:  Lumbar spinal stenosis [M48.061]  Lumbar stenosis with neurogenic claudication [M48.062]  PMHx/PSHx:    Past Medical History:   Diagnosis Date    Agent orange exposure     Chronic cough     uses inhaler /to have Bronch 2019    Colon polyps 2019,     fOR or 10-19-21    COPD (chronic obstructive pulmonary disease) ([facility] [address]     Diabetes (HCC)     Foot drop, right foot     HAS A BRACE    Hyperlipidemia     Hypertension     Rash     noted 19- says it is on back & shoulders    Restless leg     Spinal stenosis at L4-L5 level     F Leo Guidry    Thyroid nodule     R     Procedure/Surgery:  L3,L4, L5 laminectomies bilaterally with medial facetectomies and foraminotomies, 2/15/23  Reason for admission:  Spinal stenosis of lumbar region without neurogenic claudication  Precautions:   Fall Risk, spinal, R foot drop- has new AFO at home but has not worn  Equipment Needs:  [employer], owns cane that is in room     SUBJECTIVE:   Pt lives with wife in a 2nd floor apartment; notes he takes stairs to apartment and there are 6 IRON building with one HR. Pt ambulated with SPC PTA. OBJECTIVE:   Initial Evaluation  Date: 2/15/23 Treatment Short Term/ Long Term   Goals   AM-PAC 6 Clicks 58/26 84/36    Was pt agreeable to Eval/treatment? Yes yes    Does pt have pain?  Minimal in incision site Min back pain    Bed Mobility  Rolling: min a  Supine to sit: min a  Sit to supine: NT  Scooting: min a NT, sitting in bedside chair on arrival  Rolling: ind  Supine to sit: ind  Sit to supine: ind  Scooting: ind   Transfers Sit to stand: min A<>mod A   Stand to sit: min a  Stand pivot: min A FWW Sit to stand: SBA  Stand to sit: SBA  Stand pivot: SBA FWW Sit to stand: ind  Stand to sit: ind  Stand pivot: ind   Ambulation    50 feet with FWW min A 2x80ft FWW  feet with AAD ind   Stair negotiation: ascended and descended NT  NT >10 steps with one rail ind   ROM BUE:  Refer to OT eval   BLE:  WNL     Strength BUE:  Refer to OT eval   BLE:  WNL except R PF 4-/5  4+/5 globally    Balance Sitting EOB:  SBA  Dynamic Standing: min A FWW  Sitting EOB:  ind  Dynamic Standing:  ind     Pt is A & O x 4  Sensation:  Pt denies numbness and tingling to extremities  Edema:  none noted     Vitals:  Blood Pressure at rest -- Blood Pressure post session --   Heart Rate at rest 80 BPM  Heart Rate post session --   SPO2 at rest 97% SPO2 post session --     Therapeutic Exercises:  none performed this visit    Patient education  Pt educated on spinal precautions    Patient response to education:   Pt verbalized understanding Pt demonstrated skill Pt requires further education in this area   x x x     ASSESSMENT:    Conditions Requiring Skilled Therapeutic Intervention:    [x]Decreased strength     []Decreased ROM  [x]Decreased functional mobility  [x]Decreased balance   [x]Decreased endurance   []Decreased posture  []Decreased sensation  []Decreased coordination   []Decreased vision  [x]Decreased safety awareness   [x]Increased pain       Comments:  Pt in bedside chair on arrival, agreeable to PT tx. Pt ambulated increased distance today with WW with steppage gait pattern noted on R d/t drop foot. Pt educated to wear brace at home especially in community to improve safety with mobility. Pt also recommended to use WW at home for safety upon DC, SW aware pt needs for home. Pt with min pain with mobility today. Pt agreeable to sit up in chair at end of session, left with all needs met and call light in reach. Plan to practice stair navigation next visit prior to DC home as pt lives in two [place] home.      Treatment:  Patient practiced and was instructed in the following treatment:    Bed mobility: performed with cues for safety awareness and proper hand placement to promote improved functional independence. Transfer Training: STS, SPT performed with [employer] in bed/from bedside chair   Gait training: short distance in hallway with [employer]      Pt's/ family goals   1. Return home safely. Prognosis is good for reaching above PT goals. Patient and or family understand(s) diagnosis, prognosis, and plan of care. yes    PHYSICAL THERAPY PLAN OF CARE:    PT POC is established based on physician order and patient diagnosis     Referring provider/PT Order:    02/15/23 1200  PT eval and treat  Start:  02/15/23 1200,   End:  02/15/23 1200,   ONE TIME,   Standing Count:  1 Occurrences,   R         ALIX Antunez     Diagnosis:  Lumbar spinal stenosis [M48.061]  Lumbar stenosis with neurogenic claudication [M48.062]  Specific instructions for next treatment:  increase activity as able     Current Treatment Recommendations:     [x] Strengthening to improve independence with functional mobility   [] ROM to improve independence with functional mobility   [x] Balance Training to improve static/dynamic balance and to reduce fall risk  [x] Endurance Training to improve activity tolerance during functional mobility   [x] Transfer Training to improve safety and independence with all functional transfers   [x] Gait Training to improve gait mechanics, endurance and assess need for appropriate assistive device  [x] Stair Training in preparation for safe discharge home and/or into the community   [] Positioning to prevent skin breakdown and contractures  [x] Safety and Education Training   [] Patient/Caregiver Education   [] HEP  [] Other     PT long term treatment goals are located in above grid    Frequency of treatments: 2-5x/week x 1-2 weeks.     Time in  0840  Time out  0852    Total Treatment Time  10 minutes     Evaluation Time includes thorough review of current medical information, gathering information on past medical history/social history and prior level of function, completion of standardized testing/informal observation of tasks, assessment of data and education on plan of care and goals.     CPT codes:  [] Low Complexity PT evaluation 62947  [] Moderate Complexity PT evaluation 24545  [] High Complexity PT evaluation 45477  [] PT Re-evaluation 01674  [] Gait training 25748 -- minutes  [] Manual therapy 38684 -- minutes  [x] Therapeutic activities 97586 12 minutes  [] Therapeutic exercises 38596 -- minutes  [] Neuromuscular reeducation 64366 -- minutes     Mickey Fess, PT, DPT  [id number]

## 2023-02-16 NOTE — PROGRESS NOTES
Department of Neurosurgery  Progress Note    CHIEF COMPLAINT: s/p L3-L5 laminectomy     SUBJECTIVE:  No acute events overnight. Patient states pain has improved although does still have some in his legs and feels unsteady. No BM, has not passed gassed. Drain output- 210 mL total    REVIEW OF SYSTEMS :  Constitutional: Negative for chills and fever. Neurological: Negative for dizziness, tremors and speech change.      OBJECTIVE:   VITALS:  /70   Pulse 74   Temp (!) 95.6 °F (35.3 °C) (Temporal)   Resp 18   Ht 5' 11\" (1.803 m)   Wt 193 lb (87.5 kg)   SpO2 95%   BMI 26.92 kg/m²     PHYSICAL:  Alert, oriented  Appears stated age  PERRL  EOMI  Right DF 2/5, Right PF 3/5 otherwise Strength full  Sensation intact to light touch    DATA:  CBC:   Lab Results   Component Value Date/Time    WBC 15.8 02/16/2023 07:16 AM    RBC 4.64 02/16/2023 07:16 AM    HGB 14.3 02/16/2023 07:16 AM    HCT 44.6 02/16/2023 07:16 AM    MCV 96.1 02/16/2023 07:16 AM    MCH 30.8 02/16/2023 07:16 AM    MCHC 32.1 02/16/2023 07:16 AM    RDW 12.9 02/16/2023 07:16 AM     02/16/2023 07:16 AM    MPV 11.6 02/16/2023 07:16 AM     BMP:    Lab Results   Component Value Date/Time     02/08/2023 09:40 AM    K 4.4 02/08/2023 09:40 AM     02/08/2023 09:40 AM    CO2 22 02/08/2023 09:40 AM    BUN 9 02/08/2023 09:40 AM    LABALBU 4.0 08/22/2022 10:52 AM    CREATININE 1.1 02/08/2023 09:40 AM    CALCIUM 8.8 02/08/2023 09:40 AM    GFRAA >60 08/22/2022 10:52 AM    LABGLOM >60 02/08/2023 09:40 AM    GLUCOSE 136 02/08/2023 09:40 AM     PT/INR:    Lab Results   Component Value Date/Time    PROTIME 12.4 02/08/2023 09:40 AM    INR 1.1 02/08/2023 09:40 AM     PTT:  No results found for: APTT, PTT[APTT}    Current Inpatient Medications  Current Facility-Administered Medications: pregabalin (LYRICA) capsule 50 mg, 50 mg, Oral, BID  ramipril (ALTACE) capsule 1.25 mg, 1.25 mg, Oral, Daily  rOPINIRole (REQUIP) tablet 1 mg, 1 mg, Oral, Nightly PRN  sodium chloride flush 0.9 % injection 5-40 mL, 5-40 mL, IntraVENous, 2 times per day  sodium chloride flush 0.9 % injection 5-40 mL, 5-40 mL, IntraVENous, PRN  0.9 % sodium chloride infusion, , IntraVENous, PRN  acetaminophen (TYLENOL) tablet 650 mg, 650 mg, Oral, Q6H  ondansetron (ZOFRAN-ODT) disintegrating tablet 4 mg, 4 mg, Oral, Q8H PRN **OR** ondansetron (ZOFRAN) injection 4 mg, 4 mg, IntraVENous, Q6H PRN  polyethylene glycol (GLYCOLAX) packet 17 g, 17 g, Oral, Daily  bisacodyl (DULCOLAX) EC tablet 5 mg, 5 mg, Oral, Daily  sennosides-docusate sodium (SENOKOT-S) 8.6-50 MG tablet 1 tablet, 1 tablet, Oral, BID  magnesium hydroxide (MILK OF MAGNESIA) 400 MG/5ML suspension 30 mL, 30 mL, Oral, Daily PRN  sodium phosphate (FLEET) rectal enema 1 enema, 1 enema, Rectal, Daily PRN  cyclobenzaprine (FLEXERIL) tablet 10 mg, 10 mg, Oral, Q12H PRN  oxyCODONE (ROXICODONE) immediate release tablet 5 mg, 5 mg, Oral, Q4H PRN **OR** oxyCODONE HCl (OXY-IR) immediate release tablet 10 mg, 10 mg, Oral, Q4H PRN  morphine (PF) injection 2 mg, 2 mg, IntraVENous, Q2H PRN **OR** morphine sulfate (PF) injection 4 mg, 4 mg, IntraVENous, Q2H PRN  alogliptin (NESINA) tablet 12.5 mg, 12.5 mg, Oral, Daily **AND** metFORMIN (GLUCOPHAGE-XR) extended release tablet 500 mg, 500 mg, Oral, Daily with breakfast  arformoterol tartrate (BROVANA) nebulizer solution 15 mcg, 15 mcg, Nebulization, BID PRN  budesonide (PULMICORT) nebulizer suspension 500 mcg, 0.5 mg, Nebulization, BID PRN  ipratropium (ATROVENT) 0.02 % nebulizer solution 0.5 mg, 0.5 mg, Nebulization, 4x Daily PRN  albuterol (PROVENTIL) nebulizer solution 2.5 mg, 2.5 mg, Nebulization, Q6H PRN  insulin lispro (HUMALOG) injection vial 0-8 Units, 0-8 Units, SubCUTAneous, TID WC  insulin lispro (HUMALOG) injection vial 0-4 Units, 0-4 Units, SubCUTAneous, Nightly  glucose chewable tablet 16 g, 4 tablet, Oral, PRN  dextrose bolus 10% 125 mL, 125 mL, IntraVENous, PRN **OR** dextrose bolus 10% 250 mL, 250 mL, IntraVENous, PRN  glucagon injection 1 mg, 1 mg, SubCUTAneous, PRN  dextrose 10 % infusion, , IntraVENous, Continuous PRN    ASSESSMENT:   -Tad Limon is a 73 y/o male who s/p L3-L5 laminectomy     PLAN:  -Pain control  -PT/OT  -Drain management   -Follow up in clinic in 1 week for incision check  -Please call with any questions or concerns. Electronically signed by Romi Salmeron PA-C on 2/16/2023 at 8:15 AM     Nsx Attending:    Patient was seen and examined by me with the team.  I personally reviewed all pertinent radiological images. I concur with Miss Moura's clinical assessment and plan. RAJAN working well. Incision: c/d/d    Thank you so much for allowing us to participate in the care of this patient. I certify that I spent more than half of the total time on this encounter. NOTE: This report was transcribed using voice recognition software.  Every effort was made to ensure accuracy; however, inadvertent computerized transcription errors may be present

## 2023-02-16 NOTE — PROGRESS NOTES
Occupational Therapy  OT BEDSIDE TREATMENT NOTE   [address] [facility] [place] [address] [address]        FCCT:  Patient Name: Kerri Saab  MRN: [id number]  : 1950  Room: [address] [place] [address]-A     Per OT Eval:    Evaluating OT: Baldomero Yoon, OTR/L #[id number]     Diagnosis: Lumbar spinal stenosis [M48.061]  Lumbar stenosis with neurogenic claudication [M48.062]       Surgery:   L3,L4, L5 laminectomies bilaterally with medial facetectomies and foraminotomies, 2/15/23     Pertinent Medical History:  has a past medical history of Agent orange exposure, Chronic cough, Colon polyps, COPD (chronic obstructive pulmonary disease) ([facility] [address] Diabetes ([facility] [address] Foot drop, right foot, Hyperlipidemia, Hypertension, Rash, Restless leg, Spinal stenosis at L4-L5 level, and Thyroid nodule.       Precautions:  Fall Risk, spinal, R foot drop- has new AFO     Assessment of current deficits    [x] Functional mobility             [x]ADLs           [] Strength                  []Cognition   [x] Functional transfers           [x] IADLs         [x] Safety Awareness   [x]Endurance   [] Fine Coordination              [x] Balance      [] Vision/perception   []Sensation     []Gross Motor Coordination  [] ROM           [] Delirium                   [] Motor Control      OT PLAN OF CARE   OT POC based on physician orders, patient diagnosis and results of clinical assessment     Frequency/Duration   1-3 days/wk for 1 week PRN   Specific OT Treatment Interventions to include:   * Instruction/training on adapted ADL techniques and AE recommendations to increase functional independence within precautions       * Training on energy conservation strategies, correct breathing pattern and techniques to improve independence/tolerance for self-care routine  * Functional transfer/mobility training/DME recommendations for increased independence, safety, and fall prevention  * Patient/Family education to increase follow through with safety techniques and functional independence  * Recommendation of environmental modifications for increased safety with functional transfers/mobility and ADLs  * Therapeutic activities to facilitate/challenge dynamic balance, stand tolerance for increased safety and independence with ADLs  * Positioning to improve skin integrity, interaction with environment and functional independence     Recommended Adaptive Equipment: ww, elevated commode, shower seat , AE for LE dressing and bathing     Home Living: Pt lives with wife in a 2nd floor apt. with 6 steps and one hand rails. Bed and bath on second floor with 2 flights of  steps and oneHR's.   Family/ Outside assistance available: wife  Bathroom setup: tub shower   Equipment owned: spc     Prior Level of Function: mod I  with ADLs , mod I  with IADLs; ambulated spc  Driving: yes   Occupation: retired from [employer] and   Medication management: self  Leisure: casino     Pain Level:Pt complained of minimal back pain this session  Cognition: A&O: 4/4; Follows multi step directions              Memory:  poor recall of spinal precautions- handout issued              Sequencing:  fair+              Problem solving:  fair+              Judgement/safety:  fair+                Functional Assessment:  AM-PAC Daily Activity Raw Score: 19/24    Initial Eval Status  Date: 2/15/23 Treatment Status  Date: 2/16/23 STGs = LTGs  Time frame: 1-3 days   Feeding Independent  Independent     Grooming SBA with ww Setup  Pt washed face, applied deodorant, brushed teeth seated upright in chair at sink Mod I with ww standing   UB Dressing SBA  SBA  Pt donned/doffed hospital gown seated Mod I    LB Dressing Max A unable to perform figure 4  SBA  Pt donned/doffed hospital socks using cross over technique    Pt donned pants, able to thread and stand to pull over hips Mod I with AE    Bathing Simulated mod A recommend seat Rod/CGA  Sponge bathing task seated/standing at sink, pt able to wash of UB, using cross over technique to wash of LB, standing to wash of shekhar area and assistance to buttocks Mod I seated with LHS   Toileting Simulated mod A from standard height commode recommend elevated commode with HR CGA  Pt urinated seated on   on raised commode, pt able to complete of transfer with use of grab bar with CGA for safety and pt able to manage of gown Mod I with ww to [facility]   Bed Mobility  Supine to sit: min A   Sit to supine:  n/t  SBA  Supine<>EOB    Log roll technique, HOB slightly elevated Supine to sit: mod I   Sit to supine: mod I    Functional Transfers Sit to stand min A from bed level and mod A from lower surface  SBA/CGA  Sit to Stand  Stand to Sit    Cueing for hand placement Mod I with ww   Functional Mobility Min A with ww cues for safety and sequencing  CGA  Pt ambulated short household distance in room EOB<>Bathroom with w.w. Mod I with ww   Balance Sitting:     Static:  SBA    Dynamic:CGA  Standing: min A   Sitting EOB:  Supervision    Standing:  SBA/CGA                                                                      Activity Tolerance Good- O2 97% HR 80 RA   Good good   Visual/  Perceptual Glasses: reading                     Education:  Pt was educated on role of OT, goals to be reached, importance of OOB activity, precautions to follow, log roll technique with bed mobility, safety and hand placement with transfers, safety/balance and walker management with functional mobility, and techniques to assist with LB dressing/bathing tasks. Comments: Upon arrival pt supine in bed, agreeable to therapy, speaking with nursing okaying pt to be seen this session. Pt completed of bed mobility, functional mobility, transfers and ADL tasks this session. At end of session, pt seated upright in chair, all lines and tubes intact, call light within reach. Pt has made fair progress towards set goals.    Continue with current plan of care focusing on increasing of independency with transfers and ADL tasks       Treatment Time In: 7:50am           Treatment Time Out: 8:15am                Treatment Charges: Mins Units   Ther Ex  94847     Manual Therapy 42696     Thera Activities 98525     ADL/Home Mgt 57515 25 2   Neuro Re-ed 41271     Group Therapy      Orthotic manage/training  76226     Non-Billable Time     Total Timed Treatment 25 2        Remedios OREILLY/DAISHA [address]

## 2023-02-16 NOTE — OP NOTE
[address]                  [address] [facility] [place],  [facility]                                OPERATIVE REPORT    PATIENT NAME: Ivonne Roe                   :        1950  MED REC NO:   [id number]                            ROOM:       8206  ACCOUNT NO:   [de-identified]                           ADMIT DATE: 02/15/2023  PROVIDER:     Jonne Boast, MD    DATE OF PROCEDURE:  02/15/2023    ATTENDING NEUROSURGEON:  Jonne Boast, MD    ASSISTANT:  Lanre Leon PA-C    PREOPERATIVE DIAGNOSIS:  Lumbar 3 through 5 stenosis with neurogenic  claudication. POSTOPERATIVE DIAGNOSIS:  Lumbar 3 through 5 stenosis with neurogenic  claudication. PROCEDURES PERFORMED:  1.  Bilateral L3, L4, and L5 laminectomies with medial facetectomy and  foraminotomies bilaterally. 2.  AS modifier for use of physician assistant as no other qualified  assistant was available for primary opening and closure. 3.  Intraoperative fluoroscopy with interpretation by me, the surgeon. ESTIMATED BLOOD LOSS:  25 mL. ANESTHESIA:  General endotracheal anesthesia with 1% lidocaine with  epinephrine and 0.25% Marcaine for analgesia. IMPLANTS:   Duramorph and Depomedrol soaked gelfoam  7 mm RAJAN drain    INDICATIONS FOR THE PROCEDURE:  The patient is a delightful 58-year-old  gentleman who presented with history of low back pain and with  progression. He had signs and symptoms of neurogenic claudication and  had failed epidural injections and physical therapy, proceeding with a  progressive right footdrop over the last six months. MRI demonstrated  significant stenosis in the lumbar spine consistent with his neurogenic  claudication.   Therefore, the risks, benefits, and alternatives of  lumbar laminectomy with medial facetectomy and foraminotomy were  explained at length to the patient, which included, but were not limited  to, risks of infection; bleeding, CSF leak; failure to offer benefit;  need for re-operation; paralysis; paresthesia; injury to bowel, bladder  or sexual function; coma; cardiovascular collapse; and even death. He  articulated his understanding and wished to proceed. DESCRIPTION OF PROCEDURE:  The patient was taken to the operative suite  and intubated on the operating room cart. He was placed prone on the  Franc frame and all pressure points were carefully padded. I jacked in  Samson frame to the maximal amount and then used intraoperative  fluoroscopy to localize the appropriate level and marked a linear  incision. The area was sterilely prepped and draped in the usual  fashion. 1% lidocaine with epinephrine was used for local anesthesia. Using a 10-blade scalpel then, I sharply incised the skin and carried  the dissection down through the subcutaneous tissues and fat using Bovie  electrocautery. Self-retaining retractors were placed. The lumbodorsal  fascia was encountered and then this was stripped out laterally in a  subperiosteal fashion with the paraspinal muscles being mobilized in a  subperiosteal fashion with Bovie electrocautery in combination with  gentle elevation with Chao elevation. Self-retaining retractors were  then deepened. I used intraoperative fluoroscopy once again to localize  the appropriate level and satisfied with this then, the intraspinous  ligaments at L2-3, L3-4, L4-5 and L5-S1 were removed using Leksell  rongeur. The spinous processes were then removed using a Leksell  rongeur and a Shalini. Bone wax was applied to the cancellous bone  exposed and then I used a high-speed drill with a 5-mm cutting bur, both  on forward and reverse to thin out the lamina bilaterally at L3, L4 and  L5 and underscored the medial aspect of the facet as well bilaterally.    I then carefully used #2 and #3 Kerrison punches to remove extremely  thickened ligamentum flavum admixed with the thinned out lamina out  laterally mindful of the pars at all levels. We paid particular  attention to the right side as this was his most symptomatic side and  foraminotomies were performed at all levels as well until the nerve  roots and thecal sacs were palpated to be free and clear of any  compression. Satisfied with this then, the wound was copiously  irrigated with antibiotic-impregnated saline. I then asked Anesthesia  to perform a Valsalva maneuver to ensure that no occult hemostatic  compromise could be found nor occult CSF leak. None was found. Satisfied with this then, DuraGen and Depo-Medrol soaked Gelfoam were  placed into the wound, and a 7-mm Orlando Nilda drain was inserted into  the wound and secured to the skin using a 3-0 Monocryl suture in a  Mercy-type fashion. The wound was then closed using 0 Vicryl suture in  inverted interrupted fashion for the fascia followed by 2-0 Vicryl  suture in inverted interrupted fashion for the subcutaneous layer. The  skin was then closed using a running continuous 3-0 Monocryl suture. Antibiotic ointment was applied to the wound and sterile dressing  followed. The patient was subsequently extubated and transferred to the  recovery room in stable condition. All counts were correct, and I  certify I was present for the case in its entirety.         Coreen Carrasquillo MD    D: 02/15/2023 15:21:29       T: 02/16/2023 1:20:11     [email or web address]  Job#: [id number]     Doc#: [id number]    CC:

## 2023-02-17 VITALS
HEART RATE: 60 BPM | RESPIRATION RATE: 16 BRPM | WEIGHT: 193 LBS | DIASTOLIC BLOOD PRESSURE: 60 MMHG | TEMPERATURE: 97.9 F | HEIGHT: 71 IN | SYSTOLIC BLOOD PRESSURE: 102 MMHG | OXYGEN SATURATION: 91 % | BODY MASS INDEX: 27.02 KG/M2

## 2023-02-17 LAB
ANION GAP SERPL CALCULATED.3IONS-SCNC: 8 MMOL/L (ref 7–16)
BUN BLDV-MCNC: 21 MG/DL (ref 6–23)
CALCIUM SERPL-MCNC: 9 MG/DL (ref 8.6–10.2)
CHLORIDE BLD-SCNC: 100 MMOL/L (ref 98–107)
CO2: 29 MMOL/L (ref 22–29)
CREAT SERPL-MCNC: 1.5 MG/DL (ref 0.7–1.2)
GFR SERPL CREATININE-BSD FRML MDRD: 48 ML/MIN/1.73
GLUCOSE BLD-MCNC: 104 MG/DL (ref 74–99)
HCT VFR BLD CALC: 43.2 % (ref 37–54)
HEMOGLOBIN: 14.1 G/DL (ref 12.5–16.5)
MCH RBC QN AUTO: 30.4 PG (ref 26–35)
MCHC RBC AUTO-ENTMCNC: 32.6 % (ref 32–34.5)
MCV RBC AUTO: 93.1 FL (ref 80–99.9)
METER GLUCOSE: 136 MG/DL (ref 74–99)
PDW BLD-RTO: 13.1 FL (ref 11.5–15)
PLATELET # BLD: 179 E9/L (ref 130–450)
PMV BLD AUTO: 12 FL (ref 7–12)
POTASSIUM SERPL-SCNC: 4.6 MMOL/L (ref 3.5–5)
RBC # BLD: 4.64 E12/L (ref 3.8–5.8)
SODIUM BLD-SCNC: 137 MMOL/L (ref 132–146)
WBC # BLD: 13.6 E9/L (ref 4.5–11.5)

## 2023-02-17 PROCEDURE — 80048 BASIC METABOLIC PNL TOTAL CA: CPT

## 2023-02-17 PROCEDURE — 2580000003 HC RX 258: Performed by: STUDENT IN AN ORGANIZED HEALTH CARE EDUCATION/TRAINING PROGRAM

## 2023-02-17 PROCEDURE — 36415 COLL VENOUS BLD VENIPUNCTURE: CPT

## 2023-02-17 PROCEDURE — 6370000000 HC RX 637 (ALT 250 FOR IP): Performed by: INTERNAL MEDICINE

## 2023-02-17 PROCEDURE — 85027 COMPLETE CBC AUTOMATED: CPT

## 2023-02-17 PROCEDURE — G0378 HOSPITAL OBSERVATION PER HR: HCPCS

## 2023-02-17 PROCEDURE — 6370000000 HC RX 637 (ALT 250 FOR IP): Performed by: STUDENT IN AN ORGANIZED HEALTH CARE EDUCATION/TRAINING PROGRAM

## 2023-02-17 PROCEDURE — 82962 GLUCOSE BLOOD TEST: CPT

## 2023-02-17 RX ORDER — OXYCODONE HYDROCHLORIDE 5 MG/1
5 TABLET ORAL EVERY 4 HOURS PRN
Qty: 42 TABLET | Refills: 0 | Status: SHIPPED | OUTPATIENT
Start: 2023-02-17 | End: 2023-02-24

## 2023-02-17 RX ORDER — CYCLOBENZAPRINE HCL 10 MG
10 TABLET ORAL EVERY 8 HOURS
Qty: 30 TABLET | Refills: 0 | Status: SHIPPED | OUTPATIENT
Start: 2023-02-17 | End: 2023-02-27

## 2023-02-17 RX ORDER — NICOTINE 21 MG/24HR
1 PATCH, TRANSDERMAL 24 HOURS TRANSDERMAL DAILY
Qty: 30 PATCH | Refills: 3 | Status: SHIPPED | OUTPATIENT
Start: 2023-02-17

## 2023-02-17 RX ADMIN — RAMIPRIL 1.25 MG: 1.25 CAPSULE ORAL at 08:57

## 2023-02-17 RX ADMIN — ALOGLIPTIN 12.5 MG: 12.5 TABLET, FILM COATED ORAL at 08:57

## 2023-02-17 RX ADMIN — OXYCODONE HYDROCHLORIDE 10 MG: 10 TABLET ORAL at 05:56

## 2023-02-17 RX ADMIN — PREGABALIN 50 MG: 25 CAPSULE ORAL at 08:57

## 2023-02-17 RX ADMIN — Medication 10 ML: at 08:56

## 2023-02-17 RX ADMIN — SENNOSIDES AND DOCUSATE SODIUM 1 TABLET: 50; 8.6 TABLET ORAL at 08:57

## 2023-02-17 RX ADMIN — METFORMIN HYDROCHLORIDE 500 MG: 500 TABLET, EXTENDED RELEASE ORAL at 08:26

## 2023-02-17 RX ADMIN — POLYETHYLENE GLYCOL 3350 17 G: 17 POWDER, FOR SOLUTION ORAL at 08:57

## 2023-02-17 RX ADMIN — ROPINIROLE HYDROCHLORIDE 1 MG: 1 TABLET, FILM COATED ORAL at 08:57

## 2023-02-17 RX ADMIN — ACETAMINOPHEN 650 MG: 325 TABLET ORAL at 06:26

## 2023-02-17 RX ADMIN — BISACODYL 5 MG: 5 TABLET, COATED ORAL at 08:57

## 2023-02-17 ASSESSMENT — PAIN DESCRIPTION - DESCRIPTORS: DESCRIPTORS: ACHING;DISCOMFORT;TIGHTNESS

## 2023-02-17 ASSESSMENT — PAIN SCALES - GENERAL: PAINLEVEL_OUTOF10: 7

## 2023-02-17 ASSESSMENT — PAIN DESCRIPTION - LOCATION: LOCATION: BACK

## 2023-02-17 NOTE — DISCHARGE INSTRUCTIONS
Discharge Instructions:    1. No lifting more than 10 pounds. 2. Refrain from bending, twisting, or turning at the waist.   3. No brace is needed to be worn. 4. Walking is encouraged, slowly increase time and distance. 5. Can remove dressing and leave incision open to air. 6. All stitches are dissolvable and will dissolve in time. 7. Patient may NOT shower. Okay for sponge bath. DO NOT soak or scrub at incision site. 8. Do not drive while taking narcotic pain medications. 9. No sexual activity for one (1) month after surgery   10. Take medications as prescribed. Continue taking stool softener while taking narcotic pain medications. Lumbar Laminectomy: What to Expect at [address] [facility]  A lumbar laminectomy is surgery to ease pressure on the spinal cord and nerves of the lower spine. The doctor took out pieces of bone that were squeezing the spinal cord and nerves. You can expect your back to feel stiff or sore after surgery. This should improve in the weeks after surgery. You may have trouble sitting or standing in one position for very long. Your doctor may advise you to work with a physical therapist to strengthen the muscles around your spine and trunk. You will need to learn how to lift, twist, and bend so that you don't put too much strain on your back. This care sheet gives you a general idea about how long it will take for you to recover. But each person recovers at a different pace. Follow the steps below to get better as quickly as possible. How can you care for yourself at home? Activity    Rest when you feel tired. Getting enough sleep will help you recover. Try to walk each day. Start by walking a little more than you did the day before. Bit by bit, increase the amount you walk. Walking boosts blood flow and helps prevent pneumonia and constipation. Walking may also decrease your muscle soreness after surgery.      If advised by your doctor, you may need to avoid lifting anything that would cause excessive strain on your back. This may include a child, heavy grocery bags and milk containers, a heavy briefcase or backpack, cat litter or dog food bags, or a vacuum . Avoid strenuous activities, such as bicycle riding, jogging, weight lifting, or aerobic exercise, until your doctor says it is okay. Do not drive for 2 to 4 weeks after your surgery or until your doctor says it is okay. Avoid riding in a car for more than 30 minutes at a time for 2 to 4 weeks after surgery. If you must ride in a car for a longer distance, stop often to walk and stretch your legs. Try to change your position about every 30 minutes while sitting or standing. This will help decrease your back pain while you are healing. You will probably need to take 2 to 4 weeks off from work. But if your job requires physical labor, it may take 4 to 8 weeks. It depends on the type of work you do and how you feel. You may have sex as soon as you feel able, but avoid positions that put stress on your back or cause pain. Diet    You can eat your normal diet. If your stomach is upset, try bland, low-fat foods like plain rice, broiled chicken, toast, and yogurt. Drink plenty of fluids (unless your doctor tells you not to). You may notice that your bowel movements are not regular right after your surgery. This is common. Try to avoid constipation and straining with bowel movements. You may want to take a fiber supplement every day. If you have not had a bowel movement after a couple of days, ask your doctor about taking a mild laxative. Medicines    Your doctor will tell you if and when you can restart your medicines. You will also get instructions about taking any new medicines. If you stopped taking aspirin or some other blood thinner, your doctor will tell you when to start taking it again. Take pain medicines exactly as directed.   If the doctor gave you a prescription medicine for pain, take it as prescribed. If you are not taking a prescription pain medicine, ask your doctor if you can take an over-the-counter medicine. If you think your pain medicine is making you sick to your stomach: Take your medicine after meals (unless your doctor has told you not to). Ask your doctor for a different pain medicine. Incision care    If you have strips of tape on the cut (incision) the doctor made, leave the tape on for a week or until it falls off. Wash the area daily with warm, soapy water and pat it dry. Keep the area clean and dry. You may cover it with a gauze bandage if it weeps or rubs against clothing. Change the bandage every day. Exercise    Do back exercises as instructed by your doctor. Your doctor may advise you to work with a physical therapist to improve the strength and flexibility of your back. Other instructions    To reduce stiffness and help sore muscles, use a warm water bottle, a heating pad set on low, or a warm cloth on your back. Do not put heat right over the incision. Do not go to sleep with a heating pad on your skin. Follow-up care is a key part of your treatment and safety. Be sure to make and go to all appointments, and call your doctor if you are having problems. It's also a good idea to know your test results and keep a list of the medicines you take. When should you call for help? Call 911 anytime you think you may need emergency care. For example, call if:    You passed out (lost consciousness). You have sudden chest pain and shortness of breath, or you cough up blood. You are unable to move a leg at all. Call your doctor now or seek immediate medical care if:    You have new or worse symptoms in your legs or buttocks. Symptoms may include:  Numbness or tingling. Weakness. Pain. You lose bladder or bowel control. You have loose stitches, or your incision comes open.      You have blood or fluid draining from the incision. You have signs of infection, such as: Increased pain, swelling, warmth, or redness. Pus draining from the incision. A fever. Red streaks leading from the incision. Watch closely for changes in your health, and be sure to contact your doctor if:    You do not have a bowel movement after taking a laxative. You are not getting better as expected. Where can you learn more? Go to http://www.woods.com/ and enter U123 to learn more about \"Lumbar Laminectomy: What to Expect at Home. \"  Current as of: March 9, 2022               Content Version: 13.5  © 1792-7026 [employer]. Care instructions adapted under license by [facility] [address] If you have questions about a medical condition or this instruction, always ask your healthcare professional. [address] any warranty or liability for your use of this information.

## 2023-02-17 NOTE — PROGRESS NOTES
Department of Neurosurgery  Progress Note    CHIEF COMPLAINT: s/p L3-L5 laminectomy     SUBJECTIVE:  No acute events overnight. Patient states he is doing well, no new complaints. Drain output- 60 mL total, drain pulled, patient tolerated procedure well. No immediate complications. REVIEW OF SYSTEMS :  Constitutional: Negative for chills and fever. Neurological: Negative for dizziness, tremors and speech change.      OBJECTIVE:   VITALS:  /60   Pulse 60   Temp 97.9 °F (36.6 °C) (Temporal)   Resp 16   Ht 5' 11\" (1.803 m)   Wt 193 lb (87.5 kg)   SpO2 91%   BMI 26.92 kg/m²     PHYSICAL:  Alert, oriented  Appears stated age  PERRL  EOMI  Right DF 2/5, Right PF 3/5 otherwise Strength full  Sensation intact to light touch    DATA:  CBC:   Lab Results   Component Value Date/Time    WBC 13.6 02/17/2023 07:11 AM    RBC 4.64 02/17/2023 07:11 AM    HGB 14.1 02/17/2023 07:11 AM    HCT 43.2 02/17/2023 07:11 AM    MCV 93.1 02/17/2023 07:11 AM    MCH 30.4 02/17/2023 07:11 AM    MCHC 32.6 02/17/2023 07:11 AM    RDW 13.1 02/17/2023 07:11 AM     02/17/2023 07:11 AM    MPV 12.0 02/17/2023 07:11 AM     BMP:    Lab Results   Component Value Date/Time     02/17/2023 07:11 AM    K 4.6 02/17/2023 07:11 AM    K 5.0 02/16/2023 07:16 AM     02/17/2023 07:11 AM    CO2 29 02/17/2023 07:11 AM    BUN 21 02/17/2023 07:11 AM    LABALBU 4.0 08/22/2022 10:52 AM    CREATININE 1.5 02/17/2023 07:11 AM    CALCIUM 9.0 02/17/2023 07:11 AM    GFRAA >60 08/22/2022 10:52 AM    LABGLOM 48 02/17/2023 07:11 AM    GLUCOSE 104 02/17/2023 07:11 AM     PT/INR:    Lab Results   Component Value Date/Time    PROTIME 12.4 02/08/2023 09:40 AM    INR 1.1 02/08/2023 09:40 AM     PTT:  No results found for: APTT, PTT[APTT}    Current Inpatient Medications  Current Facility-Administered Medications: nicotine (NICODERM CQ) 14 MG/24HR 1 patch, 1 patch, TransDERmal, Daily  pregabalin (LYRICA) capsule 50 mg, 50 mg, Oral, BID  ramipril (ALTACE) capsule 1.25 mg, 1.25 mg, Oral, Daily  rOPINIRole (REQUIP) tablet 1 mg, 1 mg, Oral, Nightly PRN  sodium chloride flush 0.9 % injection 5-40 mL, 5-40 mL, IntraVENous, 2 times per day  sodium chloride flush 0.9 % injection 5-40 mL, 5-40 mL, IntraVENous, PRN  0.9 % sodium chloride infusion, , IntraVENous, PRN  acetaminophen (TYLENOL) tablet 650 mg, 650 mg, Oral, Q6H  ondansetron (ZOFRAN-ODT) disintegrating tablet 4 mg, 4 mg, Oral, Q8H PRN **OR** ondansetron (ZOFRAN) injection 4 mg, 4 mg, IntraVENous, Q6H PRN  polyethylene glycol (GLYCOLAX) packet 17 g, 17 g, Oral, Daily  bisacodyl (DULCOLAX) EC tablet 5 mg, 5 mg, Oral, Daily  sennosides-docusate sodium (SENOKOT-S) 8.6-50 MG tablet 1 tablet, 1 tablet, Oral, BID  magnesium hydroxide (MILK OF MAGNESIA) 400 MG/5ML suspension 30 mL, 30 mL, Oral, Daily PRN  sodium phosphate (FLEET) rectal enema 1 enema, 1 enema, Rectal, Daily PRN  cyclobenzaprine (FLEXERIL) tablet 10 mg, 10 mg, Oral, Q12H PRN  oxyCODONE (ROXICODONE) immediate release tablet 5 mg, 5 mg, Oral, Q4H PRN **OR** oxyCODONE HCl (OXY-IR) immediate release tablet 10 mg, 10 mg, Oral, Q4H PRN  morphine (PF) injection 2 mg, 2 mg, IntraVENous, Q2H PRN **OR** morphine sulfate (PF) injection 4 mg, 4 mg, IntraVENous, Q2H PRN  alogliptin (NESINA) tablet 12.5 mg, 12.5 mg, Oral, Daily **AND** metFORMIN (GLUCOPHAGE-XR) extended release tablet 500 mg, 500 mg, Oral, Daily with breakfast  arformoterol tartrate (BROVANA) nebulizer solution 15 mcg, 15 mcg, Nebulization, BID PRN  budesonide (PULMICORT) nebulizer suspension 500 mcg, 0.5 mg, Nebulization, BID PRN  ipratropium (ATROVENT) 0.02 % nebulizer solution 0.5 mg, 0.5 mg, Nebulization, 4x Daily PRN  albuterol (PROVENTIL) nebulizer solution 2.5 mg, 2.5 mg, Nebulization, Q6H PRN  insulin lispro (HUMALOG) injection vial 0-8 Units, 0-8 Units, SubCUTAneous, TID WC  insulin lispro (HUMALOG) injection vial 0-4 Units, 0-4 Units, SubCUTAneous, Nightly  glucose chewable tablet 16 g, 4 tablet, Oral, PRN  dextrose bolus 10% 125 mL, 125 mL, IntraVENous, PRN **OR** dextrose bolus 10% 250 mL, 250 mL, IntraVENous, PRN  glucagon injection 1 mg, 1 mg, SubCUTAneous, PRN  dextrose 10 % infusion, , IntraVENous, Continuous PRN    ASSESSMENT:   -Kadi Ibarra is a 73 y/o male who s/p L3-L5 laminectomy     PLAN:  -Pain control  -PT/OT  -Discharge planning   -Follow up in clinic in 1 week for incision check  -Please call with any questions or concerns.        Electronically signed by Debbi Barry PA-C on 2/17/2023 at 8:49 AM

## 2023-02-17 NOTE — DISCHARGE SUMMARY
Neurosurgery Surgery Discharge Summary    [address] SUMMARY:                The patient is a 74 y.o. male who was admitted to the hospital on 2/15/2023  5:25 AM for treatment of Lumbar stenosis. On the day of admission, a L3-L5 laminectomy was performed. The patient's hospital course was uncomplicated and consisted of physical therapy, incision observation, and a return to normal oral intake. The patient was discharged on 2/17/2023 11:02 AM tolerating a diet, moving bowels, and urinating without difficulty. The incisions were clean and intact. The patient was discharged to Home in satisfactory condition with instructions to call the office for a follow up appointment. Hospital Problem List:  Principal Problem:    Spinal stenosis of lumbar region without neurogenic claudication  Active Problems:    Lumbar stenosis with neurogenic claudication  Resolved Problems:    * No resolved hospital problems. *     Procedure(s) (LRB):  L3,L4, L5 laminectomies bilaterally with medial facetectomies and foraminotomies (Bilateral)    Discharge Medications:   Discharge Medication List as of 2/17/2023  9:19 AM        START taking these medications    Details   oxyCODONE (ROXICODONE) 5 MG immediate release tablet Take 1 tablet by mouth every 4 hours as needed for Pain for up to 7 days. Max Daily Amount: 30 mg, Disp-42 tablet, R-0Normal      cyclobenzaprine (FLEXERIL) 10 MG tablet Take 1 tablet by mouth every 8 (eight) hours for 10 days, Disp-30 tablet, R-0Normal      nicotine (NICODERM CQ) 14 MG/24HR Place 1 patch onto the skin daily, Disp-30 patch, R-3Normal           CONTINUE these medications which have NOT CHANGED    Details   pregabalin (LYRICA) 50 MG capsule Take 1 capsule by mouth 2 times daily for 30 days.  Max Daily Amount: 100 mg, Disp-60 capsule, R-0Normal      ibuprofen (ADVIL;MOTRIN) 200 MG tablet Take 200 mg by mouth as needed for PainHistorical Med      ramipril (ALTACE) 1.25 MG capsule TAKE 1 CAPSULE DAILY, Disp-90 capsule, R-1Normal      fluticasone-umeclidin-vilant (TRELEGY ELLIPTA) 100-62.5-25 MCG/INH AEPB Inhale 1 puff into the lungs daily, Disp-1 each, R-3Normal      SITagliptin-metFORMIN (JANUMET XR)  MG TB24 per extended release tablet Take 1 tablet by mouth daily, Disp-90 tablet, R-1Normal      vitamin D (CHOLECALCIFEROL) [address]3237 UNIT CAPS One weekly, Disp-12 capsule, R-1Normal      albuterol sulfate  (90 Base) MCG/ACT inhaler Inhale 2 puffs into the lungs every 6 hours as needed for Wheezing or Shortness of Breath, Disp-18 g, R-1Normal      rOPINIRole (REQUIP) 1 MG tablet Take 1 tablet by mouth nightly, Disp-90 tablet, R-1Normal             Starr Abdi PA-C  2/17/2023

## 2023-02-17 NOTE — PROGRESS NOTES
[facility] Medicine    Subjective:  pt alert conversive pain controlled      Current Facility-Administered Medications:     nicotine (NICODERM CQ) 14 MG/24HR 1 patch, 1 patch, TransDERmal, Daily, Carmella Wright DO, 1 patch at 02/16/23 1056    pregabalin (LYRICA) capsule 50 mg, 50 mg, Oral, BID, ALIX Cool, 50 mg at 02/16/23 2034    ramipril (ALTACE) capsule 1.25 mg, 1.25 mg, Oral, Daily, ALIX Cool, 1.25 mg at 02/16/23 9038    rOPINIRole (REQUIP) tablet 1 mg, 1 mg, Oral, Nightly PRN, ALIX Cool    sodium chloride flush 0.9 % injection 5-40 mL, 5-40 mL, IntraVENous, 2 times per day, ALIX Cool, 10 mL at 02/16/23 2035    sodium chloride flush 0.9 % injection 5-40 mL, 5-40 mL, IntraVENous, PRN, ALIX Cool    0.9 % sodium chloride infusion, , IntraVENous, PRN, ALIX Cool    acetaminophen (TYLENOL) tablet 650 mg, 650 mg, Oral, Q6H, ALIX Cool, 650 mg at 02/17/23 0626    ondansetron (ZOFRAN-ODT) disintegrating tablet 4 mg, 4 mg, Oral, Q8H PRN **OR** ondansetron (ZOFRAN) injection 4 mg, 4 mg, IntraVENous, Q6H PRN, ALIX Cool    polyethylene glycol (GLYCOLAX) packet 17 g, 17 g, Oral, Daily, ALIX Cool, 17 g at 02/15/23 1322    bisacodyl (DULCOLAX) EC tablet 5 mg, 5 mg, Oral, Daily, ALIX Cool, 5 mg at 02/15/23 1322    sennosides-docusate sodium (SENOKOT-S) 8.6-50 MG tablet 1 tablet, 1 tablet, Oral, BID, ALIX Cool, 1 tablet at 02/16/23 2034    magnesium hydroxide (MILK OF MAGNESIA) 400 MG/5ML suspension 30 mL, 30 mL, Oral, Daily PRN, ALIX Cool    sodium phosphate (FLEET) rectal enema 1 enema, 1 enema, Rectal, Daily PRN, ALIX Cool    cyclobenzaprine (FLEXERIL) tablet 10 mg, 10 mg, Oral, Q12H PRN, ALIX Cool, 10 mg at 02/16/23 0835    oxyCODONE (ROXICODONE) immediate release tablet 5 mg, 5 mg, Oral, Q4H PRN, 5 mg at 02/16/23 1131 **OR** oxyCODONE HCl (OXY-IR) immediate release tablet 10 mg, 10 mg, Oral, Q4H PRN, ALIX Hutson, 10 mg at 02/17/23 0556    morphine (PF) injection 2 mg, 2 mg, IntraVENous, Q2H PRN **OR** morphine sulfate (PF) injection 4 mg, 4 mg, IntraVENous, Q2H PRN, ALIX Hutson    alogliptin (NESINA) tablet 12.5 mg, 12.5 mg, Oral, Daily, 12.5 mg at 02/16/23 2200 **AND** metFORMIN (GLUCOPHAGE-XR) extended release tablet 500 mg, 500 mg, Oral, Daily with breakfast, ALIX Hutson, 500 mg at 02/17/23 0826    arformoterol tartrate (BROVANA) nebulizer solution 15 mcg, 15 mcg, Nebulization, BID PRN, ALIX Hutson    budesonide (PULMICORT) nebulizer suspension 500 mcg, 0.5 mg, Nebulization, BID PRN, ALIX Hutson    ipratropium (ATROVENT) 0.02 % nebulizer solution 0.5 mg, 0.5 mg, Nebulization, 4x Daily PRN, ALIX Hutson    albuterol (PROVENTIL) nebulizer solution 2.5 mg, 2.5 mg, Nebulization, Q6H PRN, ALIX Hutson    insulin lispro (HUMALOG) injection vial 0-8 Units, 0-8 Units, SubCUTAneous, TID WC, Jl Wright DO, 2 Units at 02/15/23 1615    insulin lispro (HUMALOG) injection vial 0-4 Units, 0-4 Units, SubCUTAneous, Nightly, Jl Wright, DO    glucose chewable tablet 16 g, 4 tablet, Oral, PRN, Jl Wright, DO    dextrose bolus 10% 125 mL, 125 mL, IntraVENous, PRN **OR** dextrose bolus 10% 250 mL, 250 mL, IntraVENous, PRN, Jl Wright, DO    glucagon injection 1 mg, 1 mg, SubCUTAneous, PRN, Jl Wright, DO    dextrose 10 % infusion, , IntraVENous, Continuous PRN, Jl Wright, DO    Objective:    /60   Pulse 60   Temp 97.9 °F (36.6 °C) (Temporal)   Resp 16   Ht 5' 11\" (1.803 m)   Wt 193 lb (87.5 kg)   SpO2 91%   BMI 26.92 kg/m²     Heart:  reg  Lungs:  ctab  Abd: + bs soft non tender  Extrem:  w/o edema    CBC with Differential:    Lab Results   Component Value Date/Time    WBC 13.6 02/17/2023 07:11 AM    RBC 4.64 02/17/2023 07:11 AM    HGB 14.1 02/17/2023 07:11 AM    HCT 43.2 02/17/2023 07:11 AM     02/17/2023 07:11 AM    MCV 93.1 02/17/2023 07:11 AM    MCH 30.4 02/17/2023 07:11 AM    MCHC 32.6 02/17/2023 07:11 AM    RDW 13.1 02/17/2023 07:11 AM    LYMPHOPCT 11.4 02/16/2023 07:16 AM    MONOPCT 5.5 02/16/2023 07:16 AM    BASOPCT 0.2 02/16/2023 07:16 AM    MONOSABS 0.87 02/16/2023 07:16 AM    LYMPHSABS 1.80 02/16/2023 07:16 AM    EOSABS 0.00 02/16/2023 07:16 AM    BASOSABS 0.03 02/16/2023 07:16 AM     CMP:    Lab Results   Component Value Date/Time     02/17/2023 07:11 AM    K 4.6 02/17/2023 07:11 AM    K 5.0 02/16/2023 07:16 AM     02/17/2023 07:11 AM    CO2 29 02/17/2023 07:11 AM    BUN 21 02/17/2023 07:11 AM    CREATININE 1.5 02/17/2023 07:11 AM    GFRAA >60 08/22/2022 10:52 AM    LABGLOM 48 02/17/2023 07:11 AM    GLUCOSE 104 02/17/2023 07:11 AM    PROT 6.7 08/22/2022 10:52 AM    LABALBU 4.0 08/22/2022 10:52 AM    CALCIUM 9.0 02/17/2023 07:11 AM    BILITOT 1.0 08/22/2022 10:52 AM    ALKPHOS 187 08/22/2022 10:52 AM    AST 19 08/22/2022 10:52 AM    ALT 15 08/22/2022 10:52 AM     Warfarin PT/INR:    Lab Results   Component Value Date    INR 1.1 02/08/2023    PROTIME 12.4 02/08/2023       Assessment:    Principal Problem:    Spinal stenosis of lumbar region without neurogenic claudication  Active Problems:    Lumbar stenosis with neurogenic claudication  Resolved Problems:    * No resolved hospital problems.  *      Plan:  Dc planning cont as per neurosurgery        Eneida Love DO  8:48 AM  2/17/2023

## 2023-02-17 NOTE — PROGRESS NOTES
CLINICAL PHARMACY NOTE: MEDS TO BEDS    Total # of Prescriptions Filled: 3   The following medications were delivered to the patient:  Cyclobenzaprine 10 mg  Nicotine 21 mg patch  Oxycodone 5 mg  Pt picked up in the pharmacy at the register    Additional Documentation:

## 2023-02-20 ENCOUNTER — CARE COORDINATION (OUTPATIENT)
Dept: CASE MANAGEMENT | Age: 73
End: 2023-02-20

## 2023-02-20 DIAGNOSIS — M48.062 LUMBAR STENOSIS WITH NEUROGENIC CLAUDICATION: Primary | ICD-10-CM

## 2023-02-20 PROCEDURE — 1111F DSCHRG MED/CURRENT MED MERGE: CPT | Performed by: FAMILY MEDICINE

## 2023-02-20 NOTE — CARE COORDINATION
[facility] Care Transitions Initial Follow Up Call    Call within 2 business days of discharge: Yes    Patient Current Location:  Home: [address] [place] [address], [address] [place] [address]    Care Transition Nurse contacted the patient by telephone to perform post hospital discharge assessment. Provided introduction to self, and explanation of the Care Transition Nurse role. Patient: Zuly Mesa Patient : 1950   MRN: [id number]  Reason for Admission: S/p Elective Laminectomy  Discharge Date: 23 RARS: No data recorded    Last Discharge  Street       Date Complaint Diagnosis Description Type Department Provider    2/15/23  Pre-operative laboratory examination . .. Admission (Discharged) Sushil Cruz MD            Was this an external facility discharge? No     Challenges to be reviewed by the provider   Additional needs identified to be addressed with provider: No  none               Method of communication with provider: none. CTN called and spoke with the pt for an initial care transition call post hospital discharge. Pt admitted on 2/15/23 with elective laminectomy of L3-L5 for spinal stenosis of the lumbar region. Pt returned CTN's call and states that he is doing [place]. \" Pt reports increased pain when first standing up and taking initial few steps. Pt rates his pain at that point a \"10/10\" on a 0-10 pain scale. Pt states that he is taking the Oxycodone rx BID and that it seems to be keeping his pain level well controlled. Pt also taking Flexeril TID prn as prescribed. Pt denies any bowel or bladder issues. Is voiding and moving his bowels normally. Pt reports to legs feeling weak and at times a little tingly. Pt states that he has baseline pedal edema and feels that it is slightly more today but does state that he has not been up too much yet today after being in his recliner. CTN advised pt to monitor closely and notify his pcp if he notices any increase in swelling. Pt voiced understanding. Pt reports that DSD is still intact to surgical site and denies any active bleeding, swelling, or drainage from area. Pt does have a hx of COPD, but denies any worrisome s/sx of worsening. He feels he is at his baseline from a pulmonary standpoint. CTN was able to review the pt's medications in full. 1111F entered. Confirmed with pt's wife earlier that pt has all new rxs from discharge, as they were provided to the pt from [facility] to [facility]. Reviewed HFU appts with pt. Pt is aware of his f/u with NS this week on 2/23 and with his pcp on 2/27/23. Reviewed with pt's wife earlier as well and she will be transporting him to his appts. [place] [address] [place] orders and [facility] and will be out tomorrow for an initial visit. Pt did not voice any needs/concerns at this time. Pt agreeable to continued outreaches. Care Transition Nurse reviewed discharge instructions, medical action plan, and red flags with patient who verbalized understanding. The patient was given an opportunity to ask questions and does not have any further questions or concerns at this time. Were discharge instructions available to patient? Yes. Reviewed appropriate site of care based on symptoms and resources available to patient including: PCP  Specialist  Home health  When to call 911. The patient agrees to contact the PCP office for questions related to their healthcare. Advance Care Planning:     Healthcare Decision Maker:    Primary Decision Maker: Nery Cohn - [phone]    Medication reconciliation was performed with patient, who verbalizes understanding of administration of home medications. Medications reviewed, 1111F entered: yes    Was patient discharged with a pulse oximeter? no    Non-face-to-face services provided:  Scheduled appointment with PCP-2/27/23 @ 11:15 am. Reviewed with both pt and wife.    Scheduled appointment with Specialist-NS 2/23/23~Reviewed with pt and wife  Obtained and reviewed discharge summary and/or continuity of care documents  Communication with home health agencies or other community services the patient is currently using-CTN had [facility] of [facility]. Assessment and support for treatment adherence and medication management-1111F entered. Offered patient enrollment in the Remote Patient Monitoring (RPM) program for in-home monitoring:  Did not offer. Pt admitted for an elective procedure. No evident reason at this time to offer RPM .    Care Transitions 24 Hour Call    Schedule Follow Up Appointment with PCP: Completed  Do you have a copy of your discharge instructions?: Yes  Do you have all of your prescriptions and are they filled?: Yes  Have you been contacted by a [id number] [employer] Pharmacist?: No  Have you scheduled your follow up appointment?: Yes  How are you going to get to your appointment?: Car - family or friend to transport  Do you feel like you have everything you need to keep you well at home?: Yes  Care Transitions Interventions         Discussed follow-up appointments. If no appointment was previously scheduled, appointment scheduling offered: Yes. Is follow up appointment scheduled within 7 days of discharge? Yes. Follow Up  Future Appointments   Date Time Provider Judy Garcia   2/23/2023  9:00 AM [facility]   2/27/2023 11:15 AM Hitesh Jacob DO [facility]   3/2/2023  2:00 PM [facility]   5/26/2023 10:30 AM Hitesh Jacob DO [facility]   9/13/2023 10:00 AM Jorge Campos MD        Care Transition Nurse provided contact information. Plan for follow-up call in 7-10 days based on severity of symptoms and risk factors. Plan for next call: symptom management-Any post op complications?  Incision site healing?  follow-up appointment-Review OV with NS and pcp for any changes/new instructions.     Lee Bush RN

## 2023-02-20 NOTE — CARE COORDINATION
-CTN called and spoke with Christiano at [facility] [place] [id number]. Christiano states that she sees an order in Epic for the pt, but does not note pt is active with services. She states that she will check with intake staff and therapy manager and will call this CTN back.

## 2023-02-20 NOTE — CARE COORDINATION
[facility] Care Transitions Initial Follow Up Call    Call within 2 business days of discharge: Yes    Patient Current Location:  Home: [facility] [place] [address] [facility] [address]        Patient: Chung Zarate Patient : 1950   MRN: [id number]  Reason for Admission: Elective Laminectomy  Discharge Date: 23 RARS: No data recorded    Last Discharge  Street       Date Complaint Diagnosis Description Type Department Provider    2/15/23  Pre-operative laboratory examination . .. Admission (Discharged) [facility] Lucy Nichole MD            Attempted to reach the patient for initial Care Transition call post hospital discharge. Pt's wife, Denae Guerrier (On HIPAA communication release form), answered stating pt was asleep and would have him call back later    Deane Better stated that she would like to schedule the pt's 1wk HFU appt with his pcp, as she will be taking him to all of his appts. CTN assisted Denae Guerrier with scheduling the pt's HFU appt with Dr. Michele Rucker for 23 @ 11:15 am.    CTN noted orders in [employer] for [facility] PT/OT. Denae Better states that she doesn't think the pt was sent home with therapy, but will have him call this CTN back later when he wakes up so CTN can discuss with pt. CTN notes order and let Denae Better know. She states she knows nothing about it, as she just picked the pt up on the day of discharge and never came to the unit to go over anything. CTN will f/u with  [address] to check on status. CTN will await call back later from pt. Unable to verify medications with wife, as she states that the pt manages his own meds. However, she confirms pt was able to get 3 new medications at discharge, as pt received prior to leaving the hospital. She states that pt is taking the Oxycodone BID right now for pain and the Flexeril Q8hrs prn as ordered. He is wearing the Nicoderm patch per wife. Will await call from pt to complete initial call. If no return call, CTN will attempt call tomorrow. Follow Up  Future Appointments   Date Time Provider Judy Radha   2/23/2023  9:00 AM [facility]   2/27/2023 11:15 AM Amrit Avalos DO [facility]   3/2/2023  2:00 PM [facility]   5/26/2023 10:30 AM DO [facility]   9/13/2023 10:00 AM [facility]         Mike Duran RN

## 2023-02-20 NOTE — CARE COORDINATION
-SERAFIN received a call back from Chirag Figueroa at [facility] confirming that [facility] will be tomorrow. She states that she will be calling the pt today to update.

## 2023-02-23 ENCOUNTER — OFFICE VISIT (OUTPATIENT)
Dept: NEUROSURGERY | Age: 73
End: 2023-02-23
Payer: MEDICARE

## 2023-02-23 VITALS
WEIGHT: 193 LBS | HEIGHT: 71 IN | BODY MASS INDEX: 27.02 KG/M2 | TEMPERATURE: 97 F | DIASTOLIC BLOOD PRESSURE: 78 MMHG | HEART RATE: 67 BPM | SYSTOLIC BLOOD PRESSURE: 143 MMHG | OXYGEN SATURATION: 93 %

## 2023-02-23 DIAGNOSIS — Z98.890 S/P LAMINECTOMY: Primary | ICD-10-CM

## 2023-02-23 PROCEDURE — 99024 POSTOP FOLLOW-UP VISIT: CPT | Performed by: STUDENT IN AN ORGANIZED HEALTH CARE EDUCATION/TRAINING PROGRAM

## 2023-02-23 PROCEDURE — 99212 OFFICE O/P EST SF 10 MIN: CPT

## 2023-02-23 NOTE — PROGRESS NOTES
Incision Check    Ashu Moreland is a 74 y.o. male who is s/p L3-L5 laminectomy who presents for an 1 week incision check. Incision non erythematous and non tender to touch. Incision healing well without signs of infection. No discharge noted. Okay to shower now. Use a mild shampoo (I.e. baby shampoo) to clean incision. Patient to return in 1 weeks for suture removal. Patient to call if they have any fever or discharge from incision site.

## 2023-02-27 ENCOUNTER — OFFICE VISIT (OUTPATIENT)
Dept: PRIMARY CARE CLINIC | Age: 73
End: 2023-02-27

## 2023-02-27 VITALS
BODY MASS INDEX: 26.36 KG/M2 | OXYGEN SATURATION: 97 % | DIASTOLIC BLOOD PRESSURE: 82 MMHG | TEMPERATURE: 97.5 F | WEIGHT: 189 LBS | HEART RATE: 68 BPM | SYSTOLIC BLOOD PRESSURE: 122 MMHG

## 2023-02-27 DIAGNOSIS — Z72.0 TOBACCO ABUSE: ICD-10-CM

## 2023-02-27 DIAGNOSIS — Z09 HOSPITAL DISCHARGE FOLLOW-UP: ICD-10-CM

## 2023-02-27 DIAGNOSIS — K59.03 DRUG-INDUCED CONSTIPATION: ICD-10-CM

## 2023-02-27 DIAGNOSIS — M48.062 LUMBAR STENOSIS WITH NEUROGENIC CLAUDICATION: ICD-10-CM

## 2023-02-27 DIAGNOSIS — M48.061 SPINAL STENOSIS AT L4-L5 LEVEL: Primary | ICD-10-CM

## 2023-02-27 DIAGNOSIS — M21.371 RIGHT FOOT DROP: ICD-10-CM

## 2023-02-27 RX ORDER — FLUTICASONE FUROATE, UMECLIDINIUM BROMIDE AND VILANTEROL TRIFENATATE 100; 62.5; 25 UG/1; UG/1; UG/1
1 POWDER RESPIRATORY (INHALATION) DAILY
Qty: 1 EACH | Refills: 5 | Status: SHIPPED | OUTPATIENT
Start: 2023-02-27

## 2023-02-27 NOTE — PROGRESS NOTES
Post-Discharge Transitional Care  Follow Up      Salome Foy   YOB: 1950    Date of Office Visit:  2/27/2023  Date of Hospital Admission: 2/15/23  Date of Hospital Discharge: 2/17/23  Risk of hospital readmission (high >=14%. Medium >=10%) :No data recorded    Care management risk score Rising risk (score 2-5) and Complex Care (Scores >=6): No Risk Score On File     Non face to face  following discharge, date last encounter closed (first attempt may have been earlier): 02/20/2023    Call initiated 2 business days of discharge: Yes    ASSESSMENT/PLAN:   Spinal stenosis at L4-L5 level  Right foot drop  Drug-induced constipation  Tobacco abuse  Lumbar stenosis with neurogenic claudication  Hospital discharge follow-up  -     WI DISCHARGE MEDS RECONCILED W/ CURRENT OUTPATIENT MED LIST  - follow up with neurosurgery for suture removal in several days  - continue PT and OT  - colace 100 mg 1-2 a day prn constipation. - try to wean off pain meds as soon as he can  - re try wearing nicotine patch  - use Advil prn pain   - as scheduled in May. Medical Decision Making: moderate complexity  No follow-ups on file. Subjective:   HPI:  Follow up of Hospital problems/diagnosis(es): spinal stenosis with neurolgic claudication, foot drop R, drug induced constipation, tobacco abuse. Inpatient course: Discharge summary reviewed- see chart. Interval history/Current status: Gen: no F/C. Not wearing nicotine patches. GI: having hard bm every 2 days. MS: back pain improving since surgery, oocas sharp pain R leg. Still trouble lifting R foot. In PT at home also with OT. Only needing pain med 1-2 times a day of Oxycontin. CV: no cp or palp  Resp: no sob.       Patient Active Problem List   Diagnosis    COPD (chronic obstructive pulmonary disease) ([facility] [address]    Type 2 diabetes mellitus with diabetic neuropathy, without long-term current use of insulin ([address]    Hyperlipemia    Nephrolithiasis Tobacco abuse    Intractable ophthalmoplegic migraine    Adenomatous polyp of colon    Chronic pain syndrome    Lumbar facet arthropathy    Lumbar disc disorder    Lumbar spondylosis    Spinal stenosis of lumbar region without neurogenic claudication    Lumbar radiculopathy    Lumbar stenosis with neurogenic claudication       Medications listed as ordered at the time of discharge from hospital     Medication List            Accurate as of February 27, 2023 12:11 PM. If you have any questions, ask your nurse or doctor. CHANGE how you take these medications      rOPINIRole 1 MG tablet  Commonly known as: REQUIP  Take 1 tablet by mouth nightly  What changed:   when to take this  reasons to take this     Trelegy Ellipta 100-62.5-25 MCG/ACT Aepb inhaler  Generic drug: fluticasone-umeclidin-vilant  Inhale 1 puff into the lungs daily  What changed: medication strength  Changed by: March Nelly, DO     vitamin D [address] UNIT Caps  Commonly known as: CHOLECALCIFEROL  One weekly  What changed:   how much to take  how to take this  when to take this            CONTINUE taking these medications      albuterol sulfate  (90 Base) MCG/ACT inhaler  Commonly known as: PROVENTIL;VENTOLIN;PROAIR  Inhale 2 puffs into the lungs every 6 hours as needed for Wheezing or Shortness of Breath     cyclobenzaprine 10 MG tablet  Commonly known as: FLEXERIL  Take 1 tablet by mouth every 8 (eight) hours for 10 days     ibuprofen 200 MG tablet  Commonly known as: ADVIL;MOTRIN     Janumet XR  MG Tb24 per extended release tablet  Generic drug: SITagliptin-metFORMIN  Take 1 tablet by mouth daily     nicotine 14 MG/24HR  Commonly known as: NICODERM CQ  Place 1 patch onto the skin daily     pregabalin 50 MG capsule  Commonly known as: Lyrica  Take 1 capsule by mouth 2 times daily for 30 days.  Max Daily Amount: 100 mg     ramipril 1.25 MG capsule  Commonly known as: ALTACE  TAKE 1 CAPSULE DAILY               Where to Get Your Medications        These medications were sent to [facility], [address] - F [phone]  [address] [address]      Phone: [phone]   Trelegy Ellipta 100-62.5-25 MCG/ACT Aepb inhaler           Medications marked \"taking\" at this time  Outpatient Medications Marked as Taking for the 2/27/23 encounter (Office Visit) with Tomeka Harris, DO   Medication Sig Dispense Refill    fluticasone-umeclidin-vilant (TRELEGY ELLIPTA) 100-62.5-25 MCG/ACT AEPB inhaler Inhale 1 puff into the lungs daily 1 each 5        Medications patient taking as of now reconciled against medications ordered at time of hospital discharge: Yes    A comprehensive review of systems was negative except for what was noted in the HPI. Objective:    /82   Pulse 68   Temp 97.5 °F (36.4 °C)   Wt 189 lb (85.7 kg)   SpO2 97%   BMI 26.36 kg/m²   General Appearance: alert and oriented to person, place and time, well-developed and well-nourished, in no acute distress  Skin: warm and dry, no rash or erythema and healing mid line surgical wound LS area  Eyes: conjunctivae normal  Pulmonary/Chest: clear to auscultation bilaterally- no wheezes, rales or rhonchi, normal air movement, no respiratory distress  Cardiovascular: normal rate, normal S1 and S2, no gallops, and intact distal pulses  Abdomen: soft, non-tender, non-distended, normal bowel sounds, no masses or organomegaly  Extremities: no cyanosis and no clubbing  Musculoskeletal: ROM-  limited extension R foot      An electronic signature was used to authenticate this note.   --Reese Ojeda, DO

## 2023-03-02 ENCOUNTER — OFFICE VISIT (OUTPATIENT)
Dept: NEUROSURGERY | Age: 73
End: 2023-03-02
Payer: MEDICARE

## 2023-03-02 ENCOUNTER — CARE COORDINATION (OUTPATIENT)
Dept: CASE MANAGEMENT | Age: 73
End: 2023-03-02

## 2023-03-02 VITALS
RESPIRATION RATE: 18 BRPM | OXYGEN SATURATION: 95 % | SYSTOLIC BLOOD PRESSURE: 118 MMHG | TEMPERATURE: 97.5 F | DIASTOLIC BLOOD PRESSURE: 65 MMHG | BODY MASS INDEX: 26.46 KG/M2 | HEIGHT: 71 IN | WEIGHT: 189 LBS | HEART RATE: 75 BPM

## 2023-03-02 DIAGNOSIS — Z98.890 S/P LAMINECTOMY: ICD-10-CM

## 2023-03-02 DIAGNOSIS — M48.062 LUMBAR STENOSIS WITH NEUROGENIC CLAUDICATION: ICD-10-CM

## 2023-03-02 PROCEDURE — 99212 OFFICE O/P EST SF 10 MIN: CPT

## 2023-03-02 PROCEDURE — 99024 POSTOP FOLLOW-UP VISIT: CPT | Performed by: STUDENT IN AN ORGANIZED HEALTH CARE EDUCATION/TRAINING PROGRAM

## 2023-03-02 RX ORDER — OXYCODONE HYDROCHLORIDE 5 MG/1
5 TABLET ORAL EVERY 4 HOURS PRN
Qty: 42 TABLET | Refills: 0 | Status: SHIPPED | OUTPATIENT
Start: 2023-03-02 | End: 2023-03-09

## 2023-03-02 RX ORDER — CYCLOBENZAPRINE HCL 10 MG
10 TABLET ORAL EVERY 8 HOURS
Qty: 30 TABLET | Refills: 0 | Status: SHIPPED | OUTPATIENT
Start: 2023-03-02 | End: 2023-03-12

## 2023-03-02 NOTE — CARE COORDINATION
[facility] Care Transitions Follow Up Call    Patient Current Location:  [place] [address] [place] [address] [place], [address] [place] [address]    Care Transition Nurse contacted the patient by telephone to follow up after admission on 2/15/23. Patient: Parish Brar  Patient : 1950   MRN: [id number]  Reason for Admission: Lumbar stenosis  Discharge Date: 23 RARS: No data recorded    Needs to be reviewed by the provider   Additional needs identified to be addressed with provider: No  none             Method of communication with provider: none. CTN called and spoke with the pt for a sub care transition call. Pt admitted on 2/15/23 with elective laminectomy of L3-L5 for spinal stenosis of the lumbar region. Pt states the he is doing better this week and and that pain is starting to improve. Pt is now taking Advil 2 tabs a day an 1 Oxycontin a day for pain. Pt states he rates his pain about a \"4\" now with movement. Pt states if he is just sitting he does not have any pain. Pt states that he still is having trouble lifting his R foot. [facility] [place] [facility] [place] [facility] [place] [address] [facility] and feels that the therapy is working a little. Pt reports incision site is doing \"good\" and healing without any complications. Pt denies any redness, swelling, or drainage. Pt did have a HFU with NS on 23 for an incision check and OV notes reviewed. Pt advised to f/u in 1wk to have sutures removed and pt is scheduled to f/u today. Pt aware and intends on going to appt. Pt completed a HFU appt with his pcp on 23. Pt recommended to take Advil for pain and Colace to help with his bowels. Pt states that his bowels are functioning with no difficulty stating he was able to have a good BM yesterday. Pt reports to some mild pedal edema and states this is also improving from last week. Pt did not voice any needs/concerns today.  Pt agreeable with today being final outreach, as pt is doing well and has completed HFU appts with both pcp and NS. CTLISETTE signing off and resolving CT episode today. Addressed changes since last contact:  home health care- [facility] PT/OT initiated last week and pt receiving visits. Pt given home exercises to do.  medications-Advil and Colace per pcp. Pt taking as advised  Pt completed HFU appts with NS and pcp. Follow Up  Future Appointments   Date Time Provider Judy Radha   3/2/2023  2:00 PM AndreinaScooter patelelena STEWARTLYNNE LYONURG [facility]   5/26/2023 10:30 AM DO [facility]   9/13/2023 10:00 AM Jorge Chaparro MD [facility]       Care Transition Nurse reviewed medical action plan and red flags with patient and discussed any barriers to care and/or understanding of plan of care after discharge. Discussed appropriate site of care based on symptoms and resources available to patient including: PCP  Specialist  Urgent care clinics  Home health  When to call 911. The patient agrees to contact the PCP office for questions related to their healthcare. Advance Care Planning:     Healthcare Decision Maker:    Primary Decision Maker: Fahad Ba - [phone]    Patients top risk factors for readmission: functional physical ability and medical condition-COPD, chronic pain, s/p laminectomy  Interventions to address risk factors: Scheduled appointment with PCP-5/26/23 and Scheduled appointment with Specialist-NS today       Care Transitions Subsequent and Final Call    Schedule Follow Up Appointment with PCP: Completed  Subsequent and Final Calls  Do you have any ongoing symptoms?: Yes  Onset of Patient-reported symptoms:  In the past 7 days  Patient-reported symptoms: Pain, Other  Interventions for patient-reported symptoms: Other  Have your medications changed?: Yes  Patient Reports: Pt advised can take Advil prn for pain and Colace 1-2x/day for constipation  Do you have any questions related to your medications?: No  Do you currently have any active services?: Yes  Are you currently active with any services?: Home Health  Do you have any needs or concerns that I can assist you with?: No  Identified Barriers: Impairment  Care Transitions Interventions  No Identified Needs  Other Interventions:              No further follow-up call indicated based on severity of symptoms and risk factors.       Thomas Cobian RN

## 2023-03-02 NOTE — PROGRESS NOTES
Encounter for Suture Removal     Lila Song is a 74 y.o.  male  two weeks s/p L3-L5 laminectomy     Patient presents for suture removal.      Equipment: General suture removal kit, ChloraPrep, sterile gloves     Procedure: Pt was placed in the sitting position. Using a sterile technique, ChloraPrep was used to clean the incisional wound. The wound healing well without signs of infection. Sutures were removed with no pain and no complications. Pt tolerated procedure well. Pts questions were answered and wound care instructions and restrictions were discussed. Pt is to return to neurosurgery clinic in 2 weeks for surgery follow-up or sooner if new issues or concerns arise.

## 2023-03-16 ENCOUNTER — OFFICE VISIT (OUTPATIENT)
Dept: NEUROSURGERY | Age: 73
End: 2023-03-16
Payer: MEDICARE

## 2023-03-16 VITALS
SYSTOLIC BLOOD PRESSURE: 126 MMHG | WEIGHT: 185 LBS | TEMPERATURE: 97.8 F | HEIGHT: 71 IN | BODY MASS INDEX: 25.9 KG/M2 | DIASTOLIC BLOOD PRESSURE: 77 MMHG | OXYGEN SATURATION: 95 % | HEART RATE: 72 BPM

## 2023-03-16 DIAGNOSIS — Z98.890 S/P LAMINECTOMY: Primary | ICD-10-CM

## 2023-03-16 DIAGNOSIS — M21.371 RIGHT FOOT DROP: ICD-10-CM

## 2023-03-16 PROCEDURE — 99212 OFFICE O/P EST SF 10 MIN: CPT

## 2023-03-16 PROCEDURE — 99024 POSTOP FOLLOW-UP VISIT: CPT | Performed by: STUDENT IN AN ORGANIZED HEALTH CARE EDUCATION/TRAINING PROGRAM

## 2023-03-16 NOTE — PROGRESS NOTES
Post-Operative Follow-up     This is a 74year old male who presents to the office for a 1 month follow-up s/p L3-L5 laminectomy      Subjective: Patient states he is doing well. He denies any significant back or leg pain. Does admit to some weakness and balance issues. Still with right drop foot. No other complaints. Physical Exam:              WDWN, no apparent distress              Non-labored breathing               Vitals Stable              Alert and oriented x3              CN 3-12 intact              PERRL              EOMI              PETERSON well              Motor strength symmetric              Sensation to LT intact bilaterally    Incision healing well without signs of infection. Assessment: This is a 74 y.o.  male presenting for a 1 month follow-up s/p L3-L5 laminectomy. Plan:  -Pain control and expectations discussed  -Continue restrictions, okay to start PT at 6 week post-op. Once patient attends PT can reevaluate for return to work. -OARRS report reviewed   -Follow-up in neurosurgery clinic in 2 months for 3 month follow up.  -Call or return to neurosurgery office sooner if symptoms worsen or if new issues arise in the interim.     Electronically signed by Pricila Doan PA-C on 3/16/2023 at 11:25 AM

## 2023-05-16 ENCOUNTER — OFFICE VISIT (OUTPATIENT)
Dept: NEUROSURGERY | Age: 73
End: 2023-05-16
Payer: MEDICARE

## 2023-05-16 VITALS
HEART RATE: 74 BPM | WEIGHT: 185 LBS | BODY MASS INDEX: 25.9 KG/M2 | DIASTOLIC BLOOD PRESSURE: 76 MMHG | SYSTOLIC BLOOD PRESSURE: 137 MMHG | OXYGEN SATURATION: 97 % | TEMPERATURE: 97.8 F | HEIGHT: 71 IN

## 2023-05-16 DIAGNOSIS — G89.29 CHRONIC BILATERAL LOW BACK PAIN WITHOUT SCIATICA: Primary | ICD-10-CM

## 2023-05-16 DIAGNOSIS — Z98.890 S/P LAMINECTOMY: ICD-10-CM

## 2023-05-16 DIAGNOSIS — M54.50 CHRONIC BILATERAL LOW BACK PAIN WITHOUT SCIATICA: Primary | ICD-10-CM

## 2023-05-16 PROCEDURE — 99024 POSTOP FOLLOW-UP VISIT: CPT | Performed by: STUDENT IN AN ORGANIZED HEALTH CARE EDUCATION/TRAINING PROGRAM

## 2023-05-16 PROCEDURE — 99212 OFFICE O/P EST SF 10 MIN: CPT

## 2023-05-16 RX ORDER — OXYCODONE HYDROCHLORIDE 5 MG/1
5 TABLET ORAL EVERY 6 HOURS PRN
Qty: 28 TABLET | Refills: 0 | Status: SHIPPED | OUTPATIENT
Start: 2023-05-16 | End: 2023-05-23

## 2023-05-16 RX ORDER — CYCLOBENZAPRINE HCL 10 MG
10 TABLET ORAL 3 TIMES DAILY PRN
Qty: 30 TABLET | Refills: 0 | Status: SHIPPED | OUTPATIENT
Start: 2023-05-16 | End: 2023-05-26

## 2023-05-16 NOTE — PROGRESS NOTES
Post-Operative Follow-up     This is a 74year old male who presents to the office for a 3 month follow-up s/p L3-L5 laminectomy      Subjective: Patient states he is doing okay, he admits to pain in his lower back that radiates across his back. States he does have some intermittent pain in his right leg. No new numbness or weakness, still with right drop foot. Denies any bowel or bladder incontinence. Physical Exam:              WDWN, no apparent distress              Non-labored breathing               Vitals Stable              Alert and oriented x3              CN 3-12 intact              PERRL              EOMI              PETERSON well              Chronic right drop foot otherwise Motor strength symmetric              Sensation to LT intact bilaterally    Incision healed well without signs of infection. Assessment: This is a 74 y.o.  male presenting for a 3 month follow-up s/p L3-L5 laminectomy. Plan:  -Pain control and expectations discussed, Last pain medications refilled  -No restrictions, continue with PT, if no improvement will order MRI Lumbar Spine W/WO Contrast and Lumbar Flex/Ex XR  -OARRS report reviewed   -Follow-up in neurosurgery clinic in 9 months for 1 year follow up.  -Call or return to neurosurgery office sooner if symptoms worsen or if new issues arise in the interim.     Electronically signed by Mendoza Workman PA-C on 5/16/2023 at 1:10 PM

## 2023-05-26 ENCOUNTER — OFFICE VISIT (OUTPATIENT)
Dept: PRIMARY CARE CLINIC | Age: 73
End: 2023-05-26
Payer: MEDICARE

## 2023-05-26 VITALS
TEMPERATURE: 97.2 F | OXYGEN SATURATION: 96 % | DIASTOLIC BLOOD PRESSURE: 76 MMHG | BODY MASS INDEX: 27.58 KG/M2 | HEIGHT: 71 IN | HEART RATE: 66 BPM | WEIGHT: 197 LBS | SYSTOLIC BLOOD PRESSURE: 132 MMHG | RESPIRATION RATE: 14 BRPM

## 2023-05-26 DIAGNOSIS — G25.0 BENIGN ESSENTIAL TREMOR: ICD-10-CM

## 2023-05-26 DIAGNOSIS — E11.9 TYPE 2 DIABETES MELLITUS WITHOUT COMPLICATION, WITHOUT LONG-TERM CURRENT USE OF INSULIN (HCC): Primary | ICD-10-CM

## 2023-05-26 DIAGNOSIS — G25.81 RLS (RESTLESS LEGS SYNDROME): ICD-10-CM

## 2023-05-26 DIAGNOSIS — E11.9 TYPE 2 DIABETES MELLITUS WITHOUT COMPLICATION, WITHOUT LONG-TERM CURRENT USE OF INSULIN (HCC): ICD-10-CM

## 2023-05-26 DIAGNOSIS — R91.1 PULMONARY NODULE, RIGHT: ICD-10-CM

## 2023-05-26 LAB
ALBUMIN SERPL-MCNC: 4 G/DL (ref 3.5–5.2)
ALP SERPL-CCNC: 210 U/L (ref 40–129)
ALT SERPL-CCNC: 10 U/L (ref 0–40)
ANION GAP SERPL CALCULATED.3IONS-SCNC: 12 MMOL/L (ref 7–16)
AST SERPL-CCNC: 16 U/L (ref 0–39)
BILIRUB SERPL-MCNC: 0.6 MG/DL (ref 0–1.2)
BUN SERPL-MCNC: 12 MG/DL (ref 6–23)
CALCIUM SERPL-MCNC: 9.5 MG/DL (ref 8.6–10.2)
CHLORIDE SERPL-SCNC: 100 MMOL/L (ref 98–107)
CHOLESTEROL, TOTAL: 205 MG/DL (ref 0–199)
CO2 SERPL-SCNC: 25 MMOL/L (ref 22–29)
CREAT SERPL-MCNC: 1.2 MG/DL (ref 0.7–1.2)
GLUCOSE SERPL-MCNC: 158 MG/DL (ref 74–99)
HBA1C MFR BLD: 6.5 % (ref 4–5.6)
HDLC SERPL-MCNC: 37 MG/DL
LDLC SERPL CALC-MCNC: 112 MG/DL (ref 0–99)
POTASSIUM SERPL-SCNC: 5.3 MMOL/L (ref 3.5–5)
PROT SERPL-MCNC: 6.9 G/DL (ref 6.4–8.3)
SODIUM SERPL-SCNC: 137 MMOL/L (ref 132–146)
TRIGL SERPL-MCNC: 282 MG/DL (ref 0–149)
VLDLC SERPL CALC-MCNC: 56 MG/DL

## 2023-05-26 PROCEDURE — G8417 CALC BMI ABV UP PARAM F/U: HCPCS | Performed by: FAMILY MEDICINE

## 2023-05-26 PROCEDURE — G8427 DOCREV CUR MEDS BY ELIG CLIN: HCPCS | Performed by: FAMILY MEDICINE

## 2023-05-26 PROCEDURE — 99214 OFFICE O/P EST MOD 30 MIN: CPT | Performed by: FAMILY MEDICINE

## 2023-05-26 PROCEDURE — 1124F ACP DISCUSS-NO DSCNMKR DOCD: CPT | Performed by: FAMILY MEDICINE

## 2023-05-26 PROCEDURE — 3044F HG A1C LEVEL LT 7.0%: CPT | Performed by: FAMILY MEDICINE

## 2023-05-26 PROCEDURE — 2022F DILAT RTA XM EVC RTNOPTHY: CPT | Performed by: FAMILY MEDICINE

## 2023-05-26 PROCEDURE — 4004F PT TOBACCO SCREEN RCVD TLK: CPT | Performed by: FAMILY MEDICINE

## 2023-05-26 PROCEDURE — 3017F COLORECTAL CA SCREEN DOC REV: CPT | Performed by: FAMILY MEDICINE

## 2023-05-26 RX ORDER — ROPINIROLE 1 MG/1
1 TABLET, FILM COATED ORAL NIGHTLY
Qty: 90 TABLET | Refills: 1 | Status: SHIPPED | OUTPATIENT
Start: 2023-05-26

## 2023-05-26 ASSESSMENT — ENCOUNTER SYMPTOMS
VISUAL CHANGE: 0
BLURRED VISION: 0

## 2023-05-26 NOTE — PROGRESS NOTES
Kalani Rodriguez, a male of 74 y.o. came to the office 5/26/2023. Patient Active Problem List   Diagnosis    COPD (chronic obstructive pulmonary disease) ([facility] [address]    Type 2 diabetes mellitus with diabetic neuropathy, without long-term current use of insulin ([facility] [address]    Hyperlipemia    Nephrolithiasis     Tobacco abuse    Intractable ophthalmoplegic migraine    Adenomatous polyp of colon    Chronic pain syndrome    Lumbar facet arthropathy    Lumbar disc disorder    Lumbar spondylosis    Spinal stenosis of lumbar region without neurogenic claudication    Lumbar radiculopathy    Lumbar stenosis with neurogenic claudication          Diabetes  He presents for his follow-up diabetic visit. He has type 2 diabetes mellitus. His disease course has been stable. Hypoglycemia symptoms include tremors. Associated symptoms include foot paresthesias (R). Pertinent negatives for diabetes include no blurred vision, no chest pain, no foot ulcerations, no polydipsia, no polyuria, no visual change and no weight loss. Symptoms are stable. Current diabetic treatment includes oral agent (dual therapy). He is compliant with treatment all of the time. His weight is increasing steadily. An ACE inhibitor/angiotensin II receptor blocker is not being taken. Tremor    The tremor affects the fingers on both hands (R> L). The onset of the tremor was sudden. Current severity of tremor is rated at mild. The tremor occurs intermittently. Associated symptoms include change in handwriting, difficulty balancing and difficulty walking. Associated symptoms do not include drooling while sleeping, trouble swallowing or voice change. Pulm nodule: breathing ok. Occas cough. Smoking still slightly over 1 ppd down from 1.5.     Allergies   Allergen Reactions    Pcn [Penicillins] Hives    Tape Akash Gola Tape]      Surgical tape-blisters       Current Outpatient Medications on File Prior to Visit   Medication Sig Dispense Refill    cyclobenzaprine

## 2023-05-30 RX ORDER — ROSUVASTATIN CALCIUM 5 MG/1
5 TABLET, COATED ORAL DAILY
Qty: 30 TABLET | Refills: 3 | Status: SHIPPED | OUTPATIENT
Start: 2023-05-30

## 2023-06-10 RX ORDER — ROSUVASTATIN CALCIUM 5 MG/1
5 TABLET, COATED ORAL DAILY
Qty: 90 TABLET | Refills: 0 | Status: SHIPPED | OUTPATIENT
Start: 2023-06-10

## 2023-07-07 ENCOUNTER — TELEPHONE (OUTPATIENT)
Dept: NEUROSURGERY | Age: 73
End: 2023-07-07

## 2023-07-10 ENCOUNTER — OFFICE VISIT (OUTPATIENT)
Dept: NEUROSURGERY | Age: 73
End: 2023-07-10
Payer: MEDICARE

## 2023-07-10 VITALS
HEART RATE: 76 BPM | BODY MASS INDEX: 27.58 KG/M2 | OXYGEN SATURATION: 92 % | WEIGHT: 197 LBS | HEIGHT: 71 IN | SYSTOLIC BLOOD PRESSURE: 145 MMHG | DIASTOLIC BLOOD PRESSURE: 79 MMHG

## 2023-07-10 DIAGNOSIS — M54.16 LUMBAR RADICULOPATHY: ICD-10-CM

## 2023-07-10 DIAGNOSIS — M79.89 SWELLING OF BOTH LOWER EXTREMITIES: ICD-10-CM

## 2023-07-10 DIAGNOSIS — M54.41 CHRONIC RIGHT-SIDED LOW BACK PAIN WITH RIGHT-SIDED SCIATICA: Primary | ICD-10-CM

## 2023-07-10 DIAGNOSIS — G89.29 CHRONIC RIGHT-SIDED LOW BACK PAIN WITH RIGHT-SIDED SCIATICA: Primary | ICD-10-CM

## 2023-07-10 DIAGNOSIS — Z98.890 S/P LAMINECTOMY: ICD-10-CM

## 2023-07-10 PROCEDURE — G8427 DOCREV CUR MEDS BY ELIG CLIN: HCPCS | Performed by: STUDENT IN AN ORGANIZED HEALTH CARE EDUCATION/TRAINING PROGRAM

## 2023-07-10 PROCEDURE — 3017F COLORECTAL CA SCREEN DOC REV: CPT | Performed by: STUDENT IN AN ORGANIZED HEALTH CARE EDUCATION/TRAINING PROGRAM

## 2023-07-10 PROCEDURE — 1124F ACP DISCUSS-NO DSCNMKR DOCD: CPT | Performed by: STUDENT IN AN ORGANIZED HEALTH CARE EDUCATION/TRAINING PROGRAM

## 2023-07-10 PROCEDURE — 4004F PT TOBACCO SCREEN RCVD TLK: CPT | Performed by: STUDENT IN AN ORGANIZED HEALTH CARE EDUCATION/TRAINING PROGRAM

## 2023-07-10 PROCEDURE — 99213 OFFICE O/P EST LOW 20 MIN: CPT | Performed by: STUDENT IN AN ORGANIZED HEALTH CARE EDUCATION/TRAINING PROGRAM

## 2023-07-10 PROCEDURE — 99212 OFFICE O/P EST SF 10 MIN: CPT

## 2023-07-10 PROCEDURE — G8417 CALC BMI ABV UP PARAM F/U: HCPCS | Performed by: STUDENT IN AN ORGANIZED HEALTH CARE EDUCATION/TRAINING PROGRAM

## 2023-07-10 RX ORDER — GABAPENTIN 300 MG/1
300 CAPSULE ORAL 3 TIMES DAILY
Qty: 90 CAPSULE | Refills: 0 | Status: SHIPPED | OUTPATIENT
Start: 2023-07-10 | End: 2023-08-09

## 2023-07-10 NOTE — PROGRESS NOTES
Problem Focused Office Visit     Subjective: Chacha Mckenzie is a 74 y.o.  male who has a past medical history of previous L3-L5 laminectomy who presents for evaluation of low back pain. Patient states he started to have lower back pain about 2 months ago. He describes this low back pain as a sharp, stabbing, burning pain that radiates down his right leg. He states this pain is worse than prior to surgery. He admits to associated weakness with this pain and has to walk with a cane. He also admits to associated swelling in both of his feet. He has tried ibuprofen for this pain with no relief. He has also tried PT at [facility] for this pain with no relief. No recent AYALA. He denies any bowel or bladder incontinence. He is a current smoker and denies any blood thinner usage. Physical Exam  HENT:      Head: Normocephalic. Eyes:      Pupils: Pupils are equal, round, and reactive to light. Cardiovascular:      Rate and Rhythm: Normal rate. Pulmonary:      Effort: Pulmonary effort is normal.   Abdominal:      General: There is no distension. Musculoskeletal:         General: Normal range of motion. Cervical back: Normal range of motion. Skin:     General: Skin is warm and dry. Neurological:      Mental Status: He is alert. Comments: A&Ox3  CN3-12 intact  Chronic right drop foot otherwise Motor strength symmetric  Sensation intact to light touch   Reflexes normal    Psychiatric:         Thought Content: Thought content normal.      Assessment: This is a 74 y.o.  male presenting for a office follow-up for increasing low back pain that radiates into his right leg. He is currently in PT with no relief. No recent AYALA.       Plan:  -Pain control and expectations discussed  -Obtain MRI Lumbar Spine W/WO Contrast to evaluate for stenosis   -Lumbar Flex/Ex XR  -US BLE to r/o DVT  -OARRS report reviewed   -Return to Neurosurgery clinic after completion of imaging to discuss results and further treatment

## 2023-07-11 ENCOUNTER — TELEPHONE (OUTPATIENT)
Dept: NEUROSURGERY | Age: 73
End: 2023-07-11

## 2023-07-11 DIAGNOSIS — I83.893 VARICOSE VEINS OF LEG WITH SWELLING, BILATERAL: Primary | ICD-10-CM

## 2023-07-11 DIAGNOSIS — R22.43 LOCALIZED SWELLING OF BOTH LOWER LEGS: ICD-10-CM

## 2023-08-07 ENCOUNTER — TELEPHONE (OUTPATIENT)
Dept: PHARMACY | Facility: CLINIC | Age: 73
End: 2023-08-07

## 2023-08-08 ENCOUNTER — HOSPITAL ENCOUNTER (OUTPATIENT)
Dept: GENERAL RADIOLOGY | Age: 73
Discharge: HOME | End: 2023-08-10
Payer: MEDICARE

## 2023-08-08 ENCOUNTER — HOSPITAL ENCOUNTER (OUTPATIENT)
Dept: MRI IMAGING | Age: 73
Discharge: HOME | End: 2023-08-10
Payer: MEDICARE

## 2023-08-08 ENCOUNTER — HOSPITAL ENCOUNTER (OUTPATIENT)
Age: 73
Discharge: HOME | End: 2023-08-10
Payer: MEDICARE

## 2023-08-08 ENCOUNTER — HOSPITAL ENCOUNTER (OUTPATIENT)
Dept: ULTRASOUND IMAGING | Age: 73
Discharge: HOME | End: 2023-08-10
Payer: MEDICARE

## 2023-08-08 ENCOUNTER — HOSPITAL ENCOUNTER (OUTPATIENT)
Age: 73
Discharge: HOME | End: 2023-08-08
Payer: MEDICARE

## 2023-08-08 DIAGNOSIS — I83.893 VARICOSE VEINS OF LEG WITH SWELLING, BILATERAL: ICD-10-CM

## 2023-08-08 DIAGNOSIS — R22.43 LOCALIZED SWELLING OF BOTH LOWER LEGS: ICD-10-CM

## 2023-08-08 DIAGNOSIS — G89.29 CHRONIC RIGHT-SIDED LOW BACK PAIN WITH RIGHT-SIDED SCIATICA: ICD-10-CM

## 2023-08-08 DIAGNOSIS — M54.16 LUMBAR RADICULOPATHY: ICD-10-CM

## 2023-08-08 DIAGNOSIS — M54.41 CHRONIC RIGHT-SIDED LOW BACK PAIN WITH RIGHT-SIDED SCIATICA: ICD-10-CM

## 2023-08-08 DIAGNOSIS — Z98.890 S/P LAMINECTOMY: ICD-10-CM

## 2023-08-08 LAB
BUN SERPL-MCNC: 12 MG/DL (ref 6–23)
CREAT SERPL-MCNC: 1.3 MG/DL (ref 0.7–1.2)
GFR SERPL CREATININE-BSD FRML MDRD: 55 ML/MIN/1.73M2

## 2023-08-08 PROCEDURE — 93970 EXTREMITY STUDY: CPT

## 2023-08-08 PROCEDURE — 6360000004 HC RX CONTRAST MEDICATION: Performed by: RADIOLOGY

## 2023-08-08 PROCEDURE — 72120 X-RAY BEND ONLY L-S SPINE: CPT

## 2023-08-08 PROCEDURE — A9579 GAD-BASE MR CONTRAST NOS,1ML: HCPCS | Performed by: RADIOLOGY

## 2023-08-08 PROCEDURE — 36415 COLL VENOUS BLD VENIPUNCTURE: CPT

## 2023-08-08 PROCEDURE — 72158 MRI LUMBAR SPINE W/O & W/DYE: CPT

## 2023-08-08 PROCEDURE — 82565 ASSAY OF CREATININE: CPT

## 2023-08-08 PROCEDURE — 84520 ASSAY OF UREA NITROGEN: CPT

## 2023-08-08 RX ADMIN — GADOTERIDOL 19 ML: 279.3 INJECTION, SOLUTION INTRAVENOUS at 17:35

## 2023-08-11 ENCOUNTER — TELEPHONE (OUTPATIENT)
Dept: NEUROSURGERY | Age: 73
End: 2023-08-11

## 2023-08-14 NOTE — TELEPHONE ENCOUNTER
Pt called, results given, he will return to discuss surgical options tomorrow at 1PM.  Please put on my schedule

## 2023-08-15 ENCOUNTER — OFFICE VISIT (OUTPATIENT)
Dept: NEUROSURGERY | Age: 73
End: 2023-08-15
Payer: MEDICARE

## 2023-08-15 DIAGNOSIS — M54.16 LUMBAR RADICULOPATHY: Primary | ICD-10-CM

## 2023-08-15 PROCEDURE — 4004F PT TOBACCO SCREEN RCVD TLK: CPT | Performed by: PHYSICIAN ASSISTANT

## 2023-08-15 PROCEDURE — G8417 CALC BMI ABV UP PARAM F/U: HCPCS | Performed by: PHYSICIAN ASSISTANT

## 2023-08-15 PROCEDURE — 99213 OFFICE O/P EST LOW 20 MIN: CPT | Performed by: PHYSICIAN ASSISTANT

## 2023-08-15 PROCEDURE — 1124F ACP DISCUSS-NO DSCNMKR DOCD: CPT | Performed by: PHYSICIAN ASSISTANT

## 2023-08-15 PROCEDURE — G8428 CUR MEDS NOT DOCUMENT: HCPCS | Performed by: PHYSICIAN ASSISTANT

## 2023-08-15 PROCEDURE — 3017F COLORECTAL CA SCREEN DOC REV: CPT | Performed by: PHYSICIAN ASSISTANT

## 2023-08-15 ASSESSMENT — ENCOUNTER SYMPTOMS
EYES NEGATIVE: 1
ALLERGIC/IMMUNOLOGIC NEGATIVE: 1
GASTROINTESTINAL NEGATIVE: 1
RESPIRATORY NEGATIVE: 1
BACK PAIN: 1

## 2023-08-15 NOTE — PROGRESS NOTES
Subjective:      Patient ID: Fanny Damon is a 74 y.o. male. Back Pain  This is a chronic problem. The current episode started more than 1 year ago. The problem occurs daily. The problem has been gradually worsening since onset. The pain is present in the lumbar spine (and bilateral leg pain. ). The quality of the pain is described as aching and shooting. The pain is at a severity of 8/10. The symptoms are aggravated by twisting, standing and bending. Treatments tried: AYALA, PT, NSAIDS, gabapentin, and lumbar laminectomy. The treatment provided mild relief. Review of Systems   Constitutional: Negative. HENT: Negative. Eyes: Negative. Respiratory: Negative. Cardiovascular: Negative. Gastrointestinal: Negative. Endocrine: Negative. Genitourinary: Negative. Musculoskeletal:  Positive for back pain. Skin: Negative. Allergic/Immunologic: Negative. Neurological: Negative. Hematological: Negative. Psychiatric/Behavioral: Negative. Objective:   Physical Exam  Constitutional:       Appearance: Normal appearance. HENT:      Head: Normocephalic and atraumatic. Nose: Nose normal.   Eyes:      Pupils: Pupils are equal, round, and reactive to light. Pulmonary:      Effort: Pulmonary effort is normal.   Abdominal:      General: There is no distension. Skin:     General: Skin is warm and dry. Neurological:      Mental Status: He is alert. GCS: GCS eye subscore is 4. GCS verbal subscore is 5. GCS motor subscore is 6. Cranial Nerves: Cranial nerves 2-12 are intact. Sensory: Sensation is intact. Motor: Weakness present. Gait: Gait abnormal.      Deep Tendon Reflexes:      Reflex Scores:       Patellar reflexes are 1+ on the right side and 1+ on the left side. Achilles reflexes are 1+ on the right side and 1+ on the left side.      Comments: Right foot drop   Psychiatric:         Mood and Affect: Mood normal.       Assessment:      73 year

## 2023-08-30 ENCOUNTER — OFFICE VISIT (OUTPATIENT)
Dept: NEUROSURGERY | Age: 73
End: 2023-08-30
Payer: MEDICARE

## 2023-08-30 VITALS — HEIGHT: 71 IN | RESPIRATION RATE: 16 BRPM | WEIGHT: 195 LBS | BODY MASS INDEX: 27.3 KG/M2

## 2023-08-30 DIAGNOSIS — M54.41 ACUTE MIDLINE LOW BACK PAIN WITH BILATERAL SCIATICA: Primary | ICD-10-CM

## 2023-08-30 DIAGNOSIS — M54.42 ACUTE MIDLINE LOW BACK PAIN WITH BILATERAL SCIATICA: Primary | ICD-10-CM

## 2023-08-30 PROCEDURE — 4004F PT TOBACCO SCREEN RCVD TLK: CPT | Performed by: NEUROLOGICAL SURGERY

## 2023-08-30 PROCEDURE — 99212 OFFICE O/P EST SF 10 MIN: CPT

## 2023-08-30 PROCEDURE — G8417 CALC BMI ABV UP PARAM F/U: HCPCS | Performed by: NEUROLOGICAL SURGERY

## 2023-08-30 PROCEDURE — 99213 OFFICE O/P EST LOW 20 MIN: CPT | Performed by: NEUROLOGICAL SURGERY

## 2023-08-30 PROCEDURE — G8427 DOCREV CUR MEDS BY ELIG CLIN: HCPCS | Performed by: NEUROLOGICAL SURGERY

## 2023-08-30 PROCEDURE — 3017F COLORECTAL CA SCREEN DOC REV: CPT | Performed by: NEUROLOGICAL SURGERY

## 2023-08-30 PROCEDURE — 1124F ACP DISCUSS-NO DSCNMKR DOCD: CPT | Performed by: NEUROLOGICAL SURGERY

## 2023-08-30 NOTE — PROGRESS NOTES
Patient is here for follow up from a hospital consult for:    Physical exam  Alert and Oriented X3  PERRLA, EOMI  PETERSON 5/5  Sensation intact to LT and PP  Reflexes are 2+ and symmetric    A/P: patient is here for follow up for: back and bilateral leg pain. The pain is interfering with his activities of daily living. He has had a prior L3-S1 laminectomy and still has severe central stenosis at L3-L4 and severe bilateral foraminal stenosis at L3-L4, L4-L5 and L5-S1. I am recommending an L3-L4, L4-L5 and L5-S1 posterior lumbar interbody fusion.   He needs to quit smoking first and will contact his PCP for help with chantix       Beth Prasad MD

## 2023-09-13 ENCOUNTER — OFFICE VISIT (OUTPATIENT)
Dept: ENDOCRINOLOGY | Age: 73
End: 2023-09-13
Payer: MEDICARE

## 2023-09-13 VITALS
HEIGHT: 71 IN | DIASTOLIC BLOOD PRESSURE: 83 MMHG | SYSTOLIC BLOOD PRESSURE: 146 MMHG | OXYGEN SATURATION: 97 % | HEART RATE: 77 BPM | BODY MASS INDEX: 28.28 KG/M2 | WEIGHT: 202 LBS

## 2023-09-13 DIAGNOSIS — E11.42 TYPE 2 DIABETES MELLITUS WITH DIABETIC POLYNEUROPATHY, WITH LONG-TERM CURRENT USE OF INSULIN (HCC): ICD-10-CM

## 2023-09-13 DIAGNOSIS — Z79.4 TYPE 2 DIABETES MELLITUS WITH DIABETIC POLYNEUROPATHY, WITH LONG-TERM CURRENT USE OF INSULIN (HCC): ICD-10-CM

## 2023-09-13 DIAGNOSIS — E78.5 HYPERLIPIDEMIA, UNSPECIFIED HYPERLIPIDEMIA TYPE: ICD-10-CM

## 2023-09-13 DIAGNOSIS — E04.2 MULTINODULAR GOITER: Primary | ICD-10-CM

## 2023-09-13 PROCEDURE — 3044F HG A1C LEVEL LT 7.0%: CPT | Performed by: INTERNAL MEDICINE

## 2023-09-13 PROCEDURE — 99214 OFFICE O/P EST MOD 30 MIN: CPT | Performed by: INTERNAL MEDICINE

## 2023-09-13 PROCEDURE — G8417 CALC BMI ABV UP PARAM F/U: HCPCS | Performed by: INTERNAL MEDICINE

## 2023-09-13 PROCEDURE — G8427 DOCREV CUR MEDS BY ELIG CLIN: HCPCS | Performed by: INTERNAL MEDICINE

## 2023-09-13 PROCEDURE — 1124F ACP DISCUSS-NO DSCNMKR DOCD: CPT | Performed by: INTERNAL MEDICINE

## 2023-09-13 PROCEDURE — 3017F COLORECTAL CA SCREEN DOC REV: CPT | Performed by: INTERNAL MEDICINE

## 2023-09-13 PROCEDURE — 4004F PT TOBACCO SCREEN RCVD TLK: CPT | Performed by: INTERNAL MEDICINE

## 2023-09-13 PROCEDURE — 2022F DILAT RTA XM EVC RTNOPTHY: CPT | Performed by: INTERNAL MEDICINE

## 2023-09-13 NOTE — PROGRESS NOTES
ago    COLONOSCOPY N/A 3/7/2019    COLONOSCOPY POLYPECTOMY SNARE/COLD BIOPSY performed by Iraida Washington DO at [facility] N/A 8/26/2021    COLONOSCOPY POLYPECTOMY HOT BIOPSY performed by Iraida Washington DO at [address] [facility]  years ago    LAMINECTOMY Bilateral 2/15/2023    L3,L4, L5 laminectomies bilaterally with medial facetectomies and foraminotomies performed by Elidia Jane MD at [address]    LITHOTRIPSY  years ago    [address] N/A 11/14/2022    LUMBAR EPIDURAL STEROID INJECTION L4-5 WITH LOW VOLUME AND 60 DEPO performed by Uyen Blanca MD [address] N/A 12/22/2022    LUMBAR EPIDURAL STEROID INJECTION L3-4 WITH LOW VOLUME AND 80 DEPO performed by Uyen Blanca MD at [address] 8/26/2021    EGD BIOPSY performed by Iraida Washington DO at [address]   Tobacco:   reports that he has been smoking cigarettes. He has a 52.50 pack-year smoking history. He has been exposed to tobacco smoke. He has never used smokeless tobacco.  Alcol:   reports that he does not currently use alcohol. Illicit Drugs:   reports no history of drug use.       FAMILY HISTORY   Family History   Problem Relation Age of Onset    Stroke Father      ALLERGIES AND DRUG REACTIONS   Allergies   Allergen Reactions    Pcn [Penicillins] Hives    Tape Josefine Karina Tape]      Surgical tape-blisters       CURRENT MEDICATIONS   Current Outpatient Medications   Medication Sig Dispense Refill    rosuvastatin (CRESTOR) 5 MG tablet Take 1 tablet by mouth daily 90 tablet 0    rOPINIRole (REQUIP) 1 MG tablet Take 1 tablet by mouth nightly 90 tablet 1    fluticasone-umeclidin-vilant (TRELEGY ELLIPTA) 100-62.5-25 MCG/ACT AEPB inhaler Inhale 1 puff into the lungs daily 1 each 5    ibuprofen (ADVIL;MOTRIN) 200 MG tablet Take 1 tablet by mouth as needed for Pain      SITagliptin-metFORMIN (JANUMET XR)

## 2023-09-26 ENCOUNTER — OFFICE VISIT (OUTPATIENT)
Dept: PRIMARY CARE CLINIC | Age: 73
End: 2023-09-26
Payer: MEDICARE

## 2023-09-26 VITALS
OXYGEN SATURATION: 93 % | RESPIRATION RATE: 14 BRPM | BODY MASS INDEX: 28.42 KG/M2 | HEART RATE: 85 BPM | DIASTOLIC BLOOD PRESSURE: 66 MMHG | WEIGHT: 203 LBS | HEIGHT: 71 IN | TEMPERATURE: 98.1 F | SYSTOLIC BLOOD PRESSURE: 110 MMHG

## 2023-09-26 DIAGNOSIS — Z87.891 PERSONAL HISTORY OF TOBACCO USE: ICD-10-CM

## 2023-09-26 DIAGNOSIS — E11.9 TYPE 2 DIABETES MELLITUS WITHOUT COMPLICATION, WITHOUT LONG-TERM CURRENT USE OF INSULIN (HCC): ICD-10-CM

## 2023-09-26 DIAGNOSIS — R91.1 PULMONARY NODULE, RIGHT: ICD-10-CM

## 2023-09-26 DIAGNOSIS — Z00.00 MEDICARE ANNUAL WELLNESS VISIT, SUBSEQUENT: Primary | ICD-10-CM

## 2023-09-26 DIAGNOSIS — Z23 NEED FOR INFLUENZA VACCINATION: ICD-10-CM

## 2023-09-26 LAB — HBA1C MFR BLD: 7.9 %

## 2023-09-26 PROCEDURE — 2022F DILAT RTA XM EVC RTNOPTHY: CPT | Performed by: FAMILY MEDICINE

## 2023-09-26 PROCEDURE — 3017F COLORECTAL CA SCREEN DOC REV: CPT | Performed by: FAMILY MEDICINE

## 2023-09-26 PROCEDURE — G8427 DOCREV CUR MEDS BY ELIG CLIN: HCPCS | Performed by: FAMILY MEDICINE

## 2023-09-26 PROCEDURE — 4004F PT TOBACCO SCREEN RCVD TLK: CPT | Performed by: FAMILY MEDICINE

## 2023-09-26 PROCEDURE — 83036 HEMOGLOBIN GLYCOSYLATED A1C: CPT | Performed by: FAMILY MEDICINE

## 2023-09-26 PROCEDURE — 3051F HG A1C>EQUAL 7.0%<8.0%: CPT | Performed by: FAMILY MEDICINE

## 2023-09-26 PROCEDURE — G8417 CALC BMI ABV UP PARAM F/U: HCPCS | Performed by: FAMILY MEDICINE

## 2023-09-26 PROCEDURE — 99212 OFFICE O/P EST SF 10 MIN: CPT | Performed by: FAMILY MEDICINE

## 2023-09-26 PROCEDURE — G0008 ADMIN INFLUENZA VIRUS VAC: HCPCS | Performed by: FAMILY MEDICINE

## 2023-09-26 PROCEDURE — G0296 VISIT TO DETERM LDCT ELIG: HCPCS | Performed by: FAMILY MEDICINE

## 2023-09-26 PROCEDURE — 90694 VACC AIIV4 NO PRSRV 0.5ML IM: CPT | Performed by: FAMILY MEDICINE

## 2023-09-26 PROCEDURE — 1124F ACP DISCUSS-NO DSCNMKR DOCD: CPT | Performed by: FAMILY MEDICINE

## 2023-09-26 PROCEDURE — G0439 PPPS, SUBSEQ VISIT: HCPCS | Performed by: FAMILY MEDICINE

## 2023-09-26 RX ORDER — VARENICLINE TARTRATE 0.5 MG/1
.5-1 TABLET, FILM COATED ORAL SEE ADMIN INSTRUCTIONS
Qty: 57 TABLET | Refills: 0 | Status: SHIPPED | OUTPATIENT
Start: 2023-09-26

## 2023-09-26 SDOH — ECONOMIC STABILITY: FOOD INSECURITY: WITHIN THE PAST 12 MONTHS, THE FOOD YOU BOUGHT JUST DIDN'T LAST AND YOU DIDN'T HAVE MONEY TO GET MORE.: NEVER TRUE

## 2023-09-26 SDOH — ECONOMIC STABILITY: HOUSING INSECURITY
IN THE LAST 12 MONTHS, WAS THERE A TIME WHEN YOU DID NOT HAVE A STEADY PLACE TO SLEEP OR SLEPT IN A SHELTER (INCLUDING NOW)?: NO

## 2023-09-26 SDOH — ECONOMIC STABILITY: INCOME INSECURITY: HOW HARD IS IT FOR YOU TO PAY FOR THE VERY BASICS LIKE FOOD, HOUSING, MEDICAL CARE, AND HEATING?: NOT HARD AT ALL

## 2023-09-26 SDOH — ECONOMIC STABILITY: FOOD INSECURITY: WITHIN THE PAST 12 MONTHS, YOU WORRIED THAT YOUR FOOD WOULD RUN OUT BEFORE YOU GOT MONEY TO BUY MORE.: NEVER TRUE

## 2023-09-26 ASSESSMENT — ENCOUNTER SYMPTOMS
BLURRED VISION: 0
VISUAL CHANGE: 0

## 2023-09-26 ASSESSMENT — PATIENT HEALTH QUESTIONNAIRE - PHQ9
SUM OF ALL RESPONSES TO PHQ9 QUESTIONS 1 & 2: 0
SUM OF ALL RESPONSES TO PHQ QUESTIONS 1-9: 0
SUM OF ALL RESPONSES TO PHQ QUESTIONS 1-9: 0
2. FEELING DOWN, DEPRESSED OR HOPELESS: 0
SUM OF ALL RESPONSES TO PHQ QUESTIONS 1-9: 0
SUM OF ALL RESPONSES TO PHQ QUESTIONS 1-9: 0
1. LITTLE INTEREST OR PLEASURE IN DOING THINGS: 0

## 2023-09-26 ASSESSMENT — LIFESTYLE VARIABLES
HOW OFTEN DO YOU HAVE A DRINK CONTAINING ALCOHOL: NEVER
HOW MANY STANDARD DRINKS CONTAINING ALCOHOL DO YOU HAVE ON A TYPICAL DAY: PATIENT DOES NOT DRINK

## 2023-09-26 NOTE — PATIENT INSTRUCTIONS
life.  Balancing. This helps you stay coordinated and have good posture. Includes heel-to-toe walking, daniel chi, and certain types of yoga. Aim for at least 3 days a week. It can reduce your risk of falling. Even if you have a hard time meeting the recommendations, it's better to be more active than less active. All activity done in each category counts toward your weekly total. You'd be surprised how daily things like carrying groceries, keeping up with grandchildren, and taking the stairs can add up. What keeps you from being active? If you've had a hard time being more active, you're not alone. Maybe you remember being able to do more. Or maybe you've never thought of yourself as being active. It's frustrating when you can't do the things you want. Being more active can help. What's holding you back? Getting started. Have a goal, but break it into easy tasks. Small steps build into big accomplishments. Staying motivated. If you feel like skipping your activity, remember your goal. Maybe you want to move better and stay independent. Every activity gets you one step closer. Not feeling your best.  Start with 5 minutes of an activity you enjoy. Prove to yourself you can do it. As you get comfortable, increase your time. You may not be where you want to be. But you're in the process of getting there. Everyone starts somewhere. How can you find safe ways to stay active? Talk with your doctor about any physical challenges you're facing. Make a plan with your doctor if you have a health problem or aren't sure how to get started with activity. If you're already active, ask your doctor if there is anything you should change to stay safe as your body and health change. If you tend to feel dizzy after you take medicine, avoid activity at that time. Try being active before you take your medicine. This will reduce your risk of falls.   If you plan to be active at home, make sure to clear your space before you

## 2023-09-26 NOTE — PROGRESS NOTES
Medicare Annual Wellness Visit    Murphy Spaulding is here for Diabetes, Flu Vaccine (Given in room), and Medicare AWV    Assessment & Plan   Medicare annual wellness visit, subsequent  Need for influenza vaccination  -     Influenza, FLUAD, (age 72 y+), IM, Preservative Free, 0.5 mL  Type 2 diabetes mellitus without complication, without long-term current use of insulin (HCC)  -     POCT glycosylated hemoglobin (Hb A1C)  Personal history of tobacco use  -     UT VISIT TO DISCUSS LUNG CA SCREEN W LDCT  -     varenicline (CHANTIX) 0.5 MG tablet; Take 1-2 tablets by mouth See Admin Instructions 0.5mg DAILY for 3 days followed by 0.5mg TWICE DAILY for 4 days followed by 1mg TWICE DAILY, Disp-57 tablet, R-0Normal  Pulmonary nodule, right  -     CT CHEST WO CONTRAST; Future    Recommendations for Preventive Services Due: see orders and patient instructions/AVS.  Recommended screening schedule for the next 5-10 years is provided to the patient in written form: see Patient Instructions/AVS.     Return in 15 weeks (on 1/9/2024) for Medicare Annual Wellness Visit in 1 year. Subjective   The following acute and/or chronic problems were also addressed today:    Murphy Spaulding, a male of 74 y.o. came to the office 9/26/2023. Patient Active Problem List   Diagnosis    COPD (chronic obstructive pulmonary disease) ([address])    Type 2 diabetes mellitus with diabetic neuropathy, without long-term current use of insulin ([address])    Hyperlipemia    Nephrolithiasis     Tobacco abuse    Intractable ophthalmoplegic migraine    Adenomatous polyp of colon    Chronic pain syndrome    Lumbar facet arthropathy    Lumbar disc disorder    Lumbar spondylosis    Spinal stenosis of lumbar region without neurogenic claudication    Lumbar radiculopathy    Lumbar stenosis with neurogenic claudication          Diabetes  He presents for his follow-up diabetic visit. He has type 2 diabetes mellitus. His disease course has been worsening.  Associated

## 2023-11-07 ENCOUNTER — TELEPHONE (OUTPATIENT)
Dept: PHARMACY | Facility: CLINIC | Age: 73
End: 2023-11-07

## 2023-11-07 NOTE — TELEPHONE ENCOUNTER
Adherence  Intervention Accepted By: Provider: 1 and Pharmacy: 1  Gap Closed?: Yes   Time Spent (min): 15

## 2024-01-26 ENCOUNTER — OFFICE VISIT (OUTPATIENT)
Dept: PRIMARY CARE CLINIC | Age: 74
End: 2024-01-26
Payer: MEDICARE

## 2024-01-26 VITALS
HEIGHT: 71 IN | TEMPERATURE: 97.3 F | HEART RATE: 76 BPM | RESPIRATION RATE: 20 BRPM | BODY MASS INDEX: 28.42 KG/M2 | OXYGEN SATURATION: 93 % | SYSTOLIC BLOOD PRESSURE: 124 MMHG | DIASTOLIC BLOOD PRESSURE: 74 MMHG | WEIGHT: 203 LBS

## 2024-01-26 DIAGNOSIS — J43.2 CENTRILOBULAR EMPHYSEMA (HCC): ICD-10-CM

## 2024-01-26 DIAGNOSIS — Z12.5 SCREENING PSA (PROSTATE SPECIFIC ANTIGEN): ICD-10-CM

## 2024-01-26 DIAGNOSIS — E11.9 TYPE 2 DIABETES MELLITUS WITHOUT COMPLICATION, WITHOUT LONG-TERM CURRENT USE OF INSULIN (HCC): Primary | ICD-10-CM

## 2024-01-26 LAB
ALBUMIN SERPL-MCNC: 4.2 G/DL (ref 3.5–5.2)
ALP BLD-CCNC: 198 U/L (ref 40–129)
ALT SERPL-CCNC: 10 U/L (ref 0–40)
ANION GAP SERPL CALCULATED.3IONS-SCNC: 16 MMOL/L (ref 7–16)
AST SERPL-CCNC: 12 U/L (ref 0–39)
BILIRUB SERPL-MCNC: 1.1 MG/DL (ref 0–1.2)
BUN BLDV-MCNC: 11 MG/DL (ref 6–23)
CALCIUM SERPL-MCNC: 9.4 MG/DL (ref 8.6–10.2)
CHLORIDE BLD-SCNC: 97 MMOL/L (ref 98–107)
CHOLESTEROL: 210 MG/DL
CO2: 23 MMOL/L (ref 22–29)
CREAT SERPL-MCNC: 1.4 MG/DL (ref 0.7–1.2)
CREATININE URINE: 222.5 MG/DL (ref 40–278)
GFR SERPL CREATININE-BSD FRML MDRD: 54 ML/MIN/1.73M2
GLUCOSE BLD-MCNC: 130 MG/DL (ref 74–99)
HBA1C MFR BLD: 6.9 % (ref 4–5.6)
HDLC SERPL-MCNC: 31 MG/DL
LDL CHOLESTEROL: 124 MG/DL
POTASSIUM SERPL-SCNC: 4.7 MMOL/L (ref 3.5–5)
PROSTATE SPECIFIC ANTIGEN: 1.44 NG/ML (ref 0–4)
SODIUM BLD-SCNC: 136 MMOL/L (ref 132–146)
TOTAL PROTEIN, URINE: 33 MG/DL (ref 0–12)
TOTAL PROTEIN: 7.4 G/DL (ref 6.4–8.3)
TRIGL SERPL-MCNC: 275 MG/DL
URINE TOTAL PROTEIN CREATININE RATIO: 0.15 (ref 0–0.2)
VLDLC SERPL CALC-MCNC: 55 MG/DL

## 2024-01-26 PROCEDURE — G8484 FLU IMMUNIZE NO ADMIN: HCPCS | Performed by: FAMILY MEDICINE

## 2024-01-26 PROCEDURE — 36415 COLL VENOUS BLD VENIPUNCTURE: CPT | Performed by: FAMILY MEDICINE

## 2024-01-26 PROCEDURE — G8427 DOCREV CUR MEDS BY ELIG CLIN: HCPCS | Performed by: FAMILY MEDICINE

## 2024-01-26 PROCEDURE — 4004F PT TOBACCO SCREEN RCVD TLK: CPT | Performed by: FAMILY MEDICINE

## 2024-01-26 PROCEDURE — G8417 CALC BMI ABV UP PARAM F/U: HCPCS | Performed by: FAMILY MEDICINE

## 2024-01-26 PROCEDURE — 3017F COLORECTAL CA SCREEN DOC REV: CPT | Performed by: FAMILY MEDICINE

## 2024-01-26 PROCEDURE — 2022F DILAT RTA XM EVC RTNOPTHY: CPT | Performed by: FAMILY MEDICINE

## 2024-01-26 PROCEDURE — 1124F ACP DISCUSS-NO DSCNMKR DOCD: CPT | Performed by: FAMILY MEDICINE

## 2024-01-26 PROCEDURE — 99214 OFFICE O/P EST MOD 30 MIN: CPT | Performed by: FAMILY MEDICINE

## 2024-01-26 PROCEDURE — 3023F SPIROM DOC REV: CPT | Performed by: FAMILY MEDICINE

## 2024-01-26 PROCEDURE — 3046F HEMOGLOBIN A1C LEVEL >9.0%: CPT | Performed by: FAMILY MEDICINE

## 2024-01-26 RX ORDER — FLUTICASONE FUROATE, UMECLIDINIUM BROMIDE AND VILANTEROL TRIFENATATE 100; 62.5; 25 UG/1; UG/1; UG/1
1 POWDER RESPIRATORY (INHALATION) DAILY
Qty: 1 EACH | Refills: 5 | Status: SHIPPED | OUTPATIENT
Start: 2024-01-26

## 2024-01-26 ASSESSMENT — ENCOUNTER SYMPTOMS
BLURRED VISION: 0
COUGH: 1
VISUAL CHANGE: 0
HEMOPTYSIS: 0
SPUTUM PRODUCTION: 1
WHEEZING: 1
DIFFICULTY BREATHING: 0
CHEST TIGHTNESS: 0
FREQUENT THROAT CLEARING: 1
SHORTNESS OF BREATH: 1

## 2024-01-26 ASSESSMENT — PATIENT HEALTH QUESTIONNAIRE - PHQ9
SUM OF ALL RESPONSES TO PHQ QUESTIONS 1-9: 0
SUM OF ALL RESPONSES TO PHQ9 QUESTIONS 1 & 2: 0
SUM OF ALL RESPONSES TO PHQ QUESTIONS 1-9: 0
SUM OF ALL RESPONSES TO PHQ QUESTIONS 1-9: 0
1. LITTLE INTEREST OR PLEASURE IN DOING THINGS: 0
SUM OF ALL RESPONSES TO PHQ QUESTIONS 1-9: 0
2. FEELING DOWN, DEPRESSED OR HOPELESS: 0

## 2024-01-26 ASSESSMENT — COPD QUESTIONNAIRES: COPD: 1

## 2024-01-26 NOTE — PROGRESS NOTES
normal.   Neck:      Thyroid: No thyromegaly.   Cardiovascular:      Rate and Rhythm: Normal rate and regular rhythm.      Heart sounds: No murmur heard.  Pulmonary:      Effort: Pulmonary effort is normal.      Breath sounds: Decreased air movement present. Decreased breath sounds present.   Musculoskeletal:      Cervical back: Neck supple.   Lymphadenopathy:      Cervical: No cervical adenopathy.   Skin:     General: Skin is warm and dry.   Neurological:      Mental Status: He is alert and oriented to person, place, and time.   Psychiatric:         Mood and Affect: Mood normal.         ASSESSMENT AND PLAN:    Kin was seen today for copd and diabetes.    Diagnoses and all orders for this visit:    Type 2 diabetes mellitus without complication, without long-term current use of insulin (HCC)  -     Protein / creatinine ratio, urine; Future  -     Lipid Panel  -     Comprehensive Metabolic Panel  -     Hemoglobin A1C    Centrilobular emphysema (HCC)  -     CBC  -     fluticasone-umeclidin-vilant (TRELEGY ELLIPTA) 100-62.5-25 MCG/ACT AEPB inhaler; Inhale 1 puff into the lungs daily    Screening PSA (prostate specific antigen)  -     PSA Screening    - quit smoking. Start Chantix    Return in about 4 months (around 5/26/2024) for medicare pe and ov.    Vinod Ojeda, DO

## 2024-01-27 LAB
HCT VFR BLD CALC: 57.4 % (ref 37–54)
HEMOGLOBIN: 18.1 G/DL (ref 12.5–16.5)
MCH RBC QN AUTO: 30.2 PG (ref 26–35)
MCHC RBC AUTO-ENTMCNC: 31.5 G/DL (ref 32–34.5)
MCV RBC AUTO: 95.8 FL (ref 80–99.9)
PDW BLD-RTO: 13.2 % (ref 11.5–15)
PLATELET # BLD: 233 K/UL (ref 130–450)
PMV BLD AUTO: 12.1 FL (ref 7–12)
RBC # BLD: 5.99 M/UL (ref 3.8–5.8)
WBC # BLD: 13.6 K/UL (ref 4.5–11.5)

## 2024-01-30 ENCOUNTER — TELEPHONE (OUTPATIENT)
Dept: PRIMARY CARE CLINIC | Age: 74
End: 2024-01-30

## 2024-01-30 NOTE — TELEPHONE ENCOUNTER
Left message cholesterol still not at goal so increase Crestor to 10 mg.  Other labs stable but hemoglobin elevated due to his smoking history.  Therefore again counseled to quit smoking.  Follow-up as scheduled or needed.

## 2024-02-05 ENCOUNTER — HOSPITAL ENCOUNTER (OUTPATIENT)
Dept: CT IMAGING | Age: 74
Discharge: HOME | End: 2024-02-07
Attending: FAMILY MEDICINE
Payer: MEDICARE

## 2024-02-05 ENCOUNTER — HOSPITAL ENCOUNTER (OUTPATIENT)
Dept: ULTRASOUND IMAGING | Age: 74
Discharge: HOME | End: 2024-02-07
Payer: MEDICARE

## 2024-02-05 DIAGNOSIS — R91.1 PULMONARY NODULE, RIGHT: ICD-10-CM

## 2024-02-05 DIAGNOSIS — E04.2 MULTINODULAR GOITER: ICD-10-CM

## 2024-02-05 PROCEDURE — 71250 CT THORAX DX C-: CPT

## 2024-02-05 PROCEDURE — 76536 US EXAM OF HEAD AND NECK: CPT

## 2024-02-06 ENCOUNTER — TELEPHONE (OUTPATIENT)
Dept: ENDOCRINOLOGY | Age: 74
End: 2024-02-06

## 2024-02-06 NOTE — TELEPHONE ENCOUNTER
Notify patient to  Thyroid nodules remain stable on this ultrasound.  We will continue following with ultrasound.  Will discuss this in details at your next office visit

## 2024-02-07 ENCOUNTER — TELEPHONE (OUTPATIENT)
Dept: PRIMARY CARE CLINIC | Age: 74
End: 2024-02-07

## 2024-02-07 DIAGNOSIS — R91.1 PULMONARY NODULE 1 CM OR GREATER IN DIAMETER: Primary | ICD-10-CM

## 2024-02-07 NOTE — TELEPHONE ENCOUNTER
Called patient discussed CAT scan of his chest low-dose which shows increasing size of pulmonary nodule in the right upper lobe.  Recommend pulmonology referral for this.  He agrees.    Cholesterol elevated as discussed previous he had been off Crestor as possible cause of muscle aches.  However been off of this did not affect his muscles so I want him to restart it.  We need to lower his cholesterol.  Follow-up as scheduled.

## 2024-02-28 ENCOUNTER — TELEPHONE (OUTPATIENT)
Dept: PULMONOLOGY | Age: 74
End: 2024-02-28

## 2024-02-29 ENCOUNTER — OFFICE VISIT (OUTPATIENT)
Dept: PULMONOLOGY | Age: 74
End: 2024-02-29
Payer: MEDICARE

## 2024-02-29 ENCOUNTER — TELEPHONE (OUTPATIENT)
Dept: PULMONOLOGY | Age: 74
End: 2024-02-29

## 2024-02-29 VITALS
HEART RATE: 68 BPM | OXYGEN SATURATION: 97 % | BODY MASS INDEX: 28.64 KG/M2 | RESPIRATION RATE: 18 BRPM | DIASTOLIC BLOOD PRESSURE: 72 MMHG | HEIGHT: 71 IN | WEIGHT: 204.6 LBS | TEMPERATURE: 97.4 F | SYSTOLIC BLOOD PRESSURE: 147 MMHG

## 2024-02-29 DIAGNOSIS — R91.1 PULMONARY NODULE 1 CM OR GREATER IN DIAMETER: ICD-10-CM

## 2024-02-29 DIAGNOSIS — Z72.0 TOBACCO ABUSE: Primary | ICD-10-CM

## 2024-02-29 DIAGNOSIS — J44.9 CHRONIC OBSTRUCTIVE PULMONARY DISEASE, UNSPECIFIED COPD TYPE (HCC): ICD-10-CM

## 2024-02-29 LAB
EXPIRATORY TIME: NORMAL
FEF 25-75% %PRED-PRE: NORMAL
FEF 25-75% PRED: NORMAL
FEF 25-75-PRE: NORMAL
FEV1 %PRED-PRE: 50 %
FEV1 PRED: 3.11 L
FEV1/FVC %PRED-PRE: 82 %
FEV1/FVC PRED: 75 %
FEV1/FVC: 62 %
FEV1: 1.56 L
FVC %PRED-PRE: 59 %
FVC PRED: 4.18 L
FVC: 2.51 L
PEF %PRED-PRE: NORMAL
PEF PRED: NORMAL
PEF-PRE: NORMAL

## 2024-02-29 PROCEDURE — 3017F COLORECTAL CA SCREEN DOC REV: CPT | Performed by: INTERNAL MEDICINE

## 2024-02-29 PROCEDURE — 4004F PT TOBACCO SCREEN RCVD TLK: CPT | Performed by: INTERNAL MEDICINE

## 2024-02-29 PROCEDURE — 94010 BREATHING CAPACITY TEST: CPT | Performed by: INTERNAL MEDICINE

## 2024-02-29 PROCEDURE — 99205 OFFICE O/P NEW HI 60 MIN: CPT | Performed by: INTERNAL MEDICINE

## 2024-02-29 PROCEDURE — G8427 DOCREV CUR MEDS BY ELIG CLIN: HCPCS | Performed by: INTERNAL MEDICINE

## 2024-02-29 PROCEDURE — G8417 CALC BMI ABV UP PARAM F/U: HCPCS | Performed by: INTERNAL MEDICINE

## 2024-02-29 PROCEDURE — 3023F SPIROM DOC REV: CPT | Performed by: INTERNAL MEDICINE

## 2024-02-29 PROCEDURE — 1124F ACP DISCUSS-NO DSCNMKR DOCD: CPT | Performed by: INTERNAL MEDICINE

## 2024-02-29 PROCEDURE — G8484 FLU IMMUNIZE NO ADMIN: HCPCS | Performed by: INTERNAL MEDICINE

## 2024-02-29 ASSESSMENT — PULMONARY FUNCTION TESTS
FVC_PERCENT_PREDICTED_PRE: 59
FEV1: 1.56
FEV1/FVC: 62
FEV1_PREDICTED: 3.11
FEV1/FVC_PERCENT_PREDICTED_PRE: 82
FEV1_PERCENT_PREDICTED_PRE: 50
FVC_PREDICTED: 4.18
FVC: 2.51
FEV1/FVC_PREDICTED: 75

## 2024-02-29 NOTE — PROGRESS NOTES
[facility]  Department of Internal Medicine   Division of Pulmonary, Critical Care and Sleep Medicine  Pulmonary [facility]  Office [phone], Fax [phone]    Mg Holder DO, TAHIRA RODRÍGUEZ FACP Molly Howsare DO,   Javed Reddy MD      Dear Vinod Ojeda DO    We had the pleasure of seeing Kin Issa, in the Pulmonary [facility] at [facility] regarding his lung mass and history of asbestos exposure.     HISTORY OF PRESENT ILLNESS:    Kin Issa is a 73 y.o. male is being seen for abnormal CT scan revealing progression of previously identified right upper lobe nodule previously measuring 9 mm now 28 mm in addition there is Interval progression of the pretracheal/precarinal lymph node now measuring 11 mm short axis dimension previously 8 mm and a right thyroid lobe nodule 3.2 cm.4. Left non obstructing nephrolithiasis. He is  and smokes 1.5 pack per day since 10 years old. He worked a [employer] as a  where he was exposure and received money for asbestosis exposure. He used to enjoy Golf but his back pain and DM has him limited now. He has no pets or travel. His parents are  but his mother  from lung cancer and had her breast removed for the same.  He complains of cough, frequent throat clearing, shortness of breath, sputum production (q 3 days) and wheezing. There is no chest tightness, difficulty breathing or hemoptysis. This is a chronic problem. The current episode started more than 1 year ago. The problem has been waxing and waning. Pertinent negatives include no chest pain or weight loss. His symptoms are aggravated by exposure to smoke (still smoking over 1 ppd.). Relieved by: Trelegy. He reports significant improvement on treatment.       ALLERGIES:    Allergies   Allergen Reactions    Pcn [Penicillins] Hives    Tape [Adhesive Tape]      Surgical

## 2024-02-29 NOTE — TELEPHONE ENCOUNTER
Mailed a letter to patient informing him that his PET Scan is scheduled for 3-8-24 at 1:00 pm at the [facility]. He must arrive by 12:30 pm. He must follow the instructions I provided in the letter

## 2024-03-08 ENCOUNTER — HOSPITAL ENCOUNTER (OUTPATIENT)
Dept: PET IMAGING | Age: 74
End: 2024-03-08
Attending: INTERNAL MEDICINE
Payer: MEDICARE

## 2024-03-08 ENCOUNTER — HOSPITAL ENCOUNTER (OUTPATIENT)
Dept: PET IMAGING | Age: 74
Discharge: HOME | End: 2024-03-08
Attending: INTERNAL MEDICINE
Payer: MEDICARE

## 2024-03-08 DIAGNOSIS — J44.9 CHRONIC OBSTRUCTIVE PULMONARY DISEASE, UNSPECIFIED COPD TYPE (HCC): ICD-10-CM

## 2024-03-08 DIAGNOSIS — R91.1 PULMONARY NODULE 1 CM OR GREATER IN DIAMETER: ICD-10-CM

## 2024-03-08 DIAGNOSIS — Z72.0 TOBACCO ABUSE: ICD-10-CM

## 2024-03-08 LAB — GLUCOSE BLD-MCNC: 147 MG/DL (ref 74–99)

## 2024-03-08 PROCEDURE — 3430000000 HC RX DIAGNOSTIC RADIOPHARMACEUTICAL: Performed by: RADIOLOGY

## 2024-03-08 PROCEDURE — 78815 PET IMAGE W/CT SKULL-THIGH: CPT

## 2024-03-08 PROCEDURE — 82962 GLUCOSE BLOOD TEST: CPT

## 2024-03-08 PROCEDURE — A9609 HC RX DIAGNOSTIC RADIOPHARMACEUTICAL: HCPCS | Performed by: RADIOLOGY

## 2024-03-08 RX ORDER — FLUDEOXYGLUCOSE F 18 200 MCI/ML
15 INJECTION, SOLUTION INTRAVENOUS
Status: COMPLETED | OUTPATIENT
Start: 2024-03-08 | End: 2024-03-08

## 2024-03-08 RX ADMIN — FLUDEOXYGLUCOSE F 18 15 MILLICURIE: 200 INJECTION, SOLUTION INTRAVENOUS at 12:50

## 2024-04-01 LAB — DIABETIC RETINOPATHY: NEGATIVE

## 2024-05-23 ENCOUNTER — TELEPHONE (OUTPATIENT)
Dept: PULMONOLOGY | Age: 74
End: 2024-05-23

## 2024-05-23 ENCOUNTER — OFFICE VISIT (OUTPATIENT)
Dept: PRIMARY CARE CLINIC | Age: 74
End: 2024-05-23
Payer: MEDICARE

## 2024-05-23 VITALS
DIASTOLIC BLOOD PRESSURE: 74 MMHG | BODY MASS INDEX: 28.14 KG/M2 | SYSTOLIC BLOOD PRESSURE: 130 MMHG | HEIGHT: 71 IN | WEIGHT: 201 LBS | HEART RATE: 75 BPM | OXYGEN SATURATION: 95 % | TEMPERATURE: 96.8 F | RESPIRATION RATE: 20 BRPM

## 2024-05-23 DIAGNOSIS — J44.9 CHRONIC OBSTRUCTIVE PULMONARY DISEASE, UNSPECIFIED COPD TYPE (HCC): Primary | ICD-10-CM

## 2024-05-23 DIAGNOSIS — Z00.00 MEDICARE ANNUAL WELLNESS VISIT, SUBSEQUENT: Primary | ICD-10-CM

## 2024-05-23 DIAGNOSIS — Z79.4 TYPE 2 DIABETES MELLITUS WITH DIABETIC POLYNEUROPATHY, WITH LONG-TERM CURRENT USE OF INSULIN (HCC): ICD-10-CM

## 2024-05-23 DIAGNOSIS — G25.81 RLS (RESTLESS LEGS SYNDROME): ICD-10-CM

## 2024-05-23 DIAGNOSIS — E78.2 MIXED HYPERLIPIDEMIA: ICD-10-CM

## 2024-05-23 DIAGNOSIS — R94.2 ABNORMAL PET SCAN OF LUNG: ICD-10-CM

## 2024-05-23 DIAGNOSIS — J43.2 CENTRILOBULAR EMPHYSEMA (HCC): ICD-10-CM

## 2024-05-23 DIAGNOSIS — E11.42 TYPE 2 DIABETES MELLITUS WITH DIABETIC POLYNEUROPATHY, WITH LONG-TERM CURRENT USE OF INSULIN (HCC): ICD-10-CM

## 2024-05-23 DIAGNOSIS — R91.8 MASS OF UPPER LOBE OF RIGHT LUNG: ICD-10-CM

## 2024-05-23 DIAGNOSIS — R91.1 LUNG NODULE: ICD-10-CM

## 2024-05-23 DIAGNOSIS — Z87.891 PERSONAL HISTORY OF TOBACCO USE: ICD-10-CM

## 2024-05-23 DIAGNOSIS — E11.9 TYPE 2 DIABETES MELLITUS WITHOUT COMPLICATION, WITHOUT LONG-TERM CURRENT USE OF INSULIN (HCC): ICD-10-CM

## 2024-05-23 LAB
ALBUMIN: 4.3 G/DL (ref 3.5–5.2)
ALP BLD-CCNC: 217 U/L (ref 40–129)
ALT SERPL-CCNC: 12 U/L (ref 0–40)
ANION GAP SERPL CALCULATED.3IONS-SCNC: 15 MMOL/L (ref 7–16)
AST SERPL-CCNC: 15 U/L (ref 0–39)
BILIRUB SERPL-MCNC: 0.8 MG/DL (ref 0–1.2)
BUN BLDV-MCNC: 13 MG/DL (ref 6–23)
CALCIUM SERPL-MCNC: 9.7 MG/DL (ref 8.6–10.2)
CHLORIDE BLD-SCNC: 100 MMOL/L (ref 98–107)
CHOLESTEROL, TOTAL: 138 MG/DL
CO2: 24 MMOL/L (ref 22–29)
CREAT SERPL-MCNC: 1.4 MG/DL (ref 0.7–1.2)
GFR, ESTIMATED: 54 ML/MIN/1.73M2
GLUCOSE BLD-MCNC: 122 MG/DL (ref 74–99)
HBA1C MFR BLD: 7.1 % (ref 4–5.6)
HDLC SERPL-MCNC: 34 MG/DL
LDL CHOLESTEROL: 62 MG/DL
POTASSIUM SERPL-SCNC: 5.1 MMOL/L (ref 3.5–5)
SODIUM BLD-SCNC: 139 MMOL/L (ref 132–146)
TOTAL PROTEIN: 7.1 G/DL (ref 6.4–8.3)
TRIGL SERPL-MCNC: 209 MG/DL
VLDLC SERPL CALC-MCNC: 42 MG/DL

## 2024-05-23 PROCEDURE — G0296 VISIT TO DETERM LDCT ELIG: HCPCS | Performed by: FAMILY MEDICINE

## 2024-05-23 PROCEDURE — 36415 COLL VENOUS BLD VENIPUNCTURE: CPT | Performed by: FAMILY MEDICINE

## 2024-05-23 PROCEDURE — 99212 OFFICE O/P EST SF 10 MIN: CPT | Performed by: FAMILY MEDICINE

## 2024-05-23 PROCEDURE — 3044F HG A1C LEVEL LT 7.0%: CPT | Performed by: FAMILY MEDICINE

## 2024-05-23 PROCEDURE — G8427 DOCREV CUR MEDS BY ELIG CLIN: HCPCS | Performed by: FAMILY MEDICINE

## 2024-05-23 PROCEDURE — G0439 PPPS, SUBSEQ VISIT: HCPCS | Performed by: FAMILY MEDICINE

## 2024-05-23 PROCEDURE — 3023F SPIROM DOC REV: CPT | Performed by: FAMILY MEDICINE

## 2024-05-23 PROCEDURE — 4004F PT TOBACCO SCREEN RCVD TLK: CPT | Performed by: FAMILY MEDICINE

## 2024-05-23 PROCEDURE — G8417 CALC BMI ABV UP PARAM F/U: HCPCS | Performed by: FAMILY MEDICINE

## 2024-05-23 PROCEDURE — 1124F ACP DISCUSS-NO DSCNMKR DOCD: CPT | Performed by: FAMILY MEDICINE

## 2024-05-23 RX ORDER — ACETAMINOPHEN 500 MG
500 TABLET ORAL 4 TIMES DAILY PRN
Qty: 120 TABLET | Refills: 0 | Status: SHIPPED | OUTPATIENT
Start: 2024-05-23

## 2024-05-23 RX ORDER — FLUTICASONE FUROATE, UMECLIDINIUM BROMIDE AND VILANTEROL TRIFENATATE 100; 62.5; 25 UG/1; UG/1; UG/1
1 POWDER RESPIRATORY (INHALATION) DAILY
Qty: 3 EACH | Refills: 1 | Status: SHIPPED | OUTPATIENT
Start: 2024-05-23

## 2024-05-23 RX ORDER — ROSUVASTATIN CALCIUM 10 MG/1
10 TABLET, COATED ORAL DAILY
Qty: 90 TABLET | Refills: 1 | Status: SHIPPED | OUTPATIENT
Start: 2024-05-23

## 2024-05-23 RX ORDER — SITAGLIPTIN AND METFORMIN HYDROCHLORIDE 50; 500 MG/1; MG/1
1 TABLET, FILM COATED, EXTENDED RELEASE ORAL DAILY
Qty: 90 TABLET | Refills: 1 | Status: SHIPPED | OUTPATIENT
Start: 2024-05-23

## 2024-05-23 RX ORDER — ROPINIROLE 1 MG/1
1 TABLET, FILM COATED ORAL NIGHTLY
Qty: 90 TABLET | Refills: 1 | Status: SHIPPED | OUTPATIENT
Start: 2024-05-23

## 2024-05-23 ASSESSMENT — LIFESTYLE VARIABLES: HOW OFTEN DO YOU HAVE A DRINK CONTAINING ALCOHOL: NEVER

## 2024-05-23 ASSESSMENT — PATIENT HEALTH QUESTIONNAIRE - PHQ9
SUM OF ALL RESPONSES TO PHQ QUESTIONS 1-9: 0
SUM OF ALL RESPONSES TO PHQ QUESTIONS 1-9: 0
1. LITTLE INTEREST OR PLEASURE IN DOING THINGS: NOT AT ALL
SUM OF ALL RESPONSES TO PHQ9 QUESTIONS 1 & 2: 0
SUM OF ALL RESPONSES TO PHQ QUESTIONS 1-9: 0
SUM OF ALL RESPONSES TO PHQ QUESTIONS 1-9: 0
2. FEELING DOWN, DEPRESSED OR HOPELESS: NOT AT ALL

## 2024-05-23 ASSESSMENT — ENCOUNTER SYMPTOMS
VISUAL CHANGE: 0
BLURRED VISION: 0

## 2024-05-23 NOTE — TELEPHONE ENCOUNTER
[facility]  Department of Internal Medicine   Division of Pulmonary, Critical Care and Sleep Medicine  Pulmonary [place] [facility]  Office [phone], Fax [phone]      Dear Vinod Ojeda, DO    We had the pleasure of seeing Kin Issa, in the Pulmonary [facility] at [facility] regarding his lung mass and history of asbestos exposure.     HISTORY OF PRESENT ILLNESS:    Kin Issa is a 74 y.o. male is being seen for abnormal CT scan revealing progression of previously identified right upper lobe nodule previously measuring 9 mm now 28 mm in addition there is Interval progression of the pretracheal/precarinal lymph node now measuring 11 mm short axis dimension previously 8 mm and a right thyroid lobe nodule 3.2 cm.4. Left non obstructing nephrolithiasis. He is  and smokes 1.5 pack per day since 10 years old. He worked a [employer] as a  where he was exposure and received money for asbestosis exposure. He used to enjoy Golf but his back pain and DM has him limited now. He has no pets or travel. His parents are  but his mother  from lung cancer and had her breast removed for the same.  He complains of cough, frequent throat clearing, shortness of breath, sputum production (q 3 days) and wheezing. There is no chest tightness, difficulty breathing or hemoptysis. This is a chronic problem. The current episode started more than 1 year ago. The problem has been waxing and waning. Pertinent negatives include no chest pain or weight loss. His symptoms are aggravated by exposure to smoke (still smoking over 1 ppd.). Relieved by: Trelegy. He reports significant improvement on treatment.       Spoke with Kin today to discuss his PET scan which is avid uptake in the hilar area along with a lung mass.  Bronchial ultrasound-guided biopsy was recommended and procedure option.  I referred him to

## 2024-05-23 NOTE — PROGRESS NOTES
Vinod GAONA DO   rOPINIRole (REQUIP) 1 MG tablet Take 1 tablet by mouth nightly Yes Vinod Ojeda DO   rosuvastatin (CRESTOR) 10 MG tablet Take 1 tablet by mouth daily Yes Viond Ojeda DO   SITagliptin-metFORMIN (JANUMET XR)  MG TB24 per extended release tablet Take 1 tablet by mouth daily Yes Vinod Ojeda DO   acetaminophen (TYLENOL) 500 MG tablet Take 1 tablet by mouth 4 times daily as needed for Pain Yes Vinod Ojeda DO   albuterol sulfate  (90 Base) MCG/ACT inhaler Inhale 2 puffs into the lungs every 6 hours as needed for Wheezing or Shortness of Breath Yes Vinod Ojeda DO   varenicline (CHANTIX) 0.5 MG tablet Take 1-2 tablets by mouth See Admin Instructions 0.5mg DAILY for 3 days followed by 0.5mg TWICE DAILY for 4 days followed by 1mg TWICE DAILY  Patient not taking: Reported on 1/26/2024  Vinod Ojeda DO   gabapentin (NEURONTIN) 300 MG capsule Take 1 capsule by mouth 3 times daily for 30 days. Intended supply: 30 days  Patient not taking: Reported on 1/26/2024  Nelia Moura PA   ibuprofen (ADVIL;MOTRIN) 200 MG tablet Take 1 tablet by mouth as needed for Pain  Patient not taking: Reported on 5/23/2024  Provider, MD Kendall   vitamin D (CHOLECALCIFEROL) 26447 UNIT CAPS One weekly  Patient not taking: Reported on 1/26/2024  Vinod Ojeda DO       CareTeam (Including outside providers/suppliers regularly involved in providing care):   Patient Care Team:  Vinod Ojeda DO as PCP - General (Family Medicine)  Vinod Ojeda DO as PCP - Empaneled Provider  Jaxson Daley MD as Consulting Physician (Pulmonology)     Reviewed and updated this visit:  Tobacco  Allergies  Meds  Problems  Med Hx  Surg Hx  Soc Hx  Fam Hx

## 2024-05-23 NOTE — PATIENT INSTRUCTIONS
review.    Other Preventive Recommendations:    A preventive eye exam performed by an eye specialist is recommended every 1-2 years to screen for glaucoma; cataracts, macular degeneration, and other eye disorders.  A preventive dental visit is recommended every 6 months.  Try to get at least 150 minutes of exercise per week or 10,000 steps per day on a pedometer .  Order or download the FREE \"Exercise & Physical Activity: Your Everyday Guide\" from [employer]. Call 1-252.420.9052 or search [employer] online.  You need 4163-6079 mg of calcium and 2448-8942 IU of vitamin D per day. It is possible to meet your calcium requirement with diet alone, but a vitamin D supplement is usually necessary to meet this goal.  When exposed to the sun, use a sunscreen that protects against both UVA and UVB radiation with an SPF of 30 or greater. Reapply every 2 to 3 hours or after sweating, drying off with a towel, or swimming.  Always wear a seat belt when traveling in a car. Always wear a helmet when riding a bicycle or motorcycle.

## 2024-05-24 ENCOUNTER — TELEPHONE (OUTPATIENT)
Dept: PULMONOLOGY | Age: 74
End: 2024-05-24

## 2024-05-24 NOTE — TELEPHONE ENCOUNTER
Patient called and notified that his Bronch with EBUS will be on June 3rd @2:30.  Patient was notified to be NPO except for sips of water to take medications.  Patient also notified that the PAT department will be calling him and giving him a time of arrival, parking and registration.  Patient verbalized understanding.

## 2024-05-28 NOTE — PROGRESS NOTES
[facility]   PRE-ADMISSION TESTING GENERAL INSTRUCTIONS  PAT Phone Number: [phone]      GENERAL INSTRUCTIONS:    [x] Antibacterial Soap Shower Night before and/or AM of surgery.  [] CHG Wipes instruction sheet and wipes given.  [x] Do not wear lotions, powders, deodorant the morning of surgery.  [x] Nothing to eat or drink after midnight. This includes no gum, candy, mints or water.  [x] You may brush your teeth, gargle, but do not swallow water.   [x] No tobacco products, illegal drugs, or alcohol within 24 hours of your surgery.  [x] Jewelry or valuables should not be brought to the hospital. All body and/or tongue piercing's must be removed prior to arriving to hospital. No contact lens or hair pins.   [x] Arrange transportation with a responsible adult  to and from the hospital. Arrange for someone to be with you for the remainder of the day and for 24 hours after your procedure due to having had anesthesia.          -Who will be your  for transportation? wife        -Who will be staying with you for 24 hrs after your procedure? wife  [x] Bring insurance card and photo ID.  [] Bring copy of living will or healthcare power of  papers to be placed in your electronic record.  [] Urine Pregnancy test will be preformed the day of surgery. A specimen sample may be brought from home.  [] Transfusion (Green) Bracelet: Please bring with you to hospital, day of surgery.     PARKING INSTRUCTIONS:     [x] ARRIVAL DATE & TIME:   6/3  @  1230   [x] Times are subject to change. We will contact you the business day before surgery if that were to occur.  [x] Enter into the [facility] [place] Entrance. Two people may accompany you. Masks are not required.  [x] Parking Lot \"I\" is where you will park. It is located on the corner of [address] and [facility] [address] [facility] [address]. The entrance is on [facility] [address] [facility] [address].   Only one vehicle - per patient, is permitted in parking lot.   Upon entering

## 2024-06-03 ENCOUNTER — HOSPITAL ENCOUNTER (OUTPATIENT)
Age: 74
Setting detail: OUTPATIENT SURGERY
Discharge: HOME | End: 2024-06-03
Attending: INTERNAL MEDICINE | Admitting: INTERNAL MEDICINE
Payer: MEDICARE

## 2024-06-03 ENCOUNTER — ANESTHESIA EVENT (OUTPATIENT)
Dept: ENDOSCOPY | Age: 74
End: 2024-06-03
Payer: MEDICARE

## 2024-06-03 ENCOUNTER — ANESTHESIA (OUTPATIENT)
Dept: ENDOSCOPY | Age: 74
End: 2024-06-03
Payer: MEDICARE

## 2024-06-03 VITALS
WEIGHT: 200 LBS | OXYGEN SATURATION: 93 % | DIASTOLIC BLOOD PRESSURE: 69 MMHG | SYSTOLIC BLOOD PRESSURE: 140 MMHG | RESPIRATION RATE: 16 BRPM | TEMPERATURE: 97 F | BODY MASS INDEX: 28 KG/M2 | HEART RATE: 76 BPM | HEIGHT: 71 IN

## 2024-06-03 DIAGNOSIS — Z01.812 PRE-OPERATIVE LABORATORY EXAMINATION: Primary | ICD-10-CM

## 2024-06-03 DIAGNOSIS — R91.1 LUNG NODULE: ICD-10-CM

## 2024-06-03 LAB
ANION GAP SERPL CALCULATED.3IONS-SCNC: 14 MMOL/L (ref 7–16)
BUN SERPL-MCNC: 10 MG/DL (ref 6–23)
CALCIUM SERPL-MCNC: 9.6 MG/DL (ref 8.6–10.2)
CHLORIDE SERPL-SCNC: 101 MMOL/L (ref 98–107)
CO2 SERPL-SCNC: 21 MMOL/L (ref 22–29)
CREAT SERPL-MCNC: 1.1 MG/DL (ref 0.7–1.2)
EKG ATRIAL RATE: 72 BPM
EKG P AXIS: 43 DEGREES
EKG P-R INTERVAL: 162 MS
EKG Q-T INTERVAL: 390 MS
EKG QRS DURATION: 98 MS
EKG QTC CALCULATION (BAZETT): 427 MS
EKG R AXIS: -12 DEGREES
EKG T AXIS: 63 DEGREES
EKG VENTRICULAR RATE: 72 BPM
ERYTHROCYTE [DISTWIDTH] IN BLOOD BY AUTOMATED COUNT: 12.9 % (ref 11.5–15)
GFR, ESTIMATED: 69 ML/MIN/1.73M2
GLUCOSE BLD-MCNC: 160 MG/DL (ref 74–99)
GLUCOSE SERPL-MCNC: 176 MG/DL (ref 74–99)
HCT VFR BLD AUTO: 53.5 % (ref 37–54)
HGB BLD-MCNC: 17.5 G/DL (ref 12.5–16.5)
MCH RBC QN AUTO: 30.4 PG (ref 26–35)
MCHC RBC AUTO-ENTMCNC: 32.7 G/DL (ref 32–34.5)
MCV RBC AUTO: 93 FL (ref 80–99.9)
PLATELET # BLD AUTO: 234 K/UL (ref 130–450)
PMV BLD AUTO: 11.1 FL (ref 7–12)
POTASSIUM SERPL-SCNC: 4.4 MMOL/L (ref 3.5–5)
RBC # BLD AUTO: 5.75 M/UL (ref 3.8–5.8)
SODIUM SERPL-SCNC: 136 MMOL/L (ref 132–146)
WBC OTHER # BLD: 10.7 K/UL (ref 4.5–11.5)

## 2024-06-03 PROCEDURE — 2500000003 HC RX 250 WO HCPCS: Performed by: NURSE ANESTHETIST, CERTIFIED REGISTERED

## 2024-06-03 PROCEDURE — 87116 MYCOBACTERIA CULTURE: CPT

## 2024-06-03 PROCEDURE — 88305 TISSUE EXAM BY PATHOLOGIST: CPT

## 2024-06-03 PROCEDURE — 6370000000 HC RX 637 (ALT 250 FOR IP): Performed by: NURSE ANESTHETIST, CERTIFIED REGISTERED

## 2024-06-03 PROCEDURE — 93005 ELECTROCARDIOGRAM TRACING: CPT

## 2024-06-03 PROCEDURE — 3603165200 HC BRNCHSC EBUS GUIDED SAMPL 1/2 NODE STATION/STRUX: Performed by: INTERNAL MEDICINE

## 2024-06-03 PROCEDURE — 80048 BASIC METABOLIC PNL TOTAL CA: CPT

## 2024-06-03 PROCEDURE — 2709999900 HC NON-CHARGEABLE SUPPLY: Performed by: INTERNAL MEDICINE

## 2024-06-03 PROCEDURE — 7100000011 HC PHASE II RECOVERY - ADDTL 15 MIN: Performed by: INTERNAL MEDICINE

## 2024-06-03 PROCEDURE — 3700000000 HC ANESTHESIA ATTENDED CARE: Performed by: INTERNAL MEDICINE

## 2024-06-03 PROCEDURE — 2580000003 HC RX 258: Performed by: NURSE ANESTHETIST, CERTIFIED REGISTERED

## 2024-06-03 PROCEDURE — 87102 FUNGUS ISOLATION CULTURE: CPT

## 2024-06-03 PROCEDURE — 93005 ELECTROCARDIOGRAM TRACING: CPT | Performed by: ANESTHESIOLOGY

## 2024-06-03 PROCEDURE — 88342 IMHCHEM/IMCYTCHM 1ST ANTB: CPT

## 2024-06-03 PROCEDURE — 88173 CYTOPATH EVAL FNA REPORT: CPT

## 2024-06-03 PROCEDURE — 6360000002 HC RX W HCPCS: Performed by: NURSE ANESTHETIST, CERTIFIED REGISTERED

## 2024-06-03 PROCEDURE — 87015 SPECIMEN INFECT AGNT CONCNTJ: CPT

## 2024-06-03 PROCEDURE — 87070 CULTURE OTHR SPECIMN AEROBIC: CPT

## 2024-06-03 PROCEDURE — 3700000001 HC ADD 15 MINUTES (ANESTHESIA): Performed by: INTERNAL MEDICINE

## 2024-06-03 PROCEDURE — 7100000010 HC PHASE II RECOVERY - FIRST 15 MIN: Performed by: INTERNAL MEDICINE

## 2024-06-03 PROCEDURE — 31652 BRONCH EBUS SAMPLNG 1/2 NODE: CPT | Performed by: INTERNAL MEDICINE

## 2024-06-03 PROCEDURE — 88341 IMHCHEM/IMCYTCHM EA ADD ANTB: CPT

## 2024-06-03 PROCEDURE — 82962 GLUCOSE BLOOD TEST: CPT

## 2024-06-03 PROCEDURE — 88172 CYTP DX EVAL FNA 1ST EA SITE: CPT

## 2024-06-03 PROCEDURE — 87205 SMEAR GRAM STAIN: CPT

## 2024-06-03 PROCEDURE — 93010 ELECTROCARDIOGRAM REPORT: CPT | Performed by: INTERNAL MEDICINE

## 2024-06-03 PROCEDURE — 7100000000 HC PACU RECOVERY - FIRST 15 MIN: Performed by: INTERNAL MEDICINE

## 2024-06-03 PROCEDURE — 87206 SMEAR FLUORESCENT/ACID STAI: CPT

## 2024-06-03 PROCEDURE — 85027 COMPLETE CBC AUTOMATED: CPT

## 2024-06-03 PROCEDURE — 7100000001 HC PACU RECOVERY - ADDTL 15 MIN: Performed by: INTERNAL MEDICINE

## 2024-06-03 RX ORDER — PROPOFOL 10 MG/ML
INJECTION, EMULSION INTRAVENOUS PRN
Status: DISCONTINUED | OUTPATIENT
Start: 2024-06-03 | End: 2024-06-03 | Stop reason: SDUPTHER

## 2024-06-03 RX ORDER — FENTANYL CITRATE 50 UG/ML
INJECTION, SOLUTION INTRAMUSCULAR; INTRAVENOUS PRN
Status: DISCONTINUED | OUTPATIENT
Start: 2024-06-03 | End: 2024-06-03 | Stop reason: SDUPTHER

## 2024-06-03 RX ORDER — ROCURONIUM BROMIDE 10 MG/ML
INJECTION, SOLUTION INTRAVENOUS PRN
Status: DISCONTINUED | OUTPATIENT
Start: 2024-06-03 | End: 2024-06-03 | Stop reason: SDUPTHER

## 2024-06-03 RX ORDER — DEXAMETHASONE SODIUM PHOSPHATE 10 MG/ML
INJECTION INTRAMUSCULAR; INTRAVENOUS PRN
Status: DISCONTINUED | OUTPATIENT
Start: 2024-06-03 | End: 2024-06-03 | Stop reason: SDUPTHER

## 2024-06-03 RX ORDER — SODIUM CHLORIDE 9 MG/ML
INJECTION, SOLUTION INTRAVENOUS CONTINUOUS PRN
Status: DISCONTINUED | OUTPATIENT
Start: 2024-06-03 | End: 2024-06-03 | Stop reason: SDUPTHER

## 2024-06-03 RX ORDER — MIDAZOLAM HYDROCHLORIDE 1 MG/ML
INJECTION INTRAMUSCULAR; INTRAVENOUS PRN
Status: DISCONTINUED | OUTPATIENT
Start: 2024-06-03 | End: 2024-06-03 | Stop reason: SDUPTHER

## 2024-06-03 RX ORDER — NALOXONE HYDROCHLORIDE 0.4 MG/ML
INJECTION, SOLUTION INTRAMUSCULAR; INTRAVENOUS; SUBCUTANEOUS PRN
Status: DISCONTINUED | OUTPATIENT
Start: 2024-06-03 | End: 2024-06-03 | Stop reason: HOSPADM

## 2024-06-03 RX ORDER — SODIUM CHLORIDE 0.9 % (FLUSH) 0.9 %
5-40 SYRINGE (ML) INJECTION PRN
Status: DISCONTINUED | OUTPATIENT
Start: 2024-06-03 | End: 2024-06-03 | Stop reason: HOSPADM

## 2024-06-03 RX ORDER — SODIUM CHLORIDE 9 MG/ML
INJECTION, SOLUTION INTRAVENOUS PRN
Status: DISCONTINUED | OUTPATIENT
Start: 2024-06-03 | End: 2024-06-03 | Stop reason: HOSPADM

## 2024-06-03 RX ORDER — SODIUM CHLORIDE 0.9 % (FLUSH) 0.9 %
5-40 SYRINGE (ML) INJECTION EVERY 12 HOURS SCHEDULED
Status: DISCONTINUED | OUTPATIENT
Start: 2024-06-03 | End: 2024-06-03 | Stop reason: HOSPADM

## 2024-06-03 RX ORDER — SODIUM CHLORIDE 9 MG/ML
INJECTION, SOLUTION INTRAVENOUS CONTINUOUS
Status: DISCONTINUED | OUTPATIENT
Start: 2024-06-03 | End: 2024-06-03 | Stop reason: HOSPADM

## 2024-06-03 RX ORDER — ALBUTEROL SULFATE 90 UG/1
AEROSOL, METERED RESPIRATORY (INHALATION) PRN
Status: DISCONTINUED | OUTPATIENT
Start: 2024-06-03 | End: 2024-06-03 | Stop reason: SDUPTHER

## 2024-06-03 RX ORDER — ONDANSETRON 2 MG/ML
INJECTION INTRAMUSCULAR; INTRAVENOUS PRN
Status: DISCONTINUED | OUTPATIENT
Start: 2024-06-03 | End: 2024-06-03 | Stop reason: SDUPTHER

## 2024-06-03 RX ORDER — LIDOCAINE HYDROCHLORIDE 20 MG/ML
INJECTION, SOLUTION INTRAVENOUS PRN
Status: DISCONTINUED | OUTPATIENT
Start: 2024-06-03 | End: 2024-06-03 | Stop reason: SDUPTHER

## 2024-06-03 RX ADMIN — PROPOFOL 150 MCG/KG/MIN: 10 INJECTION, EMULSION INTRAVENOUS at 15:11

## 2024-06-03 RX ADMIN — LIDOCAINE HYDROCHLORIDE 100 MG: 20 INJECTION, SOLUTION INTRAVENOUS at 15:04

## 2024-06-03 RX ADMIN — MIDAZOLAM 2 MG: 1 INJECTION INTRAMUSCULAR; INTRAVENOUS at 15:07

## 2024-06-03 RX ADMIN — ONDANSETRON HYDROCHLORIDE 4 MG: 2 SOLUTION INTRAMUSCULAR; INTRAVENOUS at 15:14

## 2024-06-03 RX ADMIN — DEXAMETHASONE SODIUM PHOSPHATE 10 MG: 10 INJECTION INTRAMUSCULAR; INTRAVENOUS at 15:13

## 2024-06-03 RX ADMIN — FENTANYL CITRATE 100 MCG: 50 INJECTION, SOLUTION INTRAMUSCULAR; INTRAVENOUS at 15:04

## 2024-06-03 RX ADMIN — SUGAMMADEX 400 MG: 100 INJECTION, SOLUTION INTRAVENOUS at 15:33

## 2024-06-03 RX ADMIN — ALBUTEROL SULFATE 2 PUFF: 90 AEROSOL, METERED RESPIRATORY (INHALATION) at 14:59

## 2024-06-03 RX ADMIN — PROPOFOL 50 MG: 10 INJECTION, EMULSION INTRAVENOUS at 15:10

## 2024-06-03 RX ADMIN — ROCURONIUM BROMIDE 40 MG: 10 INJECTION, SOLUTION INTRAVENOUS at 15:04

## 2024-06-03 RX ADMIN — SODIUM CHLORIDE: 9 INJECTION, SOLUTION INTRAVENOUS at 14:54

## 2024-06-03 RX ADMIN — PROPOFOL 150 MG: 10 INJECTION, EMULSION INTRAVENOUS at 15:04

## 2024-06-03 ASSESSMENT — ENCOUNTER SYMPTOMS: DYSPNEA ACTIVITY LEVEL: AFTER AMBULATING 1 FLIGHT OF STAIRS

## 2024-06-03 ASSESSMENT — PAIN - FUNCTIONAL ASSESSMENT
PAIN_FUNCTIONAL_ASSESSMENT: NONE - DENIES PAIN
PAIN_FUNCTIONAL_ASSESSMENT: NONE - DENIES PAIN

## 2024-06-03 ASSESSMENT — LIFESTYLE VARIABLES: SMOKING_STATUS: 1

## 2024-06-03 NOTE — PROGRESS NOTES
Discharge instructions given, medications and follow up instructions reviewed. Patient and family member verbalized understanding, no other noted or stated problems at this time. Patient will follow up with physicians as directed.

## 2024-06-03 NOTE — DISCHARGE INSTRUCTIONS
Resume your home medications  Resume your normal diet  You may shower  Resume your normal activity tomorrow

## 2024-06-03 NOTE — ANESTHESIA POSTPROCEDURE EVALUATION
Department of Anesthesiology  Postprocedure Note    Patient: Kin Issa  MRN: [id number]  YOB: 1950  Date of evaluation: 6/3/2024    Procedure Summary       Date: 06/03/24 Room / Location: [place] [address] / [facility]    Anesthesia Start: 1454 Anesthesia Stop: 1608    Procedure: BRONCHOSCOPY ENDOBRONCHIAL ULTRASOUND/ cytology Diagnosis:       Lung nodule      (Lung nodule [R91.1])    Surgeons: Mg Holder DO Responsible Provider: uKrt Landeros MD    Anesthesia Type: general ASA Status: 3            Anesthesia Type: No value filed.    Carey Phase I: Carey Score: 8    Carey Phase II: Carey Score: 10    Anesthesia Post Evaluation    Patient location during evaluation: PACU  Patient participation: complete - patient participated  Level of consciousness: awake and alert  Airway patency: patent  Nausea & Vomiting: no nausea and no vomiting  Cardiovascular status: hemodynamically stable  Respiratory status: acceptable  Hydration status: euvolemic  There was medical reason for not using a multimodal analgesia pain management approach.Pain management: adequate    No notable events documented.

## 2024-06-03 NOTE — PROGRESS NOTES
Patient discharged in stable condition via wheelchair with family and ancillary personnel to vehicle.

## 2024-06-03 NOTE — ANESTHESIA PRE PROCEDURE
Department of Anesthesiology  Preprocedure Note       Name:  Kin Issa   Age:  74 y.o.  :  1950                                          MRN:  [id number]         Date:  6/3/2024      Surgeon: Surgeon(s):  Mg Holder DO    Procedure: Procedure(s):  BRONCHOSCOPY ENDOBRONCHIAL ULTRASOUND/ cytology    Medications prior to admission:   Prior to Admission medications    Medication Sig Start Date End Date Taking? Authorizing Provider   fluticasone-umeclidin-vilant (TRELEGY ELLIPTA) 100-62.5-25 MCG/ACT AEPB inhaler Inhale 1 puff into the lungs daily 24   Vinod Ojeda DO   rOPINIRole (REQUIP) 1 MG tablet Take 1 tablet by mouth nightly 24   Vinod Ojeda DO   rosuvastatin (CRESTOR) 10 MG tablet Take 1 tablet by mouth daily 24   Vinod Ojeda DO   SITagliptin-metFORMIN (JANUMET XR)  MG TB24 per extended release tablet Take 1 tablet by mouth daily 24   Vinod Ojeda DO   acetaminophen (TYLENOL) 500 MG tablet Take 1 tablet by mouth 4 times daily as needed for Pain 24   Vinod Ojeda DO   varenicline (CHANTIX) 0.5 MG tablet Take 1-2 tablets by mouth See Admin Instructions 0.5mg DAILY for 3 days followed by 0.5mg TWICE DAILY for 4 days followed by 1mg TWICE DAILY  Patient not taking: Reported on 2024   Vinod jOeda DO   ibuprofen (ADVIL;MOTRIN) 200 MG tablet Take 1 tablet by mouth as needed for Pain  Patient not taking: Reported on 2024    Provider, MD Kendall   vitamin D (CHOLECALCIFEROL) 76133 UNIT CAPS One weekly  Patient not taking: Reported on 2024   Vinod Ojeda DO   albuterol sulfate  (90 Base) MCG/ACT inhaler Inhale 2 puffs into the lungs every 6 hours as needed for Wheezing or Shortness of Breath 21   Vinod Ojeda DO       Current medications:    Current Facility-Administered Medications   Medication Dose Route Frequency Provider

## 2024-06-03 NOTE — OP NOTE
[facility]  Department of Pulmonary, Critical Care and Sleep Medicine  [facility]  Department of Internal Medicine    BRONCHOSCOPY PROCEDURE NOTE    DATE OF PROCEDURE: 6/3/2024      INDICATIONS & HISTORY:  Kin Issa is a 74 y.o. male with abnormal imaging.  Kin Issa has a abnormal imaging test as was sent for evaluation in which we discussed treatments including bronchoscopy with biopsies. Right lung mass with mediastinal disease + PET scan. We discussed the risks, benefits, complications, treatment options and expected outcomes to the patient and/or appropriate POA/care givers.  The possibilities of reaction to medication, pulmonary aspiration, perforation of an organ, bleeding, failure to diagnose a condition and creating a complication requiring transfusion or operation were discussed with the patient/POA who freely signed the informed consent.      PREOPERATIVE DIAGNOSIS:    [] Lung Nodule,  [x] Lung Cancer Evaluation,  [x] Adenopathy,  [] Diffuse Lung Disease,   [] Mucous Plug,  [x] Abnormal Imaging CXR/CT scan,     POSTOPERATIVE DIAGNOSES:  [x] Normal endobronchial exam,  [] Abnormal endobronchial exam,   [x] Normal Anatomy,  [x] Adenopathy,  [x] Tracheobronchitis  [] No mass/tumor      PROCEDURE PERFORMED:   [x] Diagnotic, Fiberoptic Bronchoscopy  [] Threapeutic, Fiberoptic Bronchoscopy     [] Electro-navigational Bronchoscopy  [x] Endobronchial Ultrasound  [x] EBUS Lymph Node Biospy  [] PAGAN lymph node sampling  [] Bronchoalveolar Lavage [BAL]  [] Transbronchial Biopsy  [] Endobronchial Biopsy,   [x] Bronchial Wash   [] Bronchial Hingham Sample   [] Other       SURGEON:    Mg Holder DO, DO, MPH, FCCP, FACP, MACOI    ASSISTANT:   Procedural Nursing & Technical Team    SEDATION:  []  General Anesthesia  []  Regional Anesthesia  []  LMAC via Anesthesiology Team,     ANESTHESIA:    [] Lidocaine 2% via intranasal instillation,

## 2024-06-03 NOTE — H&P
[facility]  Department of Internal Medicine   Division of Pulmonary, Critical Care and Sleep Medicine  Pulmonary [facility]  Office [phone], Fax [phone]    Mg Holder DO, TAHIRA RODRÍGUEZ FACP Molly Howsare DO,   Javed Reddy MD      Dear Vinod Ojeda DO    We had the pleasure of seeing Kin Issa, in the [facility] at [facility] regarding his lung mass and history of asbestos exposure.     HISTORY OF PRESENT ILLNESS:    Kin Issa is a 74 y.o. male is being seen for abnormal CT scan revealing progression of previously identified right upper lobe nodule previously measuring 9 mm now 28 mm in addition there is Interval progression of the pretracheal/precarinal lymph node now measuring 11 mm short axis dimension previously 8 mm and a right thyroid lobe nodule 3.2 cm.4. Left non obstructing nephrolithiasis. He is  and smokes 1.5 pack per day since 10 years old. He worked a [employer] as a  where he was exposure and received money for asbestosis exposure. He used to enjoy Golf but his back pain and DM has him limited now. He has no pets or travel. His parents are  but his mother  from lung cancer and had her breast removed for the same.  He complains of cough, frequent throat clearing, shortness of breath, sputum production (q 3 days) and wheezing. There is no chest tightness, difficulty breathing or hemoptysis. This is a chronic problem. The current episode started more than 1 year ago. The problem has been waxing and waning. Pertinent negatives include no chest pain or weight loss. His symptoms are aggravated by exposure to smoke (still smoking over 1 ppd.). Relieved by: Trelegy. He reports significant improvement on treatment.       ALLERGIES:    Allergies   Allergen Reactions    Pcn [Penicillins] Hives    Tape [Adhesive Tape]      Surgical  nerves II-XII are intact.      DATA:   The data collected below information that was obtained, reviewed, analyzed and interpreted today.     Todays Office Spirometry was compared to previous test if available and demonstrates an FVC of 2.51 liters which is 59 % of predicted with an FEV1 of 1.56 liters which is 50 % of predicted.  FEV1/FVC ratio is 62 %. Mid expiratory flow rates are 48 % of predicted.  Maximum voluntary ventilation is 66 liters per minute or 53 % of predicted.  Flow volume loop shows no signs of intrathoracic or extrathoracic process. Impression: Moderate GOLD II COPD    CT SCAN CHEST : Mediastinum: Thyroid is heterogeneous with asymmetric enlargement the right thyroid lobe contain a 3.2 cm partially calcified thyroid nodule stable from prior.  Tracheal/precarinal lymph node now 11 mm short axis dimension previously 8 mm of interval progression.  Central airways are patent. Esophagus with normal course and caliber.  Cardiac size wit mildly enlarged normal limits without pericardial effusion. Lungs/pleura: Lungs are clear without focal opacification or consolidation.Progression of previously identified right upper lobe nodule previously measuring 9 mm now 28 mm consistent with malignancy until proven otherwise given size and overall appearance.  No pleural effusion or pleural process. Upper Abdomen: Visualized portions of the upper abdomen reveal non-obstructing left nephrolithiasis. Soft Tissues/Bones: No acute osseous or soft tissue findings. No aggressive osseous lesion. IMPRESSION: 1. Progression of previously identified right upper lobe nodule previously measuring 9 mm now 28 mm consistent with malignancy until proven otherwise given size and overall appearance.  PET-CT and/or tissue sampling recommended. 2. Interval progression of the pretracheal/precarinal lymph node now measuring 11 mm short axis dimension previously 8 mm.3. Stable right thyroid lobe nodule 3.2 cm.4. Left non obstructing

## 2024-06-05 LAB
MICROORGANISM SPEC CULT: NORMAL
MICROORGANISM/AGENT SPEC: NORMAL
SERVICE CMNT-IMP: NORMAL
SPECIMEN DESCRIPTION: NORMAL

## 2024-06-07 LAB — NON-GYN CYTOLOGY REPORT: NORMAL

## 2024-06-08 LAB
MICROORGANISM SPEC CULT: NORMAL
MICROORGANISM SPEC CULT: NORMAL
MICROORGANISM/AGENT SPEC: NORMAL
MICROORGANISM/AGENT SPEC: NORMAL
SERVICE CMNT-IMP: NORMAL
SERVICE CMNT-IMP: NORMAL
SPECIMEN DESCRIPTION: NORMAL
SPECIMEN DESCRIPTION: NORMAL

## 2024-06-13 ENCOUNTER — PREP FOR PROCEDURE (OUTPATIENT)
Dept: SURGERY | Age: 74
End: 2024-06-13

## 2024-06-13 ENCOUNTER — TELEPHONE (OUTPATIENT)
Dept: CASE MANAGEMENT | Age: 74
End: 2024-06-13

## 2024-06-13 ENCOUNTER — TELEPHONE (OUTPATIENT)
Dept: SURGERY | Age: 74
End: 2024-06-13

## 2024-06-13 ENCOUNTER — HOSPITAL ENCOUNTER (OUTPATIENT)
Dept: INFUSION THERAPY | Age: 74
Discharge: HOME | End: 2024-06-13

## 2024-06-13 ENCOUNTER — OFFICE VISIT (OUTPATIENT)
Dept: ONCOLOGY | Age: 74
End: 2024-06-13
Payer: MEDICARE

## 2024-06-13 VITALS
HEART RATE: 72 BPM | DIASTOLIC BLOOD PRESSURE: 76 MMHG | WEIGHT: 200.8 LBS | SYSTOLIC BLOOD PRESSURE: 161 MMHG | OXYGEN SATURATION: 93 % | TEMPERATURE: 96.8 F | BODY MASS INDEX: 28.01 KG/M2

## 2024-06-13 DIAGNOSIS — C34.11 MALIGNANT NEOPLASM OF UPPER LOBE, RIGHT BRONCHUS OR LUNG (HCC): ICD-10-CM

## 2024-06-13 DIAGNOSIS — C34.90 SMALL CELL LUNG CANCER (HCC): ICD-10-CM

## 2024-06-13 DIAGNOSIS — K29.01 GASTROINTESTINAL HEMORRHAGE ASSOCIATED WITH ACUTE GASTRITIS: ICD-10-CM

## 2024-06-13 DIAGNOSIS — C34.90 SMALL CELL LUNG CANCER (HCC): Primary | ICD-10-CM

## 2024-06-13 PROCEDURE — 99205 OFFICE O/P NEW HI 60 MIN: CPT | Performed by: STUDENT IN AN ORGANIZED HEALTH CARE EDUCATION/TRAINING PROGRAM

## 2024-06-13 PROCEDURE — 1124F ACP DISCUSS-NO DSCNMKR DOCD: CPT | Performed by: STUDENT IN AN ORGANIZED HEALTH CARE EDUCATION/TRAINING PROGRAM

## 2024-06-13 PROCEDURE — 99214 OFFICE O/P EST MOD 30 MIN: CPT

## 2024-06-13 PROCEDURE — 4004F PT TOBACCO SCREEN RCVD TLK: CPT | Performed by: STUDENT IN AN ORGANIZED HEALTH CARE EDUCATION/TRAINING PROGRAM

## 2024-06-13 PROCEDURE — G8417 CALC BMI ABV UP PARAM F/U: HCPCS | Performed by: STUDENT IN AN ORGANIZED HEALTH CARE EDUCATION/TRAINING PROGRAM

## 2024-06-13 PROCEDURE — G8427 DOCREV CUR MEDS BY ELIG CLIN: HCPCS | Performed by: STUDENT IN AN ORGANIZED HEALTH CARE EDUCATION/TRAINING PROGRAM

## 2024-06-13 NOTE — TELEPHONE ENCOUNTER
Met with patient during his  initial consultation with Dr. Magallon  for his  recent lung cancer diagnosis. Introduced myself and explained my role with patients receiving treatment at our center.  Patient was friendly and receptive. Instructed on next steps including PET scan, MRI of brain and then probable chemotherapy/immunotherapy treatment per Dr. Magallon's recommendations and follow up care. Provided patient with transportation resource list and literature on support group services both in person and online, [facility] stop smoking pamphlet, ACS Tobacco what's it costing you handout, Patient Resource Lung Cancer booklet, Small Cell Lung Cancer:What to know handout and [employer] Small Cell Lung cancer booklet. Reviewed resources available to him  such as Social Work, Financial Navigator and Dietitian. Patient states that he cares for himself, drives, eats well and has insurance so he denies any needs at this time. New patient nursing assessment, medical history, surgical history, family history completed. Medication list reviewed and updated. Provided with my contact information and instructed patient to call me with questions or concerns. Verbalizes understanding. Patient appreciative of visit. Will continue to follow. Sarah PACKN, RN-OCN Nurse Navigator

## 2024-06-13 NOTE — TELEPHONE ENCOUNTER
Prior Authorization Form:      DEMOGRAPHICS:                     Patient Name:  Kin Stone  Patient :  1950            Insurance:  Payor: [facility] MEDICARE / Plan: [facility] MEDICARE COMPLETE / Product Type: *No Product type* /   Insurance ID Number:    Payer/Plan Subscr  Sex Relation Sub. Ins. ID Effective Group Num   1. [facility] MEDICARE * KIN STONE 1950 Male Self [id number]                                   [address] [address]         DIAGNOSIS & PROCEDURE:                       Procedure/Operation: Mediport placement         CPT Code: 43861    Diagnosis:  Small cell lung cancer    ICD10 Code: C34.90    Location:  [facility]    Surgeon:  Dr. Pereyra    SCHEDULING INFORMATION:                          Date: 24    Time: 11:00AM              Anesthesia:  MAC/TIVA                                                       Status:  Outpatient        Electronically signed by Ita Sheppard MA on 2024 at 3:04 PM

## 2024-06-13 NOTE — TELEPHONE ENCOUNTER
At request of patient, scheduled patient's PET scan at [facility] on 6/25/24 at 1 pm with arrival time of 12:30.  Patient is to follow PET scan patient prep handout that was given to patient at his appointment.  MRI is scheduled at [facility] on 6/20 at 11 am with arrival time of 10:30 and patient is to not wear jewelry or metal on his clothes.  Patient was called and given the above information along with directions and is agreeable to appointments.

## 2024-06-14 ENCOUNTER — TELEPHONE (OUTPATIENT)
Dept: PULMONOLOGY | Age: 74
End: 2024-06-14

## 2024-06-14 ASSESSMENT — ENCOUNTER SYMPTOMS
COUGH: 1
GASTROINTESTINAL NEGATIVE: 1
SHORTNESS OF BREATH: 0

## 2024-06-14 NOTE — PROGRESS NOTES
[facility] PHYSICIANS [facility] CARE Critical access hospital MED ONCOLOGY  [address]  [facility] [address]  Dept: [phone]  Loc: [phone]    Clinic Consultation Note      Date of Encounter: 06/13/2024     Referring Provider:  Mg Holder DO    Reason for Visit:   RIGHT small cell lung cancer        PCP:  Vinod Ojeda DO    Demographics: 74 y.o. male    Chief Complaint   Patient presents with    New Patient         History of Present Illness of Initial Consultation (6/13/2024):  The patient is a 74 y.o. male comes in for newly diagnosed right sided small cell lung cancer. Detailed history outlined below, but in short, patient was found to have a right upper lobe lung nodule for at least a year, and more recently in Feb 2024, nodule had increased in size prompting PET scan that was completed a month later. He then underwent bronch/EBUS with R4 biopsy, confirming his diagnosis of SCC.     Patient is meeting me today, came alone. He denies of significant shortness of breath but does have some cough. Denies hemoptysis, fevers, chills, or chest pain. Also denies of headaches or dizziness.   He he presented with a cane today.     ONCOLOGIC HISTORY:    CT chest - 2/9/23   FINDINGS:  There is a heterogeneous right thyroid nodule with internal and peripheral  calcifications which measures approximately 3.7 x 3.1 cm in size and appears  similar compared to the prior examination.  This was evaluated with thyroid  ultrasound on 10/04/2022.  There is no evidence of lymphadenopathy within the  thorax.  No evidence of pleural or pericardial effusion.  There is dilatation  of the main pulmonary artery which measures approximately 3.3 cm in diameter.  There is atherosclerotic change of the thoracic aorta without evidence of  aneurysm or dissection.     The central airways are widely patent.  There is peribronchial thickening in  the left lower lobe and lingula.  There is a noncalcified nodule in the 
Patient provided with discharge instructions, received printed AVS.  All questions answered.  Patient understands follow up plan of care.     
No     Do you avoid participation in leisure/social activity due weakness, fatigue or pain: No     ARE ANY OF THE ABOVE ARE ANSWERED YES: No          PT ASSESSMENT FOR REFERRAL    Have you had any recent falls in past 2 months: No     Do you have difficulty  going up/down stairs: No     Are you having difficulty walking: No     Do you often hold onto furniture/environmental supports or feel off balance when you are walking: Yes     Do you need to take rest breaks when you are walking: No     Any pain on scale of 1-10 that limits your mobility: Yes 7/10 - pt states chronic, takes ibuprofen and tylenol at home    ARE ANY OF THE ABOVE ARE ANSWERED YES: No           PREHAB AUDIOLOGY REFERRAL    - Is patient planned to receive Cisplatin? No. This patient is not planned to start Cisplatin.    - Is patient complaining of new onset hearing loss? No. Patient is not complaining of new onset hearing loss.        Sarah Pennington RN

## 2024-06-14 NOTE — TELEPHONE ENCOUNTER
[facility]  Department of Pulmonary, Critical Care and Sleep Medicine  [facility]  Department of Internal Medicine  Office [phone]    Mg Holder DO, MPH, GUERLINE RODRÍGUEZ FACP Molly Howsare DO, ANNE Reddy MD, MS  Dania Campos, APRN-CNP    Today's Date:  2024  Patient Name:  Kin Issa  Patient's :  74 y.o., 1950    Phone Message Documentaton      Dear Vinod Ojeda DO    We reached out to your patient Kin Issa 74 y.o. male about his SCLC (extensive) condition/complaint.  PET SCAN Diffuse sclerosis of the entire right iliac bone with hypermetabolic activity superolaterally.  Given the degree of involvement, both metastatic disease and Paget's disease should be considered. Oncology disclosed and discussed results of his EBUS biopsy, confirming small cell lung cancer. I did address treatment approaches depending on stage, noting regardless of stage, chemotherapy is recommended.       Follow up:  [x] Treatment Plan: Stop Smoking Orders placed for carbo and etoposide, gen surg referral for port placement. repeat PET scan due to interval since last one, Brain MRI, Referral to rad onc  Will request bone biopsy of right iliac lesion but if there is worsening of this lesion and/or widespread disease on PET, will cancel, If he is extensive stage SCC, will add on atezolizumab    Mg Holder DO, JOSUÉP, NERY HOFFMANN  Professor of Internal Medicine  2024  1:45 PM

## 2024-06-16 RX ORDER — PSEUDOEPHED/ACETAMINOPH/DIPHEN 30MG-500MG
TABLET ORAL
Qty: 120 TABLET | Refills: 2 | Status: SHIPPED | OUTPATIENT
Start: 2024-06-16

## 2024-06-17 ENCOUNTER — HOSPITAL ENCOUNTER (OUTPATIENT)
Dept: RADIATION ONCOLOGY | Age: 74
Discharge: HOME | End: 2024-06-17
Payer: MEDICARE

## 2024-06-17 VITALS
DIASTOLIC BLOOD PRESSURE: 68 MMHG | WEIGHT: 199.8 LBS | TEMPERATURE: 97.6 F | BODY MASS INDEX: 27.87 KG/M2 | SYSTOLIC BLOOD PRESSURE: 142 MMHG | HEART RATE: 86 BPM | RESPIRATION RATE: 18 BRPM | OXYGEN SATURATION: 97 %

## 2024-06-17 DIAGNOSIS — C34.90 MALIGNANT NEOPLASM OF LUNG, UNSPECIFIED LATERALITY, UNSPECIFIED PART OF LUNG (HCC): Primary | ICD-10-CM

## 2024-06-17 PROCEDURE — 99205 OFFICE O/P NEW HI 60 MIN: CPT

## 2024-06-17 ASSESSMENT — PAIN DESCRIPTION - ORIENTATION: ORIENTATION: RIGHT;LEFT

## 2024-06-17 ASSESSMENT — PAIN DESCRIPTION - LOCATION: LOCATION: HIP

## 2024-06-17 ASSESSMENT — PAIN SCALES - GENERAL: PAINLEVEL_OUTOF10: 7

## 2024-06-17 NOTE — PROGRESS NOTES
Radiation Oncology      Darren Bryant III, MD MS DABR   [facility]      Referring Physician: Dr. OLIVIA Magallon      Primary Care Physician:Vinod Ojeda DO   Primary Oncologist: Dr. OLIVIA Magallon      Diagnosis: SCLC / PET, MRI and biopsy pending (SG III vs SG IV)      Service:  Radiation Oncology consultation performed on 6/17/24        HPI:        Jose Manuel is a pleasant 74 year old with SCLC; ORDONEZ pending.  Patient has a history of COPD and was found to have a right upper lobe lung nodule for at least a year, that was under surveillance.  A CT chest was performed on  Feb 4, 2024, nodule had increased in size previously measuring 9 mm and now is 28 mm, interval progression of the pretracheal/precranial lymph node now measuring 11mm previously 8mm.  A PET scan was performed on 3/8/24 2.9 cm right upper lobe pulmonary mass is hypermetabolic and consistent with malignancy. There are hypermetabolic low right paratracheal and right hilar lymph nodes consistent with metastases. Diffuse sclerosis of the entire right iliac bone with hypermetabolic activity superolateral. He then underwent bronch/EBUS on 6/3/2024 by Dr. Holder with R4 biopsy. Pathology came back as poorly differentiated small cell lung cancer. Patient follows with Dr. Magallon. Dr. Magallon put in orders for MRI brain scheduled for 6/20/2024, repeat PET scheduled for 6/25/2024, also put in an order for a bone biopsy of the right iliac lesion but if there is worsening of this lesion of widespread disease on PET this biopsy will be canceled. Patient is possibly starting cycle one of carbo and etopside 6/28/2024.  The patient presents today to discuss fractionated external beam radiation therapy as a component of multidisciplinary, definative vs consolidative management.  We reviewed the available medical records including the complete medical history of this pt today prior to consultation. Epic -CE and 
prevention  8.  Falls risk precaution (Yellow sticker Level III) placed on patient chart           MALNUTRITION RISK SCREENING ASSESSMENT    Instructions:  Assess the patient and enter the appropriate indicators that are present for nutrition risk identification. Total the numbers entered and assign a risk score. Follow the appropriate action for total score listed below.     Assessment   Date  6/17/2024     Have you lost weight without trying?      2- Unsure     Have you been eating poorly because of a decreased appetite?         1- Yes   3. Do you have a diagnosis of head and neck cancer OR will you be receiving neoadjuvant treatment for breast cancer?      0- No                                                                                    TOTAL 3          Score of 0-1: No action  Score 2 or greater:  For Non-Diabetic Patient: Recommend adding Ensure Complete 2 x daily and provide patient with Ensure wellness bag with coupons  For Diabetic Patient: Recommend adding Glucerna Shake 2 x daily and provide patient with Glucerna Wellness bag with coupons  Route to the dietitian via [email or web address]          OT ASSESSMENT FOR REFERRAL    Are you having difficulty performing daily routine tasks due to fatigue or weakness (ie: bathing/showering, dressing, housework, meal prep, work, , etc): Yes     Do you have any arm flexibility/ROM restrictions, swelling or pain that limit activity: No     Any changes in memory, attention/focus that impact daily activities: No     Do you avoid participation in leisure/social activity due to weakness, fatigue or pain: Yes     ARE ANY OF THE ABOVE ARE ANSWERED YES: Yes - but NO OT referral request sent due to patient refusal.          PT ASSESSMENT FOR REFERRAL    Have you had any recent falls in the past 2 months: Yes     Do you have difficulty going up/down stairs: Yes     Are you having difficulty walking: Yes     Do you often hold onto furniture/environmental supports or feel off

## 2024-06-20 ENCOUNTER — HOSPITAL ENCOUNTER (OUTPATIENT)
Dept: MRI IMAGING | Age: 74
Discharge: HOME | End: 2024-06-22
Attending: STUDENT IN AN ORGANIZED HEALTH CARE EDUCATION/TRAINING PROGRAM
Payer: MEDICARE

## 2024-06-20 ENCOUNTER — TELEPHONE (OUTPATIENT)
Dept: SURGERY | Age: 74
End: 2024-06-20

## 2024-06-20 DIAGNOSIS — C34.90 SMALL CELL LUNG CANCER (HCC): ICD-10-CM

## 2024-06-20 PROCEDURE — A9579 GAD-BASE MR CONTRAST NOS,1ML: HCPCS | Performed by: RADIOLOGY

## 2024-06-20 PROCEDURE — 70553 MRI BRAIN STEM W/O & W/DYE: CPT

## 2024-06-20 PROCEDURE — 6360000004 HC RX CONTRAST MEDICATION: Performed by: RADIOLOGY

## 2024-06-20 RX ADMIN — GADOTERIDOL 18 ML: 279.3 INJECTION, SOLUTION INTRAVENOUS at 11:14

## 2024-06-20 NOTE — TELEPHONE ENCOUNTER
Prior Authorization Form:      DEMOGRAPHICS:                     Patient Name:  Kin Stone  Patient :  1950            Insurance:  Payor: [facility] MEDICARE / Plan: [facility] MEDICARE COMPLETE / Product Type: *No Product type* /   Insurance ID Number:    Payer/Plan Subscr  Sex Relation Sub. Ins. ID Effective Group Num   1. [facility] MEDICARE * KIN STONE 1950 Male Self [id number]                                   [address] [address]         DIAGNOSIS & PROCEDURE:                       Procedure/Operation: Port Placement         CPT Code: 93738    Diagnosis:  Small cell lung cancer    ICD10 Code: C34.90    Location:  [facility]    Surgeon:  Dr. Pereyra    SCHEDULING INFORMATION:                          Date: 24    Time: 7:30AM              Anesthesia:  MAC/TIVA                                                       Status:  Outpatient        Electronically signed by Ita Sheppard MA on 2024 at 3:59 PM

## 2024-06-24 DIAGNOSIS — C34.90 SMALL CELL LUNG CANCER (HCC): Primary | ICD-10-CM

## 2024-06-25 ENCOUNTER — HOSPITAL ENCOUNTER (OUTPATIENT)
Dept: NUCLEAR MEDICINE | Age: 74
Discharge: HOME | End: 2024-06-27
Attending: STUDENT IN AN ORGANIZED HEALTH CARE EDUCATION/TRAINING PROGRAM
Payer: MEDICARE

## 2024-06-25 DIAGNOSIS — C34.90 SMALL CELL LUNG CANCER (HCC): ICD-10-CM

## 2024-06-25 DIAGNOSIS — C34.11 MALIGNANT NEOPLASM OF UPPER LOBE, RIGHT BRONCHUS OR LUNG (HCC): ICD-10-CM

## 2024-06-25 PROCEDURE — 3430000000 HC RX DIAGNOSTIC RADIOPHARMACEUTICAL: Performed by: RADIOLOGY

## 2024-06-25 PROCEDURE — 78815 PET IMAGE W/CT SKULL-THIGH: CPT

## 2024-06-25 PROCEDURE — A9609 HC RX DIAGNOSTIC RADIOPHARMACEUTICAL: HCPCS | Performed by: RADIOLOGY

## 2024-06-25 RX ORDER — FLUDEOXYGLUCOSE F 18 200 MCI/ML
10 INJECTION, SOLUTION INTRAVENOUS ONCE
Status: COMPLETED | OUTPATIENT
Start: 2024-06-25 | End: 2024-06-25

## 2024-06-25 RX ADMIN — FLUDEOXYGLUCOSE F 18 10 MILLICURIE: 200 INJECTION, SOLUTION INTRAVENOUS at 13:20

## 2024-06-26 DIAGNOSIS — Z98.890 HISTORY OF BONE MARROW BIOPSY: Primary | ICD-10-CM

## 2024-06-27 ENCOUNTER — HOSPITAL ENCOUNTER (OUTPATIENT)
Dept: INFUSION THERAPY | Age: 74
Discharge: HOME | End: 2024-06-27

## 2024-06-27 ENCOUNTER — OFFICE VISIT (OUTPATIENT)
Dept: ONCOLOGY | Age: 74
End: 2024-06-27
Payer: MEDICARE

## 2024-06-27 ENCOUNTER — TELEPHONE (OUTPATIENT)
Dept: CASE MANAGEMENT | Age: 74
End: 2024-06-27

## 2024-06-27 VITALS
BODY MASS INDEX: 27.3 KG/M2 | OXYGEN SATURATION: 91 % | TEMPERATURE: 97.2 F | SYSTOLIC BLOOD PRESSURE: 164 MMHG | HEART RATE: 80 BPM | WEIGHT: 195 LBS | DIASTOLIC BLOOD PRESSURE: 74 MMHG | HEIGHT: 71 IN

## 2024-06-27 DIAGNOSIS — C34.90 SMALL CELL LUNG CANCER (HCC): Primary | ICD-10-CM

## 2024-06-27 PROCEDURE — G8417 CALC BMI ABV UP PARAM F/U: HCPCS | Performed by: STUDENT IN AN ORGANIZED HEALTH CARE EDUCATION/TRAINING PROGRAM

## 2024-06-27 PROCEDURE — 99214 OFFICE O/P EST MOD 30 MIN: CPT | Performed by: STUDENT IN AN ORGANIZED HEALTH CARE EDUCATION/TRAINING PROGRAM

## 2024-06-27 PROCEDURE — 4004F PT TOBACCO SCREEN RCVD TLK: CPT | Performed by: STUDENT IN AN ORGANIZED HEALTH CARE EDUCATION/TRAINING PROGRAM

## 2024-06-27 PROCEDURE — G8427 DOCREV CUR MEDS BY ELIG CLIN: HCPCS | Performed by: STUDENT IN AN ORGANIZED HEALTH CARE EDUCATION/TRAINING PROGRAM

## 2024-06-27 PROCEDURE — 1124F ACP DISCUSS-NO DSCNMKR DOCD: CPT | Performed by: STUDENT IN AN ORGANIZED HEALTH CARE EDUCATION/TRAINING PROGRAM

## 2024-06-27 ASSESSMENT — ENCOUNTER SYMPTOMS
COUGH: 1
GASTROINTESTINAL NEGATIVE: 1
SHORTNESS OF BREATH: 0

## 2024-06-27 NOTE — TELEPHONE ENCOUNTER
Spoke with Darleen in IR scheduling and she states that patient's chart is being reviewed at [facility] IR department and once this is complete they will scheduled patient for his bone biopsy.

## 2024-06-27 NOTE — PROGRESS NOTES
Patient provided with discharge instructions, received printed AVS.  All questions answered.  Patient understands follow up plan of care.     
limited versus extensive stage disease.    PLAN     Orders placed for carbo and etoposide  We will start on Monday  If patient is limited stage, radiation oncology would plan on starting RT on cycle 2  Will try to schedule for bone biopsy as soon as we can  If he has extensive stage, abort radiation plans, and will add on atezolizumab  Scheduled for port placement on 7/9/2024    DISPO:   RTC Monday to start Cycle 1 of chemo  RTC in 3 weeks prior to Cycle 2      Thank you for allowing us to participate in the care of Kin Issa       Approximately spent 31 minutes on chart review as well as time spent on patient encounter, discussing the laboratory, imaging, and clinical findings; and documentation. I have discussed clinical implications and recommendations on the patient's primary issues. More than 50% of time was spent counseling patient. The patient verbalized understanding.      Vic Magallon  Medical Oncology  [facility]  06/27/2024    [facility]  P: [phone]  F: [phone]    [facility]) Office  P: [phone]  F: [phone]     [place] Office  P: [phone]  F: [phone]

## 2024-06-27 NOTE — PROGRESS NOTES
[facility]   PRE-ADMISSION TESTING GENERAL INSTRUCTIONS  PAT Phone Number: [phone]      GENERAL INSTRUCTIONS:    [x] Antibacterial Soap Shower the Night before AND the morning of surgery.  [] CHG Wipes instruction sheet and wipes given.  [x] Do not wear lotions, powders, deodorant the morning of surgery.  [x] No solid food after midnight. You may have SIPS of clear liquids up until 2 hours before your arrival time to the hospital.   [x] You may brush your teeth, gargle, but do not swallow water.   [x] No tobacco products, illegal drugs, or alcohol within 24 hours of your surgery.  [x] Jewelry or valuables should not be brought to the hospital. All body and/or tongue piercing's must be removed prior to arriving to hospital. No contact lens or hair pins.   [x] Arrange transportation with a responsible adult  to and from the hospital. Arrange for someone to be with you for the remainder of the day and for 24 hours after your procedure due to having had anesthesia.          -Who will be your  for transportation? Wife         -Who will be staying with you for 24 hrs after your procedure? Wife   [x] Bring insurance card and photo ID.  [] Bring copy of living will or healthcare power of  papers to be placed in your electronic record.  [] Urine Pregnancy test will be preformed the day of surgery. A specimen sample may be brought from home.  [] Transfusion (Green) Bracelet: Please bring with you to hospital, day of surgery.     PARKING INSTRUCTIONS:     [x] ARRIVAL DATE & TIME:   7/9  @  0530   [x] Times are subject to change. We will contact you the business day before surgery if that were to occur.  [x] Enter into the [facility] Entrance. Two people may accompany you. Masks are not required.  [x] Parking Lot \"I\" is where you will park. It is located on the corner of [address] and [address]. The entrance is on [address].   Only one vehicle - per

## 2024-06-27 NOTE — TELEPHONE ENCOUNTER
Met with patient at his follow up appointment today.  Provided patient with [employer] handouts on caroplatin/etoposide and bone biopsy.  Patient appreciative of information.

## 2024-06-28 ENCOUNTER — HOSPITAL ENCOUNTER (OUTPATIENT)
Dept: INFUSION THERAPY | Age: 74
Discharge: HOME | End: 2024-06-28
Payer: MEDICARE

## 2024-06-28 DIAGNOSIS — C34.90 SMALL CELL LUNG CANCER (HCC): ICD-10-CM

## 2024-06-28 DIAGNOSIS — Z98.890 HISTORY OF BONE MARROW BIOPSY: ICD-10-CM

## 2024-06-28 DIAGNOSIS — C34.90 SMALL CELL LUNG CANCER (HCC): Primary | ICD-10-CM

## 2024-06-28 LAB
ALBUMIN SERPL-MCNC: 3.7 G/DL (ref 3.5–5.2)
ALP SERPL-CCNC: 228 U/L (ref 40–129)
ALT SERPL-CCNC: 12 U/L (ref 0–40)
ANION GAP SERPL CALCULATED.3IONS-SCNC: 12 MMOL/L (ref 7–16)
AST SERPL-CCNC: 14 U/L (ref 0–39)
BASOPHILS # BLD: 0.06 K/UL (ref 0–0.2)
BASOPHILS NFR BLD: 1 % (ref 0–2)
BILIRUB SERPL-MCNC: 1 MG/DL (ref 0–1.2)
BUN SERPL-MCNC: 10 MG/DL (ref 6–23)
CALCIUM SERPL-MCNC: 9 MG/DL (ref 8.6–10.2)
CHLORIDE SERPL-SCNC: 100 MMOL/L (ref 98–107)
CO2 SERPL-SCNC: 21 MMOL/L (ref 22–29)
CREAT SERPL-MCNC: 1.2 MG/DL (ref 0.7–1.2)
EOSINOPHIL # BLD: 0.12 K/UL (ref 0.05–0.5)
EOSINOPHILS RELATIVE PERCENT: 1 % (ref 0–6)
ERYTHROCYTE [DISTWIDTH] IN BLOOD BY AUTOMATED COUNT: 12.9 % (ref 11.5–15)
GFR, ESTIMATED: 66 ML/MIN/1.73M2
GLUCOSE SERPL-MCNC: 146 MG/DL (ref 74–99)
HCT VFR BLD AUTO: 53.2 % (ref 37–54)
HGB BLD-MCNC: 17.4 G/DL (ref 12.5–16.5)
IMM GRANULOCYTES # BLD AUTO: 0.07 K/UL (ref 0–0.58)
IMM GRANULOCYTES NFR BLD: 1 % (ref 0–5)
LYMPHOCYTES NFR BLD: 1.75 K/UL (ref 1.5–4)
LYMPHOCYTES RELATIVE PERCENT: 16 % (ref 20–42)
MCH RBC QN AUTO: 30.6 PG (ref 26–35)
MCHC RBC AUTO-ENTMCNC: 32.7 G/DL (ref 32–34.5)
MCV RBC AUTO: 93.7 FL (ref 80–99.9)
MONOCYTES NFR BLD: 0.81 K/UL (ref 0.1–0.95)
MONOCYTES NFR BLD: 7 % (ref 2–12)
NEUTROPHILS NFR BLD: 75 % (ref 43–80)
NEUTS SEG NFR BLD: 8.21 K/UL (ref 1.8–7.3)
PLATELET # BLD AUTO: 212 K/UL (ref 130–450)
PMV BLD AUTO: 11.4 FL (ref 7–12)
POTASSIUM SERPL-SCNC: 4.6 MMOL/L (ref 3.5–5)
PROT SERPL-MCNC: 7 G/DL (ref 6.4–8.3)
RBC # BLD AUTO: 5.68 M/UL (ref 3.8–5.8)
SODIUM SERPL-SCNC: 133 MMOL/L (ref 132–146)
WBC OTHER # BLD: 11 K/UL (ref 4.5–11.5)

## 2024-06-28 PROCEDURE — 99214 OFFICE O/P EST MOD 30 MIN: CPT

## 2024-06-28 PROCEDURE — 80053 COMPREHEN METABOLIC PANEL: CPT

## 2024-06-28 PROCEDURE — 36415 COLL VENOUS BLD VENIPUNCTURE: CPT

## 2024-06-28 PROCEDURE — 85025 COMPLETE CBC W/AUTO DIFF WBC: CPT

## 2024-06-28 RX ORDER — PROCHLORPERAZINE MALEATE 10 MG
10 TABLET ORAL EVERY 6 HOURS PRN
Qty: 30 TABLET | Refills: 0 | Status: SHIPPED | OUTPATIENT
Start: 2024-06-28

## 2024-06-28 RX ORDER — ONDANSETRON HYDROCHLORIDE 8 MG/1
8 TABLET, FILM COATED ORAL EVERY 8 HOURS PRN
Qty: 30 TABLET | Refills: 1 | Status: SHIPPED | OUTPATIENT
Start: 2024-06-28

## 2024-06-28 NOTE — PROGRESS NOTES
Patient was seen today for chemotherapy education for Carboplatin + Etoposide for small cell lung cancer cancer. Patient was by himself. The schedule and mechanisms of action were discussed in detail. Some of the side effects discussed include, but are not limited to, bone marrow suppression, nausea/vomiting, hair loss, fatigue, diarrhea, mucositis, and infection. Patient demonstrated comprehension and understanding throughout the education session. A packet containing the information discussed was provided to the patient. Prescriptions for Zofran and Compazine sent.    All questions asked were answered or deferred to the oncology team. Patient encouraged to reach out to their treatment team with any other additional questions or concerns that they may have.    Corby Mcgee, PharmD, BCOP  Clinical Pharmacist, Hematology/Oncology  [facility]

## 2024-06-28 NOTE — PROGRESS NOTES
Patient arrived to unit for chemo teaching. This nurse provided patient with a chemo video and educational packet. This nurse discussed a typical day on the infusion unit and what his treatment day would be like. After chemo sheet was discussed. Patient was given opportunity to ask questions and advised to write down any questions he may have and bring them to the next visit. Labs were drawn at this time. Patient verbalized understanding.

## 2024-07-01 ENCOUNTER — HOSPITAL ENCOUNTER (OUTPATIENT)
Dept: INFUSION THERAPY | Age: 74
Discharge: HOME | End: 2024-07-01
Payer: MEDICARE

## 2024-07-01 ENCOUNTER — TELEPHONE (OUTPATIENT)
Dept: CASE MANAGEMENT | Age: 74
End: 2024-07-01

## 2024-07-01 VITALS
TEMPERATURE: 97.5 F | HEART RATE: 67 BPM | SYSTOLIC BLOOD PRESSURE: 143 MMHG | DIASTOLIC BLOOD PRESSURE: 66 MMHG | OXYGEN SATURATION: 97 % | RESPIRATION RATE: 18 BRPM

## 2024-07-01 DIAGNOSIS — C34.90 SMALL CELL LUNG CANCER (HCC): Primary | ICD-10-CM

## 2024-07-01 PROCEDURE — 2580000003 HC RX 258: Performed by: STUDENT IN AN ORGANIZED HEALTH CARE EDUCATION/TRAINING PROGRAM

## 2024-07-01 PROCEDURE — 96417 CHEMO IV INFUS EACH ADDL SEQ: CPT

## 2024-07-01 PROCEDURE — 6360000002 HC RX W HCPCS: Performed by: STUDENT IN AN ORGANIZED HEALTH CARE EDUCATION/TRAINING PROGRAM

## 2024-07-01 PROCEDURE — 96413 CHEMO IV INFUSION 1 HR: CPT

## 2024-07-01 PROCEDURE — 96375 TX/PRO/DX INJ NEW DRUG ADDON: CPT

## 2024-07-01 PROCEDURE — 96367 TX/PROPH/DG ADDL SEQ IV INF: CPT

## 2024-07-01 RX ORDER — PALONOSETRON HYDROCHLORIDE 0.05 MG/ML
0.25 INJECTION, SOLUTION INTRAVENOUS ONCE
Status: COMPLETED | OUTPATIENT
Start: 2024-07-01 | End: 2024-07-01

## 2024-07-01 RX ORDER — SODIUM CHLORIDE 9 MG/ML
5-250 INJECTION, SOLUTION INTRAVENOUS PRN
Status: DISCONTINUED | OUTPATIENT
Start: 2024-07-01 | End: 2024-07-02 | Stop reason: HOSPADM

## 2024-07-01 RX ORDER — DEXAMETHASONE SODIUM PHOSPHATE 10 MG/ML
10 INJECTION, SOLUTION INTRAMUSCULAR; INTRAVENOUS ONCE
Status: COMPLETED | OUTPATIENT
Start: 2024-07-01 | End: 2024-07-01

## 2024-07-01 RX ORDER — SODIUM CHLORIDE 0.9 % (FLUSH) 0.9 %
5-40 SYRINGE (ML) INJECTION PRN
Status: DISCONTINUED | OUTPATIENT
Start: 2024-07-01 | End: 2024-07-02 | Stop reason: HOSPADM

## 2024-07-01 RX ADMIN — SODIUM CHLORIDE 200 ML/HR: 9 INJECTION, SOLUTION INTRAVENOUS at 10:19

## 2024-07-01 RX ADMIN — CARBOPLATIN 420 MG: 10 INJECTION, SOLUTION INTRAVENOUS at 10:54

## 2024-07-01 RX ADMIN — PALONOSETRON 0.25 MG: 0.25 INJECTION, SOLUTION INTRAVENOUS at 10:20

## 2024-07-01 RX ADMIN — SODIUM CHLORIDE 150 MG: 9 INJECTION, SOLUTION INTRAVENOUS at 10:27

## 2024-07-01 RX ADMIN — ETOPOSIDE 200 MG: 20 INJECTION INTRAVENOUS at 11:36

## 2024-07-01 RX ADMIN — SODIUM CHLORIDE, PRESERVATIVE FREE 10 ML: 5 INJECTION INTRAVENOUS at 10:19

## 2024-07-01 RX ADMIN — DEXAMETHASONE SODIUM PHOSPHATE 10 MG: 10 INJECTION, SOLUTION INTRAMUSCULAR; INTRAVENOUS at 10:21

## 2024-07-01 NOTE — TELEPHONE ENCOUNTER
Met with patient during his fist infusion appointment today.  Patient states that he is ready to get this treatment started.  He did have questions about if he needed lab work with each day of his cycle and some regarding his bone biopsy.  Questions were answered to the best of this staff members ability and patient was informed that IR scheduling should be calling to get his bone biopsy scheduled soon.  Patient denies any further questions or issues and was encouraged to call if any arise.

## 2024-07-01 NOTE — PROGRESS NOTES
Patient tolerated infusion well. Patient alert and oriented x 3. No distress noted. Vital signs stable. Patient denies any new or worsening pain. Patient denies questions regarding treatment or medication; verbalized understanding, offered patient information and discharge material; patient declined; Patient denies needs, all questions answered. Pt left ambulatory with cane.

## 2024-07-02 ENCOUNTER — TELEPHONE (OUTPATIENT)
Dept: INFUSION THERAPY | Age: 74
End: 2024-07-02

## 2024-07-02 ENCOUNTER — HOSPITAL ENCOUNTER (OUTPATIENT)
Dept: INFUSION THERAPY | Age: 74
Discharge: HOME | End: 2024-07-02
Payer: MEDICARE

## 2024-07-02 VITALS
OXYGEN SATURATION: 96 % | HEART RATE: 62 BPM | DIASTOLIC BLOOD PRESSURE: 60 MMHG | RESPIRATION RATE: 16 BRPM | TEMPERATURE: 97.3 F | SYSTOLIC BLOOD PRESSURE: 130 MMHG

## 2024-07-02 DIAGNOSIS — C34.90 SMALL CELL LUNG CANCER (HCC): Primary | ICD-10-CM

## 2024-07-02 PROCEDURE — 2580000003 HC RX 258: Performed by: STUDENT IN AN ORGANIZED HEALTH CARE EDUCATION/TRAINING PROGRAM

## 2024-07-02 PROCEDURE — 96375 TX/PRO/DX INJ NEW DRUG ADDON: CPT

## 2024-07-02 PROCEDURE — 96413 CHEMO IV INFUSION 1 HR: CPT

## 2024-07-02 PROCEDURE — 6360000002 HC RX W HCPCS: Performed by: STUDENT IN AN ORGANIZED HEALTH CARE EDUCATION/TRAINING PROGRAM

## 2024-07-02 RX ORDER — SODIUM CHLORIDE 0.9 % (FLUSH) 0.9 %
5-40 SYRINGE (ML) INJECTION PRN
Status: DISCONTINUED | OUTPATIENT
Start: 2024-07-02 | End: 2024-07-03 | Stop reason: HOSPADM

## 2024-07-02 RX ORDER — DEXAMETHASONE SODIUM PHOSPHATE 10 MG/ML
8 INJECTION, SOLUTION INTRAMUSCULAR; INTRAVENOUS ONCE
Status: COMPLETED | OUTPATIENT
Start: 2024-07-02 | End: 2024-07-02

## 2024-07-02 RX ORDER — SODIUM CHLORIDE 9 MG/ML
5-250 INJECTION, SOLUTION INTRAVENOUS PRN
Status: DISCONTINUED | OUTPATIENT
Start: 2024-07-02 | End: 2024-07-03 | Stop reason: HOSPADM

## 2024-07-02 RX ADMIN — SODIUM CHLORIDE 200 ML/HR: 9 INJECTION, SOLUTION INTRAVENOUS at 14:00

## 2024-07-02 RX ADMIN — ETOPOSIDE 210 MG: 20 INJECTION INTRAVENOUS at 14:19

## 2024-07-02 RX ADMIN — SODIUM CHLORIDE, PRESERVATIVE FREE 10 ML: 5 INJECTION INTRAVENOUS at 14:01

## 2024-07-02 RX ADMIN — DEXAMETHASONE SODIUM PHOSPHATE 8 MG: 10 INJECTION, SOLUTION INTRAMUSCULAR; INTRAVENOUS at 14:01

## 2024-07-02 NOTE — TELEPHONE ENCOUNTER
Attempted to contact patient in regards to positive nutrition risk screen. Left message for patient with contact information. Will await call back.     Natalya Owens, MPH, RDN, LDN

## 2024-07-03 ENCOUNTER — HOSPITAL ENCOUNTER (OUTPATIENT)
Dept: INFUSION THERAPY | Age: 74
Discharge: HOME | End: 2024-07-03
Payer: MEDICARE

## 2024-07-03 VITALS
DIASTOLIC BLOOD PRESSURE: 69 MMHG | RESPIRATION RATE: 16 BRPM | SYSTOLIC BLOOD PRESSURE: 137 MMHG | TEMPERATURE: 97.5 F | HEART RATE: 62 BPM

## 2024-07-03 DIAGNOSIS — C34.90 SMALL CELL LUNG CANCER (HCC): Primary | ICD-10-CM

## 2024-07-03 PROCEDURE — 2580000003 HC RX 258: Performed by: STUDENT IN AN ORGANIZED HEALTH CARE EDUCATION/TRAINING PROGRAM

## 2024-07-03 PROCEDURE — 6360000002 HC RX W HCPCS: Performed by: STUDENT IN AN ORGANIZED HEALTH CARE EDUCATION/TRAINING PROGRAM

## 2024-07-03 PROCEDURE — 96375 TX/PRO/DX INJ NEW DRUG ADDON: CPT

## 2024-07-03 PROCEDURE — 96413 CHEMO IV INFUSION 1 HR: CPT

## 2024-07-03 PROCEDURE — 96377 APPLICATON ON-BODY INJECTOR: CPT

## 2024-07-03 RX ORDER — SODIUM CHLORIDE 9 MG/ML
5-250 INJECTION, SOLUTION INTRAVENOUS PRN
Status: DISCONTINUED | OUTPATIENT
Start: 2024-07-03 | End: 2024-07-04 | Stop reason: HOSPADM

## 2024-07-03 RX ORDER — SODIUM CHLORIDE 0.9 % (FLUSH) 0.9 %
5-40 SYRINGE (ML) INJECTION PRN
Status: DISCONTINUED | OUTPATIENT
Start: 2024-07-03 | End: 2024-07-04 | Stop reason: HOSPADM

## 2024-07-03 RX ORDER — DEXAMETHASONE SODIUM PHOSPHATE 10 MG/ML
8 INJECTION, SOLUTION INTRAMUSCULAR; INTRAVENOUS ONCE
Status: COMPLETED | OUTPATIENT
Start: 2024-07-03 | End: 2024-07-03

## 2024-07-03 RX ADMIN — SODIUM CHLORIDE 100 ML/HR: 9 INJECTION, SOLUTION INTRAVENOUS at 13:15

## 2024-07-03 RX ADMIN — PEGFILGRASTIM 6 MG: KIT SUBCUTANEOUS at 14:45

## 2024-07-03 RX ADMIN — ETOPOSIDE 210 MG: 20 INJECTION INTRAVENOUS at 13:42

## 2024-07-03 RX ADMIN — DEXAMETHASONE SODIUM PHOSPHATE 8 MG: 10 INJECTION, SOLUTION INTRAMUSCULAR; INTRAVENOUS at 13:14

## 2024-07-05 ENCOUNTER — TELEPHONE (OUTPATIENT)
Dept: INTERVENTIONAL RADIOLOGY/VASCULAR | Age: 74
End: 2024-07-05

## 2024-07-05 ENCOUNTER — TELEPHONE (OUTPATIENT)
Dept: INFUSION THERAPY | Age: 74
End: 2024-07-05

## 2024-07-05 NOTE — TELEPHONE ENCOUNTER
Special Procedures Pre-Procedure Telephone Call    Patient Name: Kin Issa  MRN: [id number]  : 1950  Date of Procedure: 2024  Planned Procedure: Bone Marrow Biopsy    Pre procedure telephone call initiated to verify patient and procedure details.    This nurse attempted to contact patient. Spouse answered phone and will relay to patient to contact IR department to discuss upcoming appointment.

## 2024-07-05 NOTE — TELEPHONE ENCOUNTER
Referred to pt via nurse navigator for possible weight loss and decreased PO intake. According to EMR pt did lose about 5 pounds last month. However, pt denies weight loss. Pt stated that he lacks an appetite and only eats one meal per day. He does not utilize nutrition supplements such as Boost or Ensure. Explained the importance of adequate nutrition during cancer treatment. Recommended pt try to consume small, frequent snacks throughout the day that are calorie and protein dense. Provided examples. Also suggested use of Boost or Ensure daily. Pt was resistant to nutrition advice and states that he is \"fine.\" Will remain available to pt for nutritional support.     Natalya Owens, MPH, RDN, LDN

## 2024-07-08 NOTE — H&P
[place] [facility]    General Surgery Attending History and Physical    Patient's Name/Date of Birth: Kin Issa / 1950 (74 y.o.)    Date: July 8, 2024     CC:RIGHT small cell lung cancer, here for port placement    HPI:  Kin is a 75yo man referred to our group for chemoport placement with a diagnosis of RIGHT small cell lung cancer.  He denies any previous central venous catheter placements.  He is NPO for surgery.  I have discussed the risks, benefits, and alternatives to med port placement.  I have detailed the risks of deep sedation (hypotension, hypoxia) as well as complications of bleeding, infection, thrombosis of vein, and pneumothorax (1%). If a pneumothorax were to occur, the patient would require a chest tube and would need to stay overnight. If the port were to be come infected or the vein developed a clot, the port would need to be removed. The patient acknowledges these risks and wishes to proceed.      Past Medical History:   Diagnosis Date    Agent orange exposure     Chronic cough     uses inhaler /to have Bronch 8/22/2019    Colon polyps 03/07/2019, 9-2021    fOR or 10-19-21    COPD (chronic obstructive pulmonary disease) (HCC)     Diabetes (HCC)     Foot drop, right foot     HAS A BRACE    Hyperlipidemia     Hypertension     Malaria     Rash     noted 2/28/19- says it is on back & shoulders    Restless leg     Small cell lung cancer (HCC) 06/02/2024    Spinal stenosis at L4-L5 level 2009    [facility] Thorne    Thyroid nodule 2020    R    Type 2 diabetes mellitus without complication (HCC)        Past Surgical History:   Procedure Laterality Date    APPENDECTOMY  2000?    ruptured    BRONCHOSCOPY N/A 8/22/2019    BRONCHOSCOPY DIAGNOSTIC OR CELL WASH ONLY performed by Jaxson Daley MD at [facility]COPY    BRONCHOSCOPY N/A 6/3/2024    BRONCHOSCOPY ENDOBRONCHIAL ULTRASOUND/ cytology performed by [facility]    CHOLECYSTECTOMY  2000?    open

## 2024-07-09 ENCOUNTER — HOSPITAL ENCOUNTER (OUTPATIENT)
Age: 74
Setting detail: OUTPATIENT SURGERY
Discharge: HOME | End: 2024-07-09
Attending: STUDENT IN AN ORGANIZED HEALTH CARE EDUCATION/TRAINING PROGRAM | Admitting: STUDENT IN AN ORGANIZED HEALTH CARE EDUCATION/TRAINING PROGRAM
Payer: MEDICARE

## 2024-07-09 ENCOUNTER — APPOINTMENT (OUTPATIENT)
Dept: GENERAL RADIOLOGY | Age: 74
End: 2024-07-09
Attending: STUDENT IN AN ORGANIZED HEALTH CARE EDUCATION/TRAINING PROGRAM
Payer: MEDICARE

## 2024-07-09 ENCOUNTER — ANESTHESIA EVENT (OUTPATIENT)
Dept: OPERATING ROOM | Age: 74
End: 2024-07-09
Payer: MEDICARE

## 2024-07-09 ENCOUNTER — ANESTHESIA (OUTPATIENT)
Dept: OPERATING ROOM | Age: 74
End: 2024-07-09
Payer: MEDICARE

## 2024-07-09 VITALS
BODY MASS INDEX: 27.3 KG/M2 | RESPIRATION RATE: 18 BRPM | TEMPERATURE: 98 F | OXYGEN SATURATION: 94 % | WEIGHT: 195 LBS | DIASTOLIC BLOOD PRESSURE: 61 MMHG | HEART RATE: 78 BPM | SYSTOLIC BLOOD PRESSURE: 126 MMHG | HEIGHT: 71 IN

## 2024-07-09 DIAGNOSIS — C34.90 SMALL CELL LUNG CANCER (HCC): ICD-10-CM

## 2024-07-09 DIAGNOSIS — Z01.812 PRE-OPERATIVE LABORATORY EXAMINATION: Primary | ICD-10-CM

## 2024-07-09 DIAGNOSIS — G89.18 POSTOPERATIVE PAIN: ICD-10-CM

## 2024-07-09 LAB — GLUCOSE BLD-MCNC: 200 MG/DL (ref 74–99)

## 2024-07-09 PROCEDURE — 71045 X-RAY EXAM CHEST 1 VIEW: CPT

## 2024-07-09 PROCEDURE — 7100000010 HC PHASE II RECOVERY - FIRST 15 MIN: Performed by: STUDENT IN AN ORGANIZED HEALTH CARE EDUCATION/TRAINING PROGRAM

## 2024-07-09 PROCEDURE — 2580000003 HC RX 258: Performed by: STUDENT IN AN ORGANIZED HEALTH CARE EDUCATION/TRAINING PROGRAM

## 2024-07-09 PROCEDURE — 3700000000 HC ANESTHESIA ATTENDED CARE: Performed by: STUDENT IN AN ORGANIZED HEALTH CARE EDUCATION/TRAINING PROGRAM

## 2024-07-09 PROCEDURE — 3600000004 HC SURGERY LEVEL 4 BASE: Performed by: STUDENT IN AN ORGANIZED HEALTH CARE EDUCATION/TRAINING PROGRAM

## 2024-07-09 PROCEDURE — 82962 GLUCOSE BLOOD TEST: CPT

## 2024-07-09 PROCEDURE — 6360000002 HC RX W HCPCS: Performed by: NURSE ANESTHETIST, CERTIFIED REGISTERED

## 2024-07-09 PROCEDURE — 6360000002 HC RX W HCPCS: Performed by: STUDENT IN AN ORGANIZED HEALTH CARE EDUCATION/TRAINING PROGRAM

## 2024-07-09 PROCEDURE — A4217 STERILE WATER/SALINE, 500 ML: HCPCS | Performed by: STUDENT IN AN ORGANIZED HEALTH CARE EDUCATION/TRAINING PROGRAM

## 2024-07-09 PROCEDURE — 2500000003 HC RX 250 WO HCPCS: Performed by: STUDENT IN AN ORGANIZED HEALTH CARE EDUCATION/TRAINING PROGRAM

## 2024-07-09 PROCEDURE — C1788 PORT, INDWELLING, IMP: HCPCS | Performed by: STUDENT IN AN ORGANIZED HEALTH CARE EDUCATION/TRAINING PROGRAM

## 2024-07-09 PROCEDURE — 2709999900 HC NON-CHARGEABLE SUPPLY: Performed by: STUDENT IN AN ORGANIZED HEALTH CARE EDUCATION/TRAINING PROGRAM

## 2024-07-09 PROCEDURE — 3600000014 HC SURGERY LEVEL 4 ADDTL 15MIN: Performed by: STUDENT IN AN ORGANIZED HEALTH CARE EDUCATION/TRAINING PROGRAM

## 2024-07-09 PROCEDURE — 76937 US GUIDE VASCULAR ACCESS: CPT | Performed by: STUDENT IN AN ORGANIZED HEALTH CARE EDUCATION/TRAINING PROGRAM

## 2024-07-09 PROCEDURE — 36561 INSERT TUNNELED CV CATH: CPT | Performed by: STUDENT IN AN ORGANIZED HEALTH CARE EDUCATION/TRAINING PROGRAM

## 2024-07-09 PROCEDURE — 7100000011 HC PHASE II RECOVERY - ADDTL 15 MIN: Performed by: STUDENT IN AN ORGANIZED HEALTH CARE EDUCATION/TRAINING PROGRAM

## 2024-07-09 PROCEDURE — 3700000001 HC ADD 15 MINUTES (ANESTHESIA): Performed by: STUDENT IN AN ORGANIZED HEALTH CARE EDUCATION/TRAINING PROGRAM

## 2024-07-09 PROCEDURE — 77001 FLUOROGUIDE FOR VEIN DEVICE: CPT | Performed by: STUDENT IN AN ORGANIZED HEALTH CARE EDUCATION/TRAINING PROGRAM

## 2024-07-09 DEVICE — VACCESS CT POWER-INJECTABLE IMPLANTABLE PORT (WITH SUTURE PLUGS) (8F)
Type: IMPLANTABLE DEVICE | Site: CHEST | Status: FUNCTIONAL
Brand: VACCESS

## 2024-07-09 RX ORDER — SODIUM CHLORIDE 0.9 % (FLUSH) 0.9 %
5-40 SYRINGE (ML) INJECTION PRN
Status: DISCONTINUED | OUTPATIENT
Start: 2024-07-09 | End: 2024-07-09 | Stop reason: HOSPADM

## 2024-07-09 RX ORDER — MIDAZOLAM HYDROCHLORIDE 1 MG/ML
INJECTION INTRAMUSCULAR; INTRAVENOUS PRN
Status: DISCONTINUED | OUTPATIENT
Start: 2024-07-09 | End: 2024-07-09 | Stop reason: SDUPTHER

## 2024-07-09 RX ORDER — OXYCODONE HYDROCHLORIDE AND ACETAMINOPHEN 5; 325 MG/1; MG/1
1 TABLET ORAL EVERY 6 HOURS PRN
Qty: 20 TABLET | Refills: 0 | Status: SHIPPED | OUTPATIENT
Start: 2024-07-09 | End: 2024-07-14

## 2024-07-09 RX ORDER — FENTANYL CITRATE 50 UG/ML
INJECTION, SOLUTION INTRAMUSCULAR; INTRAVENOUS PRN
Status: DISCONTINUED | OUTPATIENT
Start: 2024-07-09 | End: 2024-07-09 | Stop reason: SDUPTHER

## 2024-07-09 RX ORDER — SENNA AND DOCUSATE SODIUM 50; 8.6 MG/1; MG/1
1 TABLET, FILM COATED ORAL DAILY
Qty: 30 TABLET | Refills: 0 | Status: SHIPPED | OUTPATIENT
Start: 2024-07-09

## 2024-07-09 RX ORDER — HEPARIN 100 UNIT/ML
SYRINGE INTRAVENOUS PRN
Status: DISCONTINUED | OUTPATIENT
Start: 2024-07-09 | End: 2024-07-09 | Stop reason: ALTCHOICE

## 2024-07-09 RX ORDER — SODIUM CHLORIDE 9 MG/ML
INJECTION, SOLUTION INTRAVENOUS CONTINUOUS
Status: DISCONTINUED | OUTPATIENT
Start: 2024-07-09 | End: 2024-07-09 | Stop reason: HOSPADM

## 2024-07-09 RX ORDER — ONDANSETRON 2 MG/ML
INJECTION INTRAMUSCULAR; INTRAVENOUS PRN
Status: DISCONTINUED | OUTPATIENT
Start: 2024-07-09 | End: 2024-07-09 | Stop reason: SDUPTHER

## 2024-07-09 RX ORDER — PROPOFOL 10 MG/ML
INJECTION, EMULSION INTRAVENOUS CONTINUOUS PRN
Status: DISCONTINUED | OUTPATIENT
Start: 2024-07-09 | End: 2024-07-09 | Stop reason: SDUPTHER

## 2024-07-09 RX ORDER — LIDOCAINE HYDROCHLORIDE AND EPINEPHRINE 10; 10 MG/ML; UG/ML
INJECTION, SOLUTION INFILTRATION; PERINEURAL PRN
Status: DISCONTINUED | OUTPATIENT
Start: 2024-07-09 | End: 2024-07-09 | Stop reason: ALTCHOICE

## 2024-07-09 RX ORDER — SODIUM CHLORIDE 9 MG/ML
INJECTION, SOLUTION INTRAVENOUS PRN
Status: DISCONTINUED | OUTPATIENT
Start: 2024-07-09 | End: 2024-07-09 | Stop reason: HOSPADM

## 2024-07-09 RX ORDER — SODIUM CHLORIDE 0.9 % (FLUSH) 0.9 %
5-40 SYRINGE (ML) INJECTION EVERY 12 HOURS SCHEDULED
Status: DISCONTINUED | OUTPATIENT
Start: 2024-07-09 | End: 2024-07-09 | Stop reason: HOSPADM

## 2024-07-09 RX ADMIN — FENTANYL CITRATE 25 MCG: 50 INJECTION, SOLUTION INTRAMUSCULAR; INTRAVENOUS at 08:08

## 2024-07-09 RX ADMIN — Medication 1500 MG: at 07:29

## 2024-07-09 RX ADMIN — FENTANYL CITRATE 25 MCG: 50 INJECTION, SOLUTION INTRAMUSCULAR; INTRAVENOUS at 07:51

## 2024-07-09 RX ADMIN — MIDAZOLAM 2 MG: 1 INJECTION INTRAMUSCULAR; INTRAVENOUS at 07:29

## 2024-07-09 RX ADMIN — Medication 1500 MG: at 06:37

## 2024-07-09 RX ADMIN — SODIUM CHLORIDE: 9 INJECTION, SOLUTION INTRAVENOUS at 06:36

## 2024-07-09 RX ADMIN — FENTANYL CITRATE 25 MCG: 50 INJECTION, SOLUTION INTRAMUSCULAR; INTRAVENOUS at 07:59

## 2024-07-09 RX ADMIN — ONDANSETRON 4 MG: 2 INJECTION INTRAMUSCULAR; INTRAVENOUS at 07:42

## 2024-07-09 RX ADMIN — FENTANYL CITRATE 25 MCG: 50 INJECTION, SOLUTION INTRAMUSCULAR; INTRAVENOUS at 07:37

## 2024-07-09 RX ADMIN — PROPOFOL 100 MCG/KG/MIN: 10 INJECTION, EMULSION INTRAVENOUS at 07:37

## 2024-07-09 ASSESSMENT — LIFESTYLE VARIABLES: SMOKING_STATUS: 1

## 2024-07-09 ASSESSMENT — PAIN - FUNCTIONAL ASSESSMENT
PAIN_FUNCTIONAL_ASSESSMENT: NONE - DENIES PAIN
PAIN_FUNCTIONAL_ASSESSMENT: 0-10

## 2024-07-09 NOTE — ANESTHESIA PRE PROCEDURE
\"PREGTESTUR\", \"PREGSERUM\", \"HCG\", \"HCGQUANT\"     ABGs: No results found for: \"PHART\", \"PO2ART\", \"BTX7AYP\", \"YYB6VWO\", \"BEART\", \"C0INDMVI\"     Type & Screen (If Applicable):  No results found for: \"LABABO\"    Drug/Infectious Status (If Applicable):  No results found for: \"HIV\", \"HEPCAB\"    COVID-19 Screening (If Applicable): No results found for: \"COVID19\"        Anesthesia Evaluation  Patient summary reviewed and Nursing notes reviewed   no history of anesthetic complications:   Airway: Mallampati: III  TM distance: <3 FB   Neck ROM: full  Mouth opening: > = 3 FB   Dental:    (+) poor dentition      Pulmonary: breath sounds clear to auscultation  (+)  COPD:          current smoker (1.5ppd)          Patient smoked on day of surgery.                ROS comment:  Small cell lung cancer   Cardiovascular:    (+) hypertension:, hyperlipidemia        Rhythm: regular                      Neuro/Psych:   (+) neuromuscular disease:, headaches:             ROS comment: Chronic pain syndrome    Lumbar disc disorder  Lumbar facet arthropathy  Lumbar radiculopathy  Lumbar spondylosis  Lumbar stenosis with neurogenic claudication     GI/Hepatic/Renal: Neg GI/Hepatic/Renal ROS            Endo/Other:    (+) Diabetes, malignancy/cancer (Small cell lung cancer).                 Abdominal:             Vascular: negative vascular ROS.         Other Findings:         Anesthesia Plan      MAC     ASA 3       Induction: intravenous.      Anesthetic plan and risks discussed with patient.      Plan discussed with attending.                  Sarahy Campso, APRN - CRNA   7/9/2024

## 2024-07-09 NOTE — ANESTHESIA POSTPROCEDURE EVALUATION
Department of Anesthesiology  Postprocedure Note    Patient: Kin Issa  MRN: [id number]  YOB: 1950  Date of evaluation: 7/9/2024    Procedure Summary       Date: 07/09/24 Room / Location: [address]    Anesthesia Start: 0729 Anesthesia Stop: 0832    Procedure: PORT INSERTION (Chest) Diagnosis:       Small cell lung cancer (HCC)      (Small cell lung cancer (HCC) [C34.90])    Surgeons: Cayden Pereyra MD Responsible Provider: Cayden Pitt DO    Anesthesia Type: MAC ASA Status: 3            Anesthesia Type: No value filed.    Carey Phase I: Carey Score: 10    Carey Phase II:      Anesthesia Post Evaluation    Patient location during evaluation: bedside  Patient participation: complete - patient cannot participate  Level of consciousness: awake and alert  Airway patency: patent  Nausea & Vomiting: no nausea and no vomiting  Cardiovascular status: blood pressure returned to baseline  Respiratory status: acceptable  Hydration status: euvolemic  Multimodal analgesia pain management approach    No notable events documented.

## 2024-07-09 NOTE — OP NOTE
Operative Note      Patient: Kin Issa  YOB: 1950  MRN: [id number]    Date of Procedure: 7/9/2024    Pre-Op Diagnosis Codes:     * Small cell lung cancer (HCC) [C34.90]    Post-Op Diagnosis: Same       Procedure(s):  PORT INSERTION    Surgeon(s):  Cayden Pereyra MD    Assistant:   Resident: Sarah Ernandez MD    Anesthesia: Monitor Anesthesia Care    Estimated Blood Loss (mL): Minimal    Complications: None    Specimens:   * No specimens in log *    Implants:  Implant Name Type Inv. Item Serial No.  Lot No. LRB No. Used Action   PORT INFUS 8FR PWR INJ CT FOR VASC ACCS CATH - [id number]  PORT INFUS 8FR PWR INJ CT FOR VASC ACCS CATH  [employer]- [id number] N/A 1 Implanted   PORT INFUS 8FR PWR INJ CT FOR VASC ACCS CATH - [id number]  PORT INFUS 8FR PWR INJ CT FOR VASC ACCS CATH  [employer]- [id number] N/A 1 Implanted         Drains: * No LDAs found *    Findings:  Infection Present At Time Of Surgery (PATOS) (choose all levels that have infection present):  No infection present  Other Findings: Left IJ access performed under ultrasound guidance, Xray confirmed venous access    Detailed Description of Procedure:   Patient's Name/Date of Birth: Kin Issa / 1950 (74 y.o.)    Date: July 9, 2024     Procedure: Left internal jugular mediport   Anesthesia: LMAC (refer to Anesthesia record)  Surgeon: Cayden Pereyra MD   Assistant: Dr. Ernandez    Estimated Blood Loss: 5 ml  Complications: none   Specimens: were not obtained     PROCEDURE: Vancomycin was given prior to incision. The patient was placed in the supine position with a towel roll placed in between the patients scapulae. The right and left anterior chest wall and neck were appropriately prepped and draped with sterile OR towels and sterile OR drapes. Ioban was applied. An ultrasound was used to identify the left IJ.  The skin at the access site was injected with local anesthetic.  A cook needle was

## 2024-07-12 ENCOUNTER — HOSPITAL ENCOUNTER (OUTPATIENT)
Dept: CT IMAGING | Age: 74
Discharge: HOME | End: 2024-07-12
Payer: MEDICARE

## 2024-07-12 VITALS
RESPIRATION RATE: 18 BRPM | DIASTOLIC BLOOD PRESSURE: 69 MMHG | OXYGEN SATURATION: 93 % | HEART RATE: 88 BPM | SYSTOLIC BLOOD PRESSURE: 138 MMHG | TEMPERATURE: 98.9 F

## 2024-07-12 DIAGNOSIS — C34.90 SMALL CELL LUNG CANCER (HCC): ICD-10-CM

## 2024-07-12 LAB
ANION GAP SERPL CALCULATED.3IONS-SCNC: 13 MMOL/L (ref 7–16)
BUN SERPL-MCNC: 15 MG/DL (ref 6–23)
CALCIUM SERPL-MCNC: 8.2 MG/DL (ref 8.6–10.2)
CHLORIDE SERPL-SCNC: 95 MMOL/L (ref 98–107)
CO2 SERPL-SCNC: 21 MMOL/L (ref 22–29)
CREAT SERPL-MCNC: 1.1 MG/DL (ref 0.7–1.2)
ERYTHROCYTE [DISTWIDTH] IN BLOOD BY AUTOMATED COUNT: 12.2 % (ref 11.5–15)
GFR, ESTIMATED: 67 ML/MIN/1.73M2
GLUCOSE SERPL-MCNC: 185 MG/DL (ref 74–99)
HCT VFR BLD AUTO: 47.9 % (ref 37–54)
HGB BLD-MCNC: 16 G/DL (ref 12.5–16.5)
INR PPP: 1.3
MCH RBC QN AUTO: 30.2 PG (ref 26–35)
MCHC RBC AUTO-ENTMCNC: 33.4 G/DL (ref 32–34.5)
MCV RBC AUTO: 90.5 FL (ref 80–99.9)
PLATELET CONFIRMATION: NORMAL
PLATELET, FLUORESCENCE: 92 K/UL (ref 130–450)
PMV BLD AUTO: 10.9 FL (ref 7–12)
POTASSIUM SERPL-SCNC: 3.8 MMOL/L (ref 3.5–5)
PROTHROMBIN TIME: 14.7 SEC (ref 9.3–12.4)
RBC # BLD AUTO: 5.29 M/UL (ref 3.8–5.8)
SODIUM SERPL-SCNC: 129 MMOL/L (ref 132–146)
WBC OTHER # BLD: 5.5 K/UL (ref 4.5–11.5)

## 2024-07-12 PROCEDURE — 85027 COMPLETE CBC AUTOMATED: CPT

## 2024-07-12 PROCEDURE — 6360000002 HC RX W HCPCS: Performed by: RADIOLOGY

## 2024-07-12 PROCEDURE — 77012 CT SCAN FOR NEEDLE BIOPSY: CPT

## 2024-07-12 PROCEDURE — 80048 BASIC METABOLIC PNL TOTAL CA: CPT

## 2024-07-12 PROCEDURE — 36415 COLL VENOUS BLD VENIPUNCTURE: CPT

## 2024-07-12 PROCEDURE — 7100000010 HC PHASE II RECOVERY - FIRST 15 MIN

## 2024-07-12 PROCEDURE — 20225 BONE BIOPSY TROCAR/NDL DEEP: CPT

## 2024-07-12 PROCEDURE — 85610 PROTHROMBIN TIME: CPT

## 2024-07-12 PROCEDURE — 7100000011 HC PHASE II RECOVERY - ADDTL 15 MIN

## 2024-07-12 PROCEDURE — 2709999900 CT BIOPSY DEEP BONE PERCUTANEOUS

## 2024-07-12 RX ORDER — FENTANYL CITRATE 50 UG/ML
INJECTION, SOLUTION INTRAMUSCULAR; INTRAVENOUS PRN
Status: COMPLETED | OUTPATIENT
Start: 2024-07-12 | End: 2024-07-12

## 2024-07-12 RX ORDER — MIDAZOLAM HYDROCHLORIDE 2 MG/2ML
INJECTION, SOLUTION INTRAMUSCULAR; INTRAVENOUS PRN
Status: COMPLETED | OUTPATIENT
Start: 2024-07-12 | End: 2024-07-12

## 2024-07-12 RX ORDER — DIPHENHYDRAMINE HYDROCHLORIDE 50 MG/ML
INJECTION INTRAMUSCULAR; INTRAVENOUS PRN
Status: COMPLETED | OUTPATIENT
Start: 2024-07-12 | End: 2024-07-12

## 2024-07-12 RX ORDER — HYDRALAZINE HYDROCHLORIDE 20 MG/ML
INJECTION INTRAMUSCULAR; INTRAVENOUS PRN
Status: COMPLETED | OUTPATIENT
Start: 2024-07-12 | End: 2024-07-12

## 2024-07-12 RX ADMIN — MIDAZOLAM HYDROCHLORIDE 0.5 MG: 1 INJECTION, SOLUTION INTRAMUSCULAR; INTRAVENOUS at 09:11

## 2024-07-12 RX ADMIN — HYDRALAZINE HYDROCHLORIDE 10 MG: 20 INJECTION INTRAMUSCULAR; INTRAVENOUS at 09:03

## 2024-07-12 RX ADMIN — DIPHENHYDRAMINE HYDROCHLORIDE 25 MG: 50 INJECTION, SOLUTION INTRAMUSCULAR; INTRAVENOUS at 09:19

## 2024-07-12 RX ADMIN — FENTANYL CITRATE 25 MCG: 50 INJECTION, SOLUTION INTRAMUSCULAR; INTRAVENOUS at 09:19

## 2024-07-12 RX ADMIN — MIDAZOLAM HYDROCHLORIDE 0.5 MG: 1 INJECTION, SOLUTION INTRAMUSCULAR; INTRAVENOUS at 09:19

## 2024-07-12 RX ADMIN — DIPHENHYDRAMINE HYDROCHLORIDE 25 MG: 50 INJECTION, SOLUTION INTRAMUSCULAR; INTRAVENOUS at 09:11

## 2024-07-12 RX ADMIN — FENTANYL CITRATE 25 MCG: 50 INJECTION, SOLUTION INTRAMUSCULAR; INTRAVENOUS at 09:11

## 2024-07-12 NOTE — BRIEF OP NOTE
Brief Postoperative Note      Patient: Kin Issa  YOB: 1950  MRN: [id number]    Date of Procedure: 7/12/2024    Bone metastases    Post-Op Diagnosis: Same       Deep bone biopsy on control    M. Philip    Assistant:  * No surgical staff found *    Anesthesia: * Moderate sedation    Estimated Blood Loss (mL): Minimal    Complications: None    Specimens:   Left iliac    Implants:  * No implants in log *      Drains: * No LDAs found *    Findings:  Infection Present At Time Of Surgery (PATOS) (choose all levels that have infection present):  No infection present  Other Findings: 4 cores of left iliac    Electronically signed by DAISHA Palacios MD on 7/12/2024 at 9:24 AM

## 2024-07-12 NOTE — PRE SEDATION
Sedation Pre-Procedure Note    Patient Name: Kin Issa   YOB: 1950  Room/Bed: Room/bed info not found  Medical Record Number: [id number]  Date: 7/12/2024   Time: 9:23 AM       Indication:  right bone neoplasm    Consent: I have discussed with the patient and/or the patient representative the indication, alternatives, and the possible risks and/or complications of the planned procedure and the anesthesia methods. The patient and/or patient representative appear to understand and agree to proceed.    Vital Signs:   Vitals:    07/12/24 0919   BP: (!) 160/79   Pulse: 91   Resp: 19   Temp:    SpO2: 96%       Past Medical History:   has a past medical history of Agent orange exposure, Chronic cough, Colon polyps, COPD (chronic obstructive pulmonary disease) (HCC), Diabetes (HCC), Foot drop, right foot, Hyperlipidemia, Hypertension, Malaria, Rash, Restless leg, Small cell lung cancer (HCC), Spinal stenosis at L4-L5 level, Thyroid nodule, and Type 2 diabetes mellitus without complication (HCC).    Past Surgical History:   has a past surgical history that includes Lithotripsy (years ago); Kidney stone surgery (years ago); Colonoscopy (12 years ago); Colonoscopy (N/A, 3/7/2019); Cholecystectomy (2000?); Appendectomy (2000?); bronchoscopy (N/A, 8/22/2019); Upper gastrointestinal endoscopy (N/A, 8/26/2021); Colonoscopy (N/A, 8/26/2021); colectomy (N/A, 10/19/2021); Pain management procedure (N/A, 11/14/2022); Pain management procedure (N/A, 12/22/2022); laminectomy (Bilateral, 2/15/2023); bronchoscopy (N/A, 6/3/2024); and Port Surgery (N/A, 7/9/2024).    Medications:   Scheduled Meds:   Continuous Infusions:   PRN Meds: hydrALAZINE, fentanNYL, diphenhydrAMINE, midazolam  Home Meds:   Prior to Admission medications    Medication Sig Start Date End Date Taking? Authorizing Provider   oxyCODONE-acetaminophen (PERCOCET) 5-325 MG per tablet Take 1 tablet by mouth every 6 hours as needed for Pain for up to 5 days.  Intended supply: 5 days. Take lowest dose possible to manage pain Max Daily Amount: 4 tablets 7/9/24 7/14/24  Sarah Ernandez MD   sennosides-docusate sodium (SENOKOT-S) 8.6-50 MG tablet Take 1 tablet by mouth daily 7/9/24   Sarah Ernandez MD   ondansetron (ZOFRAN) 8 MG tablet Take 1 tablet by mouth every 8 hours as needed for Nausea or Vomiting 6/28/24   Vic Magallon MD   prochlorperazine (COMPAZINE) 10 MG tablet Take 1 tablet by mouth every 6 hours as needed (nausea) 6/28/24   Vic Magallon MD   Acetaminophen Extra Strength 500 MG TABS take 1 tablet by mouth four times a day if needed for pain 6/16/24   Vinod Ojeda DO   fluticasone-umeclidin-vilant (TRELEGY ELLIPTA) 100-62.5-25 MCG/ACT AEPB inhaler Inhale 1 puff into the lungs daily 5/23/24   Vinod Ojeda DO   rOPINIRole (REQUIP) 1 MG tablet Take 1 tablet by mouth nightly 5/23/24   Vinod Ojeda DO   rosuvastatin (CRESTOR) 10 MG tablet Take 1 tablet by mouth daily 5/23/24   Vinod Ojeda,    SITagliptin-metFORMIN (JANUMET XR)  MG TB24 per extended release tablet Take 1 tablet by mouth daily 5/23/24   Vinod Ojeda DO   ibuprofen (ADVIL;MOTRIN) 200 MG tablet Take 1 tablet by mouth as needed for Pain    Provider, MD Kendall   vitamin D (CHOLECALCIFEROL) 23719 UNIT CAPS One weekly  Patient taking differently: 1 capsule once a week One weekly 4/21/22   Vinod Ojeda DO   albuterol sulfate  (90 Base) MCG/ACT inhaler Inhale 2 puffs into the lungs every 6 hours as needed for Wheezing or Shortness of Breath 9/23/21   Vinod Ojeda DO     Coumadin Use Last 7 Days:  no  Antiplatelet drug therapy use last 7 days: no  Other anticoagulant use last 7 days: no  Additional Medication Information:  n/a      Pre-Sedation Documentation and Exam:   I have reviewed the patient's history and review of systems.    Mallampati Airway Assessment:  Mallampati Class II - (soft palate, fauces & uvula are

## 2024-07-12 NOTE — PROCEDURES
0930 Patient returned from right iliac bone biopsy. Report received from CHARLES [place]. Dressing checked, clean, dry, and intact. Patient stable. No s/s of complications noted or reported. Vitals will be checked q 15min, see flow sheets. Last medications given at 0919. Patient will recover for at least 60 minutes.     1000 Patient eating and drinking well with no s/s of complications noted or reported.    1030 Site was checked with every set of vitals. Site clean dry and intact. Discharge papers reviewed with patient, questions answered, discharge paper signed. Patient taken to door via wheelchair with his spouse. Patient in stable condition, no s/s of complications noted or reported.

## 2024-07-12 NOTE — PROGRESS NOTES
Patient arrival to procedure area via wheelchair with spouse for an anticipated and scheduled Bone marrow biopsy.    Allergies, current home medications, and history & physical reviewed. Confirmation that patient adhered to the required fasting instructions prior to the procedure. Vital signs indicate a safe level to proceed with procedure. See flowsheets for detailed documentation of vital signs.    Detailed procedural instructions were provided to the patient, ensuring they understood each step of the upcoming procedure. Ample opportunity given to ask questions, to which satisfactory answers were provided. The patient demonstrated a clear understanding of the information.    Throughout the interaction, the patient appeared calm and cooperative. The patient was reassured and made comfortable in preparation for the procedure.

## 2024-07-16 PROCEDURE — 77470 SPECIAL RADIATION TREATMENT: CPT | Performed by: RADIOLOGY

## 2024-07-17 ENCOUNTER — HOSPITAL ENCOUNTER (OUTPATIENT)
Dept: RADIATION ONCOLOGY | Age: 74
Discharge: HOME | End: 2024-07-17
Payer: MEDICARE

## 2024-07-17 PROCEDURE — 77334 RADIATION TREATMENT AID(S): CPT | Performed by: RADIOLOGY

## 2024-07-18 ENCOUNTER — HOSPITAL ENCOUNTER (OUTPATIENT)
Dept: INFUSION THERAPY | Age: 74
Discharge: HOME | End: 2024-07-18
Payer: MEDICARE

## 2024-07-18 DIAGNOSIS — Z98.890 HISTORY OF BONE MARROW BIOPSY: ICD-10-CM

## 2024-07-18 DIAGNOSIS — C34.90 SMALL CELL LUNG CANCER (HCC): Primary | ICD-10-CM

## 2024-07-18 LAB
ALBUMIN SERPL-MCNC: 3.4 G/DL (ref 3.5–5.2)
ALP SERPL-CCNC: 203 U/L (ref 40–129)
ALT SERPL-CCNC: 13 U/L (ref 0–40)
ANION GAP SERPL CALCULATED.3IONS-SCNC: 11 MMOL/L (ref 7–16)
AST SERPL-CCNC: 10 U/L (ref 0–39)
BASOPHILS # BLD: 0 K/UL (ref 0–0.2)
BASOPHILS NFR BLD: 0 % (ref 0–2)
BILIRUB SERPL-MCNC: 0.3 MG/DL (ref 0–1.2)
BUN SERPL-MCNC: 9 MG/DL (ref 6–23)
CALCIUM SERPL-MCNC: 8.4 MG/DL (ref 8.6–10.2)
CHLORIDE SERPL-SCNC: 99 MMOL/L (ref 98–107)
CO2 SERPL-SCNC: 26 MMOL/L (ref 22–29)
CREAT SERPL-MCNC: 1.1 MG/DL (ref 0.7–1.2)
EOSINOPHIL # BLD: 0 K/UL (ref 0.05–0.5)
EOSINOPHILS RELATIVE PERCENT: 0 % (ref 0–6)
ERYTHROCYTE [DISTWIDTH] IN BLOOD BY AUTOMATED COUNT: 12.8 % (ref 11.5–15)
GFR, ESTIMATED: 74 ML/MIN/1.73M2
GLUCOSE SERPL-MCNC: 205 MG/DL (ref 74–99)
HCT VFR BLD AUTO: 47.2 % (ref 37–54)
HGB BLD-MCNC: 15.5 G/DL (ref 12.5–16.5)
INR PPP: 1.2
LYMPHOCYTES NFR BLD: 2.44 K/UL (ref 1.5–4)
LYMPHOCYTES RELATIVE PERCENT: 14 % (ref 20–42)
MCH RBC QN AUTO: 30.2 PG (ref 26–35)
MCHC RBC AUTO-ENTMCNC: 32.8 G/DL (ref 32–34.5)
MCV RBC AUTO: 92 FL (ref 80–99.9)
METAMYELOCYTES ABSOLUTE COUNT: 0.35 K/UL (ref 0–0.12)
METAMYELOCYTES: 2 % (ref 0–1)
MICROORGANISM SPEC CULT: NORMAL
MICROORGANISM/AGENT SPEC: NORMAL
MONOCYTES NFR BLD: 1.04 K/UL (ref 0.1–0.95)
MONOCYTES NFR BLD: 6 % (ref 2–12)
MYELOCYTES ABSOLUTE COUNT: 0.17 K/UL
MYELOCYTES: 1 %
NEUTROPHILS NFR BLD: 77 % (ref 43–80)
NEUTS SEG NFR BLD: 13.4 K/UL (ref 1.8–7.3)
PLATELET # BLD AUTO: 188 K/UL (ref 130–450)
PMV BLD AUTO: 9.7 FL (ref 7–12)
POTASSIUM SERPL-SCNC: 3.5 MMOL/L (ref 3.5–5)
PROT SERPL-MCNC: 6 G/DL (ref 6.4–8.3)
PROTHROMBIN TIME: 13.4 SEC (ref 9.3–12.4)
RBC # BLD AUTO: 5.13 M/UL (ref 3.8–5.8)
RBC # BLD: ABNORMAL 10*6/UL
SERVICE CMNT-IMP: NORMAL
SODIUM SERPL-SCNC: 136 MMOL/L (ref 132–146)
SPECIMEN DESCRIPTION: NORMAL
WBC OTHER # BLD: 17.4 K/UL (ref 4.5–11.5)

## 2024-07-18 PROCEDURE — 36591 DRAW BLOOD OFF VENOUS DEVICE: CPT

## 2024-07-18 PROCEDURE — 6360000002 HC RX W HCPCS: Performed by: STUDENT IN AN ORGANIZED HEALTH CARE EDUCATION/TRAINING PROGRAM

## 2024-07-18 PROCEDURE — 80053 COMPREHEN METABOLIC PANEL: CPT

## 2024-07-18 PROCEDURE — 85025 COMPLETE CBC W/AUTO DIFF WBC: CPT

## 2024-07-18 PROCEDURE — 85610 PROTHROMBIN TIME: CPT

## 2024-07-18 PROCEDURE — 2580000003 HC RX 258: Performed by: STUDENT IN AN ORGANIZED HEALTH CARE EDUCATION/TRAINING PROGRAM

## 2024-07-18 RX ORDER — EPINEPHRINE 1 MG/ML
0.3 INJECTION, SOLUTION, CONCENTRATE INTRAVENOUS PRN
OUTPATIENT
Start: 2024-07-18

## 2024-07-18 RX ORDER — SODIUM CHLORIDE 9 MG/ML
25 INJECTION, SOLUTION INTRAVENOUS PRN
OUTPATIENT
Start: 2024-07-18

## 2024-07-18 RX ORDER — DIPHENHYDRAMINE HYDROCHLORIDE 50 MG/ML
50 INJECTION INTRAMUSCULAR; INTRAVENOUS
OUTPATIENT
Start: 2024-07-18

## 2024-07-18 RX ORDER — HEPARIN 100 UNIT/ML
500 SYRINGE INTRAVENOUS PRN
Status: DISCONTINUED | OUTPATIENT
Start: 2024-07-18 | End: 2024-07-19 | Stop reason: HOSPADM

## 2024-07-18 RX ORDER — SODIUM CHLORIDE 9 MG/ML
INJECTION, SOLUTION INTRAVENOUS CONTINUOUS
OUTPATIENT
Start: 2024-07-18

## 2024-07-18 RX ORDER — ALBUTEROL SULFATE 90 UG/1
4 AEROSOL, METERED RESPIRATORY (INHALATION) PRN
OUTPATIENT
Start: 2024-07-18

## 2024-07-18 RX ORDER — HEPARIN 100 UNIT/ML
500 SYRINGE INTRAVENOUS PRN
OUTPATIENT
Start: 2024-07-18

## 2024-07-18 RX ORDER — ACETAMINOPHEN 325 MG/1
650 TABLET ORAL
OUTPATIENT
Start: 2024-07-18

## 2024-07-18 RX ORDER — SODIUM CHLORIDE 0.9 % (FLUSH) 0.9 %
5-40 SYRINGE (ML) INJECTION PRN
OUTPATIENT
Start: 2024-07-18

## 2024-07-18 RX ORDER — ONDANSETRON 2 MG/ML
8 INJECTION INTRAMUSCULAR; INTRAVENOUS
OUTPATIENT
Start: 2024-07-18

## 2024-07-18 RX ORDER — SODIUM CHLORIDE 0.9 % (FLUSH) 0.9 %
5-40 SYRINGE (ML) INJECTION PRN
Status: DISCONTINUED | OUTPATIENT
Start: 2024-07-18 | End: 2024-07-19 | Stop reason: HOSPADM

## 2024-07-18 RX ADMIN — HEPARIN 500 UNITS: 100 SYRINGE at 09:22

## 2024-07-18 RX ADMIN — SODIUM CHLORIDE, PRESERVATIVE FREE 10 ML: 5 INJECTION INTRAVENOUS at 09:20

## 2024-07-18 RX ADMIN — SODIUM CHLORIDE, PRESERVATIVE FREE 10 ML: 5 INJECTION INTRAVENOUS at 09:22

## 2024-07-22 ENCOUNTER — HOSPITAL ENCOUNTER (OUTPATIENT)
Dept: INFUSION THERAPY | Age: 74
Discharge: HOME | End: 2024-07-22
Payer: MEDICARE

## 2024-07-22 VITALS
RESPIRATION RATE: 18 BRPM | HEIGHT: 71 IN | BODY MASS INDEX: 25.98 KG/M2 | DIASTOLIC BLOOD PRESSURE: 71 MMHG | OXYGEN SATURATION: 95 % | HEART RATE: 76 BPM | TEMPERATURE: 97.9 F | SYSTOLIC BLOOD PRESSURE: 152 MMHG | WEIGHT: 185.6 LBS

## 2024-07-22 DIAGNOSIS — C34.90 SMALL CELL LUNG CANCER (HCC): Primary | ICD-10-CM

## 2024-07-22 PROCEDURE — 2580000003 HC RX 258: Performed by: STUDENT IN AN ORGANIZED HEALTH CARE EDUCATION/TRAINING PROGRAM

## 2024-07-22 PROCEDURE — 96417 CHEMO IV INFUS EACH ADDL SEQ: CPT

## 2024-07-22 PROCEDURE — 96367 TX/PROPH/DG ADDL SEQ IV INF: CPT

## 2024-07-22 PROCEDURE — 96413 CHEMO IV INFUSION 1 HR: CPT

## 2024-07-22 PROCEDURE — 96375 TX/PRO/DX INJ NEW DRUG ADDON: CPT

## 2024-07-22 PROCEDURE — 6360000002 HC RX W HCPCS: Performed by: STUDENT IN AN ORGANIZED HEALTH CARE EDUCATION/TRAINING PROGRAM

## 2024-07-22 RX ORDER — ONDANSETRON 2 MG/ML
8 INJECTION INTRAMUSCULAR; INTRAVENOUS
Status: CANCELLED | OUTPATIENT
Start: 2024-07-24

## 2024-07-22 RX ORDER — SODIUM CHLORIDE 0.9 % (FLUSH) 0.9 %
5-40 SYRINGE (ML) INJECTION PRN
Status: CANCELLED | OUTPATIENT
Start: 2024-07-22

## 2024-07-22 RX ORDER — PROCHLORPERAZINE EDISYLATE 5 MG/ML
5 INJECTION INTRAMUSCULAR; INTRAVENOUS
Status: CANCELLED | OUTPATIENT
Start: 2024-07-22

## 2024-07-22 RX ORDER — ACETAMINOPHEN 325 MG/1
650 TABLET ORAL
Status: CANCELLED | OUTPATIENT
Start: 2024-07-24

## 2024-07-22 RX ORDER — DIPHENHYDRAMINE HYDROCHLORIDE 50 MG/ML
50 INJECTION INTRAMUSCULAR; INTRAVENOUS
Status: CANCELLED | OUTPATIENT
Start: 2024-07-24

## 2024-07-22 RX ORDER — HEPARIN SODIUM (PORCINE) LOCK FLUSH IV SOLN 100 UNIT/ML 100 UNIT/ML
500 SOLUTION INTRAVENOUS PRN
Status: CANCELLED | OUTPATIENT
Start: 2024-07-24

## 2024-07-22 RX ORDER — PALONOSETRON HYDROCHLORIDE 0.05 MG/ML
0.25 INJECTION, SOLUTION INTRAVENOUS ONCE
Status: COMPLETED | OUTPATIENT
Start: 2024-07-22 | End: 2024-07-22

## 2024-07-22 RX ORDER — DEXAMETHASONE SODIUM PHOSPHATE 10 MG/ML
10 INJECTION, SOLUTION INTRAMUSCULAR; INTRAVENOUS ONCE
Status: COMPLETED | OUTPATIENT
Start: 2024-07-22 | End: 2024-07-22

## 2024-07-22 RX ORDER — HEPARIN SODIUM (PORCINE) LOCK FLUSH IV SOLN 100 UNIT/ML 100 UNIT/ML
500 SOLUTION INTRAVENOUS PRN
Status: CANCELLED | OUTPATIENT
Start: 2024-07-22

## 2024-07-22 RX ORDER — HEPARIN SODIUM (PORCINE) LOCK FLUSH IV SOLN 100 UNIT/ML 100 UNIT/ML
500 SOLUTION INTRAVENOUS PRN
Status: CANCELLED | OUTPATIENT
Start: 2024-07-23

## 2024-07-22 RX ORDER — MEPERIDINE HYDROCHLORIDE 50 MG/ML
12.5 INJECTION INTRAMUSCULAR; INTRAVENOUS; SUBCUTANEOUS PRN
Status: CANCELLED | OUTPATIENT
Start: 2024-07-23

## 2024-07-22 RX ORDER — SODIUM CHLORIDE 9 MG/ML
5-250 INJECTION, SOLUTION INTRAVENOUS PRN
Status: CANCELLED | OUTPATIENT
Start: 2024-07-23

## 2024-07-22 RX ORDER — FAMOTIDINE 10 MG/ML
20 INJECTION, SOLUTION INTRAVENOUS
Status: CANCELLED | OUTPATIENT
Start: 2024-07-24

## 2024-07-22 RX ORDER — ACETAMINOPHEN 325 MG/1
650 TABLET ORAL
Status: CANCELLED | OUTPATIENT
Start: 2024-07-22

## 2024-07-22 RX ORDER — HEPARIN 100 UNIT/ML
500 SYRINGE INTRAVENOUS PRN
Status: DISCONTINUED | OUTPATIENT
Start: 2024-07-22 | End: 2024-07-23 | Stop reason: HOSPADM

## 2024-07-22 RX ORDER — ALBUTEROL SULFATE 90 UG/1
4 AEROSOL, METERED RESPIRATORY (INHALATION) PRN
Status: CANCELLED | OUTPATIENT
Start: 2024-07-24

## 2024-07-22 RX ORDER — EPINEPHRINE 1 MG/ML
0.3 INJECTION, SOLUTION, CONCENTRATE INTRAVENOUS PRN
Status: CANCELLED | OUTPATIENT
Start: 2024-07-23

## 2024-07-22 RX ORDER — SODIUM CHLORIDE 0.9 % (FLUSH) 0.9 %
5-40 SYRINGE (ML) INJECTION PRN
Status: DISCONTINUED | OUTPATIENT
Start: 2024-07-22 | End: 2024-07-23 | Stop reason: HOSPADM

## 2024-07-22 RX ORDER — SODIUM CHLORIDE 9 MG/ML
INJECTION, SOLUTION INTRAVENOUS CONTINUOUS
Status: CANCELLED | OUTPATIENT
Start: 2024-07-22

## 2024-07-22 RX ORDER — ALBUTEROL SULFATE 90 UG/1
4 AEROSOL, METERED RESPIRATORY (INHALATION) PRN
Status: CANCELLED | OUTPATIENT
Start: 2024-07-22

## 2024-07-22 RX ORDER — SODIUM CHLORIDE 0.9 % (FLUSH) 0.9 %
5-40 SYRINGE (ML) INJECTION PRN
Status: CANCELLED | OUTPATIENT
Start: 2024-07-23

## 2024-07-22 RX ORDER — MEPERIDINE HYDROCHLORIDE 50 MG/ML
12.5 INJECTION INTRAMUSCULAR; INTRAVENOUS; SUBCUTANEOUS PRN
Status: CANCELLED | OUTPATIENT
Start: 2024-07-24

## 2024-07-22 RX ORDER — SODIUM CHLORIDE 9 MG/ML
INJECTION, SOLUTION INTRAVENOUS CONTINUOUS
Status: CANCELLED | OUTPATIENT
Start: 2024-07-23

## 2024-07-22 RX ORDER — SODIUM CHLORIDE 9 MG/ML
5-250 INJECTION, SOLUTION INTRAVENOUS PRN
Status: CANCELLED | OUTPATIENT
Start: 2024-07-24

## 2024-07-22 RX ORDER — SODIUM CHLORIDE 9 MG/ML
5-250 INJECTION, SOLUTION INTRAVENOUS PRN
Status: CANCELLED | OUTPATIENT
Start: 2024-07-22

## 2024-07-22 RX ORDER — SODIUM CHLORIDE 9 MG/ML
INJECTION, SOLUTION INTRAVENOUS CONTINUOUS
Status: CANCELLED | OUTPATIENT
Start: 2024-07-24

## 2024-07-22 RX ORDER — FAMOTIDINE 10 MG/ML
20 INJECTION, SOLUTION INTRAVENOUS
Status: CANCELLED | OUTPATIENT
Start: 2024-07-22

## 2024-07-22 RX ORDER — DIPHENHYDRAMINE HYDROCHLORIDE 50 MG/ML
50 INJECTION INTRAMUSCULAR; INTRAVENOUS
Status: CANCELLED | OUTPATIENT
Start: 2024-07-22

## 2024-07-22 RX ORDER — EPINEPHRINE 1 MG/ML
0.3 INJECTION, SOLUTION, CONCENTRATE INTRAVENOUS PRN
Status: CANCELLED | OUTPATIENT
Start: 2024-07-24

## 2024-07-22 RX ORDER — ACETAMINOPHEN 325 MG/1
650 TABLET ORAL
Status: CANCELLED | OUTPATIENT
Start: 2024-07-23

## 2024-07-22 RX ORDER — ALBUTEROL SULFATE 90 UG/1
4 AEROSOL, METERED RESPIRATORY (INHALATION) PRN
Status: CANCELLED | OUTPATIENT
Start: 2024-07-23

## 2024-07-22 RX ORDER — DIPHENHYDRAMINE HYDROCHLORIDE 50 MG/ML
50 INJECTION INTRAMUSCULAR; INTRAVENOUS
Status: CANCELLED | OUTPATIENT
Start: 2024-07-23

## 2024-07-22 RX ORDER — PALONOSETRON 0.05 MG/ML
0.25 INJECTION, SOLUTION INTRAVENOUS ONCE
Status: CANCELLED | OUTPATIENT
Start: 2024-07-22 | End: 2024-07-22

## 2024-07-22 RX ORDER — MEPERIDINE HYDROCHLORIDE 50 MG/ML
12.5 INJECTION INTRAMUSCULAR; INTRAVENOUS; SUBCUTANEOUS PRN
Status: CANCELLED | OUTPATIENT
Start: 2024-07-22

## 2024-07-22 RX ORDER — FAMOTIDINE 10 MG/ML
20 INJECTION, SOLUTION INTRAVENOUS
Status: CANCELLED | OUTPATIENT
Start: 2024-07-23

## 2024-07-22 RX ORDER — ONDANSETRON 2 MG/ML
8 INJECTION INTRAMUSCULAR; INTRAVENOUS
Status: CANCELLED | OUTPATIENT
Start: 2024-07-22

## 2024-07-22 RX ORDER — EPINEPHRINE 1 MG/ML
0.3 INJECTION, SOLUTION, CONCENTRATE INTRAVENOUS PRN
Status: CANCELLED | OUTPATIENT
Start: 2024-07-22

## 2024-07-22 RX ORDER — ONDANSETRON 2 MG/ML
8 INJECTION INTRAMUSCULAR; INTRAVENOUS
Status: CANCELLED | OUTPATIENT
Start: 2024-07-23

## 2024-07-22 RX ORDER — SODIUM CHLORIDE 0.9 % (FLUSH) 0.9 %
5-40 SYRINGE (ML) INJECTION PRN
Status: CANCELLED | OUTPATIENT
Start: 2024-07-24

## 2024-07-22 RX ORDER — SODIUM CHLORIDE 9 MG/ML
5-250 INJECTION, SOLUTION INTRAVENOUS PRN
Status: DISCONTINUED | OUTPATIENT
Start: 2024-07-22 | End: 2024-07-23 | Stop reason: HOSPADM

## 2024-07-22 RX ADMIN — SODIUM CHLORIDE, PRESERVATIVE FREE 10 ML: 5 INJECTION INTRAVENOUS at 13:45

## 2024-07-22 RX ADMIN — SODIUM CHLORIDE, PRESERVATIVE FREE 10 ML: 5 INJECTION INTRAVENOUS at 10:34

## 2024-07-22 RX ADMIN — ETOPOSIDE 210 MG: 20 INJECTION INTRAVENOUS at 12:07

## 2024-07-22 RX ADMIN — PALONOSETRON 0.25 MG: 0.25 INJECTION, SOLUTION INTRAVENOUS at 10:29

## 2024-07-22 RX ADMIN — SODIUM CHLORIDE 150 MG: 9 INJECTION, SOLUTION INTRAVENOUS at 10:40

## 2024-07-22 RX ADMIN — DEXAMETHASONE SODIUM PHOSPHATE 10 MG: 10 INJECTION INTRAMUSCULAR; INTRAVENOUS at 10:34

## 2024-07-22 RX ADMIN — HEPARIN 500 UNITS: 100 SYRINGE at 13:45

## 2024-07-22 RX ADMIN — SODIUM CHLORIDE 50 ML/HR: 9 INJECTION, SOLUTION INTRAVENOUS at 10:29

## 2024-07-22 RX ADMIN — CARBOPLATIN 450 MG: 10 INJECTION, SOLUTION INTRAVENOUS at 11:21

## 2024-07-22 ASSESSMENT — PAIN DESCRIPTION - PAIN TYPE: TYPE: CHRONIC PAIN

## 2024-07-22 ASSESSMENT — PAIN DESCRIPTION - DESCRIPTORS: DESCRIPTORS: ACHING;DISCOMFORT;CRUSHING

## 2024-07-22 ASSESSMENT — PAIN DESCRIPTION - DIRECTION: RADIATING_TOWARDS: DOWN BOTH LEGS

## 2024-07-22 ASSESSMENT — PAIN DESCRIPTION - LOCATION: LOCATION: BACK

## 2024-07-22 ASSESSMENT — PAIN DESCRIPTION - ONSET: ONSET: ON-GOING

## 2024-07-22 ASSESSMENT — PAIN DESCRIPTION - FREQUENCY: FREQUENCY: CONTINUOUS

## 2024-07-22 ASSESSMENT — PAIN - FUNCTIONAL ASSESSMENT: PAIN_FUNCTIONAL_ASSESSMENT: PREVENTS OR INTERFERES SOME ACTIVE ACTIVITIES AND ADLS

## 2024-07-22 ASSESSMENT — PAIN DESCRIPTION - ORIENTATION: ORIENTATION: LOWER

## 2024-07-22 ASSESSMENT — PAIN SCALES - GENERAL: PAINLEVEL_OUTOF10: 10

## 2024-07-22 NOTE — PROGRESS NOTES
Patient tolerated Carboplatin and Etoposide well. Remained on unit for 10 minutes after treatment. Patient alert and oriented x3. No distress noted. Vital signs stable. Patient denies any new or worsening pain. Educated patient on possible side effects and treatment of medication.     Patient verbalized understanding. Offered patient education and/or discharge material. Patient declined. Patient denies any needs. All questions answered. D/C in stable condition.

## 2024-07-23 ENCOUNTER — HOSPITAL ENCOUNTER (OUTPATIENT)
Dept: INFUSION THERAPY | Age: 74
Discharge: HOME | End: 2024-07-23
Payer: MEDICARE

## 2024-07-23 ENCOUNTER — TELEPHONE (OUTPATIENT)
Dept: ONCOLOGY | Age: 74
End: 2024-07-23

## 2024-07-23 VITALS
HEART RATE: 62 BPM | RESPIRATION RATE: 16 BRPM | OXYGEN SATURATION: 96 % | DIASTOLIC BLOOD PRESSURE: 62 MMHG | TEMPERATURE: 98.2 F | SYSTOLIC BLOOD PRESSURE: 126 MMHG

## 2024-07-23 DIAGNOSIS — C34.90 SMALL CELL LUNG CANCER (HCC): Primary | ICD-10-CM

## 2024-07-23 LAB — SURGICAL PATHOLOGY REPORT: NORMAL

## 2024-07-23 PROCEDURE — 96375 TX/PRO/DX INJ NEW DRUG ADDON: CPT

## 2024-07-23 PROCEDURE — 6360000002 HC RX W HCPCS: Performed by: STUDENT IN AN ORGANIZED HEALTH CARE EDUCATION/TRAINING PROGRAM

## 2024-07-23 PROCEDURE — 96413 CHEMO IV INFUSION 1 HR: CPT

## 2024-07-23 PROCEDURE — 2580000003 HC RX 258: Performed by: STUDENT IN AN ORGANIZED HEALTH CARE EDUCATION/TRAINING PROGRAM

## 2024-07-23 RX ORDER — SODIUM CHLORIDE 9 MG/ML
5-250 INJECTION, SOLUTION INTRAVENOUS PRN
Status: DISCONTINUED | OUTPATIENT
Start: 2024-07-23 | End: 2024-07-24 | Stop reason: HOSPADM

## 2024-07-23 RX ORDER — SODIUM CHLORIDE 0.9 % (FLUSH) 0.9 %
5-40 SYRINGE (ML) INJECTION PRN
Status: DISCONTINUED | OUTPATIENT
Start: 2024-07-23 | End: 2024-07-24 | Stop reason: HOSPADM

## 2024-07-23 RX ORDER — DEXAMETHASONE SODIUM PHOSPHATE 10 MG/ML
8 INJECTION, SOLUTION INTRAMUSCULAR; INTRAVENOUS ONCE
Status: COMPLETED | OUTPATIENT
Start: 2024-07-23 | End: 2024-07-23

## 2024-07-23 RX ORDER — HEPARIN 100 UNIT/ML
500 SYRINGE INTRAVENOUS PRN
Status: DISCONTINUED | OUTPATIENT
Start: 2024-07-23 | End: 2024-07-24 | Stop reason: HOSPADM

## 2024-07-23 RX ADMIN — HEPARIN 500 UNITS: 100 SYRINGE at 14:45

## 2024-07-23 RX ADMIN — ETOPOSIDE 210 MG: 20 INJECTION INTRAVENOUS at 13:34

## 2024-07-23 RX ADMIN — SODIUM CHLORIDE, PRESERVATIVE FREE 10 ML: 5 INJECTION INTRAVENOUS at 13:16

## 2024-07-23 RX ADMIN — SODIUM CHLORIDE 250 ML/HR: 9 INJECTION, SOLUTION INTRAVENOUS at 13:06

## 2024-07-23 RX ADMIN — SODIUM CHLORIDE, PRESERVATIVE FREE 10 ML: 5 INJECTION INTRAVENOUS at 14:45

## 2024-07-23 RX ADMIN — DEXAMETHASONE SODIUM PHOSPHATE 8 MG: 10 INJECTION, SOLUTION INTRAMUSCULAR; INTRAVENOUS at 13:16

## 2024-07-23 ASSESSMENT — PAIN DESCRIPTION - PAIN TYPE: TYPE: CHRONIC PAIN

## 2024-07-23 ASSESSMENT — PAIN SCALES - GENERAL: PAINLEVEL_OUTOF10: 7

## 2024-07-23 ASSESSMENT — PAIN - FUNCTIONAL ASSESSMENT: PAIN_FUNCTIONAL_ASSESSMENT: PREVENTS OR INTERFERES SOME ACTIVE ACTIVITIES AND ADLS

## 2024-07-23 ASSESSMENT — PAIN DESCRIPTION - DESCRIPTORS: DESCRIPTORS: SHARP;THROBBING

## 2024-07-23 ASSESSMENT — PAIN DESCRIPTION - LOCATION: LOCATION: BACK

## 2024-07-23 ASSESSMENT — PAIN DESCRIPTION - ORIENTATION: ORIENTATION: MID;LOWER

## 2024-07-23 NOTE — TELEPHONE ENCOUNTER
Attempted to contact pt at request of infusion RN re: access to care.  Message left requesting callback.    Received return call from pt.  Pt reported primary concern of unmanaged pain.  Noted that pt previously seen by pain management due to back pain.  He indicated that he had elected to discontinue car and it does not appear that he has been seen since 2022.  Pt noted that he would like to establish with a provider for symptoms management following his recent diagnosis of lung cancer.  Explored difference between palliative care and pain management; will defer to MD for referral.  No additional needs identified at this time.      Luciana Shannon, MSW, JACIW-S  Oncology Social Worker

## 2024-07-24 ENCOUNTER — HOSPITAL ENCOUNTER (OUTPATIENT)
Dept: INFUSION THERAPY | Age: 74
Discharge: HOME | End: 2024-07-24
Payer: MEDICARE

## 2024-07-24 VITALS
OXYGEN SATURATION: 99 % | SYSTOLIC BLOOD PRESSURE: 140 MMHG | RESPIRATION RATE: 16 BRPM | DIASTOLIC BLOOD PRESSURE: 61 MMHG | TEMPERATURE: 97.1 F | HEART RATE: 63 BPM

## 2024-07-24 DIAGNOSIS — C34.90 SMALL CELL LUNG CANCER (HCC): Primary | ICD-10-CM

## 2024-07-24 PROCEDURE — 96413 CHEMO IV INFUSION 1 HR: CPT

## 2024-07-24 PROCEDURE — 96375 TX/PRO/DX INJ NEW DRUG ADDON: CPT

## 2024-07-24 PROCEDURE — 96377 APPLICATON ON-BODY INJECTOR: CPT

## 2024-07-24 PROCEDURE — 6360000002 HC RX W HCPCS: Performed by: STUDENT IN AN ORGANIZED HEALTH CARE EDUCATION/TRAINING PROGRAM

## 2024-07-24 PROCEDURE — 2580000003 HC RX 258: Performed by: STUDENT IN AN ORGANIZED HEALTH CARE EDUCATION/TRAINING PROGRAM

## 2024-07-24 RX ORDER — DEXAMETHASONE SODIUM PHOSPHATE 10 MG/ML
8 INJECTION, SOLUTION INTRAMUSCULAR; INTRAVENOUS ONCE
Status: COMPLETED | OUTPATIENT
Start: 2024-07-24 | End: 2024-07-24

## 2024-07-24 RX ORDER — SODIUM CHLORIDE 0.9 % (FLUSH) 0.9 %
5-40 SYRINGE (ML) INJECTION PRN
Status: DISCONTINUED | OUTPATIENT
Start: 2024-07-24 | End: 2024-07-25 | Stop reason: HOSPADM

## 2024-07-24 RX ORDER — SODIUM CHLORIDE 9 MG/ML
5-250 INJECTION, SOLUTION INTRAVENOUS PRN
Status: DISCONTINUED | OUTPATIENT
Start: 2024-07-24 | End: 2024-07-25 | Stop reason: HOSPADM

## 2024-07-24 RX ORDER — HEPARIN 100 UNIT/ML
500 SYRINGE INTRAVENOUS PRN
Status: DISCONTINUED | OUTPATIENT
Start: 2024-07-24 | End: 2024-07-25 | Stop reason: HOSPADM

## 2024-07-24 RX ADMIN — PEGFILGRASTIM 6 MG: KIT SUBCUTANEOUS at 15:21

## 2024-07-24 RX ADMIN — SODIUM CHLORIDE, PRESERVATIVE FREE 10 ML: 5 INJECTION INTRAVENOUS at 13:51

## 2024-07-24 RX ADMIN — ETOPOSIDE 210 MG: 20 INJECTION INTRAVENOUS at 14:17

## 2024-07-24 RX ADMIN — SODIUM CHLORIDE 200 ML/HR: 9 INJECTION, SOLUTION INTRAVENOUS at 13:53

## 2024-07-24 RX ADMIN — HEPARIN 500 UNITS: 100 SYRINGE at 15:21

## 2024-07-24 RX ADMIN — DEXAMETHASONE SODIUM PHOSPHATE 8 MG: 10 INJECTION, SOLUTION INTRAMUSCULAR; INTRAVENOUS at 13:53

## 2024-07-24 RX ADMIN — SODIUM CHLORIDE, PRESERVATIVE FREE 10 ML: 5 INJECTION INTRAVENOUS at 15:20

## 2024-07-24 NOTE — PROGRESS NOTES
Patient tolerated infusion well. Patient alert and oriented x 3. No distress noted. Vital signs stable. Patient denies any new or worsening pain. Patient denies questions regarding treatment or medication; verbalized understanding, offered patient information and discharge material; patient declined; Patient denies needs, all questions answered. OBI applied, pt voiced understanding of directions.

## 2024-07-25 ENCOUNTER — HOSPITAL ENCOUNTER (OUTPATIENT)
Dept: INFUSION THERAPY | Age: 74
End: 2024-07-25

## 2024-07-25 ENCOUNTER — OFFICE VISIT (OUTPATIENT)
Dept: ONCOLOGY | Age: 74
End: 2024-07-25
Payer: MEDICARE

## 2024-07-25 ENCOUNTER — HOSPITAL ENCOUNTER (OUTPATIENT)
Dept: INFUSION THERAPY | Age: 74
Discharge: HOME | End: 2024-07-25

## 2024-07-25 VITALS
BODY MASS INDEX: 26.97 KG/M2 | TEMPERATURE: 97.9 F | WEIGHT: 193.3 LBS | SYSTOLIC BLOOD PRESSURE: 127 MMHG | DIASTOLIC BLOOD PRESSURE: 59 MMHG | OXYGEN SATURATION: 98 % | HEART RATE: 72 BPM

## 2024-07-25 DIAGNOSIS — G89.29 CHRONIC LOW BACK PAIN WITH SCIATICA, SCIATICA LATERALITY UNSPECIFIED, UNSPECIFIED BACK PAIN LATERALITY: ICD-10-CM

## 2024-07-25 DIAGNOSIS — C34.90 SMALL CELL LUNG CANCER (HCC): Primary | ICD-10-CM

## 2024-07-25 DIAGNOSIS — M54.40 CHRONIC LOW BACK PAIN WITH SCIATICA, SCIATICA LATERALITY UNSPECIFIED, UNSPECIFIED BACK PAIN LATERALITY: ICD-10-CM

## 2024-07-25 PROCEDURE — G8427 DOCREV CUR MEDS BY ELIG CLIN: HCPCS | Performed by: STUDENT IN AN ORGANIZED HEALTH CARE EDUCATION/TRAINING PROGRAM

## 2024-07-25 PROCEDURE — 4004F PT TOBACCO SCREEN RCVD TLK: CPT | Performed by: STUDENT IN AN ORGANIZED HEALTH CARE EDUCATION/TRAINING PROGRAM

## 2024-07-25 PROCEDURE — 1124F ACP DISCUSS-NO DSCNMKR DOCD: CPT | Performed by: STUDENT IN AN ORGANIZED HEALTH CARE EDUCATION/TRAINING PROGRAM

## 2024-07-25 PROCEDURE — 99214 OFFICE O/P EST MOD 30 MIN: CPT | Performed by: STUDENT IN AN ORGANIZED HEALTH CARE EDUCATION/TRAINING PROGRAM

## 2024-07-25 PROCEDURE — G8417 CALC BMI ABV UP PARAM F/U: HCPCS | Performed by: STUDENT IN AN ORGANIZED HEALTH CARE EDUCATION/TRAINING PROGRAM

## 2024-07-25 RX ORDER — LIDOCAINE 4 G/G
1 PATCH TOPICAL DAILY
Qty: 30 PATCH | Refills: 0 | Status: SHIPPED | OUTPATIENT
Start: 2024-07-25 | End: 2024-08-24

## 2024-07-25 NOTE — PROGRESS NOTES
Kin Issa  7/25/2024  Ht Readings from Last 1 Encounters:   07/22/24 1.803 m (5' 10.98\")     Wt Readings from Last 10 Encounters:   07/25/24 87.7 kg (193 lb 4.8 oz)   07/22/24 84.2 kg (185 lb 9.6 oz)   07/09/24 88.5 kg (195 lb)   06/27/24 88.5 kg (195 lb)   06/14/24 90.7 kg (200 lb)   06/17/24 90.6 kg (199 lb 12.8 oz)   06/13/24 91.1 kg (200 lb 12.8 oz)   06/03/24 90.7 kg (200 lb)   05/23/24 91.2 kg (201 lb)   02/29/24 92.8 kg (204 lb 9.6 oz)     BMI: 26.97    Assessment: Met w/ pt during medical oncology visit. Pt has lost 2# since initial consult earlier this month. Pt w/ overall insignificant loss of 5% x5 months. He reports fair appetite w/ inconsistent PO intake, though denies any nutrition needs at this time. Will remain available to pt if nutrition assistance desired in future.    Meron Zavaleta, MS, RD, LD

## 2024-07-25 NOTE — PROGRESS NOTES
Patient provided with discharge instructions, received printed AVS.  All questions answered.  Patient understands follow up plan of care.     
Diagnosis Comment --   There is no evidence of metastatic epithelial malignancy within the   examined tissue.  Pankeratin, TTF-1, Synaptophysin, and Chromogranin   immunostains have been utilized to exclude isolated metastasis.  The   biopsies consist of medullary bone with marked thickening of   trabeculae, irregular cement lines, increased osteoclast activity, and   peritrabecular fibrosis.  The constellation of findings raises the   possibility of Paget disease of bone.  Correlation with imaging   studies is essential.  Intradepartmental consultation is obtained.     Kin Jefferson MD   **Electronically Signed Out**         rcb/7/22/2024      Clinical Information   Procedure: Bone biopsy   History: Right iliac lesion      Pre-Operative Diagnosis   C34.90     Post Operative Diagnosis   Not specified     Source of Specimen   A: BONE BIOPSY, RIGHT ILIAC                Past Medical History:   Diagnosis Date    Agent orange exposure     Chronic cough     uses inhaler /to have Bronch 8/22/2019    Colon polyps 03/07/2019, 9-2021    fOR or 10-19-21    COPD (chronic obstructive pulmonary disease) (HCC)     Diabetes (HCC)     Foot drop, right foot     HAS A BRACE    Hyperlipidemia     Hypertension     Malaria     Rash     noted 2/28/19- says it is on back & shoulders    Restless leg     Small cell lung cancer (HCC) 06/02/2024    Spinal stenosis at L4-L5 level 2009    [facility]    Thyroid nodule 2020    R    Type 2 diabetes mellitus without complication (HCC)        Past Surgical History:   Procedure Laterality Date    APPENDECTOMY  2000?    ruptured    BRONCHOSCOPY N/A 8/22/2019    BRONCHOSCOPY DIAGNOSTIC OR CELL WASH ONLY performed by Jaxson Daley MD at [facility] ENDOSCOPY    BRONCHOSCOPY N/A 6/3/2024    BRONCHOSCOPY ENDOBRONCHIAL ULTRASOUND/ cytology performed by Mg Holder DO at [facility] [place]    CHOLECYSTECTOMY  2000?    open    COLECTOMY N/A 10/19/2021    LAPAROSCOPIC ROBOTIC XI ASSISTED RIGHT COLECTOMY

## 2024-07-26 ENCOUNTER — TELEPHONE (OUTPATIENT)
Dept: PALLATIVE CARE | Age: 74
End: 2024-07-26

## 2024-07-26 NOTE — TELEPHONE ENCOUNTER
Called and spoke with Kin regarding referral for Palliative Care. Explained Palliative service and location of clinic. Kin has not started treatment yet of chemo and radiation.  Shira set 8/19/24

## 2024-08-02 ENCOUNTER — OFFICE VISIT (OUTPATIENT)
Dept: SURGERY | Age: 74
End: 2024-08-02
Payer: MEDICARE

## 2024-08-02 ENCOUNTER — TELEPHONE (OUTPATIENT)
Dept: SURGERY | Age: 74
End: 2024-08-02

## 2024-08-02 VITALS
TEMPERATURE: 97.5 F | DIASTOLIC BLOOD PRESSURE: 65 MMHG | WEIGHT: 180 LBS | HEIGHT: 71 IN | BODY MASS INDEX: 25.2 KG/M2 | HEART RATE: 115 BPM | SYSTOLIC BLOOD PRESSURE: 122 MMHG

## 2024-08-02 DIAGNOSIS — K29.01 GASTROINTESTINAL HEMORRHAGE ASSOCIATED WITH ACUTE GASTRITIS: Primary | ICD-10-CM

## 2024-08-02 PROCEDURE — 99213 OFFICE O/P EST LOW 20 MIN: CPT | Performed by: STUDENT IN AN ORGANIZED HEALTH CARE EDUCATION/TRAINING PROGRAM

## 2024-08-02 PROCEDURE — G8427 DOCREV CUR MEDS BY ELIG CLIN: HCPCS | Performed by: STUDENT IN AN ORGANIZED HEALTH CARE EDUCATION/TRAINING PROGRAM

## 2024-08-02 PROCEDURE — G8417 CALC BMI ABV UP PARAM F/U: HCPCS | Performed by: STUDENT IN AN ORGANIZED HEALTH CARE EDUCATION/TRAINING PROGRAM

## 2024-08-02 PROCEDURE — 4004F PT TOBACCO SCREEN RCVD TLK: CPT | Performed by: STUDENT IN AN ORGANIZED HEALTH CARE EDUCATION/TRAINING PROGRAM

## 2024-08-02 PROCEDURE — 1124F ACP DISCUSS-NO DSCNMKR DOCD: CPT | Performed by: STUDENT IN AN ORGANIZED HEALTH CARE EDUCATION/TRAINING PROGRAM

## 2024-08-02 RX ORDER — SODIUM CHLORIDE 9 MG/ML
25 INJECTION, SOLUTION INTRAVENOUS PRN
OUTPATIENT
Start: 2024-08-02

## 2024-08-02 RX ORDER — SUCRALFATE 1 G/1
1 TABLET ORAL 4 TIMES DAILY
Qty: 120 TABLET | Refills: 3 | Status: SHIPPED | OUTPATIENT
Start: 2024-08-02

## 2024-08-02 RX ORDER — SODIUM CHLORIDE 0.9 % (FLUSH) 0.9 %
5-40 SYRINGE (ML) INJECTION EVERY 12 HOURS SCHEDULED
OUTPATIENT
Start: 2024-08-02

## 2024-08-02 RX ORDER — PANTOPRAZOLE SODIUM 40 MG/1
40 GRANULE, DELAYED RELEASE ORAL
Qty: 60 PACKET | Refills: 0
Start: 2024-08-02 | End: 2024-08-02

## 2024-08-02 RX ORDER — PANTOPRAZOLE SODIUM 40 MG/1
40 GRANULE, DELAYED RELEASE ORAL
Qty: 60 PACKET | Refills: 0 | Status: SHIPPED | OUTPATIENT
Start: 2024-08-02 | End: 2024-09-01

## 2024-08-02 RX ORDER — SODIUM CHLORIDE 0.9 % (FLUSH) 0.9 %
5-40 SYRINGE (ML) INJECTION PRN
OUTPATIENT
Start: 2024-08-02

## 2024-08-02 NOTE — PATIENT INSTRUCTIONS
IF YOU HAVE ANY QUESTIONS OR CONCERNS PLEASE CALL Ita BENNETT .[phone].    GOLYTELY OR NULYTELY  COLON PREP FOR COLONOSCOPY OR COLON SURGERY    It is very important that you follow all of the instructions listed on this sheet carefully (they may be slightly different than the directions on the product that you purchase at the pharmacy) to ensure that you colon is adequately cleaned out or you risk of complications could be increased.    2 DAYS OR MORE BEFORE ENDOSCOPY:  Obtain Golytely, Colyte or Nulytely, depending on the prescription the surgeon gave you, from the pharmacy.  Mix Golytely, Colyte or Nulytely according to instructions and refrigerate.  Do not eat corn, tomatoes, peas or watermelon 3 to 5 days before procedure.  If you are on INSULIN or OTHER DIABETIC MEDICATIONS, then check with your primary care physician as to how to adjust your medication while on clear liquid diet and when nothing by mouth.    1 DAY BEFORE THE ENDOSCOPY:    NO SOLID FOOD - ONLY CLEAR LIQUIDS (soup, jello, or juice that you can see through with no solid food) for breakfast, lunch and supper.   NOTHING RED OR PURPLE as it will turn the inside of the colon red and look like blood.   BEGIN DRINKING THE GOLYTELY, COLYTE, OR NULYTELY EARLY IN THE AFTERNOON. THE EARLIER THE BETTER. WE RECOMMEND BETWEEN 1:00PM AND 4:00PM Drink and 8oz glass of this solution every 10 minutes until you have drank the entire gallon. It is best to drink the whole glass rapidly rather than sipping small amounts continuously.  Bowel movements should occur about one hour after the first glass of Golytely, Colyte, or Nulytely. They will continue periodically for approximately 1-2 hours after you finish drinking the last glass. By this time the stool should be liquid and clear.  Feelings of bloating and/or nausea are common after the first few glasses because of the large volume of fluid ingested. This is temporary, however, and will improve once bowel

## 2024-08-02 NOTE — TELEPHONE ENCOUNTER
Prior Authorization Form:      DEMOGRAPHICS:                     Patient Name:  Kin Stone  Patient :  1950            Insurance:  Payor: [facility] MEDICARE / Plan: [facility] MEDICARE COMPLETE / Product Type: *No Product type* /   Insurance ID Number:    Payer/Plan Subscr  Sex Relation Sub. Ins. ID Effective Group Num   1. [facility] MEDICARE * KIN STONE 1950 Male Self [id number]                                   [address] [address]         DIAGNOSIS & PROCEDURE:                       Procedure/Operation: Colonoscopy         CPT Code: 04307    Diagnosis:  Gastrointestinal Hemorrhage Associated with Acute Gastritis    ICD10 Code: K29.01    Location:  [facility]    Surgeon:  Dr. Pereyra    SCHEDULING INFORMATION:                          Date: 24    Time: 12:00 PM              Anesthesia:  MAC/TIVA                                                       Status:  Outpatient        Electronically signed by Ita Sheppard MA on 2024 at 8:45 AM

## 2024-08-02 NOTE — PROGRESS NOTES
[facility]    General Surgery Attending History and Physical    Patient's Name/Date of Birth: Kin Issa / 1950 (74 y.o.)    Date: August 2, 2024     CC:Abdominal pain and black stool    HPI:  Kin is a 73yo man I placed a chemoport for a diagnosis of RIGHT small cell lung cancer 7/9/2024.  He has had no issues with infusions or complaints about the port.  He is here today because he is having NINI burning abdominal pain and endorsing black stool for the past 2 months.  He denies radiation of the pain and there are no specific worsening or alleviating factors.  Pain 4/10 at the worst.  He denies symptoms concerning for anemia.  He takes NSAIDs regularly for pain.      Past Medical History:   Diagnosis Date    Agent orange exposure     Chronic cough     uses inhaler /to have Bronch 8/22/2019    Colon polyps 03/07/2019, 9-2021    fOR or 10-19-21    COPD (chronic obstructive pulmonary disease) (HCC)     Diabetes (HCC)     Foot drop, right foot     HAS A BRACE    Hyperlipidemia     Hypertension     Malaria     Rash     noted 2/28/19- says it is on back & shoulders    Restless leg     Small cell lung cancer (HCC) 06/02/2024    Spinal stenosis at L4-L5 level 2009    [facility]    Thyroid nodule 2020    R    Type 2 diabetes mellitus without complication (HCC)        Past Surgical History:   Procedure Laterality Date    APPENDECTOMY  2000?    ruptured    BRONCHOSCOPY N/A 8/22/2019    BRONCHOSCOPY DIAGNOSTIC OR CELL WASH ONLY performed by Jaxson Daley MD at [facility] ENDOSCOPY    BRONCHOSCOPY N/A 6/3/2024    BRONCHOSCOPY ENDOBRONCHIAL ULTRASOUND/ cytology performed by Mg Holder DO at [facility] ENDOSCOPY    CHOLECYSTECTOMY  2000?    open    COLECTOMY N/A 10/19/2021    LAPAROSCOPIC ROBOTIC XI ASSISTED RIGHT COLECTOMY performed by Job De Santiago DO at [facility] OR    COLONOSCOPY  12 years ago    COLONOSCOPY N/A 3/7/2019    COLONOSCOPY POLYPECTOMY SNARE/COLD BIOPSY performed by Job De Santiago

## 2024-08-07 ENCOUNTER — HOSPITAL ENCOUNTER (OUTPATIENT)
Dept: RADIATION ONCOLOGY | Age: 74
Discharge: HOME | End: 2024-08-07
Payer: MEDICARE

## 2024-08-07 PROCEDURE — 77338 DESIGN MLC DEVICE FOR IMRT: CPT | Performed by: RADIOLOGY

## 2024-08-07 PROCEDURE — 77301 RADIOTHERAPY DOSE PLAN IMRT: CPT | Performed by: RADIOLOGY

## 2024-08-07 PROCEDURE — 77300 RADIATION THERAPY DOSE PLAN: CPT | Performed by: RADIOLOGY

## 2024-08-08 ENCOUNTER — OFFICE VISIT (OUTPATIENT)
Dept: ONCOLOGY | Age: 74
End: 2024-08-08
Payer: MEDICARE

## 2024-08-08 ENCOUNTER — HOSPITAL ENCOUNTER (OUTPATIENT)
Dept: INFUSION THERAPY | Age: 74
Discharge: HOME | End: 2024-08-08
Payer: MEDICARE

## 2024-08-08 VITALS
HEART RATE: 91 BPM | SYSTOLIC BLOOD PRESSURE: 118 MMHG | BODY MASS INDEX: 25.06 KG/M2 | DIASTOLIC BLOOD PRESSURE: 62 MMHG | HEIGHT: 71 IN | OXYGEN SATURATION: 96 % | WEIGHT: 179 LBS | TEMPERATURE: 97.8 F

## 2024-08-08 DIAGNOSIS — C34.90 SMALL CELL LUNG CANCER (HCC): Primary | ICD-10-CM

## 2024-08-08 LAB
ALBUMIN SERPL-MCNC: 3 G/DL (ref 3.5–5.2)
ALP SERPL-CCNC: 225 U/L (ref 40–129)
ALT SERPL-CCNC: 11 U/L (ref 0–40)
ANION GAP SERPL CALCULATED.3IONS-SCNC: 13 MMOL/L (ref 7–16)
AST SERPL-CCNC: 12 U/L (ref 0–39)
BASOPHILS # BLD: 0 K/UL (ref 0–0.2)
BASOPHILS NFR BLD: 0 % (ref 0–2)
BILIRUB SERPL-MCNC: 0.2 MG/DL (ref 0–1.2)
BUN SERPL-MCNC: 7 MG/DL (ref 6–23)
CALCIUM SERPL-MCNC: 7.7 MG/DL (ref 8.6–10.2)
CHLORIDE SERPL-SCNC: 99 MMOL/L (ref 98–107)
CO2 SERPL-SCNC: 23 MMOL/L (ref 22–29)
CREAT SERPL-MCNC: 0.9 MG/DL (ref 0.7–1.2)
EOSINOPHIL # BLD: 0 K/UL (ref 0.05–0.5)
EOSINOPHILS RELATIVE PERCENT: 0 % (ref 0–6)
ERYTHROCYTE [DISTWIDTH] IN BLOOD BY AUTOMATED COUNT: 13.6 % (ref 11.5–15)
GFR, ESTIMATED: 84 ML/MIN/1.73M2
GLUCOSE SERPL-MCNC: 147 MG/DL (ref 74–99)
HCT VFR BLD AUTO: 41.5 % (ref 37–54)
HGB BLD-MCNC: 14.4 G/DL (ref 12.5–16.5)
LYMPHOCYTES NFR BLD: 1.13 K/UL (ref 1.5–4)
LYMPHOCYTES RELATIVE PERCENT: 4 % (ref 20–42)
MCH RBC QN AUTO: 31.1 PG (ref 26–35)
MCHC RBC AUTO-ENTMCNC: 34.7 G/DL (ref 32–34.5)
MCV RBC AUTO: 89.6 FL (ref 80–99.9)
METAMYELOCYTES ABSOLUTE COUNT: 0.23 K/UL (ref 0–0.12)
METAMYELOCYTES: 1 % (ref 0–1)
MONOCYTES NFR BLD: 1.13 K/UL (ref 0.1–0.95)
MONOCYTES NFR BLD: 4 % (ref 2–12)
MYELOCYTES ABSOLUTE COUNT: 0.9 K/UL
MYELOCYTES: 4 %
NEUTROPHILS NFR BLD: 87 % (ref 43–80)
NEUTS SEG NFR BLD: 22.52 K/UL (ref 1.8–7.3)
PLATELET # BLD AUTO: 257 K/UL (ref 130–450)
PMV BLD AUTO: 9.6 FL (ref 7–12)
POTASSIUM SERPL-SCNC: 2.9 MMOL/L (ref 3.5–5)
PROT SERPL-MCNC: 5.3 G/DL (ref 6.4–8.3)
RBC # BLD AUTO: 4.63 M/UL (ref 3.8–5.8)
RBC # BLD: ABNORMAL 10*6/UL
RBC # BLD: ABNORMAL 10*6/UL
SODIUM SERPL-SCNC: 135 MMOL/L (ref 132–146)
WBC OTHER # BLD: 25.9 K/UL (ref 4.5–11.5)

## 2024-08-08 PROCEDURE — G8420 CALC BMI NORM PARAMETERS: HCPCS | Performed by: STUDENT IN AN ORGANIZED HEALTH CARE EDUCATION/TRAINING PROGRAM

## 2024-08-08 PROCEDURE — 36591 DRAW BLOOD OFF VENOUS DEVICE: CPT

## 2024-08-08 PROCEDURE — 80053 COMPREHEN METABOLIC PANEL: CPT

## 2024-08-08 PROCEDURE — 99214 OFFICE O/P EST MOD 30 MIN: CPT | Performed by: NURSE PRACTITIONER

## 2024-08-08 PROCEDURE — 1124F ACP DISCUSS-NO DSCNMKR DOCD: CPT | Performed by: NURSE PRACTITIONER

## 2024-08-08 PROCEDURE — 85025 COMPLETE CBC W/AUTO DIFF WBC: CPT

## 2024-08-08 PROCEDURE — 2580000003 HC RX 258: Performed by: STUDENT IN AN ORGANIZED HEALTH CARE EDUCATION/TRAINING PROGRAM

## 2024-08-08 PROCEDURE — 6360000002 HC RX W HCPCS: Performed by: STUDENT IN AN ORGANIZED HEALTH CARE EDUCATION/TRAINING PROGRAM

## 2024-08-08 PROCEDURE — 99214 OFFICE O/P EST MOD 30 MIN: CPT

## 2024-08-08 PROCEDURE — G8427 DOCREV CUR MEDS BY ELIG CLIN: HCPCS | Performed by: STUDENT IN AN ORGANIZED HEALTH CARE EDUCATION/TRAINING PROGRAM

## 2024-08-08 PROCEDURE — 4004F PT TOBACCO SCREEN RCVD TLK: CPT | Performed by: STUDENT IN AN ORGANIZED HEALTH CARE EDUCATION/TRAINING PROGRAM

## 2024-08-08 RX ORDER — SODIUM CHLORIDE 9 MG/ML
5-250 INJECTION, SOLUTION INTRAVENOUS PRN
Status: CANCELLED | OUTPATIENT
Start: 2024-08-14

## 2024-08-08 RX ORDER — DIPHENHYDRAMINE HYDROCHLORIDE 50 MG/ML
50 INJECTION INTRAMUSCULAR; INTRAVENOUS
Status: CANCELLED | OUTPATIENT
Start: 2024-08-12

## 2024-08-08 RX ORDER — DIPHENHYDRAMINE HYDROCHLORIDE 50 MG/ML
50 INJECTION INTRAMUSCULAR; INTRAVENOUS
OUTPATIENT
Start: 2024-08-08

## 2024-08-08 RX ORDER — EPINEPHRINE 1 MG/ML
0.3 INJECTION, SOLUTION, CONCENTRATE INTRAVENOUS PRN
Status: CANCELLED | OUTPATIENT
Start: 2024-08-14

## 2024-08-08 RX ORDER — FAMOTIDINE 10 MG/ML
20 INJECTION, SOLUTION INTRAVENOUS
Status: CANCELLED | OUTPATIENT
Start: 2024-08-13

## 2024-08-08 RX ORDER — SODIUM CHLORIDE 0.9 % (FLUSH) 0.9 %
5-40 SYRINGE (ML) INJECTION PRN
Status: CANCELLED | OUTPATIENT
Start: 2024-08-13

## 2024-08-08 RX ORDER — MEPERIDINE HYDROCHLORIDE 50 MG/ML
12.5 INJECTION INTRAMUSCULAR; INTRAVENOUS; SUBCUTANEOUS PRN
Status: CANCELLED | OUTPATIENT
Start: 2024-08-12

## 2024-08-08 RX ORDER — ONDANSETRON 2 MG/ML
8 INJECTION INTRAMUSCULAR; INTRAVENOUS
Status: CANCELLED | OUTPATIENT
Start: 2024-08-13

## 2024-08-08 RX ORDER — HEPARIN SODIUM (PORCINE) LOCK FLUSH IV SOLN 100 UNIT/ML 100 UNIT/ML
500 SOLUTION INTRAVENOUS PRN
Status: CANCELLED | OUTPATIENT
Start: 2024-08-14

## 2024-08-08 RX ORDER — ALBUTEROL SULFATE 90 UG/1
4 AEROSOL, METERED RESPIRATORY (INHALATION) PRN
Status: CANCELLED | OUTPATIENT
Start: 2024-08-12

## 2024-08-08 RX ORDER — SODIUM CHLORIDE 9 MG/ML
INJECTION, SOLUTION INTRAVENOUS CONTINUOUS
Status: CANCELLED | OUTPATIENT
Start: 2024-08-13

## 2024-08-08 RX ORDER — SODIUM CHLORIDE 9 MG/ML
25 INJECTION, SOLUTION INTRAVENOUS PRN
OUTPATIENT
Start: 2024-08-08

## 2024-08-08 RX ORDER — SODIUM CHLORIDE 9 MG/ML
INJECTION, SOLUTION INTRAVENOUS CONTINUOUS
Status: CANCELLED | OUTPATIENT
Start: 2024-08-14

## 2024-08-08 RX ORDER — EPINEPHRINE 1 MG/ML
0.3 INJECTION, SOLUTION, CONCENTRATE INTRAVENOUS PRN
Status: CANCELLED | OUTPATIENT
Start: 2024-08-12

## 2024-08-08 RX ORDER — SODIUM CHLORIDE 0.9 % (FLUSH) 0.9 %
5-40 SYRINGE (ML) INJECTION PRN
OUTPATIENT
Start: 2024-08-08

## 2024-08-08 RX ORDER — EPINEPHRINE 1 MG/ML
0.3 INJECTION, SOLUTION, CONCENTRATE INTRAVENOUS PRN
OUTPATIENT
Start: 2024-08-08

## 2024-08-08 RX ORDER — ALBUTEROL SULFATE 90 UG/1
4 AEROSOL, METERED RESPIRATORY (INHALATION) PRN
Status: CANCELLED | OUTPATIENT
Start: 2024-08-13

## 2024-08-08 RX ORDER — ALBUTEROL SULFATE 90 UG/1
4 AEROSOL, METERED RESPIRATORY (INHALATION) PRN
Status: CANCELLED | OUTPATIENT
Start: 2024-08-14

## 2024-08-08 RX ORDER — SODIUM CHLORIDE 0.9 % (FLUSH) 0.9 %
5-40 SYRINGE (ML) INJECTION PRN
Status: CANCELLED | OUTPATIENT
Start: 2024-08-12

## 2024-08-08 RX ORDER — SODIUM CHLORIDE 9 MG/ML
5-250 INJECTION, SOLUTION INTRAVENOUS PRN
Status: CANCELLED | OUTPATIENT
Start: 2024-08-13

## 2024-08-08 RX ORDER — PROCHLORPERAZINE EDISYLATE 5 MG/ML
5 INJECTION INTRAMUSCULAR; INTRAVENOUS
Status: CANCELLED | OUTPATIENT
Start: 2024-08-12

## 2024-08-08 RX ORDER — HEPARIN SODIUM (PORCINE) LOCK FLUSH IV SOLN 100 UNIT/ML 100 UNIT/ML
500 SOLUTION INTRAVENOUS PRN
Status: CANCELLED | OUTPATIENT
Start: 2024-08-13

## 2024-08-08 RX ORDER — FAMOTIDINE 10 MG/ML
20 INJECTION, SOLUTION INTRAVENOUS
Status: CANCELLED | OUTPATIENT
Start: 2024-08-12

## 2024-08-08 RX ORDER — ACETAMINOPHEN 325 MG/1
650 TABLET ORAL
Status: CANCELLED | OUTPATIENT
Start: 2024-08-14

## 2024-08-08 RX ORDER — DIPHENHYDRAMINE HYDROCHLORIDE 50 MG/ML
50 INJECTION INTRAMUSCULAR; INTRAVENOUS
Status: CANCELLED | OUTPATIENT
Start: 2024-08-14

## 2024-08-08 RX ORDER — EPINEPHRINE 1 MG/ML
0.3 INJECTION, SOLUTION, CONCENTRATE INTRAVENOUS PRN
Status: CANCELLED | OUTPATIENT
Start: 2024-08-13

## 2024-08-08 RX ORDER — ACETAMINOPHEN 325 MG/1
650 TABLET ORAL
Status: CANCELLED | OUTPATIENT
Start: 2024-08-13

## 2024-08-08 RX ORDER — DIPHENHYDRAMINE HYDROCHLORIDE 50 MG/ML
50 INJECTION INTRAMUSCULAR; INTRAVENOUS
Status: CANCELLED | OUTPATIENT
Start: 2024-08-13

## 2024-08-08 RX ORDER — HEPARIN SODIUM (PORCINE) LOCK FLUSH IV SOLN 100 UNIT/ML 100 UNIT/ML
500 SOLUTION INTRAVENOUS PRN
Status: CANCELLED | OUTPATIENT
Start: 2024-08-12

## 2024-08-08 RX ORDER — HEPARIN 100 UNIT/ML
500 SYRINGE INTRAVENOUS PRN
Status: DISCONTINUED | OUTPATIENT
Start: 2024-08-08 | End: 2024-08-09 | Stop reason: HOSPADM

## 2024-08-08 RX ORDER — SODIUM CHLORIDE 9 MG/ML
5-250 INJECTION, SOLUTION INTRAVENOUS PRN
Status: CANCELLED | OUTPATIENT
Start: 2024-08-12

## 2024-08-08 RX ORDER — ONDANSETRON 2 MG/ML
8 INJECTION INTRAMUSCULAR; INTRAVENOUS
Status: CANCELLED | OUTPATIENT
Start: 2024-08-12

## 2024-08-08 RX ORDER — MEPERIDINE HYDROCHLORIDE 50 MG/ML
12.5 INJECTION INTRAMUSCULAR; INTRAVENOUS; SUBCUTANEOUS PRN
Status: CANCELLED | OUTPATIENT
Start: 2024-08-13

## 2024-08-08 RX ORDER — SODIUM CHLORIDE 0.9 % (FLUSH) 0.9 %
5-40 SYRINGE (ML) INJECTION PRN
Status: CANCELLED | OUTPATIENT
Start: 2024-08-14

## 2024-08-08 RX ORDER — SODIUM CHLORIDE 9 MG/ML
INJECTION, SOLUTION INTRAVENOUS CONTINUOUS
Status: CANCELLED | OUTPATIENT
Start: 2024-08-12

## 2024-08-08 RX ORDER — ALBUTEROL SULFATE 90 UG/1
4 AEROSOL, METERED RESPIRATORY (INHALATION) PRN
OUTPATIENT
Start: 2024-08-08

## 2024-08-08 RX ORDER — SODIUM CHLORIDE 9 MG/ML
INJECTION, SOLUTION INTRAVENOUS CONTINUOUS
OUTPATIENT
Start: 2024-08-08

## 2024-08-08 RX ORDER — ACETAMINOPHEN 325 MG/1
650 TABLET ORAL
Status: CANCELLED | OUTPATIENT
Start: 2024-08-12

## 2024-08-08 RX ORDER — ONDANSETRON 2 MG/ML
8 INJECTION INTRAMUSCULAR; INTRAVENOUS
OUTPATIENT
Start: 2024-08-08

## 2024-08-08 RX ORDER — PANTOPRAZOLE SODIUM 40 MG/1
40 TABLET, DELAYED RELEASE ORAL
Qty: 60 TABLET | Refills: 0 | Status: SHIPPED | OUTPATIENT
Start: 2024-08-08

## 2024-08-08 RX ORDER — ACETAMINOPHEN 325 MG/1
650 TABLET ORAL
OUTPATIENT
Start: 2024-08-08

## 2024-08-08 RX ORDER — SODIUM CHLORIDE 0.9 % (FLUSH) 0.9 %
5-40 SYRINGE (ML) INJECTION PRN
Status: DISCONTINUED | OUTPATIENT
Start: 2024-08-08 | End: 2024-08-09 | Stop reason: HOSPADM

## 2024-08-08 RX ORDER — MEPERIDINE HYDROCHLORIDE 50 MG/ML
12.5 INJECTION INTRAMUSCULAR; INTRAVENOUS; SUBCUTANEOUS PRN
Status: CANCELLED | OUTPATIENT
Start: 2024-08-14

## 2024-08-08 RX ORDER — PALONOSETRON 0.05 MG/ML
0.25 INJECTION, SOLUTION INTRAVENOUS ONCE
Status: CANCELLED | OUTPATIENT
Start: 2024-08-12 | End: 2024-08-12

## 2024-08-08 RX ORDER — ONDANSETRON 2 MG/ML
8 INJECTION INTRAMUSCULAR; INTRAVENOUS
Status: CANCELLED | OUTPATIENT
Start: 2024-08-14

## 2024-08-08 RX ORDER — HEPARIN 100 UNIT/ML
500 SYRINGE INTRAVENOUS PRN
OUTPATIENT
Start: 2024-08-08

## 2024-08-08 RX ORDER — FAMOTIDINE 10 MG/ML
20 INJECTION, SOLUTION INTRAVENOUS
Status: CANCELLED | OUTPATIENT
Start: 2024-08-14

## 2024-08-08 RX ADMIN — HEPARIN 500 UNITS: 100 SYRINGE at 10:58

## 2024-08-08 RX ADMIN — SODIUM CHLORIDE, PRESERVATIVE FREE 10 ML: 5 INJECTION INTRAVENOUS at 10:58

## 2024-08-08 NOTE — PROGRESS NOTES
Patient provided with discharge instructions, received printed AVS.  All questions answered.  Patient understands follow up plan of care.       
we would need to resort to, upon confirmation whether or not patient is limited versus extensive stage disease.    07/25/2024: Pt so far is tolerating treatment well. Received Cycle 2 on Monday earlier this week. He had bone biopsy done, which was suggestive reactive changes, benign otherwise, and could not exclude Paget's. We discussed treating his cancer as limited stage at this point. Another complaint is his chronic lower back pain due to spinal stenosis. He has seen pain medicine as well as neurosurgery in the past and did undergo laminectomy on 2/15/23. Still has significant pain.     8/8/2024: Presents for follow up. Planning for cycle 3 next week. Is doing well with treatment so far. Is having reflux as well as difficulty sleeping.     PLAN     F/u w/ radiation oncology to consider concurrent RT  Cycle 3 of carbo/etoposide scheduled for 8/12/2024. Labs ordered today  Consider adding on durvalumab after completion of chemoRT, based on the ADRIATIC trial  To address his chronic lower back pain, Rx for lidoderm patches, Voltaren gel, referral to palliative care( seeing 8/19) and follow back up with neurosurgery  Pt states Percocet  5 mg has not helped and suffered from side effects including hallucinations and mood changes  Melatonin for sleep  Protonix for reflux.   DISPO:   RTC in 3 weeks before Cycle 4      Thank you for allowing us to participate in the care of Kin Issa       Approximately spent 30 minutes on chart review as well as time spent on patient encounter, discussing the laboratory, imaging, and clinical findings; and documentation. I have discussed clinical implications and recommendations on the patient's primary issues. More than 50% of time was spent counseling patient. The patient verbalized understanding.    MITCHEL Coles     Medical Oncology  [facility]  08/08/2024    [facility]  P: [phone]  F: [phone]    [facility])

## 2024-08-12 ENCOUNTER — HOSPITAL ENCOUNTER (OUTPATIENT)
Dept: INFUSION THERAPY | Age: 74
Discharge: HOME | End: 2024-08-12
Payer: MEDICARE

## 2024-08-12 ENCOUNTER — HOSPITAL ENCOUNTER (OUTPATIENT)
Dept: RADIATION ONCOLOGY | Age: 74
Discharge: HOME | End: 2024-08-12
Payer: MEDICARE

## 2024-08-12 ENCOUNTER — TELEPHONE (OUTPATIENT)
Dept: CASE MANAGEMENT | Age: 74
End: 2024-08-12

## 2024-08-12 VITALS
HEART RATE: 66 BPM | DIASTOLIC BLOOD PRESSURE: 71 MMHG | OXYGEN SATURATION: 98 % | TEMPERATURE: 97.8 F | RESPIRATION RATE: 16 BRPM | SYSTOLIC BLOOD PRESSURE: 139 MMHG

## 2024-08-12 DIAGNOSIS — C34.90 SMALL CELL LUNG CANCER (HCC): Primary | ICD-10-CM

## 2024-08-12 LAB
ALBUMIN SERPL-MCNC: 3 G/DL (ref 3.5–5.2)
ALP SERPL-CCNC: 198 U/L (ref 40–129)
ALT SERPL-CCNC: 10 U/L (ref 0–40)
ANION GAP SERPL CALCULATED.3IONS-SCNC: 11 MMOL/L (ref 7–16)
AST SERPL-CCNC: 16 U/L (ref 0–39)
BASOPHILS # BLD: 0.08 K/UL (ref 0–0.2)
BASOPHILS NFR BLD: 1 % (ref 0–2)
BILIRUB SERPL-MCNC: 0.3 MG/DL (ref 0–1.2)
BUN SERPL-MCNC: 9 MG/DL (ref 6–23)
CALCIUM SERPL-MCNC: 8.6 MG/DL (ref 8.6–10.2)
CHLORIDE SERPL-SCNC: 101 MMOL/L (ref 98–107)
CO2 SERPL-SCNC: 27 MMOL/L (ref 22–29)
CREAT SERPL-MCNC: 1 MG/DL (ref 0.7–1.2)
EOSINOPHIL # BLD: 0 K/UL (ref 0.05–0.5)
EOSINOPHILS RELATIVE PERCENT: 0 % (ref 0–6)
ERYTHROCYTE [DISTWIDTH] IN BLOOD BY AUTOMATED COUNT: 13.7 % (ref 11.5–15)
GFR, ESTIMATED: 83 ML/MIN/1.73M2
GLUCOSE SERPL-MCNC: 184 MG/DL (ref 74–99)
HCT VFR BLD AUTO: 38.5 % (ref 37–54)
HGB BLD-MCNC: 13.4 G/DL (ref 12.5–16.5)
IMM GRANULOCYTES # BLD AUTO: 0.35 K/UL (ref 0–0.58)
IMM GRANULOCYTES NFR BLD: 3 % (ref 0–5)
LYMPHOCYTES NFR BLD: 1.81 K/UL (ref 1.5–4)
LYMPHOCYTES RELATIVE PERCENT: 14 % (ref 20–42)
MCH RBC QN AUTO: 31.6 PG (ref 26–35)
MCHC RBC AUTO-ENTMCNC: 34.8 G/DL (ref 32–34.5)
MCV RBC AUTO: 90.8 FL (ref 80–99.9)
MONOCYTES NFR BLD: 0.78 K/UL (ref 0.1–0.95)
MONOCYTES NFR BLD: 6 % (ref 2–12)
NEUTROPHILS NFR BLD: 77 % (ref 43–80)
NEUTS SEG NFR BLD: 9.96 K/UL (ref 1.8–7.3)
PLATELET # BLD AUTO: 353 K/UL (ref 130–450)
PMV BLD AUTO: 9.7 FL (ref 7–12)
POTASSIUM SERPL-SCNC: 2.9 MMOL/L (ref 3.5–5)
PROT SERPL-MCNC: 5.5 G/DL (ref 6.4–8.3)
RBC # BLD AUTO: 4.24 M/UL (ref 3.8–5.8)
SODIUM SERPL-SCNC: 139 MMOL/L (ref 132–146)
WBC OTHER # BLD: 13 K/UL (ref 4.5–11.5)

## 2024-08-12 PROCEDURE — 80053 COMPREHEN METABOLIC PANEL: CPT

## 2024-08-12 PROCEDURE — 96367 TX/PROPH/DG ADDL SEQ IV INF: CPT

## 2024-08-12 PROCEDURE — 6360000002 HC RX W HCPCS: Performed by: STUDENT IN AN ORGANIZED HEALTH CARE EDUCATION/TRAINING PROGRAM

## 2024-08-12 PROCEDURE — 6370000000 HC RX 637 (ALT 250 FOR IP): Performed by: STUDENT IN AN ORGANIZED HEALTH CARE EDUCATION/TRAINING PROGRAM

## 2024-08-12 PROCEDURE — 96368 THER/DIAG CONCURRENT INF: CPT

## 2024-08-12 PROCEDURE — 77386 HC NTSTY MODUL RAD TX DLVR CPLX: CPT | Performed by: RADIOLOGY

## 2024-08-12 PROCEDURE — 2580000003 HC RX 258: Performed by: STUDENT IN AN ORGANIZED HEALTH CARE EDUCATION/TRAINING PROGRAM

## 2024-08-12 PROCEDURE — 96413 CHEMO IV INFUSION 1 HR: CPT

## 2024-08-12 PROCEDURE — 85025 COMPLETE CBC W/AUTO DIFF WBC: CPT

## 2024-08-12 PROCEDURE — 77014 CHG CT GUIDANCE RADIATION THERAPY FLDS PLACEMENT: CPT | Performed by: RADIOLOGY

## 2024-08-12 PROCEDURE — 96375 TX/PRO/DX INJ NEW DRUG ADDON: CPT

## 2024-08-12 PROCEDURE — 96417 CHEMO IV INFUS EACH ADDL SEQ: CPT

## 2024-08-12 RX ORDER — PALONOSETRON HYDROCHLORIDE 0.05 MG/ML
0.25 INJECTION, SOLUTION INTRAVENOUS ONCE
Status: COMPLETED | OUTPATIENT
Start: 2024-08-12 | End: 2024-08-12

## 2024-08-12 RX ORDER — DEXAMETHASONE SODIUM PHOSPHATE 10 MG/ML
10 INJECTION, SOLUTION INTRAMUSCULAR; INTRAVENOUS ONCE
Status: COMPLETED | OUTPATIENT
Start: 2024-08-12 | End: 2024-08-12

## 2024-08-12 RX ORDER — SODIUM CHLORIDE 0.9 % (FLUSH) 0.9 %
5-40 SYRINGE (ML) INJECTION PRN
Status: DISCONTINUED | OUTPATIENT
Start: 2024-08-12 | End: 2024-08-13 | Stop reason: HOSPADM

## 2024-08-12 RX ORDER — POTASSIUM CHLORIDE 20 MEQ/1
40 TABLET, EXTENDED RELEASE ORAL ONCE
Status: COMPLETED | OUTPATIENT
Start: 2024-08-12 | End: 2024-08-12

## 2024-08-12 RX ORDER — POTASSIUM CHLORIDE 29.8 MG/ML
20 INJECTION INTRAVENOUS ONCE
Status: COMPLETED | OUTPATIENT
Start: 2024-08-12 | End: 2024-08-12

## 2024-08-12 RX ORDER — SODIUM CHLORIDE 9 MG/ML
5-250 INJECTION, SOLUTION INTRAVENOUS PRN
Status: DISCONTINUED | OUTPATIENT
Start: 2024-08-12 | End: 2024-08-13 | Stop reason: HOSPADM

## 2024-08-12 RX ADMIN — SODIUM CHLORIDE, PRESERVATIVE FREE 10 ML: 5 INJECTION INTRAVENOUS at 10:58

## 2024-08-12 RX ADMIN — CARBOPLATIN 460 MG: 10 INJECTION, SOLUTION INTRAVENOUS at 11:32

## 2024-08-12 RX ADMIN — POTASSIUM CHLORIDE 20 MEQ: 29.8 INJECTION, SOLUTION INTRAVENOUS at 10:51

## 2024-08-12 RX ADMIN — PALONOSETRON 0.25 MG: 0.25 INJECTION, SOLUTION INTRAVENOUS at 11:04

## 2024-08-12 RX ADMIN — SODIUM CHLORIDE 200 ML/HR: 9 INJECTION, SOLUTION INTRAVENOUS at 11:00

## 2024-08-12 RX ADMIN — ETOPOSIDE 210 MG: 20 INJECTION INTRAVENOUS at 12:12

## 2024-08-12 RX ADMIN — DEXAMETHASONE SODIUM PHOSPHATE 10 MG: 10 INJECTION INTRAMUSCULAR; INTRAVENOUS at 10:58

## 2024-08-12 RX ADMIN — SODIUM CHLORIDE 150 MG: 9 INJECTION, SOLUTION INTRAVENOUS at 11:05

## 2024-08-12 RX ADMIN — POTASSIUM CHLORIDE 40 MEQ: 1500 TABLET, EXTENDED RELEASE ORAL at 10:57

## 2024-08-12 NOTE — TELEPHONE ENCOUNTER
Met with patient during his infusion appointment today.  Patient states that treatment is going well and that he is drinking well but that he hasn't been eating really well do to constipation.  He states that it is better but that he had to stop taking one of his stomach medications as he thought that it was causing his constipation.  Encouraged patient to call physician that ordered that medication to discuss that with them.  Discussed use of stool softeners and miralax also.  Patient states that he will notify ordering physician and discuss options with them.  Patient states that he is driving himself to appointments and that that is working well.  He denies and questions or issues at this time and was encouraged to call if any arise.  Met patient's wife after visit as patient stated that she wanted to meet with me. No further needs identified at this time.

## 2024-08-12 NOTE — PROGRESS NOTES
Wt Readings from Last 3 Encounters:   08/08/24 81.2 kg (179 lb)   08/02/24 81.6 kg (180 lb)   07/25/24 87.7 kg (193 lb 4.8 oz)     Met w/ pt during infusion. He has lost 14# over last 2 weeks. He reports poor PO intake 2/2 constipation. He is not taking any medications for constipation management. Provided w/ education on foods for constipation as well as suggestions for OTC products for constipation relief. Pt appreciative. Encouraged increased PO intake as able. Will follow PRN.  Electronically signed by Meron Zavaleta, MS, RD, LD on 8/12/2024 at 3:23 PM

## 2024-08-13 ENCOUNTER — HOSPITAL ENCOUNTER (OUTPATIENT)
Dept: INFUSION THERAPY | Age: 74
Discharge: HOME | End: 2024-08-13
Payer: MEDICARE

## 2024-08-13 ENCOUNTER — HOSPITAL ENCOUNTER (OUTPATIENT)
Dept: RADIATION ONCOLOGY | Age: 74
Discharge: HOME | End: 2024-08-13
Payer: MEDICARE

## 2024-08-13 VITALS
TEMPERATURE: 97 F | RESPIRATION RATE: 16 BRPM | SYSTOLIC BLOOD PRESSURE: 124 MMHG | OXYGEN SATURATION: 96 % | DIASTOLIC BLOOD PRESSURE: 61 MMHG | HEART RATE: 68 BPM

## 2024-08-13 DIAGNOSIS — C34.90 SMALL CELL LUNG CANCER (HCC): Primary | ICD-10-CM

## 2024-08-13 PROCEDURE — 77014 CHG CT GUIDANCE RADIATION THERAPY FLDS PLACEMENT: CPT | Performed by: RADIOLOGY

## 2024-08-13 PROCEDURE — 96413 CHEMO IV INFUSION 1 HR: CPT

## 2024-08-13 PROCEDURE — 77386 HC NTSTY MODUL RAD TX DLVR CPLX: CPT | Performed by: RADIOLOGY

## 2024-08-13 PROCEDURE — 96375 TX/PRO/DX INJ NEW DRUG ADDON: CPT

## 2024-08-13 PROCEDURE — 6360000002 HC RX W HCPCS: Performed by: STUDENT IN AN ORGANIZED HEALTH CARE EDUCATION/TRAINING PROGRAM

## 2024-08-13 PROCEDURE — 2580000003 HC RX 258: Performed by: STUDENT IN AN ORGANIZED HEALTH CARE EDUCATION/TRAINING PROGRAM

## 2024-08-13 RX ORDER — DEXAMETHASONE SODIUM PHOSPHATE 10 MG/ML
8 INJECTION, SOLUTION INTRAMUSCULAR; INTRAVENOUS ONCE
Status: COMPLETED | OUTPATIENT
Start: 2024-08-13 | End: 2024-08-13

## 2024-08-13 RX ORDER — SODIUM CHLORIDE 0.9 % (FLUSH) 0.9 %
5-40 SYRINGE (ML) INJECTION PRN
Status: DISCONTINUED | OUTPATIENT
Start: 2024-08-13 | End: 2024-08-14 | Stop reason: HOSPADM

## 2024-08-13 RX ORDER — SODIUM CHLORIDE 9 MG/ML
5-250 INJECTION, SOLUTION INTRAVENOUS PRN
Status: DISCONTINUED | OUTPATIENT
Start: 2024-08-13 | End: 2024-08-14 | Stop reason: HOSPADM

## 2024-08-13 RX ORDER — HEPARIN 100 UNIT/ML
500 SYRINGE INTRAVENOUS PRN
Status: DISCONTINUED | OUTPATIENT
Start: 2024-08-13 | End: 2024-08-14 | Stop reason: HOSPADM

## 2024-08-13 RX ADMIN — SODIUM CHLORIDE, PRESERVATIVE FREE 10 ML: 5 INJECTION INTRAVENOUS at 14:16

## 2024-08-13 RX ADMIN — SODIUM CHLORIDE, PRESERVATIVE FREE 10 ML: 5 INJECTION INTRAVENOUS at 15:56

## 2024-08-13 RX ADMIN — ETOPOSIDE 210 MG: 20 INJECTION, SOLUTION INTRAVENOUS at 14:42

## 2024-08-13 RX ADMIN — SODIUM CHLORIDE 40 ML/HR: 9 INJECTION, SOLUTION INTRAVENOUS at 14:10

## 2024-08-13 RX ADMIN — DEXAMETHASONE SODIUM PHOSPHATE 8 MG: 10 INJECTION INTRAMUSCULAR; INTRAVENOUS at 14:16

## 2024-08-13 RX ADMIN — HEPARIN 500 UNITS: 100 SYRINGE at 15:56

## 2024-08-14 ENCOUNTER — HOSPITAL ENCOUNTER (OUTPATIENT)
Dept: RADIATION ONCOLOGY | Age: 74
Discharge: HOME | End: 2024-08-14
Payer: MEDICARE

## 2024-08-14 ENCOUNTER — HOSPITAL ENCOUNTER (OUTPATIENT)
Dept: INFUSION THERAPY | Age: 74
Discharge: HOME | End: 2024-08-14
Payer: MEDICARE

## 2024-08-14 VITALS
DIASTOLIC BLOOD PRESSURE: 63 MMHG | SYSTOLIC BLOOD PRESSURE: 144 MMHG | HEART RATE: 79 BPM | OXYGEN SATURATION: 96 % | BODY MASS INDEX: 25.83 KG/M2 | WEIGHT: 185.2 LBS | TEMPERATURE: 97.4 F | RESPIRATION RATE: 18 BRPM

## 2024-08-14 VITALS
HEART RATE: 63 BPM | DIASTOLIC BLOOD PRESSURE: 70 MMHG | TEMPERATURE: 97.3 F | SYSTOLIC BLOOD PRESSURE: 156 MMHG | RESPIRATION RATE: 16 BRPM | OXYGEN SATURATION: 97 %

## 2024-08-14 DIAGNOSIS — C34.90 MALIGNANT NEOPLASM OF LUNG, UNSPECIFIED LATERALITY, UNSPECIFIED PART OF LUNG (HCC): Primary | ICD-10-CM

## 2024-08-14 DIAGNOSIS — C34.90 SMALL CELL LUNG CANCER (HCC): Primary | ICD-10-CM

## 2024-08-14 PROCEDURE — 77014 CHG CT GUIDANCE RADIATION THERAPY FLDS PLACEMENT: CPT | Performed by: RADIOLOGY

## 2024-08-14 PROCEDURE — 96377 APPLICATON ON-BODY INJECTOR: CPT

## 2024-08-14 PROCEDURE — 96375 TX/PRO/DX INJ NEW DRUG ADDON: CPT

## 2024-08-14 PROCEDURE — 2580000003 HC RX 258: Performed by: STUDENT IN AN ORGANIZED HEALTH CARE EDUCATION/TRAINING PROGRAM

## 2024-08-14 PROCEDURE — 77386 HC NTSTY MODUL RAD TX DLVR CPLX: CPT | Performed by: RADIOLOGY

## 2024-08-14 PROCEDURE — 6360000002 HC RX W HCPCS: Performed by: STUDENT IN AN ORGANIZED HEALTH CARE EDUCATION/TRAINING PROGRAM

## 2024-08-14 PROCEDURE — 96413 CHEMO IV INFUSION 1 HR: CPT

## 2024-08-14 RX ORDER — DEXAMETHASONE SODIUM PHOSPHATE 10 MG/ML
8 INJECTION, SOLUTION INTRAMUSCULAR; INTRAVENOUS ONCE
Status: COMPLETED | OUTPATIENT
Start: 2024-08-14 | End: 2024-08-14

## 2024-08-14 RX ORDER — HEPARIN 100 UNIT/ML
500 SYRINGE INTRAVENOUS PRN
Status: DISCONTINUED | OUTPATIENT
Start: 2024-08-14 | End: 2024-08-15 | Stop reason: HOSPADM

## 2024-08-14 RX ORDER — SODIUM CHLORIDE 0.9 % (FLUSH) 0.9 %
5-40 SYRINGE (ML) INJECTION PRN
Status: DISCONTINUED | OUTPATIENT
Start: 2024-08-14 | End: 2024-08-15 | Stop reason: HOSPADM

## 2024-08-14 RX ORDER — SODIUM CHLORIDE 9 MG/ML
5-250 INJECTION, SOLUTION INTRAVENOUS PRN
Status: DISCONTINUED | OUTPATIENT
Start: 2024-08-14 | End: 2024-08-15 | Stop reason: HOSPADM

## 2024-08-14 RX ADMIN — SODIUM CHLORIDE, PRESERVATIVE FREE 10 ML: 5 INJECTION INTRAVENOUS at 14:53

## 2024-08-14 RX ADMIN — HEPARIN 500 UNITS: 100 SYRINGE at 14:53

## 2024-08-14 RX ADMIN — DEXAMETHASONE SODIUM PHOSPHATE 8 MG: 10 INJECTION, SOLUTION INTRAMUSCULAR; INTRAVENOUS at 13:21

## 2024-08-14 RX ADMIN — ETOPOSIDE 210 MG: 20 INJECTION, SOLUTION INTRAVENOUS at 13:45

## 2024-08-14 RX ADMIN — SODIUM CHLORIDE, PRESERVATIVE FREE 10 ML: 5 INJECTION INTRAVENOUS at 13:21

## 2024-08-14 RX ADMIN — PEGFILGRASTIM 6 MG: KIT SUBCUTANEOUS at 14:46

## 2024-08-14 RX ADMIN — SODIUM CHLORIDE 200 ML/HR: 9 INJECTION, SOLUTION INTRAVENOUS at 13:21

## 2024-08-14 NOTE — PATIENT INSTRUCTIONS
Continue daily fractionated radiation therapy as scheduled. Please see weekly OTV note and intial consultation letter in EPIC for clinical details.        Darren Bryant III, MD MS DABR    [facility]:  [phone]   FAX: [phone]  [facility]:  [phone]   FAX:    [phone]  [facility]:  [phone]   FAX:  [phone]  Email: [email or web address]

## 2024-08-14 NOTE — PROGRESS NOTES
Kin Issa  2024  Wt Readings from Last 3 Encounters:   24 84 kg (185 lb 3.2 oz)   24 81.2 kg (179 lb)   24 81.6 kg (180 lb)     Body mass index is 25.83 kg/m².        Treatment Area:[facility]    Patient was seen today for weekly visit.      Comfort Alteration  KPS:70%  Fatigue: Moderate    Ventilation Alterations  Cough: Yes  Hemoptysis: No  Mucus Color: No  Dyspnea: No  O2 Sat: 96%    Nutritional Alteration  Anorexia: No  Nausea: No   Vomiting: No     Skin Alteration   Sensation:Good    Radiation Dermatitis:  no    Mucous Membrane Alteration  Voice Changes/ Stridor/Larynx: yes - Softer spoken  Pharynx & Esophagus: occasional sore throat    Elimination Alterations  Constipation: yes  Diarrhea:  yes      Emotional  Coping: effective      Injury, potential bleeding or infection: no    Other:na    Lab Results   Component Value Date    WBC 13.0 (H) 2024    HGB 13.4 2024    HCT 38.5 2024     2024         BP (!) 144/63   Pulse 79   Temp 97.4 °F (36.3 °C) (Temporal)   Resp 18   Wt 84 kg (185 lb 3.2 oz)   SpO2 96%   BMI 25.83 kg/m²   BP within normal range? no   -if no, manually recheck in 5-10 min  NEW BP readin/67  BP within normal range? yes       Assessment/Plan:3/30fx;660/6600cGy completed.    Aimee Alberto RN

## 2024-08-14 NOTE — PROGRESS NOTES
DEPARTMENT OF RADIATION ONCOLOGY ON TREATMENT VISIT         8/14/2024      NAME:  Kin Issa    YOB: 1950    Diagnosis: lung cancer    SUBJECTIVE:   Kin Issa has now received fractionated external beam radiation therapy - ongoing.    Past medical, surgical, social and family histories reviewed and updated as indicated.    Pain: controlled    ALLERGIES:  Pcn [penicillins] and Tape [adhesive tape]         Current Outpatient Medications   Medication Sig Dispense Refill    pantoprazole (PROTONIX) 40 MG tablet Take 1 tablet by mouth 2 times daily (before meals) 60 tablet 0    Melatonin 3 MG CAPS Take 1 capsule by mouth at bedtime 30 capsule 1    sucralfate (CARAFATE) 1 GM tablet Take 1 tablet by mouth 4 times daily (Patient not taking: Reported on 8/14/2024) 120 tablet 3    PROTONIX 40 MG PACK packet Take 1 packet by mouth 2 times daily (before meals) 60 packet 0    sucralfate (CARAFATE) 1 GM tablet Take 1 tablet by mouth 4 times daily (Patient not taking: Reported on 8/14/2024) 120 tablet 3    lidocaine 4 % external patch Place 1 patch onto the skin daily 30 patch 0    diclofenac sodium (VOLTAREN) 1 % GEL Apply 2 g topically 4 times daily 50 g 0    sennosides-docusate sodium (SENOKOT-S) 8.6-50 MG tablet Take 1 tablet by mouth daily 30 tablet 0    ondansetron (ZOFRAN) 8 MG tablet Take 1 tablet by mouth every 8 hours as needed for Nausea or Vomiting 30 tablet 1    prochlorperazine (COMPAZINE) 10 MG tablet Take 1 tablet by mouth every 6 hours as needed (nausea) 30 tablet 0    Acetaminophen Extra Strength 500 MG TABS take 1 tablet by mouth four times a day if needed for pain 120 tablet 2    fluticasone-umeclidin-vilant (TRELEGY ELLIPTA) 100-62.5-25 MCG/ACT AEPB inhaler Inhale 1 puff into the lungs daily 3 each 1    rOPINIRole (REQUIP) 1 MG tablet Take 1 tablet by mouth nightly 90 tablet 1    rosuvastatin (CRESTOR) 10 MG tablet Take 1 tablet by mouth daily 90 tablet 1

## 2024-08-15 ENCOUNTER — HOSPITAL ENCOUNTER (OUTPATIENT)
Dept: RADIATION ONCOLOGY | Age: 74
Discharge: HOME | End: 2024-08-15
Payer: MEDICARE

## 2024-08-15 PROCEDURE — 77014 CHG CT GUIDANCE RADIATION THERAPY FLDS PLACEMENT: CPT | Performed by: RADIOLOGY

## 2024-08-15 PROCEDURE — 77386 HC NTSTY MODUL RAD TX DLVR CPLX: CPT | Performed by: RADIOLOGY

## 2024-08-16 ENCOUNTER — HOSPITAL ENCOUNTER (OUTPATIENT)
Dept: RADIATION ONCOLOGY | Age: 74
Discharge: HOME | End: 2024-08-16
Payer: MEDICARE

## 2024-08-16 PROCEDURE — 77386 HC NTSTY MODUL RAD TX DLVR CPLX: CPT | Performed by: RADIOLOGY

## 2024-08-16 PROCEDURE — 77336 RADIATION PHYSICS CONSULT: CPT | Performed by: RADIOLOGY

## 2024-08-19 ENCOUNTER — HOSPITAL ENCOUNTER (OUTPATIENT)
Dept: RADIATION ONCOLOGY | Age: 74
Discharge: HOME | End: 2024-08-19
Payer: MEDICARE

## 2024-08-19 ENCOUNTER — OFFICE VISIT (OUTPATIENT)
Dept: PALLATIVE CARE | Age: 74
End: 2024-08-19
Payer: MEDICARE

## 2024-08-19 VITALS
DIASTOLIC BLOOD PRESSURE: 60 MMHG | TEMPERATURE: 97 F | OXYGEN SATURATION: 95 % | HEART RATE: 88 BPM | WEIGHT: 180.9 LBS | SYSTOLIC BLOOD PRESSURE: 113 MMHG | BODY MASS INDEX: 25.23 KG/M2

## 2024-08-19 DIAGNOSIS — M54.40 CHRONIC LOW BACK PAIN WITH SCIATICA, SCIATICA LATERALITY UNSPECIFIED, UNSPECIFIED BACK PAIN LATERALITY: Primary | ICD-10-CM

## 2024-08-19 DIAGNOSIS — G89.29 CHRONIC LOW BACK PAIN WITH SCIATICA, SCIATICA LATERALITY UNSPECIFIED, UNSPECIFIED BACK PAIN LATERALITY: Primary | ICD-10-CM

## 2024-08-19 DIAGNOSIS — Z72.0 TOBACCO ABUSE: ICD-10-CM

## 2024-08-19 DIAGNOSIS — K29.01 ACUTE GASTRITIS WITH HEMORRHAGE, UNSPECIFIED GASTRITIS TYPE: ICD-10-CM

## 2024-08-19 DIAGNOSIS — C34.90 SMALL CELL LUNG CANCER (HCC): ICD-10-CM

## 2024-08-19 PROCEDURE — 1124F ACP DISCUSS-NO DSCNMKR DOCD: CPT | Performed by: NURSE PRACTITIONER

## 2024-08-19 PROCEDURE — G8427 DOCREV CUR MEDS BY ELIG CLIN: HCPCS | Performed by: NURSE PRACTITIONER

## 2024-08-19 PROCEDURE — 99202 OFFICE O/P NEW SF 15 MIN: CPT | Performed by: NURSE PRACTITIONER

## 2024-08-19 PROCEDURE — 99205 OFFICE O/P NEW HI 60 MIN: CPT | Performed by: NURSE PRACTITIONER

## 2024-08-19 PROCEDURE — 77386 HC NTSTY MODUL RAD TX DLVR CPLX: CPT | Performed by: RADIOLOGY

## 2024-08-19 PROCEDURE — G8417 CALC BMI ABV UP PARAM F/U: HCPCS | Performed by: NURSE PRACTITIONER

## 2024-08-19 PROCEDURE — 4004F PT TOBACCO SCREEN RCVD TLK: CPT | Performed by: NURSE PRACTITIONER

## 2024-08-19 NOTE — PROGRESS NOTES
Palliative Care Department  Provider: MITCHEL Alvarez - CNP    Referring Provider: Dr. Magallon    Location of Service:   [facility]    Chief Complaint: Kin Issa is a 74 y.o. male with chief complaint of pain, decreased appetite, constipation    Assessment/Plan      Right small cell lung cancer:   -Diagnosed June 2024   -Known to Dr. Magallon   -Undergoing concurrent chemoradiation therapy    Pain related to neoplasm/chronic pain syndrome:   -Continue with Tylenol as needed   -Do not recommend opioids at this time    Gastritis:   -Recommend to continue with Carafate at least twice daily   -He is for EGD soon    Constipation:   -Senna S2 tabs daily    Follow Up:  2 weeks.  They were encouraged to call with any questions, concerns, needs, or changes in symptoms.    Subjective:     HPI:  Kin Issa is a 74 y.o. male with tobacco use disorder, continuing to smoke, unfortunately diagnosed with small cell lung cancer of the right upper lobe, originally found to have a nodule which was increased in size in February 2024, having had a PET scan in March 2024 showing a right upper lobe mass which was hypermetabolic, as well as an area in his right iliac bone.  He had EBUS and biopsy done June 2024, showing poorly differentiated small cell lung cancer.  He additionally underwent bone biopsy of the iliac lesion which was benign.  He is known to Dr. Magallon, and undergoing concurrent chemoradiation therapy.  He was referred to [facility] Palliative Medicine symptomatic support.    Subjective/Events/Discussions:  Kin presents today accompanied by his wife  Palliative medicine was introduced we discussed the role palliative medicine may plan his care  His primary complaint is that of pain, some which is chronic in nature, and overall not severe  However he does report sharp stabbing pains primarily in his epigastric area after eating, at times his pain can be severe, as well as

## 2024-08-20 ENCOUNTER — HOSPITAL ENCOUNTER (OUTPATIENT)
Dept: RADIATION ONCOLOGY | Age: 74
Discharge: HOME | End: 2024-08-20
Payer: MEDICARE

## 2024-08-20 PROCEDURE — 77386 HC NTSTY MODUL RAD TX DLVR CPLX: CPT | Performed by: RADIOLOGY

## 2024-08-20 RX ORDER — SENNA AND DOCUSATE SODIUM 50; 8.6 MG/1; MG/1
2 TABLET, FILM COATED ORAL DAILY
Qty: 60 TABLET | Refills: 2 | Status: SHIPPED | OUTPATIENT
Start: 2024-08-20 | End: 2024-11-18

## 2024-08-21 ENCOUNTER — HOSPITAL ENCOUNTER (OUTPATIENT)
Dept: RADIATION ONCOLOGY | Age: 74
Discharge: HOME | End: 2024-08-21
Payer: MEDICARE

## 2024-08-21 VITALS
HEART RATE: 97 BPM | DIASTOLIC BLOOD PRESSURE: 62 MMHG | WEIGHT: 180.4 LBS | SYSTOLIC BLOOD PRESSURE: 124 MMHG | TEMPERATURE: 97.6 F | OXYGEN SATURATION: 97 % | BODY MASS INDEX: 25.16 KG/M2 | RESPIRATION RATE: 18 BRPM

## 2024-08-21 DIAGNOSIS — C34.90 MALIGNANT NEOPLASM OF LUNG, UNSPECIFIED LATERALITY, UNSPECIFIED PART OF LUNG (HCC): Primary | ICD-10-CM

## 2024-08-21 PROCEDURE — 77386 HC NTSTY MODUL RAD TX DLVR CPLX: CPT | Performed by: RADIOLOGY

## 2024-08-21 PROCEDURE — NBSRV NON-BILLABLE SERVICE: Performed by: RADIOLOGY

## 2024-08-21 NOTE — PROGRESS NOTES
Kin Issa  8/21/2024  Ht Readings from Last 1 Encounters:   08/08/24 1.803 m (5' 11\")     Wt Readings from Last 10 Encounters:   08/21/24 81.8 kg (180 lb 6.4 oz)   08/19/24 82.1 kg (180 lb 14.4 oz)   08/14/24 84 kg (185 lb 3.2 oz)   08/08/24 81.2 kg (179 lb)   08/02/24 81.6 kg (180 lb)   07/25/24 87.7 kg (193 lb 4.8 oz)   07/22/24 84.2 kg (185 lb 9.6 oz)   07/09/24 88.5 kg (195 lb)   06/27/24 88.5 kg (195 lb)   06/14/24 90.7 kg (200 lb)     BMI: 25.16    Assessment: Met w/ pt during weekly visit. He has lost 5# over last week per radiation oncology scale. He admits that he is consuming 1 meal daily, sometimes only eating once every other day. Stressed importance of consistent nutrition. Pt reports he has been scared to eat because he has been having immediate diarrhea. During last nutrition eval, he reported constipation. Pt reports he has \"gone back and forth from constipation to diarrhea for years\". Provided w/ education. Pt minimally interested in conversation. Noted ongoing wt fluctuations, question overall nutrition intake. Will follow.    Meron Zavaleta, MS, RD, LD

## 2024-08-21 NOTE — PATIENT INSTRUCTIONS
Continue daily fractionated radiation therapy as scheduled. Please see weekly OTV note and intial consultation letter in EPIC for clinical details.        Darren Bryant III, MD MS DABR    [facility]:  [phone]   FAX: [phone]  [facility]:  [phone]   FAX:    [phone]  [facility]:  [phone]   FAX:  [phone]  Email: [email or web address]

## 2024-08-21 NOTE — PROGRESS NOTES
DEPARTMENT OF RADIATION ONCOLOGY ON TREATMENT VISIT         8/21/2024      NAME:  Kin Issa    YOB: 1950    Diagnosis: lung cancer    SUBJECTIVE:   Kin Issa has now received fractionated external beam radiation therapy - ongoing.    Past medical, surgical, social and family histories reviewed and updated as indicated.    Pain: controlled    ALLERGIES:  Pcn [penicillins] and Tape [adhesive tape]         Current Outpatient Medications   Medication Sig Dispense Refill    sennosides-docusate sodium (SENOKOT-S) 8.6-50 MG tablet Take 2 tablets by mouth daily 60 tablet 2    pantoprazole (PROTONIX) 40 MG tablet Take 1 tablet by mouth 2 times daily (before meals) 60 tablet 0    Melatonin 3 MG CAPS Take 1 capsule by mouth at bedtime 30 capsule 1    sucralfate (CARAFATE) 1 GM tablet Take 1 tablet by mouth 4 times daily (Patient not taking: Reported on 8/14/2024) 120 tablet 3    lidocaine 4 % external patch Place 1 patch onto the skin daily (Patient not taking: Reported on 8/19/2024) 30 patch 0    diclofenac sodium (VOLTAREN) 1 % GEL Apply 2 g topically 4 times daily (Patient not taking: Reported on 8/19/2024) 50 g 0    ondansetron (ZOFRAN) 8 MG tablet Take 1 tablet by mouth every 8 hours as needed for Nausea or Vomiting 30 tablet 1    prochlorperazine (COMPAZINE) 10 MG tablet Take 1 tablet by mouth every 6 hours as needed (nausea) (Patient not taking: Reported on 8/19/2024) 30 tablet 0    Acetaminophen Extra Strength 500 MG TABS take 1 tablet by mouth four times a day if needed for pain 120 tablet 2    fluticasone-umeclidin-vilant (TRELEGY ELLIPTA) 100-62.5-25 MCG/ACT AEPB inhaler Inhale 1 puff into the lungs daily 3 each 1    rOPINIRole (REQUIP) 1 MG tablet Take 1 tablet by mouth nightly 90 tablet 1    rosuvastatin (CRESTOR) 10 MG tablet Take 1 tablet by mouth daily 90 tablet 1    SITagliptin-metFORMIN (JANUMET XR)  MG TB24 per extended release tablet Take 1 tablet by mouth daily 90

## 2024-08-22 ENCOUNTER — HOSPITAL ENCOUNTER (OUTPATIENT)
Dept: RADIATION ONCOLOGY | Age: 74
Discharge: HOME | End: 2024-08-22
Payer: MEDICARE

## 2024-08-22 PROCEDURE — 77386 HC NTSTY MODUL RAD TX DLVR CPLX: CPT | Performed by: RADIOLOGY

## 2024-08-23 ENCOUNTER — HOSPITAL ENCOUNTER (OUTPATIENT)
Dept: RADIATION ONCOLOGY | Age: 74
Discharge: HOME | End: 2024-08-23
Payer: MEDICARE

## 2024-08-23 PROCEDURE — 77386 HC NTSTY MODUL RAD TX DLVR CPLX: CPT | Performed by: RADIOLOGY

## 2024-08-23 PROCEDURE — 77336 RADIATION PHYSICS CONSULT: CPT | Performed by: RADIOLOGY

## 2024-08-26 ENCOUNTER — APPOINTMENT (OUTPATIENT)
Dept: RADIATION ONCOLOGY | Age: 74
End: 2024-08-26
Payer: MEDICARE

## 2024-08-26 PROCEDURE — 77386 HC NTSTY MODUL RAD TX DLVR CPLX: CPT | Performed by: RADIOLOGY

## 2024-08-26 PROCEDURE — 77014 CHG CT GUIDANCE RADIATION THERAPY FLDS PLACEMENT: CPT | Performed by: RADIOLOGY

## 2024-08-27 ENCOUNTER — APPOINTMENT (OUTPATIENT)
Dept: RADIATION ONCOLOGY | Age: 74
End: 2024-08-27
Payer: MEDICARE

## 2024-08-27 PROCEDURE — 77386 HC NTSTY MODUL RAD TX DLVR CPLX: CPT | Performed by: RADIOLOGY

## 2024-08-27 PROCEDURE — 77014 CHG CT GUIDANCE RADIATION THERAPY FLDS PLACEMENT: CPT | Performed by: RADIOLOGY

## 2024-08-28 ENCOUNTER — TELEPHONE (OUTPATIENT)
Dept: PHARMACY | Facility: CLINIC | Age: 74
End: 2024-08-28

## 2024-08-28 ENCOUNTER — APPOINTMENT (OUTPATIENT)
Dept: RADIATION ONCOLOGY | Age: 74
End: 2024-08-28
Payer: MEDICARE

## 2024-08-28 VITALS
RESPIRATION RATE: 18 BRPM | SYSTOLIC BLOOD PRESSURE: 110 MMHG | HEART RATE: 74 BPM | OXYGEN SATURATION: 98 % | DIASTOLIC BLOOD PRESSURE: 60 MMHG | WEIGHT: 178.6 LBS | TEMPERATURE: 97.6 F | BODY MASS INDEX: 24.91 KG/M2

## 2024-08-28 DIAGNOSIS — C34.90 MALIGNANT NEOPLASM OF LUNG, UNSPECIFIED LATERALITY, UNSPECIFIED PART OF LUNG (HCC): Primary | ICD-10-CM

## 2024-08-28 PROCEDURE — 77014 CHG CT GUIDANCE RADIATION THERAPY FLDS PLACEMENT: CPT | Performed by: RADIOLOGY

## 2024-08-28 PROCEDURE — NBSRV NON-BILLABLE SERVICE: Performed by: RADIOLOGY

## 2024-08-28 PROCEDURE — 77386 HC NTSTY MODUL RAD TX DLVR CPLX: CPT | Performed by: RADIOLOGY

## 2024-08-28 NOTE — PATIENT INSTRUCTIONS
Continue daily fractionated radiation therapy as scheduled. Please see weekly OTV note and intial consultation letter in EPIC for clinical details.        Darren Bryant III, MD MS DABR    [facility]:  [phone]   FAX: [phone]  [facility]:  [phone]   FAX:    [phone]  [facility]:  [phone]   FAX:  [phone]  Email: [email or web address]

## 2024-08-28 NOTE — TELEPHONE ENCOUNTER
TAB 50-500MG  and active on home medication list.     Attempting to reach patient to review.  Left message asking for return call. Letter sent to patient.    Recent Visits  Date Type Provider Dept   05/23/24 Office Visit Vinod Ojeda,  [facility]   01/26/24 Office Visit Vinod Ojeda,  [facility]   09/26/23 Office Visit Vinod Ojeda,  [facility]   05/26/23 Office Visit Vinod Ojeda,  [facility]   Showing recent visits within past 540 days with a meds authorizing provider and meeting all other requirements  Future Appointments  Date Type Provider Dept   09/23/24 Appointment Vinod Ojeda,  [facility]   Showing future appointments within next 150 days with a meds authorizing provider and meeting all other requirements    Future Appointments   Date Time Provider Department Center   8/28/2024  1:15 PM SCHEDULE, [facility]   8/28/2024  1:45 PM Darren Bryant III, MD [facility]   8/29/2024 10:45 AM [facility] MED ONC FAST TRACK 3 [facility] Med [facility]   8/29/2024 11:15 AM Vic Magallon MD MED ONC [facility]   8/29/2024  1:45 PM SCHEDULE, [facility]   8/30/2024  1:45 PM SCHEDULE, [facility]   9/3/2024  1:30 PM SCHEDULE, [facility]   9/3/2024  1:45 PM SCHEDULE, [facility]   9/4/2024  1:45 PM SCHEDULE, [facility]   9/4/2024  1:45 PM Darren Bryant III, MD [facility]   9/5/2024  1:15 PM SCHEDULE, [facility]   9/6/2024  1:15 PM SCHEDULE, [facility]   9/9/2024  1:15 PM SCHEDULE, [facility] Rad Onc [facility]   9/10/2024  1:15 PM SCHEDULE, [facility] Rad Onc [facility]   9/11/2024  1:15 PM SCHEDULE, [facility] Rad Onc [facility]   9/11/2024  1:45 PM Darren Bryant III, MD  [facility]   9/12/2024  1:45 PM SCHEDULE, [facility]   9/13/2024  1:45 PM SCHEDULE, [facility]   9/16/2024  1:45 PM SCHEDULE, [facility]   9/17/2024  1:15 PM SCHEDULE, [facility]   9/18/2024  1:15 PM SCHEDULE, [facility]   9/18/2024  1:45 PM Darren Bryant III, MD [facility] Rad [facility]   9/19/2024  1:15 PM SCHEDULE, [facility] LIN[facility]   9/20/2024  1:15 PM SCHEDULE, SE[facility] LIN[facility]   9/23/2024 10:30 AM Vinod Ojeda DO [facility]   9/23/2024  1:45 PM SCHEDULE, [facility] LIN[facility] Rad [facility]   11/13/2024  9:00 AM Jorge Curtis MD [facility] ENDO [facility] // Department, toll free [phone], Option 3    For Pharmacy Admin Tracking Only    Program: [facility]  CPA in place:  No  Gap Closed?: No   Time Spent (min): 15

## 2024-08-28 NOTE — PROGRESS NOTES
Kin Issa  8/28/2024  Wt Readings from Last 3 Encounters:   08/28/24 81 kg (178 lb 9.6 oz)   08/21/24 81.8 kg (180 lb 6.4 oz)   08/19/24 82.1 kg (180 lb 14.4 oz)     Body mass index is 24.91 kg/m².        Treatment Area:CTV thorax    Patient was seen today for weekly visit.      Comfort Alteration  KPS:70%  Fatigue: Moderate    Ventilation Alterations  Cough: No  Hemoptysis: No  Mucus Color: no  Dyspnea: No  O2 Sat: 97%    Nutritional Alteration  Anorexia: No  Nausea: Yes   Vomiting: No     Skin Alteration   Sensation:no    Radiation Dermatitis:  no    Mucous Membrane Alteration  Voice Changes/ Stridor/Larynx: no  Pharynx & Esophagus: no    Elimination Alterations  Constipation: no  Diarrhea:  no      Emotional  Coping: effective      Injury, potential bleeding or infection: no    Other:no    Lab Results   Component Value Date    WBC 13.0 (H) 08/12/2024    HGB 13.4 08/12/2024    HCT 38.5 08/12/2024     08/12/2024         /60   Pulse 74   Temp 97.6 °F (36.4 °C) (Temporal)   Resp 18   Wt 81 kg (178 lb 9.6 oz)   SpO2 98%   BMI 24.91 kg/m²   BP within normal range? yes        Assessment/Plan: Pt completed 13/2860/9550cGy.    Haylee Boone RN

## 2024-08-28 NOTE — PROGRESS NOTES
DEPARTMENT OF RADIATION ONCOLOGY ON TREATMENT VISIT         8/28/2024      NAME:  Kin Issa    YOB: 1950    Diagnosis: lung CA    SUBJECTIVE:   Kin Issa has now received fractionated external beam radiation therapy - ongoing.    Past medical, surgical, social and family histories reviewed and updated as indicated.    Pain: controlled    ALLERGIES:  Pcn [penicillins] and Tape [adhesive tape]         Current Outpatient Medications   Medication Sig Dispense Refill    sennosides-docusate sodium (SENOKOT-S) 8.6-50 MG tablet Take 2 tablets by mouth daily 60 tablet 2    pantoprazole (PROTONIX) 40 MG tablet Take 1 tablet by mouth 2 times daily (before meals) 60 tablet 0    Melatonin 3 MG CAPS Take 1 capsule by mouth at bedtime 30 capsule 1    sucralfate (CARAFATE) 1 GM tablet Take 1 tablet by mouth 4 times daily (Patient not taking: Reported on 8/14/2024) 120 tablet 3    diclofenac sodium (VOLTAREN) 1 % GEL Apply 2 g topically 4 times daily (Patient not taking: Reported on 8/19/2024) 50 g 0    ondansetron (ZOFRAN) 8 MG tablet Take 1 tablet by mouth every 8 hours as needed for Nausea or Vomiting 30 tablet 1    prochlorperazine (COMPAZINE) 10 MG tablet Take 1 tablet by mouth every 6 hours as needed (nausea) (Patient not taking: Reported on 8/19/2024) 30 tablet 0    Acetaminophen Extra Strength 500 MG TABS take 1 tablet by mouth four times a day if needed for pain 120 tablet 2    fluticasone-umeclidin-vilant (TRELEGY ELLIPTA) 100-62.5-25 MCG/ACT AEPB inhaler Inhale 1 puff into the lungs daily 3 each 1    rOPINIRole (REQUIP) 1 MG tablet Take 1 tablet by mouth nightly 90 tablet 1    rosuvastatin (CRESTOR) 10 MG tablet Take 1 tablet by mouth daily 90 tablet 1    SITagliptin-metFORMIN (JANUMET XR)  MG TB24 per extended release tablet Take 1 tablet by mouth daily 90 tablet 1    ibuprofen (ADVIL;MOTRIN) 200 MG tablet Take 1 tablet by mouth as needed for Pain      vitamin D  (CHOLECALCIFEROL) 31602 UNIT CAPS One weekly (Patient not taking: Reported on 8/19/2024) 12 capsule 1    albuterol sulfate  (90 Base) MCG/ACT inhaler Inhale 2 puffs into the lungs every 6 hours as needed for Wheezing or Shortness of Breath 18 g 1     No current facility-administered medications for this encounter.           OBJECTIVE:  Alert and fully ambulatory. Pleasant and conversant.        Physical Examination: General appearance - alert, well appearing, and in no distress.          Wt Readings from Last 3 Encounters:   08/28/24 81 kg (178 lb 9.6 oz)   08/21/24 81.8 kg (180 lb 6.4 oz)   08/19/24 82.1 kg (180 lb 14.4 oz)         ASSESSMENT/PLAN:     Patient is tolerating treatments well with expected toxicities. RBA were reviewed prior to first fraction and PRN.    Current and planned dose reviewed. Goals of treatment and potential side effects were reviewed with the patient PRN. Treatment imaging has been personally reviewed for accuracy and precision.    Questions answered to apparent satisfaction.    Treatments will continue as planned.        Darren Bryant III, MD MS DABR  Radiation Oncologist        [facility] ([facility]): [phone] /// FAX: [phone]  [facility] Garcesman): [phone] /// FAX: [phone]  [facility]): [phone] /// FAX: [phone]

## 2024-08-29 ENCOUNTER — OFFICE VISIT (OUTPATIENT)
Dept: ONCOLOGY | Age: 74
End: 2024-08-29
Payer: MEDICARE

## 2024-08-29 ENCOUNTER — HOSPITAL ENCOUNTER (OUTPATIENT)
Dept: INFUSION THERAPY | Age: 74
Discharge: HOME | End: 2024-08-29
Payer: MEDICARE

## 2024-08-29 ENCOUNTER — APPOINTMENT (OUTPATIENT)
Dept: RADIATION ONCOLOGY | Age: 74
End: 2024-08-29
Payer: MEDICARE

## 2024-08-29 VITALS
TEMPERATURE: 97.2 F | BODY MASS INDEX: 25.04 KG/M2 | OXYGEN SATURATION: 96 % | DIASTOLIC BLOOD PRESSURE: 54 MMHG | HEART RATE: 85 BPM | SYSTOLIC BLOOD PRESSURE: 105 MMHG | WEIGHT: 179.5 LBS

## 2024-08-29 DIAGNOSIS — C34.90 SMALL CELL LUNG CANCER (HCC): Primary | ICD-10-CM

## 2024-08-29 LAB
ALBUMIN SERPL-MCNC: 3.2 G/DL (ref 3.5–5.2)
ALP SERPL-CCNC: 255 U/L (ref 40–129)
ALT SERPL-CCNC: 5 U/L (ref 0–40)
ANION GAP SERPL CALCULATED.3IONS-SCNC: 10 MMOL/L (ref 7–16)
AST SERPL-CCNC: 8 U/L (ref 0–39)
BASOPHILS # BLD: 0 K/UL (ref 0–0.2)
BASOPHILS NFR BLD: 0 % (ref 0–2)
BILIRUB SERPL-MCNC: 0.4 MG/DL (ref 0–1.2)
BUN SERPL-MCNC: 8 MG/DL (ref 6–23)
CALCIUM SERPL-MCNC: 7.4 MG/DL (ref 8.6–10.2)
CHLORIDE SERPL-SCNC: 101 MMOL/L (ref 98–107)
CO2 SERPL-SCNC: 28 MMOL/L (ref 22–29)
CREAT SERPL-MCNC: 1.1 MG/DL (ref 0.7–1.2)
EOSINOPHIL # BLD: 0 K/UL (ref 0.05–0.5)
EOSINOPHILS RELATIVE PERCENT: 0 % (ref 0–6)
ERYTHROCYTE [DISTWIDTH] IN BLOOD BY AUTOMATED COUNT: 15.1 % (ref 11.5–15)
GFR, ESTIMATED: 71 ML/MIN/1.73M2
GLUCOSE SERPL-MCNC: 100 MG/DL (ref 74–99)
HCT VFR BLD AUTO: 31.4 % (ref 37–54)
HGB BLD-MCNC: 10.3 G/DL (ref 12.5–16.5)
LYMPHOCYTES NFR BLD: 0.77 K/UL (ref 1.5–4)
LYMPHOCYTES RELATIVE PERCENT: 9 % (ref 20–42)
MCH RBC QN AUTO: 30.7 PG (ref 26–35)
MCHC RBC AUTO-ENTMCNC: 32.8 G/DL (ref 32–34.5)
MCV RBC AUTO: 93.7 FL (ref 80–99.9)
METAMYELOCYTES ABSOLUTE COUNT: 0.08 K/UL (ref 0–0.12)
METAMYELOCYTES: 1 % (ref 0–1)
MONOCYTES NFR BLD: 0.54 K/UL (ref 0.1–0.95)
MONOCYTES NFR BLD: 6 % (ref 2–12)
NEUTROPHILS NFR BLD: 84 % (ref 43–80)
NEUTS SEG NFR BLD: 7.51 K/UL (ref 1.8–7.3)
PLATELET # BLD AUTO: 92 K/UL (ref 130–450)
PLATELET CONFIRMATION: NORMAL
PMV BLD AUTO: 10.2 FL (ref 7–12)
POTASSIUM SERPL-SCNC: 3.5 MMOL/L (ref 3.5–5)
PROT SERPL-MCNC: 5.2 G/DL (ref 6.4–8.3)
RBC # BLD AUTO: 3.35 M/UL (ref 3.8–5.8)
RBC # BLD: ABNORMAL 10*6/UL
SODIUM SERPL-SCNC: 139 MMOL/L (ref 132–146)
WBC OTHER # BLD: 8.9 K/UL (ref 4.5–11.5)

## 2024-08-29 PROCEDURE — 2580000003 HC RX 258: Performed by: STUDENT IN AN ORGANIZED HEALTH CARE EDUCATION/TRAINING PROGRAM

## 2024-08-29 PROCEDURE — G8417 CALC BMI ABV UP PARAM F/U: HCPCS | Performed by: STUDENT IN AN ORGANIZED HEALTH CARE EDUCATION/TRAINING PROGRAM

## 2024-08-29 PROCEDURE — 6360000002 HC RX W HCPCS: Performed by: STUDENT IN AN ORGANIZED HEALTH CARE EDUCATION/TRAINING PROGRAM

## 2024-08-29 PROCEDURE — 77386 HC NTSTY MODUL RAD TX DLVR CPLX: CPT | Performed by: RADIOLOGY

## 2024-08-29 PROCEDURE — 85025 COMPLETE CBC W/AUTO DIFF WBC: CPT

## 2024-08-29 PROCEDURE — 99214 OFFICE O/P EST MOD 30 MIN: CPT | Performed by: STUDENT IN AN ORGANIZED HEALTH CARE EDUCATION/TRAINING PROGRAM

## 2024-08-29 PROCEDURE — 36591 DRAW BLOOD OFF VENOUS DEVICE: CPT

## 2024-08-29 PROCEDURE — 1124F ACP DISCUSS-NO DSCNMKR DOCD: CPT | Performed by: STUDENT IN AN ORGANIZED HEALTH CARE EDUCATION/TRAINING PROGRAM

## 2024-08-29 PROCEDURE — G8427 DOCREV CUR MEDS BY ELIG CLIN: HCPCS | Performed by: STUDENT IN AN ORGANIZED HEALTH CARE EDUCATION/TRAINING PROGRAM

## 2024-08-29 PROCEDURE — 80053 COMPREHEN METABOLIC PANEL: CPT

## 2024-08-29 PROCEDURE — 4004F PT TOBACCO SCREEN RCVD TLK: CPT | Performed by: STUDENT IN AN ORGANIZED HEALTH CARE EDUCATION/TRAINING PROGRAM

## 2024-08-29 RX ORDER — SODIUM CHLORIDE 9 MG/ML
INJECTION, SOLUTION INTRAVENOUS CONTINUOUS
OUTPATIENT
Start: 2024-08-29

## 2024-08-29 RX ORDER — ACETAMINOPHEN 325 MG/1
650 TABLET ORAL
OUTPATIENT
Start: 2024-08-29

## 2024-08-29 RX ORDER — SODIUM CHLORIDE 9 MG/ML
25 INJECTION, SOLUTION INTRAVENOUS PRN
OUTPATIENT
Start: 2024-08-29

## 2024-08-29 RX ORDER — SODIUM CHLORIDE 0.9 % (FLUSH) 0.9 %
5-40 SYRINGE (ML) INJECTION PRN
OUTPATIENT
Start: 2024-08-29

## 2024-08-29 RX ORDER — ALBUTEROL SULFATE 90 UG/1
4 AEROSOL, METERED RESPIRATORY (INHALATION) PRN
OUTPATIENT
Start: 2024-08-29

## 2024-08-29 RX ORDER — HEPARIN 100 UNIT/ML
500 SYRINGE INTRAVENOUS PRN
OUTPATIENT
Start: 2024-08-29

## 2024-08-29 RX ORDER — SODIUM CHLORIDE 0.9 % (FLUSH) 0.9 %
5-40 SYRINGE (ML) INJECTION PRN
Status: DISCONTINUED | OUTPATIENT
Start: 2024-08-29 | End: 2024-08-30 | Stop reason: HOSPADM

## 2024-08-29 RX ORDER — EPINEPHRINE 1 MG/ML
0.3 INJECTION, SOLUTION, CONCENTRATE INTRAVENOUS PRN
OUTPATIENT
Start: 2024-08-29

## 2024-08-29 RX ORDER — DIPHENHYDRAMINE HYDROCHLORIDE 50 MG/ML
50 INJECTION INTRAMUSCULAR; INTRAVENOUS
OUTPATIENT
Start: 2024-08-29

## 2024-08-29 RX ORDER — ONDANSETRON 2 MG/ML
8 INJECTION INTRAMUSCULAR; INTRAVENOUS
OUTPATIENT
Start: 2024-08-29

## 2024-08-29 RX ORDER — HEPARIN 100 UNIT/ML
500 SYRINGE INTRAVENOUS PRN
Status: DISCONTINUED | OUTPATIENT
Start: 2024-08-29 | End: 2024-08-30 | Stop reason: HOSPADM

## 2024-08-29 RX ADMIN — SODIUM CHLORIDE, PRESERVATIVE FREE 10 ML: 5 INJECTION INTRAVENOUS at 11:01

## 2024-08-29 RX ADMIN — HEPARIN 500 UNITS: 100 SYRINGE at 11:01

## 2024-08-29 NOTE — PROGRESS NOTES
lobe pulmonary mass measures 3.4 x  2.6 cm and maximal SUV of 19.8 as compared to 2.9 x 2.0 cm and a maximal SUV  of 10.9 on the prior examination.     Enlarged cluster of low right paratracheal lymph nodes has a maximal SUV of  13.0 as compared to 8.4 on the prior examination.     Hypermetabolic nonenlarged right hilar lymph node has a maximal SUV of 7.7     More inferiorly located right hilar lymph node mass has a maximal SUV of 14.2     There is no evidence of new hypermetabolic pulmonary parenchymal lesion.     ABDOMEN/PELVIS: The liver, spleen, pancreas and adrenals are free of focal  hypermetabolic activity.  There is no evidence of hypermetabolic abdominal or  pelvic lymph node.  No focal bowel wall hypermetabolic activity is identified.     BONES/SOFT TISSUE: Hypermetabolism in the lateral right iliac bone has a  maximal SUV of 5.8 as compared to 4.1 on the prior examination.  There is no  evidence of new osseous hypermetabolic lesion.     INCIDENTAL CT FINDINGS: Gallbladder is surgically absent.  Infrarenal  abdominal aortic aneurysm has a maximal AP diameter of 4.7 cm and is not  significantly changed.  CT follow-up every 6 months and vascular consultation  should be considered, if not already performed.  The prostate gland is  enlarged.     IMPRESSION:  Interval enlargement and increase in hypermetabolic activity of the right  upper lobe pulmonary mass and right hilar lymph nodes since 03/08/2024.     Interval worsening of hypermetabolism within the right iliac bone lesion.        Biopsy   Due to imaging findings of possible metastatic right iliac bone metastasis vs Paget's, decision made to purse bone biopsy with results as follows:       -- Diagnosis --   Right iliac bone, core biopsies:   Negative for malignancy (see comment).   Benign reactive bony changes, Paget disease of bone not excluded (see   comment).     -- Diagnosis Comment --   There is no evidence of metastatic epithelial malignancy within  tablet by mouth nightly 90 tablet 1    rosuvastatin (CRESTOR) 10 MG tablet Take 1 tablet by mouth daily 90 tablet 1    SITagliptin-metFORMIN (JANUMET XR)  MG TB24 per extended release tablet Take 1 tablet by mouth daily 90 tablet 1    ibuprofen (ADVIL;MOTRIN) 200 MG tablet Take 1 tablet by mouth as needed for Pain      vitamin D (CHOLECALCIFEROL) 65703 UNIT CAPS One weekly 12 capsule 1    albuterol sulfate  (90 Base) MCG/ACT inhaler Inhale 2 puffs into the lungs every 6 hours as needed for Wheezing or Shortness of Breath 18 g 1     No current facility-administered medications on file prior to visit.     Reviewed and reconciled.    Allergies   Allergen Reactions    Pcn [Penicillins] Hives    Tape [Adhesive Tape]      Surgical tape-blisters             REVIEW OF SYSTEMS:  CONSTITUTIONAL :  No fever, chills, night sweats, or unintended weight loss; Fatigue  EYES: No discharge, itchiness, pain, redness  ENMT: No mouth sores, thrush  RESPIRATORY: No shortness of breath, or cough  CARDIOVASCULAR: No chest pain, palpitations  GASTROINTESTINAL: No nausea, vomiting, abdominal pain   GENITOURINARY: No dysuria, urinary frequency, hematuria  ENDOCRINE: No polydipsia, polyuria, cold or heat intolerance.  MUSCULOSKELETAL: No arthralgias, myalgias, or bony pain;   chronic back pain.   INTEGUMENT.: No skin rash or bruises.  HEM/LYMPHATICS: No adenopathy, bruising or prolonged bleeding  NEURO: No headache, dizziness, syncope/presyncope, focal deficits or loss of balance.       Vitals:    08/29/24 1128   BP: (!) 105/54   Pulse: 85   Temp: 97.2 °F (36.2 °C)   SpO2: 96%     PHYSICAL EXAM:  GENERAL: Well developed, well nourished; in no acute distress  HEENT:  Nornmocephalic, atraumatic.  EOMI. No scleral icterus. Oropharynx clear.   NECK:  Supple.  Normal range of motion.  LUNGS:  Distant lung sounds. Clear otherwise.   CARDIOVASCULAR:  RRR. No murmurs, rubs or gallops.  ABDOMEN:  Soft, non tender, non distended.  Bowel

## 2024-08-30 ENCOUNTER — APPOINTMENT (OUTPATIENT)
Dept: RADIATION ONCOLOGY | Age: 74
End: 2024-08-30
Payer: MEDICARE

## 2024-08-30 ENCOUNTER — HOSPITAL ENCOUNTER (OUTPATIENT)
Dept: RADIATION ONCOLOGY | Age: 74
Discharge: HOME | End: 2024-08-30
Payer: MEDICARE

## 2024-08-30 PROCEDURE — 77336 RADIATION PHYSICS CONSULT: CPT | Performed by: RADIOLOGY

## 2024-08-30 PROCEDURE — 77386 HC NTSTY MODUL RAD TX DLVR CPLX: CPT | Performed by: RADIOLOGY

## 2024-08-30 PROCEDURE — 77334 RADIATION TREATMENT AID(S): CPT | Performed by: RADIOLOGY

## 2024-08-30 NOTE — TELEPHONE ENCOUNTER
Patient wife, Anna is listed on JUAN form, returned our call and said he is taking his medication daily and is currently low.  I veried the current pharmacy which I [facility].  I let her know I'll request a refill and ask them to have ROSUVASTATIN TAB 10MG and JANUMET XR TAB 50-500MG ready for  tomorrow at [facility] on [facility].    She said that is fine and will be sure to  the refills     For Pharmacy Admin Tracking Only    Program: Domo  CPA in place:  No  Recommendation Provided To: Patient/Caregiver: 2 via Telephone and Pharmacy: 2  Intervention Detail: Adherence Monitorin  Intervention Accepted By: Patient/Caregiver: 2 and Pharmacy: 2  Gap Closed?: Yes   Time Spent (min): 20

## 2024-09-03 ENCOUNTER — TELEPHONE (OUTPATIENT)
Dept: CASE MANAGEMENT | Age: 74
End: 2024-09-03

## 2024-09-03 ENCOUNTER — OFFICE VISIT (OUTPATIENT)
Dept: PALLATIVE CARE | Age: 74
End: 2024-09-03
Payer: MEDICARE

## 2024-09-03 ENCOUNTER — HOSPITAL ENCOUNTER (OUTPATIENT)
Dept: INFUSION THERAPY | Age: 74
Discharge: HOME | End: 2024-09-03
Payer: MEDICARE

## 2024-09-03 ENCOUNTER — HOSPITAL ENCOUNTER (OUTPATIENT)
Dept: RADIATION ONCOLOGY | Age: 74
Discharge: HOME | End: 2024-09-03
Payer: MEDICARE

## 2024-09-03 VITALS
RESPIRATION RATE: 18 BRPM | TEMPERATURE: 97 F | SYSTOLIC BLOOD PRESSURE: 140 MMHG | OXYGEN SATURATION: 97 % | DIASTOLIC BLOOD PRESSURE: 70 MMHG | HEART RATE: 86 BPM

## 2024-09-03 DIAGNOSIS — C34.90 SMALL CELL LUNG CANCER (HCC): Primary | ICD-10-CM

## 2024-09-03 DIAGNOSIS — C34.90 SMALL CELL LUNG CANCER (HCC): ICD-10-CM

## 2024-09-03 DIAGNOSIS — Z51.5 PALLIATIVE CARE BY SPECIALIST: Primary | ICD-10-CM

## 2024-09-03 LAB
ALBUMIN SERPL-MCNC: 3.4 G/DL (ref 3.5–5.2)
ALP SERPL-CCNC: 195 U/L (ref 40–129)
ALT SERPL-CCNC: <5 U/L (ref 0–40)
ANION GAP SERPL CALCULATED.3IONS-SCNC: 15 MMOL/L (ref 7–16)
AST SERPL-CCNC: 9 U/L (ref 0–39)
BASOPHILS # BLD: 0.03 K/UL (ref 0–0.2)
BASOPHILS NFR BLD: 0 % (ref 0–2)
BILIRUB SERPL-MCNC: 0.6 MG/DL (ref 0–1.2)
BUN SERPL-MCNC: 7 MG/DL (ref 6–23)
CALCIUM SERPL-MCNC: 8.2 MG/DL (ref 8.6–10.2)
CHLORIDE SERPL-SCNC: 99 MMOL/L (ref 98–107)
CO2 SERPL-SCNC: 24 MMOL/L (ref 22–29)
CREAT SERPL-MCNC: 0.9 MG/DL (ref 0.7–1.2)
EOSINOPHIL # BLD: 0.01 K/UL (ref 0.05–0.5)
EOSINOPHILS RELATIVE PERCENT: 0 % (ref 0–6)
ERYTHROCYTE [DISTWIDTH] IN BLOOD BY AUTOMATED COUNT: 16.3 % (ref 11.5–15)
GFR, ESTIMATED: 85 ML/MIN/1.73M2
GLUCOSE SERPL-MCNC: 131 MG/DL (ref 74–99)
HCT VFR BLD AUTO: 35 % (ref 37–54)
HGB BLD-MCNC: 11.4 G/DL (ref 12.5–16.5)
IMM GRANULOCYTES # BLD AUTO: 0.12 K/UL (ref 0–0.58)
IMM GRANULOCYTES NFR BLD: 2 % (ref 0–5)
LYMPHOCYTES NFR BLD: 0.65 K/UL (ref 1.5–4)
LYMPHOCYTES RELATIVE PERCENT: 9 % (ref 20–42)
MCH RBC QN AUTO: 31 PG (ref 26–35)
MCHC RBC AUTO-ENTMCNC: 32.6 G/DL (ref 32–34.5)
MCV RBC AUTO: 95.1 FL (ref 80–99.9)
MONOCYTES NFR BLD: 0.54 K/UL (ref 0.1–0.95)
MONOCYTES NFR BLD: 8 % (ref 2–12)
NEUTROPHILS NFR BLD: 81 % (ref 43–80)
NEUTS SEG NFR BLD: 5.84 K/UL (ref 1.8–7.3)
PLATELET # BLD AUTO: 265 K/UL (ref 130–450)
PMV BLD AUTO: 9.7 FL (ref 7–12)
POTASSIUM SERPL-SCNC: 3.7 MMOL/L (ref 3.5–5)
PROT SERPL-MCNC: 5.7 G/DL (ref 6.4–8.3)
RBC # BLD AUTO: 3.68 M/UL (ref 3.8–5.8)
SODIUM SERPL-SCNC: 138 MMOL/L (ref 132–146)
WBC OTHER # BLD: 7.2 K/UL (ref 4.5–11.5)

## 2024-09-03 PROCEDURE — 1124F ACP DISCUSS-NO DSCNMKR DOCD: CPT | Performed by: NURSE PRACTITIONER

## 2024-09-03 PROCEDURE — 2580000003 HC RX 258: Performed by: STUDENT IN AN ORGANIZED HEALTH CARE EDUCATION/TRAINING PROGRAM

## 2024-09-03 PROCEDURE — 85025 COMPLETE CBC W/AUTO DIFF WBC: CPT

## 2024-09-03 PROCEDURE — 77386 HC NTSTY MODUL RAD TX DLVR CPLX: CPT | Performed by: RADIOLOGY

## 2024-09-03 PROCEDURE — G8417 CALC BMI ABV UP PARAM F/U: HCPCS | Performed by: NURSE PRACTITIONER

## 2024-09-03 PROCEDURE — 96417 CHEMO IV INFUS EACH ADDL SEQ: CPT

## 2024-09-03 PROCEDURE — 6360000002 HC RX W HCPCS: Performed by: STUDENT IN AN ORGANIZED HEALTH CARE EDUCATION/TRAINING PROGRAM

## 2024-09-03 PROCEDURE — 80053 COMPREHEN METABOLIC PANEL: CPT

## 2024-09-03 PROCEDURE — G8428 CUR MEDS NOT DOCUMENT: HCPCS | Performed by: NURSE PRACTITIONER

## 2024-09-03 PROCEDURE — 99211 OFF/OP EST MAY X REQ PHY/QHP: CPT

## 2024-09-03 PROCEDURE — 96367 TX/PROPH/DG ADDL SEQ IV INF: CPT

## 2024-09-03 PROCEDURE — 99212 OFFICE O/P EST SF 10 MIN: CPT | Performed by: NURSE PRACTITIONER

## 2024-09-03 PROCEDURE — 96375 TX/PRO/DX INJ NEW DRUG ADDON: CPT

## 2024-09-03 PROCEDURE — 4004F PT TOBACCO SCREEN RCVD TLK: CPT | Performed by: NURSE PRACTITIONER

## 2024-09-03 PROCEDURE — 96413 CHEMO IV INFUSION 1 HR: CPT

## 2024-09-03 RX ORDER — PALONOSETRON 0.05 MG/ML
0.25 INJECTION, SOLUTION INTRAVENOUS ONCE
Status: CANCELLED | OUTPATIENT
Start: 2024-09-03 | End: 2024-09-03

## 2024-09-03 RX ORDER — SODIUM CHLORIDE 9 MG/ML
INJECTION, SOLUTION INTRAVENOUS CONTINUOUS
Status: CANCELLED | OUTPATIENT
Start: 2024-09-03

## 2024-09-03 RX ORDER — ONDANSETRON 2 MG/ML
8 INJECTION INTRAMUSCULAR; INTRAVENOUS
OUTPATIENT
Start: 2024-09-03

## 2024-09-03 RX ORDER — ALBUTEROL SULFATE 90 UG/1
4 AEROSOL, METERED RESPIRATORY (INHALATION) PRN
Status: CANCELLED | OUTPATIENT
Start: 2024-09-03

## 2024-09-03 RX ORDER — EPINEPHRINE 1 MG/ML
0.3 INJECTION, SOLUTION, CONCENTRATE INTRAVENOUS PRN
OUTPATIENT
Start: 2024-09-03

## 2024-09-03 RX ORDER — EPINEPHRINE 1 MG/ML
0.3 INJECTION, SOLUTION, CONCENTRATE INTRAVENOUS PRN
Status: CANCELLED | OUTPATIENT
Start: 2024-09-04

## 2024-09-03 RX ORDER — ALBUTEROL SULFATE 90 UG/1
4 AEROSOL, METERED RESPIRATORY (INHALATION) PRN
OUTPATIENT
Start: 2024-09-03

## 2024-09-03 RX ORDER — SODIUM CHLORIDE 9 MG/ML
5-250 INJECTION, SOLUTION INTRAVENOUS PRN
Status: CANCELLED | OUTPATIENT
Start: 2024-09-03

## 2024-09-03 RX ORDER — SODIUM CHLORIDE 9 MG/ML
25 INJECTION, SOLUTION INTRAVENOUS PRN
OUTPATIENT
Start: 2024-09-03

## 2024-09-03 RX ORDER — EPINEPHRINE 1 MG/ML
0.3 INJECTION, SOLUTION, CONCENTRATE INTRAVENOUS PRN
Status: CANCELLED | OUTPATIENT
Start: 2024-09-05

## 2024-09-03 RX ORDER — ACETAMINOPHEN 325 MG/1
650 TABLET ORAL
OUTPATIENT
Start: 2024-09-03

## 2024-09-03 RX ORDER — DIPHENHYDRAMINE HYDROCHLORIDE 50 MG/ML
50 INJECTION INTRAMUSCULAR; INTRAVENOUS
OUTPATIENT
Start: 2024-09-03

## 2024-09-03 RX ORDER — FAMOTIDINE 10 MG/ML
20 INJECTION, SOLUTION INTRAVENOUS
Status: CANCELLED | OUTPATIENT
Start: 2024-09-04

## 2024-09-03 RX ORDER — EPINEPHRINE 1 MG/ML
0.3 INJECTION, SOLUTION, CONCENTRATE INTRAVENOUS PRN
Status: CANCELLED | OUTPATIENT
Start: 2024-09-03

## 2024-09-03 RX ORDER — ONDANSETRON 2 MG/ML
8 INJECTION INTRAMUSCULAR; INTRAVENOUS
Status: CANCELLED | OUTPATIENT
Start: 2024-09-04

## 2024-09-03 RX ORDER — MEPERIDINE HYDROCHLORIDE 50 MG/ML
12.5 INJECTION INTRAMUSCULAR; INTRAVENOUS; SUBCUTANEOUS PRN
Status: CANCELLED | OUTPATIENT
Start: 2024-09-05

## 2024-09-03 RX ORDER — SODIUM CHLORIDE 9 MG/ML
INJECTION, SOLUTION INTRAVENOUS CONTINUOUS
OUTPATIENT
Start: 2024-09-03

## 2024-09-03 RX ORDER — DEXAMETHASONE SODIUM PHOSPHATE 10 MG/ML
10 INJECTION, SOLUTION INTRAMUSCULAR; INTRAVENOUS ONCE
Status: COMPLETED | OUTPATIENT
Start: 2024-09-03 | End: 2024-09-03

## 2024-09-03 RX ORDER — ALBUTEROL SULFATE 90 UG/1
4 AEROSOL, METERED RESPIRATORY (INHALATION) PRN
Status: CANCELLED | OUTPATIENT
Start: 2024-09-05

## 2024-09-03 RX ORDER — ALBUTEROL SULFATE 90 UG/1
4 AEROSOL, METERED RESPIRATORY (INHALATION) PRN
Status: CANCELLED | OUTPATIENT
Start: 2024-09-04

## 2024-09-03 RX ORDER — MEPERIDINE HYDROCHLORIDE 50 MG/ML
12.5 INJECTION INTRAMUSCULAR; INTRAVENOUS; SUBCUTANEOUS PRN
Status: CANCELLED | OUTPATIENT
Start: 2024-09-04

## 2024-09-03 RX ORDER — HEPARIN SODIUM (PORCINE) LOCK FLUSH IV SOLN 100 UNIT/ML 100 UNIT/ML
500 SOLUTION INTRAVENOUS PRN
Status: CANCELLED | OUTPATIENT
Start: 2024-09-05

## 2024-09-03 RX ORDER — DIPHENHYDRAMINE HYDROCHLORIDE 50 MG/ML
50 INJECTION INTRAMUSCULAR; INTRAVENOUS
Status: CANCELLED | OUTPATIENT
Start: 2024-09-03

## 2024-09-03 RX ORDER — HEPARIN 100 UNIT/ML
500 SYRINGE INTRAVENOUS PRN
OUTPATIENT
Start: 2024-09-03

## 2024-09-03 RX ORDER — ACETAMINOPHEN 325 MG/1
650 TABLET ORAL
Status: CANCELLED | OUTPATIENT
Start: 2024-09-04

## 2024-09-03 RX ORDER — DIPHENHYDRAMINE HYDROCHLORIDE 50 MG/ML
50 INJECTION INTRAMUSCULAR; INTRAVENOUS
Status: CANCELLED | OUTPATIENT
Start: 2024-09-04

## 2024-09-03 RX ORDER — SODIUM CHLORIDE 9 MG/ML
5-250 INJECTION, SOLUTION INTRAVENOUS PRN
Status: CANCELLED | OUTPATIENT
Start: 2024-09-04

## 2024-09-03 RX ORDER — PROCHLORPERAZINE EDISYLATE 5 MG/ML
5 INJECTION INTRAMUSCULAR; INTRAVENOUS
Status: CANCELLED | OUTPATIENT
Start: 2024-09-03

## 2024-09-03 RX ORDER — PALONOSETRON HYDROCHLORIDE 0.05 MG/ML
0.25 INJECTION, SOLUTION INTRAVENOUS ONCE
Status: COMPLETED | OUTPATIENT
Start: 2024-09-03 | End: 2024-09-03

## 2024-09-03 RX ORDER — SODIUM CHLORIDE 9 MG/ML
INJECTION, SOLUTION INTRAVENOUS CONTINUOUS
Status: CANCELLED | OUTPATIENT
Start: 2024-09-05

## 2024-09-03 RX ORDER — HEPARIN SODIUM (PORCINE) LOCK FLUSH IV SOLN 100 UNIT/ML 100 UNIT/ML
500 SOLUTION INTRAVENOUS PRN
Status: CANCELLED | OUTPATIENT
Start: 2024-09-03

## 2024-09-03 RX ORDER — ACETAMINOPHEN 325 MG/1
650 TABLET ORAL
Status: CANCELLED | OUTPATIENT
Start: 2024-09-05

## 2024-09-03 RX ORDER — HEPARIN 100 UNIT/ML
500 SYRINGE INTRAVENOUS PRN
Status: DISCONTINUED | OUTPATIENT
Start: 2024-09-03 | End: 2024-09-04 | Stop reason: HOSPADM

## 2024-09-03 RX ORDER — ACETAMINOPHEN 325 MG/1
650 TABLET ORAL
Status: CANCELLED | OUTPATIENT
Start: 2024-09-03

## 2024-09-03 RX ORDER — ONDANSETRON 2 MG/ML
8 INJECTION INTRAMUSCULAR; INTRAVENOUS
Status: CANCELLED | OUTPATIENT
Start: 2024-09-05

## 2024-09-03 RX ORDER — HEPARIN SODIUM (PORCINE) LOCK FLUSH IV SOLN 100 UNIT/ML 100 UNIT/ML
500 SOLUTION INTRAVENOUS PRN
Status: CANCELLED | OUTPATIENT
Start: 2024-09-04

## 2024-09-03 RX ORDER — SODIUM CHLORIDE 0.9 % (FLUSH) 0.9 %
5-40 SYRINGE (ML) INJECTION PRN
Status: CANCELLED | OUTPATIENT
Start: 2024-09-03

## 2024-09-03 RX ORDER — ONDANSETRON 2 MG/ML
8 INJECTION INTRAMUSCULAR; INTRAVENOUS
Status: CANCELLED | OUTPATIENT
Start: 2024-09-03

## 2024-09-03 RX ORDER — SODIUM CHLORIDE 0.9 % (FLUSH) 0.9 %
5-40 SYRINGE (ML) INJECTION PRN
Status: CANCELLED | OUTPATIENT
Start: 2024-09-05

## 2024-09-03 RX ORDER — SODIUM CHLORIDE 0.9 % (FLUSH) 0.9 %
5-40 SYRINGE (ML) INJECTION PRN
Status: CANCELLED | OUTPATIENT
Start: 2024-09-04

## 2024-09-03 RX ORDER — FAMOTIDINE 10 MG/ML
20 INJECTION, SOLUTION INTRAVENOUS
Status: CANCELLED | OUTPATIENT
Start: 2024-09-05

## 2024-09-03 RX ORDER — SODIUM CHLORIDE 9 MG/ML
5-250 INJECTION, SOLUTION INTRAVENOUS PRN
Status: DISCONTINUED | OUTPATIENT
Start: 2024-09-03 | End: 2024-09-04 | Stop reason: HOSPADM

## 2024-09-03 RX ORDER — FAMOTIDINE 10 MG/ML
20 INJECTION, SOLUTION INTRAVENOUS
Status: CANCELLED | OUTPATIENT
Start: 2024-09-03

## 2024-09-03 RX ORDER — SODIUM CHLORIDE 0.9 % (FLUSH) 0.9 %
5-40 SYRINGE (ML) INJECTION PRN
OUTPATIENT
Start: 2024-09-03

## 2024-09-03 RX ORDER — SODIUM CHLORIDE 9 MG/ML
INJECTION, SOLUTION INTRAVENOUS CONTINUOUS
Status: CANCELLED | OUTPATIENT
Start: 2024-09-04

## 2024-09-03 RX ORDER — SODIUM CHLORIDE 9 MG/ML
5-250 INJECTION, SOLUTION INTRAVENOUS PRN
Status: CANCELLED | OUTPATIENT
Start: 2024-09-05

## 2024-09-03 RX ORDER — SODIUM CHLORIDE 0.9 % (FLUSH) 0.9 %
5-40 SYRINGE (ML) INJECTION PRN
Status: DISCONTINUED | OUTPATIENT
Start: 2024-09-03 | End: 2024-09-04 | Stop reason: HOSPADM

## 2024-09-03 RX ORDER — DIPHENHYDRAMINE HYDROCHLORIDE 50 MG/ML
50 INJECTION INTRAMUSCULAR; INTRAVENOUS
Status: CANCELLED | OUTPATIENT
Start: 2024-09-05

## 2024-09-03 RX ORDER — MEPERIDINE HYDROCHLORIDE 50 MG/ML
12.5 INJECTION INTRAMUSCULAR; INTRAVENOUS; SUBCUTANEOUS PRN
Status: CANCELLED | OUTPATIENT
Start: 2024-09-03

## 2024-09-03 RX ADMIN — ETOPOSIDE 210 MG: 20 INJECTION INTRAVENOUS at 12:42

## 2024-09-03 RX ADMIN — SODIUM CHLORIDE, PRESERVATIVE FREE 10 ML: 5 INJECTION INTRAVENOUS at 09:02

## 2024-09-03 RX ADMIN — DEXAMETHASONE SODIUM PHOSPHATE 10 MG: 10 INJECTION, SOLUTION INTRAMUSCULAR; INTRAVENOUS at 11:19

## 2024-09-03 RX ADMIN — SODIUM CHLORIDE 200 ML/HR: 9 INJECTION, SOLUTION INTRAVENOUS at 11:18

## 2024-09-03 RX ADMIN — CARBOPLATIN 500 MG: 10 INJECTION, SOLUTION INTRAVENOUS at 12:00

## 2024-09-03 RX ADMIN — SODIUM CHLORIDE 150 MG: 9 INJECTION, SOLUTION INTRAVENOUS at 11:23

## 2024-09-03 RX ADMIN — PALONOSETRON 0.25 MG: 0.25 INJECTION, SOLUTION INTRAVENOUS at 11:19

## 2024-09-03 RX ADMIN — SODIUM CHLORIDE, PRESERVATIVE FREE 10 ML: 5 INJECTION INTRAVENOUS at 11:18

## 2024-09-03 RX ADMIN — HEPARIN 500 UNITS: 100 SYRINGE at 13:54

## 2024-09-03 RX ADMIN — SODIUM CHLORIDE, PRESERVATIVE FREE 10 ML: 5 INJECTION INTRAVENOUS at 13:54

## 2024-09-03 NOTE — TELEPHONE ENCOUNTER
Met with patient during his infusion appointment today.  Patient states that his treatments are going ok.  He states that he is able to eat and drink ok but his only complaints are fatigue and soreness in his throat which he states the's going to ask radiation about as it might be a side effect of his radiation treatment.  Patient states that he is able to care for himself at home but is not able to help his wife with the house work right now an the she either does the cooking or they order food in or their meals.  Patient denies any issues with transportation to his appointments and has no further issues or questions.  Encouraged patient to call if any arise.  Patient appreciative of visit.

## 2024-09-03 NOTE — PROGRESS NOTES
Patient tolerated infusion well. Patient alert and oriented x 3. No distress noted. Vital signs stable. Patient denies any new or worsening pain. Patient denies questions regarding treatment or medication; verbalized understanding, offered patient information and discharge material; patient declined; Patient denies needs, all questions answered. Left ambulatory by self.

## 2024-09-03 NOTE — PROGRESS NOTES
Palliative Care Department  Provider: MITCHEL Alvarez - CNP    Referring Provider: Dr. Magallon    Location of Service:   [facility]    Chief Complaint: Kin Issa is a 74 y.o. male with chief complaint of pain, decreased appetite, constipation    Assessment/Plan      Right small cell lung cancer:   -Diagnosed June 2024   -Known to Dr. Magallon   -Undergoing concurrent chemoradiation therapy    Pain related to neoplasm/chronic pain syndrome:   -Continue with Tylenol as needed   -Do not recommend opioids at this time    Gastritis:   -Recommend to continue with Carafate at least twice daily   -He is for EGD soon    Constipation:   -Senna S2 tabs daily    Follow Up:  2 weeks.  They were encouraged to call with any questions, concerns, needs, or changes in symptoms.    Subjective:     HPI:  Kin Issa is a 74 y.o. male with tobacco use disorder, continuing to smoke, unfortunately diagnosed with small cell lung cancer of the right upper lobe, originally found to have a nodule which was increased in size in February 2024, having had a PET scan in March 2024 showing a right upper lobe mass which was hypermetabolic, as well as an area in his right iliac bone.  He had EBUS and biopsy done June 2024, showing poorly differentiated small cell lung cancer.  He additionally underwent bone biopsy of the iliac lesion which was benign.  He is known to Dr. Magallon, and undergoing concurrent chemoradiation therapy.  He was referred to [facility] symptomatic support.    Subjective/Events/Discussions:  Kin presents today and is seen in the infusion center with no family present  Overall he reports he is doing well  He denies any pain, nausea, and overall feels he is doing well  He does continue to have some tightness in his throat with swallowing, and he is eating and drinking a little slower due to this  He also has some occasional hiccups, but they are not persistent  He

## 2024-09-04 ENCOUNTER — HOSPITAL ENCOUNTER (OUTPATIENT)
Dept: RADIATION ONCOLOGY | Age: 74
Discharge: HOME | End: 2024-09-04
Payer: MEDICARE

## 2024-09-04 ENCOUNTER — HOSPITAL ENCOUNTER (OUTPATIENT)
Dept: INFUSION THERAPY | Age: 74
Discharge: HOME | End: 2024-09-04
Payer: MEDICARE

## 2024-09-04 VITALS
DIASTOLIC BLOOD PRESSURE: 71 MMHG | HEART RATE: 103 BPM | WEIGHT: 182.4 LBS | BODY MASS INDEX: 25.44 KG/M2 | SYSTOLIC BLOOD PRESSURE: 128 MMHG | RESPIRATION RATE: 18 BRPM | OXYGEN SATURATION: 98 % | TEMPERATURE: 97.2 F

## 2024-09-04 VITALS
HEART RATE: 79 BPM | SYSTOLIC BLOOD PRESSURE: 122 MMHG | DIASTOLIC BLOOD PRESSURE: 58 MMHG | TEMPERATURE: 97.7 F | RESPIRATION RATE: 16 BRPM | OXYGEN SATURATION: 96 %

## 2024-09-04 DIAGNOSIS — C34.90 MALIGNANT NEOPLASM OF LUNG, UNSPECIFIED LATERALITY, UNSPECIFIED PART OF LUNG (HCC): Primary | ICD-10-CM

## 2024-09-04 DIAGNOSIS — C34.90 SMALL CELL LUNG CANCER (HCC): Primary | ICD-10-CM

## 2024-09-04 PROCEDURE — 77386 HC NTSTY MODUL RAD TX DLVR CPLX: CPT | Performed by: RADIOLOGY

## 2024-09-04 PROCEDURE — 6360000002 HC RX W HCPCS: Performed by: STUDENT IN AN ORGANIZED HEALTH CARE EDUCATION/TRAINING PROGRAM

## 2024-09-04 PROCEDURE — 96375 TX/PRO/DX INJ NEW DRUG ADDON: CPT

## 2024-09-04 PROCEDURE — 2580000003 HC RX 258: Performed by: STUDENT IN AN ORGANIZED HEALTH CARE EDUCATION/TRAINING PROGRAM

## 2024-09-04 PROCEDURE — 77014 CHG CT GUIDANCE RADIATION THERAPY FLDS PLACEMENT: CPT | Performed by: RADIOLOGY

## 2024-09-04 PROCEDURE — 96413 CHEMO IV INFUSION 1 HR: CPT

## 2024-09-04 PROCEDURE — NBSRV NON-BILLABLE SERVICE: Performed by: RADIOLOGY

## 2024-09-04 RX ORDER — HEPARIN 100 UNIT/ML
500 SYRINGE INTRAVENOUS PRN
Status: DISCONTINUED | OUTPATIENT
Start: 2024-09-04 | End: 2024-09-05 | Stop reason: HOSPADM

## 2024-09-04 RX ORDER — SODIUM CHLORIDE 0.9 % (FLUSH) 0.9 %
5-40 SYRINGE (ML) INJECTION PRN
Status: DISCONTINUED | OUTPATIENT
Start: 2024-09-04 | End: 2024-09-05 | Stop reason: HOSPADM

## 2024-09-04 RX ORDER — SODIUM CHLORIDE 9 MG/ML
5-250 INJECTION, SOLUTION INTRAVENOUS PRN
Status: DISCONTINUED | OUTPATIENT
Start: 2024-09-04 | End: 2024-09-05 | Stop reason: HOSPADM

## 2024-09-04 RX ORDER — DEXAMETHASONE SODIUM PHOSPHATE 10 MG/ML
8 INJECTION, SOLUTION INTRAMUSCULAR; INTRAVENOUS ONCE
Status: COMPLETED | OUTPATIENT
Start: 2024-09-04 | End: 2024-09-04

## 2024-09-04 RX ADMIN — ETOPOSIDE 210 MG: 20 INJECTION, SOLUTION INTRAVENOUS at 11:31

## 2024-09-04 RX ADMIN — HEPARIN 500 UNITS: 100 SYRINGE at 12:37

## 2024-09-04 RX ADMIN — SODIUM CHLORIDE, PRESERVATIVE FREE 10 ML: 5 INJECTION INTRAVENOUS at 11:06

## 2024-09-04 RX ADMIN — SODIUM CHLORIDE 200 ML/HR: 9 INJECTION, SOLUTION INTRAVENOUS at 11:06

## 2024-09-04 RX ADMIN — DEXAMETHASONE SODIUM PHOSPHATE 8 MG: 10 INJECTION, SOLUTION INTRAMUSCULAR; INTRAVENOUS at 11:07

## 2024-09-04 RX ADMIN — SODIUM CHLORIDE, PRESERVATIVE FREE 10 ML: 5 INJECTION INTRAVENOUS at 12:38

## 2024-09-04 NOTE — PROGRESS NOTES
Kin Issa  9/4/2024  Wt Readings from Last 3 Encounters:   09/04/24 82.7 kg (182 lb 6.4 oz)   08/29/24 81.4 kg (179 lb 8 oz)   08/28/24 81 kg (178 lb 9.6 oz)     Body mass index is 25.44 kg/m².        Treatment Area:CTV chest    Patient was seen today for weekly visit.      Comfort Alteration  KPS:70%  Fatigue: Moderate    Ventilation Alterations  Cough: Yes  Hemoptysis: No  Mucus Color: white  Dyspnea: No  O2 Sat: 98%    Nutritional Alteration  Anorexia: No  Nausea: No   Vomiting: No     Skin Alteration   Sensation:good and using lotion    Radiation Dermatitis:  na    Mucous Membrane Alteration  Voice Changes/ Stridor/Larynx: no  Pharynx & Esophagus: na    Elimination Alterations  Constipation: yes  Diarrhea:  yes      Emotional  Coping: effective      Injury, potential bleeding or infection: na    Other:na    Lab Results   Component Value Date    WBC 7.2 09/03/2024    HGB 11.4 (L) 09/03/2024    HCT 35.0 (L) 09/03/2024     09/03/2024         /71   Pulse (!) 103   Temp 97.2 °F (36.2 °C) (Skin)   Resp 18   Wt 82.7 kg (182 lb 6.4 oz)   SpO2 98%   BMI 25.44 kg/m²   BP within normal range? yes           Assessment/Plan:17/30fx  3740/6600cGY and tolerating well.    Gabriella Faith RN      
every 6 hours as needed for Wheezing or Shortness of Breath 18 g 1     No current facility-administered medications for this encounter.     Facility-Administered Medications Ordered in Other Encounters   Medication Dose Route Frequency Provider Last Rate Last Admin    0.9 % sodium chloride infusion  5-250 mL/hr IntraVENous PRN Vic Magallon MD   Stopped at 09/04/24 1239    sodium chloride flush 0.9 % injection 5-40 mL  5-40 mL IntraVENous PRN Vic Magallon MD   10 mL at 09/04/24 1238    heparin (PF) 100 UNIT/ML injection 500 Units  500 Units IntraCATHeter PRN Vic Magallon MD   500 Units at 09/04/24 1237           OBJECTIVE:  Alert and fully ambulatory. Pleasant and conversant.    -odynophagia      Physical Examination: General appearance - alert, well appearing, and in no distress.            Wt Readings from Last 3 Encounters:   09/04/24 82.7 kg (182 lb 6.4 oz)   08/29/24 81.4 kg (179 lb 8 oz)   08/28/24 81 kg (178 lb 9.6 oz)         ASSESSMENT/PLAN:     Patient is tolerating treatments well with expected toxicities. RBA were reviewed prior to first fraction and PRN.    Current and planned dose reviewed. Goals of treatment and potential side effects were reviewed with the patient PRN. Treatment imaging has been personally reviewed for accuracy and precision.    Questions answered to apparent satisfaction.    Treatments will continue as planned.      -FRANCINE Bryant III, MD MS DABR  Radiation Oncologist        [facility] ([facility]): [phone] /// FAX: [phone]  [facility] Connor): [phone] /// FAX: [phone]  [facility]): [phone] /// FAX: [phone]

## 2024-09-04 NOTE — PROGRESS NOTES
Patient tolerated infusion well. Patient alert and oriented x 3. No distress noted. Vital signs stable. Patient denies any new or worsening pain. Patient denies questions regarding treatment or medication; verbalized understanding, offered patient information and discharge material; patient declined; Patient denies needs, all questions answered. Left ambulatory with walker by self.

## 2024-09-05 ENCOUNTER — HOSPITAL ENCOUNTER (OUTPATIENT)
Dept: RADIATION ONCOLOGY | Age: 74
Discharge: HOME | End: 2024-09-05
Payer: MEDICARE

## 2024-09-05 ENCOUNTER — HOSPITAL ENCOUNTER (OUTPATIENT)
Dept: INFUSION THERAPY | Age: 74
Discharge: HOME | End: 2024-09-05
Payer: MEDICARE

## 2024-09-05 VITALS
DIASTOLIC BLOOD PRESSURE: 66 MMHG | SYSTOLIC BLOOD PRESSURE: 136 MMHG | RESPIRATION RATE: 16 BRPM | HEART RATE: 70 BPM | OXYGEN SATURATION: 99 % | TEMPERATURE: 97.1 F

## 2024-09-05 DIAGNOSIS — C34.90 SMALL CELL LUNG CANCER (HCC): Primary | ICD-10-CM

## 2024-09-05 PROCEDURE — 2580000003 HC RX 258: Performed by: STUDENT IN AN ORGANIZED HEALTH CARE EDUCATION/TRAINING PROGRAM

## 2024-09-05 PROCEDURE — 96375 TX/PRO/DX INJ NEW DRUG ADDON: CPT

## 2024-09-05 PROCEDURE — 96413 CHEMO IV INFUSION 1 HR: CPT

## 2024-09-05 PROCEDURE — 77334 RADIATION TREATMENT AID(S): CPT | Performed by: RADIOLOGY

## 2024-09-05 PROCEDURE — 77386 HC NTSTY MODUL RAD TX DLVR CPLX: CPT | Performed by: RADIOLOGY

## 2024-09-05 PROCEDURE — 96377 APPLICATON ON-BODY INJECTOR: CPT

## 2024-09-05 PROCEDURE — 77307 TELETHX ISODOSE PLAN CPLX: CPT | Performed by: RADIOLOGY

## 2024-09-05 PROCEDURE — 6360000002 HC RX W HCPCS: Performed by: STUDENT IN AN ORGANIZED HEALTH CARE EDUCATION/TRAINING PROGRAM

## 2024-09-05 RX ORDER — SODIUM CHLORIDE 0.9 % (FLUSH) 0.9 %
5-40 SYRINGE (ML) INJECTION PRN
Status: DISCONTINUED | OUTPATIENT
Start: 2024-09-05 | End: 2024-09-06 | Stop reason: HOSPADM

## 2024-09-05 RX ORDER — HEPARIN 100 UNIT/ML
500 SYRINGE INTRAVENOUS PRN
Status: DISCONTINUED | OUTPATIENT
Start: 2024-09-05 | End: 2024-09-06 | Stop reason: HOSPADM

## 2024-09-05 RX ORDER — MEMANTINE HYDROCHLORIDE 5 MG/1
TABLET ORAL
Qty: 180 TABLET | Refills: 5 | Status: SHIPPED | OUTPATIENT
Start: 2024-09-05 | End: 2024-09-26

## 2024-09-05 RX ORDER — DEXAMETHASONE SODIUM PHOSPHATE 10 MG/ML
8 INJECTION, SOLUTION INTRAMUSCULAR; INTRAVENOUS ONCE
Status: COMPLETED | OUTPATIENT
Start: 2024-09-05 | End: 2024-09-05

## 2024-09-05 RX ORDER — SODIUM CHLORIDE 9 MG/ML
5-250 INJECTION, SOLUTION INTRAVENOUS PRN
Status: DISCONTINUED | OUTPATIENT
Start: 2024-09-05 | End: 2024-09-06 | Stop reason: HOSPADM

## 2024-09-05 RX ADMIN — SODIUM CHLORIDE, PRESERVATIVE FREE 10 ML: 5 INJECTION INTRAVENOUS at 12:48

## 2024-09-05 RX ADMIN — SODIUM CHLORIDE 200 ML/HR: 9 INJECTION, SOLUTION INTRAVENOUS at 11:15

## 2024-09-05 RX ADMIN — SODIUM CHLORIDE, PRESERVATIVE FREE 10 ML: 5 INJECTION INTRAVENOUS at 11:17

## 2024-09-05 RX ADMIN — HEPARIN 500 UNITS: 100 SYRINGE at 12:48

## 2024-09-05 RX ADMIN — DEXAMETHASONE SODIUM PHOSPHATE 8 MG: 10 INJECTION, SOLUTION INTRAMUSCULAR; INTRAVENOUS at 11:15

## 2024-09-05 RX ADMIN — PEGFILGRASTIM 6 MG: KIT SUBCUTANEOUS at 12:46

## 2024-09-05 RX ADMIN — ETOPOSIDE 210 MG: 20 INJECTION, SOLUTION INTRAVENOUS at 11:38

## 2024-09-05 NOTE — PROGRESS NOTES
Patient tolerated treatment well without complications or complaints. Alert and oriented x3. Patient aware of potential side effects and denies questions regarding treatment. Vital signs stable. Pain assessed, patient denies any new or worsening pain. Patient left ambulatory.

## 2024-09-05 NOTE — PROGRESS NOTES
Kin Issa  9/5/2024  Ht Readings from Last 1 Encounters:   08/08/24 1.803 m (5' 11\")     Wt Readings from Last 10 Encounters:   09/04/24 82.7 kg (182 lb 6.4 oz)   08/29/24 81.4 kg (179 lb 8 oz)   08/28/24 81 kg (178 lb 9.6 oz)   08/21/24 81.8 kg (180 lb 6.4 oz)   08/19/24 82.1 kg (180 lb 14.4 oz)   08/14/24 84 kg (185 lb 3.2 oz)   08/08/24 81.2 kg (179 lb)   08/02/24 81.6 kg (180 lb)   07/25/24 87.7 kg (193 lb 4.8 oz)   07/22/24 84.2 kg (185 lb 9.6 oz)     BMI: 25.44    Assessment: Met w/ pt during infusion. Wt at yesterday's radiation visit reflects 4# gain x1 week. 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 Pt was provided w/ Glucerna samples by radiation oncology nurse. Pt reports he has not tried these yet, also has not picked up MMW. He reports issues w/ uncontrolled hiccups. Provided w/ resources for gas management as well as swallowing irritation. Pt appreciative of information. Discussed options for ONS as pt had reported financial concerns at radiation yesterday. Provided w/ large quantity of samples of Boost Glucose Control for trial as well. Pt appreciative. Encouraged to contact this clinician as needed. Will follow PRN.    Weight change: +4# x1 week  Appetite: fair  Nutritional Side Effects: radiation esophagitis  Calculated Needs: 25-28 kcal/kg CBW =  kcal, 1.3-1.5 gm/kg IBW = 105-115 gm pro, 1 ml/kcal =  ml fluids  Malnutrition Status: At risk  Nutrition Diagnosis: Inadequate oral intake r/t lung CA AEB reported variable PO intake w/ wt fluctuations.    Recommendations: Pt to consume at least 3 meals/day + ONS of choice at least once daily    Meron Zavaleta, MS, RD, LD

## 2024-09-06 ENCOUNTER — HOSPITAL ENCOUNTER (OUTPATIENT)
Dept: RADIATION ONCOLOGY | Age: 74
Discharge: HOME | End: 2024-09-06
Payer: MEDICARE

## 2024-09-06 PROCEDURE — 77386 HC NTSTY MODUL RAD TX DLVR CPLX: CPT | Performed by: RADIOLOGY

## 2024-09-09 ENCOUNTER — HOSPITAL ENCOUNTER (OUTPATIENT)
Dept: RADIATION ONCOLOGY | Age: 74
Discharge: HOME | End: 2024-09-09
Payer: MEDICARE

## 2024-09-09 PROCEDURE — 77014 CHG CT GUIDANCE RADIATION THERAPY FLDS PLACEMENT: CPT | Performed by: RADIOLOGY

## 2024-09-09 PROCEDURE — 77336 RADIATION PHYSICS CONSULT: CPT | Performed by: RADIOLOGY

## 2024-09-09 PROCEDURE — 77386 HC NTSTY MODUL RAD TX DLVR CPLX: CPT | Performed by: RADIOLOGY

## 2024-09-09 PROCEDURE — 77427 RADIATION TX MANAGEMENT X5: CPT | Performed by: RADIOLOGY

## 2024-09-10 ENCOUNTER — HOSPITAL ENCOUNTER (OUTPATIENT)
Dept: RADIATION ONCOLOGY | Age: 74
Discharge: HOME | End: 2024-09-10
Payer: MEDICARE

## 2024-09-10 PROCEDURE — 77386 HC NTSTY MODUL RAD TX DLVR CPLX: CPT | Performed by: RADIOLOGY

## 2024-09-11 ENCOUNTER — HOSPITAL ENCOUNTER (OUTPATIENT)
Dept: RADIATION ONCOLOGY | Age: 74
Discharge: HOME | End: 2024-09-11
Payer: MEDICARE

## 2024-09-11 VITALS
DIASTOLIC BLOOD PRESSURE: 62 MMHG | BODY MASS INDEX: 24.63 KG/M2 | TEMPERATURE: 97.5 F | WEIGHT: 176.6 LBS | SYSTOLIC BLOOD PRESSURE: 109 MMHG | HEART RATE: 109 BPM | RESPIRATION RATE: 18 BRPM

## 2024-09-11 DIAGNOSIS — C34.90 MALIGNANT NEOPLASM OF LUNG, UNSPECIFIED LATERALITY, UNSPECIFIED PART OF LUNG (HCC): Primary | ICD-10-CM

## 2024-09-11 PROCEDURE — 77014 CHG CT GUIDANCE RADIATION THERAPY FLDS PLACEMENT: CPT | Performed by: RADIOLOGY

## 2024-09-11 PROCEDURE — 77386 HC NTSTY MODUL RAD TX DLVR CPLX: CPT | Performed by: RADIOLOGY

## 2024-09-12 ENCOUNTER — HOSPITAL ENCOUNTER (OUTPATIENT)
Dept: RADIATION ONCOLOGY | Age: 74
Discharge: HOME | End: 2024-09-12
Payer: MEDICARE

## 2024-09-12 PROCEDURE — 77386 HC NTSTY MODUL RAD TX DLVR CPLX: CPT | Performed by: RADIOLOGY

## 2024-09-12 RX ORDER — LIDOCAINE HYDROCHLORIDE 20 MG/ML
SOLUTION OROPHARYNGEAL
COMMUNITY
Start: 2024-09-04

## 2024-09-13 ENCOUNTER — HOSPITAL ENCOUNTER (OUTPATIENT)
Dept: RADIATION ONCOLOGY | Age: 74
Discharge: HOME | End: 2024-09-13
Payer: MEDICARE

## 2024-09-13 PROCEDURE — 77386 HC NTSTY MODUL RAD TX DLVR CPLX: CPT | Performed by: RADIOLOGY

## 2024-09-13 RX ORDER — PANTOPRAZOLE SODIUM 40 MG/1
40 TABLET, DELAYED RELEASE ORAL
Qty: 60 TABLET | Refills: 0 | Status: SHIPPED | OUTPATIENT
Start: 2024-09-13

## 2024-09-16 ENCOUNTER — HOSPITAL ENCOUNTER (OUTPATIENT)
Dept: RADIATION ONCOLOGY | Age: 74
Discharge: HOME | End: 2024-09-16
Payer: MEDICARE

## 2024-09-17 ENCOUNTER — HOSPITAL ENCOUNTER (OUTPATIENT)
Age: 74
Setting detail: OUTPATIENT SURGERY
Discharge: HOME | End: 2024-09-17
Attending: STUDENT IN AN ORGANIZED HEALTH CARE EDUCATION/TRAINING PROGRAM | Admitting: STUDENT IN AN ORGANIZED HEALTH CARE EDUCATION/TRAINING PROGRAM
Payer: MEDICARE

## 2024-09-17 ENCOUNTER — ANESTHESIA EVENT (OUTPATIENT)
Dept: ENDOSCOPY | Age: 74
End: 2024-09-17
Payer: MEDICARE

## 2024-09-17 ENCOUNTER — ANESTHESIA (OUTPATIENT)
Dept: ENDOSCOPY | Age: 74
End: 2024-09-17
Payer: MEDICARE

## 2024-09-17 ENCOUNTER — APPOINTMENT (OUTPATIENT)
Dept: RADIATION ONCOLOGY | Age: 74
End: 2024-09-17
Payer: MEDICARE

## 2024-09-17 VITALS
SYSTOLIC BLOOD PRESSURE: 104 MMHG | HEART RATE: 90 BPM | BODY MASS INDEX: 25.2 KG/M2 | RESPIRATION RATE: 16 BRPM | DIASTOLIC BLOOD PRESSURE: 66 MMHG | HEIGHT: 71 IN | OXYGEN SATURATION: 96 % | TEMPERATURE: 98 F | WEIGHT: 180 LBS

## 2024-09-17 DIAGNOSIS — Z01.812 PRE-OPERATIVE LABORATORY EXAMINATION: Primary | ICD-10-CM

## 2024-09-17 DIAGNOSIS — K29.01 GASTROINTESTINAL HEMORRHAGE ASSOCIATED WITH ACUTE GASTRITIS: ICD-10-CM

## 2024-09-17 PROBLEM — R10.13 EPIGASTRIC PAIN: Status: ACTIVE | Noted: 2024-09-17

## 2024-09-17 LAB
GLUCOSE BLD-MCNC: 133 MG/DL (ref 74–99)
MICROORGANISM/AGENT SPEC: NORMAL
SERVICE CMNT-IMP: NORMAL
SPECIMEN DESCRIPTION: NORMAL

## 2024-09-17 PROCEDURE — 88312 SPECIAL STAINS GROUP 1: CPT

## 2024-09-17 PROCEDURE — 87205 SMEAR GRAM STAIN: CPT

## 2024-09-17 PROCEDURE — 3609012400 HC EGD TRANSORAL BIOPSY SINGLE/MULTIPLE: Performed by: STUDENT IN AN ORGANIZED HEALTH CARE EDUCATION/TRAINING PROGRAM

## 2024-09-17 PROCEDURE — 88342 IMHCHEM/IMCYTCHM 1ST ANTB: CPT

## 2024-09-17 PROCEDURE — 2709999900 HC NON-CHARGEABLE SUPPLY: Performed by: STUDENT IN AN ORGANIZED HEALTH CARE EDUCATION/TRAINING PROGRAM

## 2024-09-17 PROCEDURE — 87102 FUNGUS ISOLATION CULTURE: CPT

## 2024-09-17 PROCEDURE — 3700000001 HC ADD 15 MINUTES (ANESTHESIA): Performed by: STUDENT IN AN ORGANIZED HEALTH CARE EDUCATION/TRAINING PROGRAM

## 2024-09-17 PROCEDURE — 3609013500 HC EGD REMOVAL TUMOR POLYP/OTHER LESION SNARE TECH: Performed by: STUDENT IN AN ORGANIZED HEALTH CARE EDUCATION/TRAINING PROGRAM

## 2024-09-17 PROCEDURE — 88305 TISSUE EXAM BY PATHOLOGIST: CPT

## 2024-09-17 PROCEDURE — 3700000000 HC ANESTHESIA ATTENDED CARE: Performed by: STUDENT IN AN ORGANIZED HEALTH CARE EDUCATION/TRAINING PROGRAM

## 2024-09-17 PROCEDURE — 7100000011 HC PHASE II RECOVERY - ADDTL 15 MIN: Performed by: STUDENT IN AN ORGANIZED HEALTH CARE EDUCATION/TRAINING PROGRAM

## 2024-09-17 PROCEDURE — 2580000003 HC RX 258: Performed by: NURSE ANESTHETIST, CERTIFIED REGISTERED

## 2024-09-17 PROCEDURE — 7100000010 HC PHASE II RECOVERY - FIRST 15 MIN: Performed by: STUDENT IN AN ORGANIZED HEALTH CARE EDUCATION/TRAINING PROGRAM

## 2024-09-17 PROCEDURE — 82962 GLUCOSE BLOOD TEST: CPT

## 2024-09-17 PROCEDURE — 2580000003 HC RX 258: Performed by: STUDENT IN AN ORGANIZED HEALTH CARE EDUCATION/TRAINING PROGRAM

## 2024-09-17 PROCEDURE — 6360000002 HC RX W HCPCS: Performed by: NURSE ANESTHETIST, CERTIFIED REGISTERED

## 2024-09-17 RX ORDER — PROPOFOL 10 MG/ML
INJECTION, EMULSION INTRAVENOUS
Status: DISCONTINUED | OUTPATIENT
Start: 2024-09-17 | End: 2024-09-17 | Stop reason: SDUPTHER

## 2024-09-17 RX ORDER — SODIUM CHLORIDE 9 MG/ML
INJECTION, SOLUTION INTRAVENOUS
Status: DISCONTINUED | OUTPATIENT
Start: 2024-09-17 | End: 2024-09-17 | Stop reason: SDUPTHER

## 2024-09-17 RX ORDER — SODIUM CHLORIDE 0.9 % (FLUSH) 0.9 %
5-40 SYRINGE (ML) INJECTION PRN
Status: DISCONTINUED | OUTPATIENT
Start: 2024-09-17 | End: 2024-09-17 | Stop reason: HOSPADM

## 2024-09-17 RX ORDER — SODIUM CHLORIDE 9 MG/ML
25 INJECTION, SOLUTION INTRAVENOUS PRN
Status: DISCONTINUED | OUTPATIENT
Start: 2024-09-17 | End: 2024-09-17 | Stop reason: HOSPADM

## 2024-09-17 RX ORDER — FLUCONAZOLE 100 MG/1
200 TABLET ORAL DAILY
Qty: 28 TABLET | Refills: 0 | Status: SHIPPED | OUTPATIENT
Start: 2024-09-17 | End: 2024-10-01

## 2024-09-17 RX ORDER — SODIUM CHLORIDE 0.9 % (FLUSH) 0.9 %
5-40 SYRINGE (ML) INJECTION EVERY 12 HOURS SCHEDULED
Status: DISCONTINUED | OUTPATIENT
Start: 2024-09-17 | End: 2024-09-17 | Stop reason: HOSPADM

## 2024-09-17 RX ADMIN — SODIUM CHLORIDE: 0.9 INJECTION, SOLUTION INTRAVENOUS at 12:07

## 2024-09-17 RX ADMIN — SODIUM CHLORIDE 25 ML: 9 INJECTION, SOLUTION INTRAVENOUS at 11:48

## 2024-09-17 RX ADMIN — PROPOFOL 120 MCG/KG/MIN: 10 INJECTION, EMULSION INTRAVENOUS at 12:15

## 2024-09-17 RX ADMIN — PROPOFOL 50 MG: 10 INJECTION, EMULSION INTRAVENOUS at 12:14

## 2024-09-17 ASSESSMENT — PAIN - FUNCTIONAL ASSESSMENT
PAIN_FUNCTIONAL_ASSESSMENT: PREVENTS OR INTERFERES SOME ACTIVE ACTIVITIES AND ADLS
PAIN_FUNCTIONAL_ASSESSMENT: 0-10

## 2024-09-17 ASSESSMENT — PAIN DESCRIPTION - DESCRIPTORS: DESCRIPTORS: DISCOMFORT

## 2024-09-17 ASSESSMENT — LIFESTYLE VARIABLES: SMOKING_STATUS: 1

## 2024-09-18 ENCOUNTER — APPOINTMENT (OUTPATIENT)
Dept: RADIATION ONCOLOGY | Age: 74
End: 2024-09-18
Payer: MEDICARE

## 2024-09-18 ENCOUNTER — HOSPITAL ENCOUNTER (OUTPATIENT)
Dept: RADIATION ONCOLOGY | Age: 74
Discharge: HOME | End: 2024-09-18
Payer: MEDICARE

## 2024-09-18 PROCEDURE — 77386 HC NTSTY MODUL RAD TX DLVR CPLX: CPT | Performed by: RADIOLOGY

## 2024-09-19 ENCOUNTER — HOSPITAL ENCOUNTER (OUTPATIENT)
Dept: INFUSION THERAPY | Age: 74
Discharge: HOME | End: 2024-09-19
Payer: MEDICARE

## 2024-09-19 ENCOUNTER — OFFICE VISIT (OUTPATIENT)
Dept: ONCOLOGY | Age: 74
End: 2024-09-19
Payer: MEDICARE

## 2024-09-19 ENCOUNTER — HOSPITAL ENCOUNTER (OUTPATIENT)
Dept: RADIATION ONCOLOGY | Age: 74
Discharge: HOME | End: 2024-09-19
Payer: MEDICARE

## 2024-09-19 VITALS
OXYGEN SATURATION: 100 % | TEMPERATURE: 97.6 F | HEART RATE: 106 BPM | WEIGHT: 169.9 LBS | DIASTOLIC BLOOD PRESSURE: 51 MMHG | BODY MASS INDEX: 23.7 KG/M2 | SYSTOLIC BLOOD PRESSURE: 98 MMHG

## 2024-09-19 VITALS
TEMPERATURE: 97.6 F | RESPIRATION RATE: 18 BRPM | OXYGEN SATURATION: 96 % | DIASTOLIC BLOOD PRESSURE: 63 MMHG | HEART RATE: 92 BPM | SYSTOLIC BLOOD PRESSURE: 112 MMHG

## 2024-09-19 DIAGNOSIS — C34.90 SMALL CELL LUNG CANCER (HCC): Primary | ICD-10-CM

## 2024-09-19 LAB
ALBUMIN SERPL-MCNC: 3.2 G/DL (ref 3.5–5.2)
ALP SERPL-CCNC: 144 U/L (ref 40–129)
ALT SERPL-CCNC: 6 U/L (ref 0–40)
ANION GAP SERPL CALCULATED.3IONS-SCNC: 13 MMOL/L (ref 7–16)
AST SERPL-CCNC: 6 U/L (ref 0–39)
BASOPHILS # BLD: 0 K/UL (ref 0–0.2)
BASOPHILS NFR BLD: 0 % (ref 0–2)
BILIRUB SERPL-MCNC: 0.4 MG/DL (ref 0–1.2)
BUN SERPL-MCNC: 9 MG/DL (ref 6–23)
CALCIUM SERPL-MCNC: 8.1 MG/DL (ref 8.6–10.2)
CHLORIDE SERPL-SCNC: 99 MMOL/L (ref 98–107)
CO2 SERPL-SCNC: 25 MMOL/L (ref 22–29)
CREAT SERPL-MCNC: 1 MG/DL (ref 0.7–1.2)
EOSINOPHIL # BLD: 0 K/UL (ref 0.05–0.5)
EOSINOPHILS RELATIVE PERCENT: 0 % (ref 0–6)
ERYTHROCYTE [DISTWIDTH] IN BLOOD BY AUTOMATED COUNT: 16.3 % (ref 11.5–15)
GFR, ESTIMATED: 74 ML/MIN/1.73M2
GLUCOSE SERPL-MCNC: 126 MG/DL (ref 74–99)
HCT VFR BLD AUTO: 22.9 % (ref 37–54)
HGB BLD-MCNC: 7.5 G/DL (ref 12.5–16.5)
LYMPHOCYTES NFR BLD: 0.2 K/UL (ref 1.5–4)
LYMPHOCYTES RELATIVE PERCENT: 3 % (ref 20–42)
MCH RBC QN AUTO: 31.9 PG (ref 26–35)
MCHC RBC AUTO-ENTMCNC: 32.8 G/DL (ref 32–34.5)
MCV RBC AUTO: 97.4 FL (ref 80–99.9)
METAMYELOCYTES ABSOLUTE COUNT: 0.2 K/UL (ref 0–0.12)
METAMYELOCYTES: 3 % (ref 0–1)
MONOCYTES NFR BLD: 0.26 K/UL (ref 0.1–0.95)
MONOCYTES NFR BLD: 4 % (ref 2–12)
MYELOCYTES ABSOLUTE COUNT: 0.13 K/UL
MYELOCYTES: 2 %
NEUTROPHILS NFR BLD: 89 % (ref 43–80)
NEUTS SEG NFR BLD: 6.74 K/UL (ref 1.8–7.3)
NUCLEATED RED BLOOD CELLS: 2 PER 100 WBC
PLATELET # BLD AUTO: 33 K/UL (ref 130–450)
PLATELET CONFIRMATION: NORMAL
PMV BLD AUTO: 11.8 FL (ref 7–12)
POTASSIUM SERPL-SCNC: 3.5 MMOL/L (ref 3.5–5)
PROMYELOCYTES ABSOLUTE COUNT: 0.07 K/UL
PROMYELOCYTES: 1 %
PROT SERPL-MCNC: 5.4 G/DL (ref 6.4–8.3)
RBC # BLD AUTO: 2.35 M/UL (ref 3.8–5.8)
RBC # BLD: ABNORMAL 10*6/UL
SODIUM SERPL-SCNC: 137 MMOL/L (ref 132–146)
WBC OTHER # BLD: 7.6 K/UL (ref 4.5–11.5)

## 2024-09-19 PROCEDURE — 1124F ACP DISCUSS-NO DSCNMKR DOCD: CPT | Performed by: STUDENT IN AN ORGANIZED HEALTH CARE EDUCATION/TRAINING PROGRAM

## 2024-09-19 PROCEDURE — 77386 HC NTSTY MODUL RAD TX DLVR CPLX: CPT | Performed by: RADIOLOGY

## 2024-09-19 PROCEDURE — G8427 DOCREV CUR MEDS BY ELIG CLIN: HCPCS | Performed by: STUDENT IN AN ORGANIZED HEALTH CARE EDUCATION/TRAINING PROGRAM

## 2024-09-19 PROCEDURE — G8420 CALC BMI NORM PARAMETERS: HCPCS | Performed by: STUDENT IN AN ORGANIZED HEALTH CARE EDUCATION/TRAINING PROGRAM

## 2024-09-19 PROCEDURE — 99214 OFFICE O/P EST MOD 30 MIN: CPT | Performed by: STUDENT IN AN ORGANIZED HEALTH CARE EDUCATION/TRAINING PROGRAM

## 2024-09-19 PROCEDURE — 36591 DRAW BLOOD OFF VENOUS DEVICE: CPT

## 2024-09-19 PROCEDURE — 2580000003 HC RX 258: Performed by: STUDENT IN AN ORGANIZED HEALTH CARE EDUCATION/TRAINING PROGRAM

## 2024-09-19 PROCEDURE — 6360000002 HC RX W HCPCS: Performed by: STUDENT IN AN ORGANIZED HEALTH CARE EDUCATION/TRAINING PROGRAM

## 2024-09-19 PROCEDURE — 85025 COMPLETE CBC W/AUTO DIFF WBC: CPT

## 2024-09-19 PROCEDURE — 80053 COMPREHEN METABOLIC PANEL: CPT

## 2024-09-19 PROCEDURE — 4004F PT TOBACCO SCREEN RCVD TLK: CPT | Performed by: STUDENT IN AN ORGANIZED HEALTH CARE EDUCATION/TRAINING PROGRAM

## 2024-09-19 RX ORDER — SODIUM CHLORIDE 9 MG/ML
25 INJECTION, SOLUTION INTRAVENOUS PRN
OUTPATIENT
Start: 2024-09-19

## 2024-09-19 RX ORDER — SODIUM CHLORIDE 9 MG/ML
INJECTION, SOLUTION INTRAVENOUS CONTINUOUS
OUTPATIENT
Start: 2024-09-19

## 2024-09-19 RX ORDER — SODIUM CHLORIDE 0.9 % (FLUSH) 0.9 %
5-40 SYRINGE (ML) INJECTION PRN
Status: DISCONTINUED | OUTPATIENT
Start: 2024-09-19 | End: 2024-09-20 | Stop reason: HOSPADM

## 2024-09-19 RX ORDER — SODIUM CHLORIDE 0.9 % (FLUSH) 0.9 %
5-40 SYRINGE (ML) INJECTION PRN
Status: CANCELLED | OUTPATIENT
Start: 2024-09-19

## 2024-09-19 RX ORDER — ALBUTEROL SULFATE 90 UG/1
4 INHALANT RESPIRATORY (INHALATION) PRN
OUTPATIENT
Start: 2024-09-19

## 2024-09-19 RX ORDER — ONDANSETRON 2 MG/ML
8 INJECTION INTRAMUSCULAR; INTRAVENOUS
OUTPATIENT
Start: 2024-09-19

## 2024-09-19 RX ORDER — EPINEPHRINE 1 MG/ML
0.3 INJECTION, SOLUTION, CONCENTRATE INTRAVENOUS PRN
OUTPATIENT
Start: 2024-09-19

## 2024-09-19 RX ORDER — ACETAMINOPHEN 325 MG/1
650 TABLET ORAL
OUTPATIENT
Start: 2024-09-19

## 2024-09-19 RX ORDER — DIPHENHYDRAMINE HYDROCHLORIDE 50 MG/ML
50 INJECTION INTRAMUSCULAR; INTRAVENOUS
OUTPATIENT
Start: 2024-09-19

## 2024-09-19 RX ORDER — HEPARIN 100 UNIT/ML
500 SYRINGE INTRAVENOUS PRN
Status: DISCONTINUED | OUTPATIENT
Start: 2024-09-19 | End: 2024-09-20 | Stop reason: HOSPADM

## 2024-09-19 RX ORDER — HEPARIN 100 UNIT/ML
500 SYRINGE INTRAVENOUS PRN
Status: CANCELLED | OUTPATIENT
Start: 2024-09-19

## 2024-09-19 RX ADMIN — HEPARIN 500 UNITS: 100 SYRINGE at 11:25

## 2024-09-19 RX ADMIN — SODIUM CHLORIDE, PRESERVATIVE FREE 10 ML: 5 INJECTION INTRAVENOUS at 11:25

## 2024-09-20 ENCOUNTER — HOSPITAL ENCOUNTER (OUTPATIENT)
Dept: RADIATION ONCOLOGY | Age: 74
Discharge: HOME | End: 2024-09-20
Payer: MEDICARE

## 2024-09-20 LAB — SURGICAL PATHOLOGY REPORT: NORMAL

## 2024-09-20 PROCEDURE — 77386 HC NTSTY MODUL RAD TX DLVR CPLX: CPT | Performed by: RADIOLOGY

## 2024-09-21 RX ORDER — SODIUM CHLORIDE 9 MG/ML
INJECTION, SOLUTION INTRAVENOUS CONTINUOUS
OUTPATIENT
Start: 2024-09-25

## 2024-09-21 RX ORDER — ONDANSETRON 2 MG/ML
8 INJECTION INTRAMUSCULAR; INTRAVENOUS
OUTPATIENT
Start: 2024-09-26

## 2024-09-21 RX ORDER — EPINEPHRINE 1 MG/ML
0.3 INJECTION, SOLUTION, CONCENTRATE INTRAVENOUS PRN
OUTPATIENT
Start: 2024-09-24

## 2024-09-21 RX ORDER — SODIUM CHLORIDE 9 MG/ML
5-250 INJECTION, SOLUTION INTRAVENOUS PRN
OUTPATIENT
Start: 2024-09-26

## 2024-09-21 RX ORDER — DIPHENHYDRAMINE HYDROCHLORIDE 50 MG/ML
50 INJECTION INTRAMUSCULAR; INTRAVENOUS
OUTPATIENT
Start: 2024-09-24

## 2024-09-21 RX ORDER — SODIUM CHLORIDE 9 MG/ML
5-250 INJECTION, SOLUTION INTRAVENOUS PRN
OUTPATIENT
Start: 2024-09-24

## 2024-09-21 RX ORDER — ONDANSETRON 2 MG/ML
8 INJECTION INTRAMUSCULAR; INTRAVENOUS
OUTPATIENT
Start: 2024-09-24

## 2024-09-21 RX ORDER — SODIUM CHLORIDE 0.9 % (FLUSH) 0.9 %
5-40 SYRINGE (ML) INJECTION PRN
OUTPATIENT
Start: 2024-09-25

## 2024-09-21 RX ORDER — SODIUM CHLORIDE 0.9 % (FLUSH) 0.9 %
5-40 SYRINGE (ML) INJECTION PRN
OUTPATIENT
Start: 2024-09-26

## 2024-09-21 RX ORDER — ACETAMINOPHEN 325 MG/1
650 TABLET ORAL
OUTPATIENT
Start: 2024-09-24

## 2024-09-21 RX ORDER — DIPHENHYDRAMINE HYDROCHLORIDE 50 MG/ML
50 INJECTION INTRAMUSCULAR; INTRAVENOUS
OUTPATIENT
Start: 2024-09-25

## 2024-09-21 RX ORDER — HEPARIN SODIUM (PORCINE) LOCK FLUSH IV SOLN 100 UNIT/ML 100 UNIT/ML
500 SOLUTION INTRAVENOUS PRN
OUTPATIENT
Start: 2024-09-25

## 2024-09-21 RX ORDER — SODIUM CHLORIDE 9 MG/ML
INJECTION, SOLUTION INTRAVENOUS CONTINUOUS
OUTPATIENT
Start: 2024-09-26

## 2024-09-21 RX ORDER — ALBUTEROL SULFATE 90 UG/1
4 INHALANT RESPIRATORY (INHALATION) PRN
OUTPATIENT
Start: 2024-09-26

## 2024-09-21 RX ORDER — SODIUM CHLORIDE 9 MG/ML
5-250 INJECTION, SOLUTION INTRAVENOUS PRN
OUTPATIENT
Start: 2024-09-25

## 2024-09-21 RX ORDER — ALBUTEROL SULFATE 90 UG/1
4 INHALANT RESPIRATORY (INHALATION) PRN
OUTPATIENT
Start: 2024-09-25

## 2024-09-21 RX ORDER — PALONOSETRON 0.05 MG/ML
0.25 INJECTION, SOLUTION INTRAVENOUS ONCE
OUTPATIENT
Start: 2024-09-24 | End: 2024-09-24

## 2024-09-21 RX ORDER — HEPARIN SODIUM (PORCINE) LOCK FLUSH IV SOLN 100 UNIT/ML 100 UNIT/ML
500 SOLUTION INTRAVENOUS PRN
OUTPATIENT
Start: 2024-09-24

## 2024-09-21 RX ORDER — FAMOTIDINE 10 MG/ML
20 INJECTION, SOLUTION INTRAVENOUS
OUTPATIENT
Start: 2024-09-24

## 2024-09-21 RX ORDER — FAMOTIDINE 10 MG/ML
20 INJECTION, SOLUTION INTRAVENOUS
OUTPATIENT
Start: 2024-09-25

## 2024-09-21 RX ORDER — MEPERIDINE HYDROCHLORIDE 50 MG/ML
12.5 INJECTION INTRAMUSCULAR; INTRAVENOUS; SUBCUTANEOUS PRN
OUTPATIENT
Start: 2024-09-26

## 2024-09-21 RX ORDER — MEPERIDINE HYDROCHLORIDE 50 MG/ML
12.5 INJECTION INTRAMUSCULAR; INTRAVENOUS; SUBCUTANEOUS PRN
OUTPATIENT
Start: 2024-09-25

## 2024-09-21 RX ORDER — EPINEPHRINE 1 MG/ML
0.3 INJECTION, SOLUTION, CONCENTRATE INTRAVENOUS PRN
OUTPATIENT
Start: 2024-09-25

## 2024-09-21 RX ORDER — ACETAMINOPHEN 325 MG/1
650 TABLET ORAL
OUTPATIENT
Start: 2024-09-26

## 2024-09-21 RX ORDER — DIPHENHYDRAMINE HYDROCHLORIDE 50 MG/ML
50 INJECTION INTRAMUSCULAR; INTRAVENOUS
OUTPATIENT
Start: 2024-09-26

## 2024-09-21 RX ORDER — ACETAMINOPHEN 325 MG/1
650 TABLET ORAL
OUTPATIENT
Start: 2024-09-25

## 2024-09-21 RX ORDER — SODIUM CHLORIDE 9 MG/ML
INJECTION, SOLUTION INTRAVENOUS CONTINUOUS
OUTPATIENT
Start: 2024-09-24

## 2024-09-21 RX ORDER — ONDANSETRON 2 MG/ML
8 INJECTION INTRAMUSCULAR; INTRAVENOUS
OUTPATIENT
Start: 2024-09-25

## 2024-09-21 RX ORDER — PROCHLORPERAZINE EDISYLATE 5 MG/ML
5 INJECTION INTRAMUSCULAR; INTRAVENOUS
OUTPATIENT
Start: 2024-09-24

## 2024-09-21 RX ORDER — ALBUTEROL SULFATE 90 UG/1
4 INHALANT RESPIRATORY (INHALATION) PRN
OUTPATIENT
Start: 2024-09-24

## 2024-09-21 RX ORDER — SODIUM CHLORIDE 0.9 % (FLUSH) 0.9 %
5-40 SYRINGE (ML) INJECTION PRN
OUTPATIENT
Start: 2024-09-24

## 2024-09-21 RX ORDER — EPINEPHRINE 1 MG/ML
0.3 INJECTION, SOLUTION, CONCENTRATE INTRAVENOUS PRN
OUTPATIENT
Start: 2024-09-26

## 2024-09-21 RX ORDER — FAMOTIDINE 10 MG/ML
20 INJECTION, SOLUTION INTRAVENOUS
OUTPATIENT
Start: 2024-09-26

## 2024-09-21 RX ORDER — HEPARIN SODIUM (PORCINE) LOCK FLUSH IV SOLN 100 UNIT/ML 100 UNIT/ML
500 SOLUTION INTRAVENOUS PRN
OUTPATIENT
Start: 2024-09-26

## 2024-09-21 RX ORDER — MEPERIDINE HYDROCHLORIDE 50 MG/ML
12.5 INJECTION INTRAMUSCULAR; INTRAVENOUS; SUBCUTANEOUS PRN
OUTPATIENT
Start: 2024-09-24

## 2024-09-23 ENCOUNTER — OFFICE VISIT (OUTPATIENT)
Dept: PRIMARY CARE CLINIC | Age: 74
End: 2024-09-23
Payer: MEDICARE

## 2024-09-23 ENCOUNTER — HOSPITAL ENCOUNTER (OUTPATIENT)
Dept: RADIATION ONCOLOGY | Age: 74
Discharge: HOME | End: 2024-09-23
Payer: MEDICARE

## 2024-09-23 VITALS
SYSTOLIC BLOOD PRESSURE: 100 MMHG | DIASTOLIC BLOOD PRESSURE: 60 MMHG | BODY MASS INDEX: 23.57 KG/M2 | TEMPERATURE: 98 F | HEART RATE: 100 BPM | OXYGEN SATURATION: 98 % | WEIGHT: 169 LBS

## 2024-09-23 DIAGNOSIS — T50.905A THROMBOCYTOPENIA DUE TO DRUGS: ICD-10-CM

## 2024-09-23 DIAGNOSIS — D64.81 ANEMIA DUE TO ANTINEOPLASTIC CHEMOTHERAPY: ICD-10-CM

## 2024-09-23 DIAGNOSIS — E11.42 TYPE 2 DIABETES MELLITUS WITH DIABETIC POLYNEUROPATHY, WITH LONG-TERM CURRENT USE OF INSULIN (HCC): Primary | ICD-10-CM

## 2024-09-23 DIAGNOSIS — E78.2 MIXED HYPERLIPIDEMIA: ICD-10-CM

## 2024-09-23 DIAGNOSIS — Z23 NEED FOR INFLUENZA VACCINATION: ICD-10-CM

## 2024-09-23 DIAGNOSIS — Z79.4 TYPE 2 DIABETES MELLITUS WITH DIABETIC POLYNEUROPATHY, WITH LONG-TERM CURRENT USE OF INSULIN (HCC): Primary | ICD-10-CM

## 2024-09-23 DIAGNOSIS — T45.1X5A ANEMIA DUE TO ANTINEOPLASTIC CHEMOTHERAPY: ICD-10-CM

## 2024-09-23 DIAGNOSIS — D69.59 THROMBOCYTOPENIA DUE TO DRUGS: ICD-10-CM

## 2024-09-23 PROCEDURE — 3051F HG A1C>EQUAL 7.0%<8.0%: CPT | Performed by: FAMILY MEDICINE

## 2024-09-23 PROCEDURE — 1124F ACP DISCUSS-NO DSCNMKR DOCD: CPT | Performed by: FAMILY MEDICINE

## 2024-09-23 PROCEDURE — 90653 IIV ADJUVANT VACCINE IM: CPT | Performed by: FAMILY MEDICINE

## 2024-09-23 PROCEDURE — G8427 DOCREV CUR MEDS BY ELIG CLIN: HCPCS | Performed by: FAMILY MEDICINE

## 2024-09-23 PROCEDURE — 99213 OFFICE O/P EST LOW 20 MIN: CPT | Performed by: FAMILY MEDICINE

## 2024-09-23 PROCEDURE — 4004F PT TOBACCO SCREEN RCVD TLK: CPT | Performed by: FAMILY MEDICINE

## 2024-09-23 PROCEDURE — G8420 CALC BMI NORM PARAMETERS: HCPCS | Performed by: FAMILY MEDICINE

## 2024-09-23 PROCEDURE — 77014 CHG CT GUIDANCE RADIATION THERAPY FLDS PLACEMENT: CPT | Performed by: RADIOLOGY

## 2024-09-23 PROCEDURE — G0008 ADMIN INFLUENZA VIRUS VAC: HCPCS | Performed by: FAMILY MEDICINE

## 2024-09-23 PROCEDURE — 77386 HC NTSTY MODUL RAD TX DLVR CPLX: CPT | Performed by: RADIOLOGY

## 2024-09-23 RX ORDER — CALCIUM CARBONATE 160(400)MG
TABLET,CHEWABLE ORAL
Qty: 1 EACH | Refills: 0 | Status: SHIPPED | OUTPATIENT
Start: 2024-09-23

## 2024-09-23 ASSESSMENT — ENCOUNTER SYMPTOMS
BLURRED VISION: 1
VISUAL CHANGE: 0
SHORTNESS OF BREATH: 0

## 2024-09-24 ENCOUNTER — HOSPITAL ENCOUNTER (OUTPATIENT)
Dept: RADIATION ONCOLOGY | Age: 74
Discharge: HOME | End: 2024-09-24
Payer: MEDICARE

## 2024-09-24 ENCOUNTER — HOSPITAL ENCOUNTER (OUTPATIENT)
Dept: INFUSION THERAPY | Age: 74
Discharge: HOME | End: 2024-09-24
Payer: MEDICARE

## 2024-09-24 VITALS
TEMPERATURE: 97.2 F | OXYGEN SATURATION: 97 % | HEART RATE: 91 BPM | SYSTOLIC BLOOD PRESSURE: 129 MMHG | RESPIRATION RATE: 16 BRPM | DIASTOLIC BLOOD PRESSURE: 65 MMHG

## 2024-09-24 DIAGNOSIS — E11.42 TYPE 2 DIABETES MELLITUS WITH DIABETIC POLYNEUROPATHY, WITH LONG-TERM CURRENT USE OF INSULIN (HCC): ICD-10-CM

## 2024-09-24 DIAGNOSIS — C34.90 SMALL CELL LUNG CANCER (HCC): Primary | ICD-10-CM

## 2024-09-24 DIAGNOSIS — Z79.4 TYPE 2 DIABETES MELLITUS WITH DIABETIC POLYNEUROPATHY, WITH LONG-TERM CURRENT USE OF INSULIN (HCC): ICD-10-CM

## 2024-09-24 LAB
ALBUMIN SERPL-MCNC: 3.1 G/DL (ref 3.5–5.2)
ALP SERPL-CCNC: 149 U/L (ref 40–129)
ALT SERPL-CCNC: <5 U/L (ref 0–40)
ANION GAP SERPL CALCULATED.3IONS-SCNC: 12 MMOL/L (ref 7–16)
AST SERPL-CCNC: 8 U/L (ref 0–39)
BASOPHILS # BLD: 0.01 K/UL (ref 0–0.2)
BASOPHILS NFR BLD: 0 % (ref 0–2)
BILIRUB SERPL-MCNC: 0.5 MG/DL (ref 0–1.2)
BUN SERPL-MCNC: 9 MG/DL (ref 6–23)
CALCIUM SERPL-MCNC: 8.1 MG/DL (ref 8.6–10.2)
CHLORIDE SERPL-SCNC: 99 MMOL/L (ref 98–107)
CO2 SERPL-SCNC: 27 MMOL/L (ref 22–29)
CREAT SERPL-MCNC: 1.1 MG/DL (ref 0.7–1.2)
EOSINOPHIL # BLD: 0 K/UL (ref 0.05–0.5)
EOSINOPHILS RELATIVE PERCENT: 0 % (ref 0–6)
ERYTHROCYTE [DISTWIDTH] IN BLOOD BY AUTOMATED COUNT: 18.7 % (ref 11.5–15)
GFR, ESTIMATED: 70 ML/MIN/1.73M2
GLUCOSE SERPL-MCNC: 126 MG/DL (ref 74–99)
HCT VFR BLD AUTO: 25.4 % (ref 37–54)
HGB BLD-MCNC: 8 G/DL (ref 12.5–16.5)
IMM GRANULOCYTES # BLD AUTO: 0.25 K/UL (ref 0–0.58)
IMM GRANULOCYTES NFR BLD: 4 % (ref 0–5)
LYMPHOCYTES NFR BLD: 0.27 K/UL (ref 1.5–4)
LYMPHOCYTES RELATIVE PERCENT: 5 % (ref 20–42)
MCH RBC QN AUTO: 31.7 PG (ref 26–35)
MCHC RBC AUTO-ENTMCNC: 31.5 G/DL (ref 32–34.5)
MCV RBC AUTO: 100.8 FL (ref 80–99.9)
MONOCYTES NFR BLD: 0.52 K/UL (ref 0.1–0.95)
MONOCYTES NFR BLD: 9 % (ref 2–12)
NEUTROPHILS NFR BLD: 81 % (ref 43–80)
NEUTS SEG NFR BLD: 4.58 K/UL (ref 1.8–7.3)
PLATELET # BLD AUTO: 172 K/UL (ref 130–450)
PMV BLD AUTO: 10.5 FL (ref 7–12)
POTASSIUM SERPL-SCNC: 3.6 MMOL/L (ref 3.5–5)
PROT SERPL-MCNC: 5.5 G/DL (ref 6.4–8.3)
RBC # BLD AUTO: 2.52 M/UL (ref 3.8–5.8)
RBC # BLD: ABNORMAL 10*6/UL
SODIUM SERPL-SCNC: 138 MMOL/L (ref 132–146)
WBC OTHER # BLD: 5.6 K/UL (ref 4.5–11.5)

## 2024-09-24 PROCEDURE — 6360000002 HC RX W HCPCS: Performed by: STUDENT IN AN ORGANIZED HEALTH CARE EDUCATION/TRAINING PROGRAM

## 2024-09-24 PROCEDURE — 85025 COMPLETE CBC W/AUTO DIFF WBC: CPT

## 2024-09-24 PROCEDURE — 96367 TX/PROPH/DG ADDL SEQ IV INF: CPT

## 2024-09-24 PROCEDURE — 77014 CHG CT GUIDANCE RADIATION THERAPY FLDS PLACEMENT: CPT | Performed by: RADIOLOGY

## 2024-09-24 PROCEDURE — 96413 CHEMO IV INFUSION 1 HR: CPT

## 2024-09-24 PROCEDURE — 77336 RADIATION PHYSICS CONSULT: CPT | Performed by: RADIOLOGY

## 2024-09-24 PROCEDURE — 77427 RADIATION TX MANAGEMENT X5: CPT | Performed by: RADIOLOGY

## 2024-09-24 PROCEDURE — 96375 TX/PRO/DX INJ NEW DRUG ADDON: CPT

## 2024-09-24 PROCEDURE — 77386 HC NTSTY MODUL RAD TX DLVR CPLX: CPT | Performed by: RADIOLOGY

## 2024-09-24 PROCEDURE — 2580000003 HC RX 258: Performed by: STUDENT IN AN ORGANIZED HEALTH CARE EDUCATION/TRAINING PROGRAM

## 2024-09-24 PROCEDURE — 80053 COMPREHEN METABOLIC PANEL: CPT

## 2024-09-24 PROCEDURE — 96417 CHEMO IV INFUS EACH ADDL SEQ: CPT

## 2024-09-24 RX ORDER — SODIUM CHLORIDE 0.9 % (FLUSH) 0.9 %
5-40 SYRINGE (ML) INJECTION PRN
Status: DISCONTINUED | OUTPATIENT
Start: 2024-09-24 | End: 2024-09-25 | Stop reason: HOSPADM

## 2024-09-24 RX ORDER — EPINEPHRINE 1 MG/ML
0.3 INJECTION, SOLUTION, CONCENTRATE INTRAVENOUS PRN
OUTPATIENT
Start: 2024-09-24

## 2024-09-24 RX ORDER — HEPARIN 100 UNIT/ML
500 SYRINGE INTRAVENOUS PRN
Status: DISCONTINUED | OUTPATIENT
Start: 2024-09-24 | End: 2024-09-25 | Stop reason: HOSPADM

## 2024-09-24 RX ORDER — SODIUM CHLORIDE 9 MG/ML
25 INJECTION, SOLUTION INTRAVENOUS PRN
OUTPATIENT
Start: 2024-09-24

## 2024-09-24 RX ORDER — ACETAMINOPHEN 325 MG/1
650 TABLET ORAL
OUTPATIENT
Start: 2024-09-24

## 2024-09-24 RX ORDER — SODIUM CHLORIDE 9 MG/ML
5-250 INJECTION, SOLUTION INTRAVENOUS PRN
Status: DISCONTINUED | OUTPATIENT
Start: 2024-09-24 | End: 2024-09-25 | Stop reason: HOSPADM

## 2024-09-24 RX ORDER — PALONOSETRON HYDROCHLORIDE 0.05 MG/ML
0.25 INJECTION, SOLUTION INTRAVENOUS ONCE
Status: COMPLETED | OUTPATIENT
Start: 2024-09-24 | End: 2024-09-24

## 2024-09-24 RX ORDER — SODIUM CHLORIDE 0.9 % (FLUSH) 0.9 %
5-40 SYRINGE (ML) INJECTION PRN
OUTPATIENT
Start: 2024-09-24

## 2024-09-24 RX ORDER — DIPHENHYDRAMINE HYDROCHLORIDE 50 MG/ML
50 INJECTION INTRAMUSCULAR; INTRAVENOUS
OUTPATIENT
Start: 2024-09-24

## 2024-09-24 RX ORDER — SODIUM CHLORIDE 9 MG/ML
INJECTION, SOLUTION INTRAVENOUS CONTINUOUS
OUTPATIENT
Start: 2024-09-24

## 2024-09-24 RX ORDER — ONDANSETRON 2 MG/ML
8 INJECTION INTRAMUSCULAR; INTRAVENOUS
OUTPATIENT
Start: 2024-09-24

## 2024-09-24 RX ORDER — ALBUTEROL SULFATE 90 UG/1
4 INHALANT RESPIRATORY (INHALATION) PRN
OUTPATIENT
Start: 2024-09-24

## 2024-09-24 RX ORDER — DEXAMETHASONE SODIUM PHOSPHATE 10 MG/ML
10 INJECTION, SOLUTION INTRAMUSCULAR; INTRAVENOUS ONCE
Status: COMPLETED | OUTPATIENT
Start: 2024-09-24 | End: 2024-09-24

## 2024-09-24 RX ORDER — HEPARIN 100 UNIT/ML
500 SYRINGE INTRAVENOUS PRN
OUTPATIENT
Start: 2024-09-24

## 2024-09-24 RX ADMIN — FOSAPREPITANT 150 MG: 150 INJECTION, POWDER, LYOPHILIZED, FOR SOLUTION INTRAVENOUS at 11:47

## 2024-09-24 RX ADMIN — ETOPOSIDE 210 MG: 20 INJECTION, SOLUTION INTRAVENOUS at 12:56

## 2024-09-24 RX ADMIN — PALONOSETRON 0.25 MG: 0.25 INJECTION, SOLUTION INTRAVENOUS at 11:37

## 2024-09-24 RX ADMIN — DEXAMETHASONE SODIUM PHOSPHATE 10 MG: 10 INJECTION, SOLUTION INTRAMUSCULAR; INTRAVENOUS at 11:39

## 2024-09-24 RX ADMIN — CARBOPLATIN 435 MG: 10 INJECTION INTRAVENOUS at 12:14

## 2024-09-24 RX ADMIN — SODIUM CHLORIDE 100 ML/HR: 9 INJECTION, SOLUTION INTRAVENOUS at 11:37

## 2024-09-24 RX ADMIN — SODIUM CHLORIDE, PRESERVATIVE FREE 10 ML: 5 INJECTION INTRAVENOUS at 10:06

## 2024-09-24 NOTE — PROGRESS NOTES
Wt Readings from Last 3 Encounters:   09/23/24 76.7 kg (169 lb)   09/19/24 77.1 kg (169 lb 14.4 oz)   09/17/24 81.6 kg (180 lb)     Met w/ pt during infusion. He admits to decreased PO intake 2/2 radiation esophagitis as well as lack of hunger cues/early satiety. Attempted to review strategies for improving overall intake. Pt minimally interested at this time. Pt encouraged to contact this clinician as needed. Will attempt to follow as able.  Electronically signed by Meron Zavaleta, MS, RD, LD on 9/24/2024 at 3:34 PM

## 2024-09-25 ENCOUNTER — HOSPITAL ENCOUNTER (OUTPATIENT)
Dept: RADIATION ONCOLOGY | Age: 74
Discharge: HOME | End: 2024-09-25
Payer: MEDICARE

## 2024-09-25 ENCOUNTER — HOSPITAL ENCOUNTER (OUTPATIENT)
Dept: INFUSION THERAPY | Age: 74
Discharge: HOME | End: 2024-09-25
Payer: MEDICARE

## 2024-09-25 VITALS
RESPIRATION RATE: 18 BRPM | DIASTOLIC BLOOD PRESSURE: 76 MMHG | SYSTOLIC BLOOD PRESSURE: 118 MMHG | HEART RATE: 78 BPM | TEMPERATURE: 97.4 F | OXYGEN SATURATION: 97 %

## 2024-09-25 VITALS
DIASTOLIC BLOOD PRESSURE: 63 MMHG | TEMPERATURE: 96.7 F | OXYGEN SATURATION: 97 % | HEART RATE: 83 BPM | RESPIRATION RATE: 18 BRPM | SYSTOLIC BLOOD PRESSURE: 133 MMHG

## 2024-09-25 DIAGNOSIS — C34.90 SMALL CELL LUNG CANCER (HCC): Primary | ICD-10-CM

## 2024-09-25 DIAGNOSIS — C34.90 MALIGNANT NEOPLASM OF LUNG, UNSPECIFIED LATERALITY, UNSPECIFIED PART OF LUNG (HCC): Primary | ICD-10-CM

## 2024-09-25 PROCEDURE — 2580000003 HC RX 258: Performed by: STUDENT IN AN ORGANIZED HEALTH CARE EDUCATION/TRAINING PROGRAM

## 2024-09-25 PROCEDURE — 96375 TX/PRO/DX INJ NEW DRUG ADDON: CPT

## 2024-09-25 PROCEDURE — 96413 CHEMO IV INFUSION 1 HR: CPT

## 2024-09-25 PROCEDURE — 6360000002 HC RX W HCPCS: Performed by: STUDENT IN AN ORGANIZED HEALTH CARE EDUCATION/TRAINING PROGRAM

## 2024-09-25 PROCEDURE — 77412 RADIATION TX DELIVERY LVL 3: CPT | Performed by: RADIOLOGY

## 2024-09-25 RX ORDER — SODIUM CHLORIDE 9 MG/ML
5-250 INJECTION, SOLUTION INTRAVENOUS PRN
Status: DISCONTINUED | OUTPATIENT
Start: 2024-09-25 | End: 2024-09-26 | Stop reason: HOSPADM

## 2024-09-25 RX ORDER — SODIUM CHLORIDE 0.9 % (FLUSH) 0.9 %
5-40 SYRINGE (ML) INJECTION PRN
Status: DISCONTINUED | OUTPATIENT
Start: 2024-09-25 | End: 2024-09-26 | Stop reason: HOSPADM

## 2024-09-25 RX ORDER — DEXAMETHASONE SODIUM PHOSPHATE 10 MG/ML
8 INJECTION, SOLUTION INTRAMUSCULAR; INTRAVENOUS ONCE
Status: COMPLETED | OUTPATIENT
Start: 2024-09-25 | End: 2024-09-25

## 2024-09-25 RX ORDER — HEPARIN 100 UNIT/ML
500 SYRINGE INTRAVENOUS PRN
Status: DISCONTINUED | OUTPATIENT
Start: 2024-09-25 | End: 2024-09-26 | Stop reason: HOSPADM

## 2024-09-25 RX ADMIN — DEXAMETHASONE SODIUM PHOSPHATE 8 MG: 10 INJECTION, SOLUTION INTRAMUSCULAR; INTRAVENOUS at 10:56

## 2024-09-25 RX ADMIN — SODIUM CHLORIDE 100 ML/HR: 9 INJECTION, SOLUTION INTRAVENOUS at 10:56

## 2024-09-25 RX ADMIN — ETOPOSIDE 210 MG: 20 INJECTION, SOLUTION INTRAVENOUS at 11:17

## 2024-09-26 ENCOUNTER — HOSPITAL ENCOUNTER (OUTPATIENT)
Dept: RADIATION ONCOLOGY | Age: 74
Discharge: HOME | End: 2024-09-26
Payer: MEDICARE

## 2024-09-26 ENCOUNTER — HOSPITAL ENCOUNTER (OUTPATIENT)
Dept: INFUSION THERAPY | Age: 74
Discharge: HOME | End: 2024-09-26
Payer: MEDICARE

## 2024-09-26 ENCOUNTER — TELEPHONE (OUTPATIENT)
Dept: CASE MANAGEMENT | Age: 74
End: 2024-09-26

## 2024-09-26 VITALS
RESPIRATION RATE: 17 BRPM | OXYGEN SATURATION: 93 % | HEART RATE: 100 BPM | DIASTOLIC BLOOD PRESSURE: 56 MMHG | TEMPERATURE: 98.1 F | SYSTOLIC BLOOD PRESSURE: 115 MMHG

## 2024-09-26 DIAGNOSIS — C34.90 SMALL CELL LUNG CANCER (HCC): Primary | ICD-10-CM

## 2024-09-26 PROCEDURE — 96375 TX/PRO/DX INJ NEW DRUG ADDON: CPT

## 2024-09-26 PROCEDURE — 2580000003 HC RX 258: Performed by: STUDENT IN AN ORGANIZED HEALTH CARE EDUCATION/TRAINING PROGRAM

## 2024-09-26 PROCEDURE — 96377 APPLICATON ON-BODY INJECTOR: CPT

## 2024-09-26 PROCEDURE — 96413 CHEMO IV INFUSION 1 HR: CPT

## 2024-09-26 PROCEDURE — 77412 RADIATION TX DELIVERY LVL 3: CPT | Performed by: RADIOLOGY

## 2024-09-26 PROCEDURE — 96361 HYDRATE IV INFUSION ADD-ON: CPT

## 2024-09-26 PROCEDURE — 6360000002 HC RX W HCPCS: Performed by: STUDENT IN AN ORGANIZED HEALTH CARE EDUCATION/TRAINING PROGRAM

## 2024-09-26 RX ORDER — HEPARIN 100 UNIT/ML
500 SYRINGE INTRAVENOUS PRN
Status: DISCONTINUED | OUTPATIENT
Start: 2024-09-26 | End: 2024-09-27 | Stop reason: HOSPADM

## 2024-09-26 RX ORDER — DEXAMETHASONE SODIUM PHOSPHATE 10 MG/ML
8 INJECTION, SOLUTION INTRAMUSCULAR; INTRAVENOUS ONCE
Status: COMPLETED | OUTPATIENT
Start: 2024-09-26 | End: 2024-09-26

## 2024-09-26 RX ORDER — SODIUM CHLORIDE 0.9 % (FLUSH) 0.9 %
5-40 SYRINGE (ML) INJECTION PRN
Status: DISCONTINUED | OUTPATIENT
Start: 2024-09-26 | End: 2024-09-27 | Stop reason: HOSPADM

## 2024-09-26 RX ORDER — SODIUM CHLORIDE 9 MG/ML
5-250 INJECTION, SOLUTION INTRAVENOUS PRN
Status: DISCONTINUED | OUTPATIENT
Start: 2024-09-26 | End: 2024-09-27 | Stop reason: HOSPADM

## 2024-09-26 RX ADMIN — DEXAMETHASONE SODIUM PHOSPHATE 8 MG: 10 INJECTION, SOLUTION INTRAMUSCULAR; INTRAVENOUS at 11:05

## 2024-09-26 RX ADMIN — HEPARIN 500 UNITS: 100 SYRINGE at 12:51

## 2024-09-26 RX ADMIN — PEGFILGRASTIM 6 MG: KIT SUBCUTANEOUS at 12:48

## 2024-09-26 RX ADMIN — SODIUM CHLORIDE 200 ML/HR: 9 INJECTION, SOLUTION INTRAVENOUS at 11:05

## 2024-09-26 RX ADMIN — SODIUM CHLORIDE, PRESERVATIVE FREE 10 ML: 5 INJECTION INTRAVENOUS at 11:05

## 2024-09-26 RX ADMIN — ETOPOSIDE 210 MG: 20 INJECTION INTRAVENOUS at 11:31

## 2024-09-27 ENCOUNTER — HOSPITAL ENCOUNTER (OUTPATIENT)
Dept: RADIATION ONCOLOGY | Age: 74
Discharge: HOME | End: 2024-09-27
Payer: MEDICARE

## 2024-09-27 PROCEDURE — 77412 RADIATION TX DELIVERY LVL 3: CPT | Performed by: RADIOLOGY

## 2024-09-30 ENCOUNTER — HOSPITAL ENCOUNTER (OUTPATIENT)
Dept: RADIATION ONCOLOGY | Age: 74
Discharge: HOME | End: 2024-09-30
Payer: MEDICARE

## 2024-10-01 ENCOUNTER — TELEPHONE (OUTPATIENT)
Dept: SURGERY | Age: 74
End: 2024-10-01

## 2024-10-01 ENCOUNTER — HOSPITAL ENCOUNTER (OUTPATIENT)
Dept: RADIATION ONCOLOGY | Age: 74
Discharge: HOME | End: 2024-10-01
Payer: MEDICARE

## 2024-10-01 PROCEDURE — 77336 RADIATION PHYSICS CONSULT: CPT | Performed by: RADIOLOGY

## 2024-10-01 PROCEDURE — 77412 RADIATION TX DELIVERY LVL 3: CPT | Performed by: RADIOLOGY

## 2024-10-01 NOTE — TELEPHONE ENCOUNTER
MA received call from patient's wife, Anna, stating patient hasn't received pathology results from patient's recent EGD on 09/17. Patient can be reached at [phone]. Patient's wife states patient is currently undergoing chemo today until 4pm and won't be available to answer home phone until after that time. MA verbalized understanding.     MA routing message to Dr. Pereyra for further advisement.     Electronically signed by Ita Sheppard MA on 10/1/2024 at 2:02 PM

## 2024-10-02 ENCOUNTER — APPOINTMENT (OUTPATIENT)
Dept: RADIATION ONCOLOGY | Age: 74
End: 2024-10-02
Payer: MEDICARE

## 2024-10-03 ENCOUNTER — APPOINTMENT (OUTPATIENT)
Dept: RADIATION ONCOLOGY | Age: 74
End: 2024-10-03
Payer: MEDICARE

## 2024-10-04 ENCOUNTER — APPOINTMENT (OUTPATIENT)
Dept: RADIATION ONCOLOGY | Age: 74
End: 2024-10-04
Payer: MEDICARE

## 2024-10-07 ENCOUNTER — TELEPHONE (OUTPATIENT)
Dept: PRIMARY CARE CLINIC | Age: 74
End: 2024-10-07

## 2024-10-07 ENCOUNTER — APPOINTMENT (OUTPATIENT)
Dept: RADIATION ONCOLOGY | Age: 74
End: 2024-10-07
Payer: MEDICARE

## 2024-10-07 RX ORDER — PANTOPRAZOLE SODIUM 40 MG/1
40 TABLET, DELAYED RELEASE ORAL
Qty: 60 TABLET | Refills: 0 | Status: SHIPPED
Start: 2024-10-07 | End: 2024-10-08

## 2024-10-07 NOTE — TELEPHONE ENCOUNTER
Lulu from [facility] is requesting you sign the Death Certificate for Mr. Issa.    Please advise    Lulu  [facility]:  [phone]

## 2024-10-08 ENCOUNTER — APPOINTMENT (OUTPATIENT)
Dept: RADIATION ONCOLOGY | Age: 74
End: 2024-10-08
Payer: MEDICARE

## 2024-10-09 ENCOUNTER — APPOINTMENT (OUTPATIENT)
Dept: RADIATION ONCOLOGY | Age: 74
End: 2024-10-09
Payer: MEDICARE

## 2024-10-10 ENCOUNTER — HOSPITAL ENCOUNTER (OUTPATIENT)
Dept: INFUSION THERAPY | Age: 74
End: 2024-10-10

## 2024-10-10 ENCOUNTER — APPOINTMENT (OUTPATIENT)
Dept: RADIATION ONCOLOGY | Age: 74
End: 2024-10-10
Payer: MEDICARE

## 2024-10-11 ENCOUNTER — APPOINTMENT (OUTPATIENT)
Dept: RADIATION ONCOLOGY | Age: 74
End: 2024-10-11
Payer: MEDICARE

## 2024-10-14 ENCOUNTER — APPOINTMENT (OUTPATIENT)
Dept: RADIATION ONCOLOGY | Age: 74
End: 2024-10-14
Payer: MEDICARE

## 2024-10-15 ENCOUNTER — APPOINTMENT (OUTPATIENT)
Dept: RADIATION ONCOLOGY | Age: 74
End: 2024-10-15
Payer: MEDICARE
